# Patient Record
Sex: MALE | Race: WHITE | NOT HISPANIC OR LATINO | Employment: OTHER | URBAN - METROPOLITAN AREA
[De-identification: names, ages, dates, MRNs, and addresses within clinical notes are randomized per-mention and may not be internally consistent; named-entity substitution may affect disease eponyms.]

---

## 2017-01-20 ENCOUNTER — APPOINTMENT (OUTPATIENT)
Dept: LAB | Age: 57
End: 2017-01-20
Payer: COMMERCIAL

## 2017-01-20 ENCOUNTER — ALLSCRIPTS OFFICE VISIT (OUTPATIENT)
Dept: OTHER | Facility: OTHER | Age: 57
End: 2017-01-20

## 2017-01-20 ENCOUNTER — TRANSCRIBE ORDERS (OUTPATIENT)
Dept: ADMINISTRATIVE | Age: 57
End: 2017-01-20

## 2017-01-20 DIAGNOSIS — M79.10 MYALGIA: ICD-10-CM

## 2017-01-20 LAB
25(OH)D3 SERPL-MCNC: 7.9 NG/ML (ref 30–100)
CRP SERPL QL: 8.8 MG/L

## 2017-01-20 PROCEDURE — 86200 CCP ANTIBODY: CPT

## 2017-01-20 PROCEDURE — 86430 RHEUMATOID FACTOR TEST QUAL: CPT

## 2017-01-20 PROCEDURE — 82085 ASSAY OF ALDOLASE: CPT

## 2017-01-20 PROCEDURE — 36415 COLL VENOUS BLD VENIPUNCTURE: CPT

## 2017-01-20 PROCEDURE — 82306 VITAMIN D 25 HYDROXY: CPT

## 2017-01-20 PROCEDURE — 86038 ANTINUCLEAR ANTIBODIES: CPT

## 2017-01-20 PROCEDURE — 86140 C-REACTIVE PROTEIN: CPT

## 2017-01-23 ENCOUNTER — GENERIC CONVERSION - ENCOUNTER (OUTPATIENT)
Dept: OTHER | Facility: OTHER | Age: 57
End: 2017-01-23

## 2017-01-23 LAB
ALDOLASE SERPL-CCNC: 12.6 U/L (ref 3.3–10.3)
CCP IGA+IGG SERPL IA-ACNC: 3 UNITS (ref 0–19)
RHEUMATOID FACT SER QL LA: NEGATIVE
RYE IGE QN: NEGATIVE

## 2017-01-24 ENCOUNTER — GENERIC CONVERSION - ENCOUNTER (OUTPATIENT)
Dept: OTHER | Facility: OTHER | Age: 57
End: 2017-01-24

## 2017-01-31 ENCOUNTER — ALLSCRIPTS OFFICE VISIT (OUTPATIENT)
Dept: RADIOLOGY | Facility: CLINIC | Age: 57
End: 2017-01-31
Payer: COMMERCIAL

## 2017-02-08 ENCOUNTER — GENERIC CONVERSION - ENCOUNTER (OUTPATIENT)
Dept: OTHER | Facility: OTHER | Age: 57
End: 2017-02-08

## 2017-02-10 ENCOUNTER — ALLSCRIPTS OFFICE VISIT (OUTPATIENT)
Dept: OTHER | Facility: OTHER | Age: 57
End: 2017-02-10

## 2017-02-17 ENCOUNTER — ALLSCRIPTS OFFICE VISIT (OUTPATIENT)
Dept: OTHER | Facility: OTHER | Age: 57
End: 2017-02-17

## 2017-02-17 DIAGNOSIS — E55.9 VITAMIN D DEFICIENCY: ICD-10-CM

## 2017-05-12 ENCOUNTER — ALLSCRIPTS OFFICE VISIT (OUTPATIENT)
Dept: OTHER | Facility: OTHER | Age: 57
End: 2017-05-12

## 2017-05-16 ENCOUNTER — HOSPITAL ENCOUNTER (OUTPATIENT)
Dept: RADIOLOGY | Age: 57
Discharge: HOME | End: 2017-05-16
Payer: COMMERCIAL

## 2017-05-16 ENCOUNTER — TRANSCRIBE ORDERS (OUTPATIENT)
Dept: ADMINISTRATIVE | Age: 57
End: 2017-05-16

## 2017-05-16 DIAGNOSIS — N20.0 CALCULUS OF KIDNEY: ICD-10-CM

## 2017-05-16 PROCEDURE — 74000 HB X-RAY EXAM OF ABDOMEN (SINGLE ANTEROPOSTERIOR VIEW): CPT

## 2017-05-19 ENCOUNTER — ALLSCRIPTS OFFICE VISIT (OUTPATIENT)
Dept: OTHER | Facility: OTHER | Age: 57
End: 2017-05-19

## 2017-07-05 ENCOUNTER — APPOINTMENT (OUTPATIENT)
Dept: LAB | Facility: CLINIC | Age: 57
End: 2017-07-05
Payer: COMMERCIAL

## 2017-07-05 ENCOUNTER — TRANSCRIBE ORDERS (OUTPATIENT)
Dept: LAB | Facility: CLINIC | Age: 57
End: 2017-07-05

## 2017-07-05 DIAGNOSIS — R63.4 ABNORMAL WEIGHT LOSS: ICD-10-CM

## 2017-07-05 DIAGNOSIS — R53.83 OTHER FATIGUE: ICD-10-CM

## 2017-07-05 DIAGNOSIS — Z00.8 HEALTH EXAMINATION IN POPULATION SURVEY: ICD-10-CM

## 2017-07-05 DIAGNOSIS — Z00.8 HEALTH EXAMINATION IN POPULATION SURVEY: Primary | ICD-10-CM

## 2017-07-05 LAB
CHOLEST SERPL-MCNC: 248 MG/DL (ref 50–200)
EST. AVERAGE GLUCOSE BLD GHB EST-MCNC: 123 MG/DL
HBA1C MFR BLD: 5.9 % (ref 4.2–6.3)
HDLC SERPL-MCNC: 46 MG/DL (ref 40–60)
LDLC SERPL CALC-MCNC: 156 MG/DL (ref 0–100)
TRIGL SERPL-MCNC: 230 MG/DL

## 2017-07-05 PROCEDURE — 36415 COLL VENOUS BLD VENIPUNCTURE: CPT

## 2017-07-05 PROCEDURE — 80061 LIPID PANEL: CPT

## 2017-07-05 PROCEDURE — 83036 HEMOGLOBIN GLYCOSYLATED A1C: CPT

## 2017-07-20 ENCOUNTER — ALLSCRIPTS OFFICE VISIT (OUTPATIENT)
Dept: OTHER | Facility: OTHER | Age: 57
End: 2017-07-20

## 2017-07-20 DIAGNOSIS — E55.9 VITAMIN D DEFICIENCY: ICD-10-CM

## 2017-07-20 DIAGNOSIS — R53.83 OTHER FATIGUE: ICD-10-CM

## 2017-07-20 DIAGNOSIS — R63.4 ABNORMAL WEIGHT LOSS: ICD-10-CM

## 2017-07-20 DIAGNOSIS — M25.50 PAIN IN JOINT: ICD-10-CM

## 2017-07-21 ENCOUNTER — APPOINTMENT (OUTPATIENT)
Dept: LAB | Facility: CLINIC | Age: 57
End: 2017-07-21
Payer: COMMERCIAL

## 2017-07-21 DIAGNOSIS — I26.99 OTHER PULMONARY EMBOLISM WITHOUT ACUTE COR PULMONALE (HCC): ICD-10-CM

## 2017-07-21 DIAGNOSIS — M54.50 LOW BACK PAIN: ICD-10-CM

## 2017-07-21 DIAGNOSIS — M25.50 PAIN IN JOINT: ICD-10-CM

## 2017-07-21 DIAGNOSIS — R79.89 OTHER SPECIFIED ABNORMAL FINDINGS OF BLOOD CHEMISTRY: ICD-10-CM

## 2017-07-21 DIAGNOSIS — R63.4 ABNORMAL WEIGHT LOSS: ICD-10-CM

## 2017-07-21 DIAGNOSIS — E55.9 VITAMIN D DEFICIENCY: ICD-10-CM

## 2017-07-21 LAB
25(OH)D3 SERPL-MCNC: 23.3 NG/ML (ref 30–100)
BASOPHILS # BLD AUTO: 0.02 THOUSANDS/ΜL (ref 0–0.1)
BASOPHILS NFR BLD AUTO: 0 % (ref 0–1)
CK MB SERPL-MCNC: <1 % (ref 0–2.5)
CK MB SERPL-MCNC: <1 NG/ML (ref 0–5)
CK SERPL-CCNC: 217 U/L (ref 39–308)
CRP SERPL QL: <3 MG/L
EOSINOPHIL # BLD AUTO: 0.08 THOUSAND/ΜL (ref 0–0.61)
EOSINOPHIL NFR BLD AUTO: 1 % (ref 0–6)
ERYTHROCYTE [DISTWIDTH] IN BLOOD BY AUTOMATED COUNT: 13 % (ref 11.6–15.1)
HCT VFR BLD AUTO: 44.3 % (ref 36.5–49.3)
HGB BLD-MCNC: 15.1 G/DL (ref 12–17)
LYMPHOCYTES # BLD AUTO: 2.84 THOUSANDS/ΜL (ref 0.6–4.47)
LYMPHOCYTES NFR BLD AUTO: 43 % (ref 14–44)
MCH RBC QN AUTO: 30.8 PG (ref 26.8–34.3)
MCHC RBC AUTO-ENTMCNC: 34.1 G/DL (ref 31.4–37.4)
MCV RBC AUTO: 90 FL (ref 82–98)
MONOCYTES # BLD AUTO: 0.58 THOUSAND/ΜL (ref 0.17–1.22)
MONOCYTES NFR BLD AUTO: 9 % (ref 4–12)
NEUTROPHILS # BLD AUTO: 3.01 THOUSANDS/ΜL (ref 1.85–7.62)
NEUTS SEG NFR BLD AUTO: 47 % (ref 43–75)
NRBC BLD AUTO-RTO: 0 /100 WBCS
PLATELET # BLD AUTO: 214 THOUSANDS/UL (ref 149–390)
PMV BLD AUTO: 12.2 FL (ref 8.9–12.7)
RBC # BLD AUTO: 4.91 MILLION/UL (ref 3.88–5.62)
T4 FREE SERPL-MCNC: 0.94 NG/DL (ref 0.76–1.46)
TSH SERPL DL<=0.05 MIU/L-ACNC: 5.24 UIU/ML (ref 0.36–3.74)
WBC # BLD AUTO: 6.56 THOUSAND/UL (ref 4.31–10.16)

## 2017-07-21 PROCEDURE — 36415 COLL VENOUS BLD VENIPUNCTURE: CPT

## 2017-07-21 PROCEDURE — 84479 ASSAY OF THYROID (T3 OR T4): CPT

## 2017-07-21 PROCEDURE — 86618 LYME DISEASE ANTIBODY: CPT

## 2017-07-21 PROCEDURE — 84443 ASSAY THYROID STIM HORMONE: CPT

## 2017-07-21 PROCEDURE — 82550 ASSAY OF CK (CPK): CPT

## 2017-07-21 PROCEDURE — 86140 C-REACTIVE PROTEIN: CPT

## 2017-07-21 PROCEDURE — 82306 VITAMIN D 25 HYDROXY: CPT

## 2017-07-21 PROCEDURE — 84439 ASSAY OF FREE THYROXINE: CPT

## 2017-07-21 PROCEDURE — 85025 COMPLETE CBC W/AUTO DIFF WBC: CPT

## 2017-07-21 PROCEDURE — 82553 CREATINE MB FRACTION: CPT

## 2017-07-23 ENCOUNTER — GENERIC CONVERSION - ENCOUNTER (OUTPATIENT)
Dept: OTHER | Facility: OTHER | Age: 57
End: 2017-07-23

## 2017-07-23 LAB
B BURGDOR IGG SER IA-ACNC: 0.09
B BURGDOR IGM SER IA-ACNC: 0.75

## 2017-07-24 ENCOUNTER — GENERIC CONVERSION - ENCOUNTER (OUTPATIENT)
Dept: OTHER | Facility: OTHER | Age: 57
End: 2017-07-24

## 2017-07-25 ENCOUNTER — GENERIC CONVERSION - ENCOUNTER (OUTPATIENT)
Dept: OTHER | Facility: OTHER | Age: 57
End: 2017-07-25

## 2017-07-25 LAB — T3RU NFR SERPL: 29 % (ref 24–39)

## 2017-09-08 ENCOUNTER — GENERIC CONVERSION - ENCOUNTER (OUTPATIENT)
Dept: OTHER | Facility: OTHER | Age: 57
End: 2017-09-08

## 2017-10-24 ENCOUNTER — ALLSCRIPTS OFFICE VISIT (OUTPATIENT)
Dept: OTHER | Facility: OTHER | Age: 57
End: 2017-10-24

## 2017-10-24 DIAGNOSIS — L98.9 DISORDER OF SKIN OR SUBCUTANEOUS TISSUE: ICD-10-CM

## 2017-10-24 PROCEDURE — 88305 TISSUE EXAM BY PATHOLOGIST: CPT | Performed by: PHYSICIAN ASSISTANT

## 2017-10-25 ENCOUNTER — LAB REQUISITION (OUTPATIENT)
Dept: LAB | Facility: HOSPITAL | Age: 57
End: 2017-10-25
Payer: COMMERCIAL

## 2017-10-25 DIAGNOSIS — L98.9 DISORDER OF SKIN OR SUBCUTANEOUS TISSUE: ICD-10-CM

## 2017-10-27 ENCOUNTER — TRANSCRIBE ORDERS (OUTPATIENT)
Dept: LAB | Facility: CLINIC | Age: 57
End: 2017-10-27

## 2017-10-27 ENCOUNTER — APPOINTMENT (OUTPATIENT)
Dept: LAB | Facility: CLINIC | Age: 57
End: 2017-10-27
Payer: COMMERCIAL

## 2017-10-27 DIAGNOSIS — R79.89 OTHER SPECIFIED ABNORMAL FINDINGS OF BLOOD CHEMISTRY: ICD-10-CM

## 2017-10-27 DIAGNOSIS — M54.50 LOW BACK PAIN: ICD-10-CM

## 2017-10-27 DIAGNOSIS — Z12.5 ENCOUNTER FOR SCREENING FOR MALIGNANT NEOPLASM OF PROSTATE: ICD-10-CM

## 2017-10-27 DIAGNOSIS — I26.99 OTHER PULMONARY EMBOLISM WITHOUT ACUTE COR PULMONALE (HCC): ICD-10-CM

## 2017-10-27 LAB
ALBUMIN SERPL BCP-MCNC: 3.9 G/DL (ref 3.5–5)
ALP SERPL-CCNC: 55 U/L (ref 46–116)
ALT SERPL W P-5'-P-CCNC: 27 U/L (ref 12–78)
ANION GAP SERPL CALCULATED.3IONS-SCNC: 4 MMOL/L (ref 4–13)
AST SERPL W P-5'-P-CCNC: 17 U/L (ref 5–45)
BASOPHILS # BLD AUTO: 0.01 THOUSANDS/ΜL (ref 0–0.1)
BASOPHILS NFR BLD AUTO: 0 % (ref 0–1)
BILIRUB SERPL-MCNC: 0.59 MG/DL (ref 0.2–1)
BUN SERPL-MCNC: 18 MG/DL (ref 5–25)
CALCIUM SERPL-MCNC: 9 MG/DL (ref 8.3–10.1)
CHLORIDE SERPL-SCNC: 104 MMOL/L (ref 100–108)
CO2 SERPL-SCNC: 28 MMOL/L (ref 21–32)
CREAT SERPL-MCNC: 1.2 MG/DL (ref 0.6–1.3)
EOSINOPHIL # BLD AUTO: 0.09 THOUSAND/ΜL (ref 0–0.61)
EOSINOPHIL NFR BLD AUTO: 2 % (ref 0–6)
ERYTHROCYTE [DISTWIDTH] IN BLOOD BY AUTOMATED COUNT: 12.8 % (ref 11.6–15.1)
GFR SERPL CREATININE-BSD FRML MDRD: 68 ML/MIN/1.73SQ M
GLUCOSE SERPL-MCNC: 102 MG/DL (ref 65–140)
HCT VFR BLD AUTO: 45.5 % (ref 36.5–49.3)
HGB BLD-MCNC: 16 G/DL (ref 12–17)
LYMPHOCYTES # BLD AUTO: 2.86 THOUSANDS/ΜL (ref 0.6–4.47)
LYMPHOCYTES NFR BLD AUTO: 47 % (ref 14–44)
MCH RBC QN AUTO: 31.5 PG (ref 26.8–34.3)
MCHC RBC AUTO-ENTMCNC: 35.2 G/DL (ref 31.4–37.4)
MCV RBC AUTO: 90 FL (ref 82–98)
MONOCYTES # BLD AUTO: 0.5 THOUSAND/ΜL (ref 0.17–1.22)
MONOCYTES NFR BLD AUTO: 8 % (ref 4–12)
NEUTROPHILS # BLD AUTO: 2.64 THOUSANDS/ΜL (ref 1.85–7.62)
NEUTS SEG NFR BLD AUTO: 43 % (ref 43–75)
NRBC BLD AUTO-RTO: 0 /100 WBCS
PLATELET # BLD AUTO: 215 THOUSANDS/UL (ref 149–390)
PMV BLD AUTO: 11.8 FL (ref 8.9–12.7)
POTASSIUM SERPL-SCNC: 3.7 MMOL/L (ref 3.5–5.3)
PROT SERPL-MCNC: 7.5 G/DL (ref 6.4–8.2)
PSA SERPL-MCNC: 1.4 NG/ML (ref 0–4)
RBC # BLD AUTO: 5.08 MILLION/UL (ref 3.88–5.62)
SODIUM SERPL-SCNC: 136 MMOL/L (ref 136–145)
WBC # BLD AUTO: 6.14 THOUSAND/UL (ref 4.31–10.16)

## 2017-10-27 PROCEDURE — 80053 COMPREHEN METABOLIC PANEL: CPT

## 2017-10-27 PROCEDURE — G0103 PSA SCREENING: HCPCS

## 2017-10-27 PROCEDURE — 85025 COMPLETE CBC W/AUTO DIFF WBC: CPT

## 2017-10-27 PROCEDURE — 36415 COLL VENOUS BLD VENIPUNCTURE: CPT

## 2017-10-30 DIAGNOSIS — I26.99 OTHER PULMONARY EMBOLISM WITHOUT ACUTE COR PULMONALE (HCC): ICD-10-CM

## 2017-10-30 DIAGNOSIS — Z12.5 ENCOUNTER FOR SCREENING FOR MALIGNANT NEOPLASM OF PROSTATE: ICD-10-CM

## 2017-11-10 ENCOUNTER — GENERIC CONVERSION - ENCOUNTER (OUTPATIENT)
Dept: OTHER | Facility: OTHER | Age: 57
End: 2017-11-10

## 2017-11-29 ENCOUNTER — ALLSCRIPTS OFFICE VISIT (OUTPATIENT)
Dept: OTHER | Facility: OTHER | Age: 57
End: 2017-11-29

## 2017-12-05 NOTE — PROGRESS NOTES
Assessment    1  Walking pneumonia (486) (J18 9)    Plan  Walking pneumonia    · Amoxicillin-Pot Clavulanate 500-125 MG Oral Tablet; TAKE 1 TABLET EVERY 12  HOURS DAILY    Discussion/Summary    Walking pneumonia- antibiotics prescribed  Continue with cough medication  Possible side effects of new medications were reviewed with the patient/guardian today  The treatment plan was reviewed with the patient/guardian  The patient/guardian understands and agrees with the treatment plan      Chief Complaint  Patient c/o runny nose, cough, yellowish discolored mucus, sore throat, headaches x3 weeks  History of Present Illness    Clark Jimenes presents with complaints of sudden onset of constant episodes of moderate cold symptoms  Episodes started 3 weeks ago Symptoms are not improved by OTC cold medications (worse at night)   Associated symptoms include nasal congestion, post nasal drainage, hoarseness, productive cough, shortness of breath and fever, but no facial pressure, no facial pain, no headache, no plugged ear(s), no ear pain, no wheezing, no fatigue, no nausea, no vomiting and no chills  The patient presents with complaints of sore throat (first three days)       has been taking Sudafed, sinus headache medication, runny nose  Gets relief for 3 hours but cough does not help  His cough is productive but copious  Using halls at bedtime      Review of Systems    Constitutional: as noted in HPI  Eyes: as noted in HPI    ENT: as noted in HPI  Cardiovascular: no chest pain  Respiratory: as noted in HPI  Gastrointestinal: as noted in HPI  Musculoskeletal: myalgias  Neurological: headache, but as noted in HPI  ROS reviewed  Active Problems    1  Abnormal weight loss (783 21) (R63 4)   2  Anticoagulant long-term use (V58 61) (Z79 01)   3  Arthralgia (719 40) (M25 50)   4  Bilateral flank pain (789 09) (R10 9)   5  Degeneration of lumbar or lumbosacral intervertebral disc (722 52) (M51 37)   6  Depression (311) (F32 9)   7  Encounter for prostate cancer screening (V76 44) (Z12 5)   8  Encounter for screening colonoscopy (V76 51) (Z12 11)   9  Erectile dysfunction, unspecified erectile dysfunction type (607 84) (N52 9)   10  Fatigue (780 79) (R53 83)   11  Flu vaccine need (V04 81) (Z23)   12  Frontal headache (784 0) (R51)   13  Hip pain, right (719 45) (M25 551)   14  Hyperlipidemia (272 4) (E78 5)   15  Kidney stones (592 0) (N20 0)   16  Late onset dysthymia (300 4) (F34 1)   17  Left chest pressure (786 59) (R07 89)   18  Loose bowel movements (787 91) (R19 7)   19  Lower back pain (724 2) (M54 5)   20  Lumbar disc herniation with radiculopathy (722 10) (M51 16)   21  Lumbar radiculopathy (724 4) (M54 16)   22  Lumbosacral radiculopathy (724 4) (M54 17)   23  Myalgia (729 1) (M79 1)   24  Neck strain (847 0) (S16 1XXA)   25  Nephrolithiasis (592 0) (N20 0)   26  Paresthesia (782 0) (R20 2)   27  Pulmonary embolism (415 19) (I26 99)   28  Skin lesion (709 9) (L98 9)   29  Thoracic sprain (847 1) (S23 9XXA)   30  Vitamin D deficiency (268 9) (E55 9)    Past Medical History    The active problems and past medical history were reviewed and updated today  Surgical History    1  History of Hernia Repair   2  History of Oral Surgery Tooth Extraction [place] Tooth    The surgical history was reviewed and updated today  Family History  Mother    1  Family history of cerebrovascular accident (V17 1) (Z82 3)   2  Family history of malignant neoplasm of breast (V16 3) (Z80 3)  Father    3  Family history of diabetes mellitus (V18 0) (Z83 3)   4  Family history of malignant neoplasm (V16 9) (Z80 9)  Brother    5  Family history of melanoma (V16 8) (Z80 8)    The family history was reviewed and updated today         Social History    · Advance directive information unavailable   · Always uses seat belt   · Caffeine use (V49 89) (F15 90)   · Denied: History of Exercises occasionally   · Full-time employment   · Denied: History of domestic violence   ·    · Never smoker   · No alcohol use   · One child  The social history was reviewed and is unchanged  Current Meds   1  Eliquis 2 5 MG Oral Tablet; Therapy: (Recorded:29Nov2017) to Recorded    The medication list was reviewed and updated today  Allergies    1  No Known Drug Allergies    2  No Known Environmental Allergies   3  No Known Food Allergies    Vitals  Vital Signs    Recorded: 67ZVL4501 06:12PM   Temperature 97 5 F   Heart Rate 80   Respiration 16   Systolic 225   Diastolic 70   Height 6 ft 2 in   Weight 199 lb 6 4 oz   BMI Calculated 25 6   BSA Calculated 2 17     Physical Exam    Constitutional   General appearance: No acute distress, well appearing and well nourished  Ears, Nose, Mouth, and Throat   Otoscopic examination: Tympanic membrance translucent with normal light reflex  Canals patent without erythema  Oropharynx: Abnormal   There was erythema of both tonsils  hoarse voice  Pulmonary   Respiratory effort: No increased work of breathing or signs of respiratory distress  Auscultation of lungs: Abnormal   rhonchi over the right midlung field  Cardiovascular   Auscultation of heart: Normal rate and rhythm, normal S1 and S2, without murmurs  Lymphatic   Palpation of lymph nodes in neck: Abnormal   bilateral anterior cervical node enlargement  Psychiatric   Orientation to person, place and time: Normal     Mood and affect: Normal          Future Appointments    Date/Time Provider Specialty Site   12/08/2017 08:20 MARILIN Simon  Hematology Oncology [facility]   [facility]2018 01[facility]00 [facility]     Signatures   Electronically signed by : Anthony Jones; Nov 29 2017  6:33PM EST                       (Author)    Electronically signed by :  Pily Bower MD; Nov 30 2017  7:55AM EST                       (Author)

## 2017-12-08 ENCOUNTER — GENERIC CONVERSION - ENCOUNTER (OUTPATIENT)
Dept: OTHER | Facility: OTHER | Age: 57
End: 2017-12-08

## 2018-01-08 DIAGNOSIS — R53.83 OTHER FATIGUE: ICD-10-CM

## 2018-01-08 DIAGNOSIS — E55.9 VITAMIN D DEFICIENCY: ICD-10-CM

## 2018-01-08 DIAGNOSIS — E78.5 HYPERLIPIDEMIA: ICD-10-CM

## 2018-01-09 NOTE — MISCELLANEOUS
Message     Recorded as Task   Date: 11/01/2016 12:59 PM, Created By: Ron Thorne   Task Name: Call Back   Assigned To: Audra Barraza   Regarding Patient: Mirella Varma, Status: Active   CommentLovette Plum - 01 Nov 2016 12:59 PM     TASK CREATED  Caller: Self; Results Inquiry; [phone] (Home)  [phone] [facility]   [facility] 01 Nov [facility] 3:27 PM     TASK EDITED  Called and spoke with pt regarding his 10/28/16 CT stone study  The study showed a 3 mm left midpole kidney stone  Pt is only having severe pain on his right side/back, especially with getting up from a sitting position  Pt was encouraged to keep his f/u appointment with his PCP this week  Active Problems    1  Abdominal pain (789 00) (R10 9)   2  Abnormal weight loss (783 21) (R63 4)   3  Anticoagulant long-term use (V58 61) (Z79 01)   4  Bilateral flank pain (789 09) (R10 9)   5  Degeneration of lumbar or lumbosacral intervertebral disc (722 52) (M51 37)   6  Dizziness (780 4) (R42)   7  Encounter for prostate cancer screening (V76 44) (Z12 5)   8  Encounter for screening colonoscopy (V76 51) (Z12 11)   9  Erectile dysfunction, unspecified erectile dysfunction type (607 84) (N52 9)   10  Fatigue (780 79) (R53 83)   11  Flu vaccine need (V04 81) (Z23)   12  Frontal headache (784 0) (R51)   13  Hyperlipidemia (272 4) (E78 5)   14  Kidney stones (592 0) (N20 0)   15  Late onset dysthymia (300 4) (F34 1)   16  Left chest pressure (786 59) (R07 89)   17  Loose bowel movements (787 91) (R19 7)   18  Lower back pain (724 2) (M54 5)   19  Myalgia (729 1) (M79 1)   20  Neck strain (847 0) (S16 1XXA)   21  Nephrolithiasis (592 0) (N20 0)   22  Paresthesia (782 0) (R20 2)   23  Pulmonary embolism (415 19) (I26 99)   24  Shortness of breath (786 05) (R06 02)   25  Thoracic sprain (847 1) (S23 9XXA)   26  Wrist pain (069 43) (M25 539)    Current Meds   1   Cyclobenzaprine HCl - 10 MG Oral Tablet; TAKE 1 TABLET 3 TIMES DAILY AS NEEDED; Therapy: 71CJD8386 to (Evaluate:65Lte3171)  Requested for: 72YZC5065; Last   Rx:56Thd3367 Ordered   2  DULoxetine HCl - 60 MG Oral Capsule Delayed Release Particles; TAKE 1 CAPSULE BY   MOUTH EVERY DAY; Therapy: 50LSG9752 to (Roberto Carlos Salinas)  Requested for: 82WAC8082; Last   Rx:55Qgn5990 Ordered   3  Eliquis 2 5 MG Oral Tablet; Take 1 tablet twice daily; Therapy: 70Yja4399 to (Terra Barajas)  Requested for: 21NFV8155; Last   Rx:89Fxb1396 Ordered   4  Nabumetone 500 MG Oral Tablet; TAKE 1 TABLET EVERY 12 HOURS DAILY; Therapy: 96XDS4944 to (01 72 64 30 83)  Requested for: 78DBM7118; Last   Rx:10Oct2016 Ordered   5  Simvastatin 10 MG Oral Tablet; 1 po three times weekly; Therapy: 93JGV5040 to ()  Requested for: 07WIC5824; Last   Rx:10Oct2016 Ordered    Allergies    1  No Known Drug Allergies    2  No Known Environmental Allergies   3  No Known Food Allergies    Signatures   Electronically signed by :  Lior Almazan RN; Nov 1 2016  3:27PM EST                       (Author)

## 2018-01-10 NOTE — MISCELLANEOUS
Message   Recorded as Task   Date: 01/20/2017 02:07 PM, Created By: Bronson Qureshi   Task Name: Miscellaneous   Assigned To: [employer]ag TOÑO,TEAM   Regarding Patient: María Chamberlain, Status: In Progress   Comment:    Kim Eddy - 20 Jan 2017 2:07 PM     TASK CREATED  DR HEARN WANTS HIM TO BE SEEN AGAIN FOR ANOTHER INJECTION  I MADE HIM APPT FOR 2/10 AT 8:45/ ASK DR GARCIA IF HE NEEDS THIS APPT  THANKS   Jada Bojorquez - 20 Jan 2017 2:50 PM     TASK EDITED  S/W pt who reports he got relief for 2 wks after the inj and then he gets relief following his PT session and using his inversion table  He continues with Rt LB and Rt leg pain and now has Left LB pain too  S/P Rt L4-L5 TFESI 12/15/16  I told pt I would d/w Dr Daphne Rider Monday when he returns and maybe he would just rec repeating an inj rather than needing to come in for ov on 2/10/17  Pt remains on Eliquis so I told pt I would send the hold form to Dr Rocael Urena just to be proactive  Eliquis hold form faxed to Dr Rosa Burns - 20 Jan 2017 8:24 PM     TASK REPLIED TO: Previously Assigned To Yeyo Burns  ok to schedule repeat injection   Jada Bojorquez - 23 Jan 2017 8:03 AM     TASK EDITED  Do you want the inj to be bilateral instead of just Rt sided b/c he now is experiencing left LB pain too? Yeyo Burns - 23 Jan 2017 9:48 AM     TASK REPLIED TO: Previously Assigned To Yeyo Burns  no his herniation is more right sided so want to target that   will help with left side too   Jada Bojorquez - 23 Jan 2017 2:26 PM     TASK EDITED  Eliquis hold approval received from Dr Rocael Urena dated 1/20/17  Form to be scanned into chart by Chandrika Rider wants to repeat  Rt L4-L5 TFESI  Left vm on home/cell for pt to c/b  Jada Bojorquez - 23 Jan 2017 2:26 PM     TASK IN PROGRESS   Jada Bojorquez - 24 Jan 2017 8:17 AM     TASK EDITED  Pt informed Dr Daphne Rider rec repeating inj   Eliquis hold approval received      Pt sched for Rt L4-L5 TFESI #2 for 1/31/17 at 1:45 at [facility]  Instr reviewed: light lunch then NPO 1 Hr prior to opro,  needed, c/b needed if sick/abx started prior to opro, Eliquis to be held for 4 days prior to opro, Eliquis to be stopped on 1/27  Pt verbalized understanding of instr  Active Problems    1  Abnormal weight loss (783 21) (R63 4)   2  Anticoagulant long-term use (V58 61) (Z79 01)   3  Azotemia (790 6) (R79 89)   4  Bilateral flank pain (789 09) (R10 9)   5  Degeneration of lumbar or lumbosacral intervertebral disc (722 52) (M51 37)   6  Encounter for prostate cancer screening (V76 44) (Z12 5)   7  Encounter for screening colonoscopy (V76 51) (Z12 11)   8  Erectile dysfunction, unspecified erectile dysfunction type (607 84) (N52 9)   9  Fatigue (780 79) (R53 83)   10  Flu vaccine need (V04 81) (Z23)   11  Frontal headache (784 0) (R51)   12  Hip pain, right (719 45) (M25 551)   13  Hyperlipidemia (272 4) (E78 5)   14  Kidney stones (592 0) (N20 0)   15  Late onset dysthymia (300 4) (F34 1)   16  Left chest pressure (786 59) (R07 89)   17  Loose bowel movements (787 91) (R19 7)   18  Lower back pain (724 2) (M54 5)   19  Lumbar disc herniation with radiculopathy (722 10) (M51 16)   20  Lumbar radiculopathy (724 4) (M54 16)   21  Lumbosacral radiculopathy (724 4) (M54 17)   22  Myalgia (729 1) (M79 1)   23  Neck strain (847 0) (S16 1XXA)   24  Nephrolithiasis (592 0) (N20 0)   25  Paresthesia (782 0) (R20 2)   26  Pulmonary embolism (415 19) (I26 99)   27  Thoracic sprain (847 1) (S23 9XXA)   28  Vitamin D deficiency (268 9) (E55 9)    Current Meds   1  Acetaminophen-Codeine 300-30 MG Oral Tablet (Tylenol with Codeine #3); 1-2 po tid   prn; Therapy: 28ITI3976 to (Evaluate:21Dec2016); Last Rx:01Dec2016 Ordered   2  Baclofen 10 MG Oral Tablet; TAKE 1 TABLET 3 TIMES DAILY AS NEEDED FOR MUSCLE   SPASM; Therapy: 90TXR7706 to (Evaluate:84Hpi9459)  Requested for: 25YLT5895; Last   Rx:04Nov2016 Ordered   3  Cyclobenzaprine HCl - 10 MG Oral Tablet; TAKE 1 TABLET 3 TIMES DAILY AS NEEDED; Therapy: 60GRF2703 to (Evaluate:21Jkv7978)  Requested for: 56XEX0530; Last   Rx:45Nvj6915 Ordered   4  DULoxetine HCl - 60 MG Oral Capsule Delayed Release Particles; TAKE 1 CAPSULE BY   MOUTH EVERY DAY; Therapy: 00USU6761 to (Felisha Mackenzie)  Requested for: 22URH6142; Last   Rx:22Bqc6338 Ordered   5  Eliquis 2 5 MG Oral Tablet; Take 1 tablet twice daily; Therapy: 50Uuo7934 to (Sanam Beltran)  Requested for: 11PGZ4328; Last   Rx:11May2016 Ordered   6  Gabapentin 300 MG Oral Capsule; TAKE 1 CAPSULE Bedtime; Therapy: 47RWX0895 to (Evaluate:90Sus4047)  Requested for: 63JHP9179; Last   Rx:15Rkj6116 Ordered   7  Hydrocodone-Acetaminophen 5-325 MG Oral Tablet (Norco); TAKE 1 TABLET EVERY 4   TO 6 HOURS AS NEEDED FOR PAIN;   Therapy: 58QCT5047 to (Evaluate:24Nov2016); Last Rx:21Nov2016 Ordered   8  MethylPREDNISolone 4 MG Oral Tablet Therapy Pack; Take according to directions; Therapy: 93VIN2319 to (Last Rx:74Cdf5527)  Requested for: 84AHE2229 Ordered   9  Nabumetone 500 MG Oral Tablet; TAKE 1 TABLET EVERY 12 HOURS DAILY; Therapy: 23GVT8944 to ([id number])  Requested for: 00IRI9845; Last   Rx:10Oct2016 Ordered   10  PredniSONE 10 MG Oral Tablet; TAKE 4 TABLETS DAILY FOR 3 DAYS,3 TABLETS    DAILY FOR 3 DAYS, 2 TABLETS DAILY FOR 3 DAYS AND 1 TABLET DAILY FOR    3 DAYS, THEN STOP; Therapy: 02WXU2624 to (Evaluate:66Jor2208)  Requested for: 22Nov2016; Last    Rx:22Nov2016 Ordered   11  Simvastatin 10 MG Oral Tablet; 1 po three times weekly; Therapy: 25XGL8743 to (Mikhail Jennings)  Requested for: 73AGQ3998; Last    Rx:10Oct2016 Ordered   12  Vitamin D (Ergocalciferol) 63428 UNIT Oral Capsule; one weekly till finished; Therapy: 96HKD8799 to (Last Rx:23Jan2017)  Requested for: 57WCV0558 Ordered    Allergies    1  No Known Drug Allergies    2  No Known Environmental Allergies   3   No Known Food Allergies    Signatures   Electronically signed by : Claribel Newman, ; Jan 24 2017  8:19AM EST                       (Author)

## 2018-01-11 NOTE — PROGRESS NOTES
Assessment    1  Degeneration of lumbar or lumbosacral intervertebral disc (722 52) (M51 37)   2  Lumbosacral radiculopathy (724 4) (M54 17)    Plan  Degeneration of lumbar or lumbosacral intervertebral disc    · 1 - Keila Lutz MD, Landry LUNDBERG (Pain Management) Physician Referral  Consult Only: the  expectation is that the referring provider will communicate back to the patient on  treatment options  Evaluation and Treatment: the expectation is that the referred to  provider will communicate back to the patient on treatment options  Status: Active   Requested for: 10GGM3456  Care Summary provided  : Yes   · Follow-up visit in 10 days Evaluation and Treatment  Follow-up  Status: Hold For -  Scheduling  Requested for: 28EIU0922  Degeneration of lumbar or lumbosacral intervertebral disc, Lumbar radiculopathy    · Acetaminophen-Codeine 300-30 MG Oral Tablet (Tylenol with Codeine #3); 1-2 po  tid prn  Degeneration of lumbar or lumbosacral intervertebral disc, Lumbosacral radiculopathy    · MethylPREDNISolone 4 MG Oral Tablet Therapy Pack; Take according to directions    Discussion/Summary  Medication changes are as documented in orders  Treatment plan includes pain medicine consultation  Patient discussion: discussed with the patient, inversion table is ok, no heavy lifitng for now  Chief Complaint  Oncology referral       History of Present Illness  54 yo male with chronic anticoag for thrombosis   Has acute onset over 8 weeks ago, nontraumatic, of right LBP, radiates to hip and anterior thigh, "quivering", now some right heel pain  PCP tried muscle relaxer, had US retroperitoneum, and MRI  Had trial of low dose steroids   unsure of benefit  Some relief of APAP, no incontinence, cannot use stairs, some buckling right knee  Noted dramatic pain relief with inversion table  Active Problems    1  Abnormal weight loss (783 21) (R63 4)   2  Anticoagulant long-term use (V58 61) (Z79 01)   3   Azotemia (790 6) (R79 89) 4  Bilateral flank pain (789 09) (R10 9)   5  Degeneration of lumbar or lumbosacral intervertebral disc (722 52) (M51 37)   6  Encounter for prostate cancer screening (V76 44) (Z12 5)   7  Encounter for screening colonoscopy (V76 51) (Z12 11)   8  Erectile dysfunction, unspecified erectile dysfunction type (607 84) (N52 9)   9  Fatigue (780 79) (R53 83)   10  Flu vaccine need (V04 81) (Z23)   11  Frontal headache (784 0) (R51)   12  Hip pain, right (719 45) (M25 551)   13  Hyperlipidemia (272 4) (E78 5)   14  Kidney stones (592 0) (N20 0)   15  Late onset dysthymia (300 4) (F34 1)   16  Left chest pressure (786 59) (R07 89)   17  Loose bowel movements (787 91) (R19 7)   18  Lower back pain (724 2) (M54 5)   19  Lumbar radiculopathy (724 4) (M54 16)   20  Myalgia (729 1) (M79 1)   21  Neck strain (847 0) (S16 1XXA)   22  Nephrolithiasis (592 0) (N20 0)   23  Paresthesia (782 0) (R20 2)   24  Pulmonary embolism (415 19) (I26 99)   25  Thoracic sprain (847 1) (S23 9XXA)    Past Medical History    The active problems and past medical history were reviewed and updated today  Surgical History    1  History of Hernia Repair   2  History of Oral Surgery Tooth Extraction [place] Tooth    The surgical history was reviewed and updated today  Family History  Mother    1  Family history of cerebrovascular accident (V17 1) (Z82 3)   2  Family history of malignant neoplasm of breast (V16 3) (Z80 3)  Father    3  Family history of diabetes mellitus (V18 0) (Z83 3)   4  Family history of malignant neoplasm (V16 9) (Z80 9)  Brother    5  Family history of melanoma (V16 8) (Z80 8)    The family history was reviewed and updated today         Social History    · Advance directive information unavailable   · Always uses seat belt   · Caffeine use (V49 89) (F15 90)   · Denied: History of Exercises occasionally   · Full-time employment   · Denied: History of domestic violence   ·    · Never smoker   · No alcohol use   · One child    Current Meds   1  Baclofen 10 MG Oral Tablet; TAKE 1 TABLET 3 TIMES DAILY AS NEEDED FOR MUSCLE   SPASM; Therapy: 52OMV7990 to (Evaluate:39Jmj3297)  Requested for: 68FNU6814; Last   Rx:04Nov2016 Ordered   2  Cyclobenzaprine HCl - 10 MG Oral Tablet; TAKE 1 TABLET 3 TIMES DAILY AS NEEDED; Therapy: 49FLX8877 to (Evaluate:14Vom3499)  Requested for: 34KNO0104; Last   Rx:52Fbc0652 Ordered   3  DULoxetine HCl - 60 MG Oral Capsule Delayed Release Particles; TAKE 1 CAPSULE BY   MOUTH EVERY DAY; Therapy: 28KQV6334 to (Roz Jessica)  Requested for: 15OQE9309; Last   Rx:11May2016 Ordered   4  Eliquis 2 5 MG Oral Tablet; Take 1 tablet twice daily; Therapy: 45Kzu7177 to (Consuelo Mcrae)  Requested for: 88WTC6363; Last   Rx:11May2016 Ordered   5  Hydrocodone-Acetaminophen 5-325 MG Oral Tablet (Norco); TAKE 1 TABLET EVERY 4   TO 6 HOURS AS NEEDED FOR PAIN;   Therapy: 79XUT1058 to (Evaluate:24Nov2016); Last Rx:21Nov2016 Ordered   6  Nabumetone 500 MG Oral Tablet; TAKE 1 TABLET EVERY 12 HOURS DAILY; Therapy: 42CWI7339 to (01 72 64 30 83)  Requested for: 63HHZ9851; Last   Rx:10Oct2016 Ordered   7  PredniSONE 10 MG Oral Tablet; TAKE 4 TABLETS DAILY FOR 3 DAYS,3 TABLETS DAILY   FOR 3 DAYS, 2 TABLETS DAILY FOR 3 DAYS AND 1 TABLET DAILY FOR 3 DAYS,   THEN STOP; Therapy: 01QCB8135 to (Evaluate:45Mlm7217)  Requested for: 22Nov2016; Last   Rx:22Nov2016 Ordered   8  Simvastatin 10 MG Oral Tablet; 1 po three times weekly; Therapy: 61KTN6423 to [phone])  Requested for: 22JTS0902; Last   Rx:10Oct2016 Ordered    The medication list was reviewed and updated today  Allergies    1  No Known Drug Allergies    2  No Known Environmental Allergies   3   No Known Food Allergies    Vitals  Signs   Recorded: 89WAV7170 11:13AM   Heart Rate: 80  Systolic: 634  Diastolic: 82  Weight: 417 lb   BMI Calculated: 25 17  BSA Calculated: 2 15    Physical Exam    Lumbosacral Spine: Special Tests: +_ bowatring on the right, + femoral stretch , but negative Straight Leg Raise  Constitutional - General appearance: Abnormal  uncomfortable  Musculoskeletal - Gait and station: Abnormal  Gait evaluation demonstrated lean to the left on table  Neurologic - Cranial nerves: Normal  Reflexes: Abnormal  Deep tendon reflexes: 1+ right patella2+ right biceps, 2+ left biceps, 2+ right triceps, 2+ left triceps, 2+ right brachioradialis, 2+ left brachioradialis, 2+ left patella, 2+ right ankle jerk, 2+ left ankle jerk, no ankle clonus on the right and no ankle clonus on the left  Sensation: Abnormal + deficit right L-3  4       Results/Data    MRI Review L-4/5 protrusion / extrusion  Future Appointments    Date/Time Provider Specialty Site   05/12/2017 08:30 AM Rosalynn Apley, M D   Hematology Oncology [facility]   12/16/2016 08:15 AM Becca Neumann MD Pain Management [facility]   05/19/2017 08:45 AM Dixie Douglass, [facility] Urology [facility] UROLOGY [place]     Signatures   Electronically signed by : Haydee Guthrie DO; Dec  6 2016 11:43AM EST                       (Author)

## 2018-01-11 NOTE — RESULT NOTES
Verified Results  (1) C-REACTIVE PROTEIN 20Jan2017 02:15PM Reddy Kerr    Order Number: [id number]     Test Name Result Flag Reference   C-REACT PROTEIN 8 8 mg/L H <3 0     (1) VITAMIN D 25-HYDROXY 20Jan2017 02:15PM eRddy Kerr    Order Number: [id number]     Test Name Result Flag Reference   VIT D 25-HYDROX 7 9 ng/mL L 30 0-100 0   This assay is a certified procedure of the CDC Vitamin D Standardization Certification Program (VDSCP)     Deficiency <20ng/ml   Insufficiency 20-30ng/ml   Sufficient  ng/ml     *Patients undergoing fluorescein dye angiography may retain small amounts of fluorescein in the body for 48-72 hours post procedure  Samples containing fluorescein can produce falsely elevated Vitamin D values  If the patient had this procedure, a specimen should be resubmitted post fluorescein clearance

## 2018-01-11 NOTE — MISCELLANEOUS
Message   Recorded as Task   Date: 02/07/2017 09:08 AM, Created By: Mic Teran   Task Name: Follow Up   Assigned To: Rosa Darby procedure,Team   Regarding Patient: Joselin Hitchcock, Status: Active   CommentCorrene Sa - 07 Feb 2017 9:08 AM     TASK CREATED  pt is S/P RT L4-L5 TFESI #2  ELIQUIS 01/31/2017 by Dr Carey Sandy   no pain diary   f/u 02/10/2017   Sharri Lares - 07 Feb 2017 1:20 PM     TASK EDITED  1st attempt lm for pt on home phone to call office to f/u   Mic Teran - 07 Feb 2017 1:20 PM     TASK IN PROGRESS   Sharri Lares - 08 Feb 2017 10:28 AM     TASK EDITED  spoke to pt her got 100% relief from inj no pain as of now will contact office if pain comes back   Karla Marte - 08 Feb 2017 12:17 PM     TASK REPLIED TO: Previously Assigned To Karla Marte md aware   pt may f/u PRN        Active Problems    1  Abnormal weight loss (783 21) (R63 4)   2  Anticoagulant long-term use (V58 61) (Z79 01)   3  Azotemia (790 6) (R79 89)   4  Bilateral flank pain (789 09) (R10 9)   5  Degeneration of lumbar or lumbosacral intervertebral disc (722 52) (M51 37)   6  Encounter for prostate cancer screening (V76 44) (Z12 5)   7  Encounter for screening colonoscopy (V76 51) (Z12 11)   8  Erectile dysfunction, unspecified erectile dysfunction type (607 84) (N52 9)   9  Fatigue (780 79) (R53 83)   10  Flu vaccine need (V04 81) (Z23)   11  Frontal headache (784 0) (R51)   12  Hip pain, right (719 45) (M25 551)   13  Hyperlipidemia (272 4) (E78 5)   14  Kidney stones (592 0) (N20 0)   15  Late onset dysthymia (300 4) (F34 1)   16  Left chest pressure (786 59) (R07 89)   17  Loose bowel movements (787 91) (R19 7)   18  Lower back pain (724 2) (M54 5)   19  Lumbar disc herniation with radiculopathy (722 10) (M51 16)   20  Lumbar radiculopathy (724 4) (M54 16)   21  Lumbosacral radiculopathy (724 4) (M54 17)   22  Myalgia (729 1) (M79 1)   23  Neck strain (847 0) (S16 1XXA)   24  Nephrolithiasis (592 0) (N20 0)   25  Paresthesia (782 0) (R20 2)   26  Pulmonary embolism (415 19) (I26 99)   27  Thoracic sprain (847 1) (S23 9XXA)   28  Vitamin D deficiency (268 9) (E55 9)    Current Meds   1  Acetaminophen-Codeine 300-30 MG Oral Tablet (Tylenol with Codeine #3); 1-2 po tid   prn; Therapy: 31GSJ6821 to (Evaluate:40Ypo7770); Last Rx:48Hcq9380 Ordered   2  Baclofen 10 MG Oral Tablet; TAKE 1 TABLET 3 TIMES DAILY AS NEEDED FOR MUSCLE   SPASM; Therapy: 35WHH9425 to (Evaluate:28Ulv2799)  Requested for: 82SXX5451; Last   Rx:04Nov2016 Ordered   3  Cyclobenzaprine HCl - 10 MG Oral Tablet; TAKE 1 TABLET 3 TIMES DAILY AS NEEDED; Therapy: 71TXT7173 to (Evaluate:22Byu2117)  Requested for: 30BVJ6844; Last   Rx:11May2016 Ordered   4  DULoxetine HCl - 60 MG Oral Capsule Delayed Release Particles; TAKE 1 CAPSULE BY   MOUTH EVERY DAY; Therapy: 85DJV2127 to (Marilynn Fitch)  Requested for: 51WSK1266; Last   Rx:11May2016 Ordered   5  Eliquis 2 5 MG Oral Tablet; Take 1 tablet twice daily; Therapy: 94Flh5194 to (Hernán Lara)  Requested for: 17VVC9722; Last   Rx:35Ljj7678 Ordered   6  Gabapentin 300 MG Oral Capsule; TAKE 1 CAPSULE Bedtime; Therapy: 20UJS7988 to (Evaluate:70Tqh5359)  Requested for: 51YYZ6090; Last   Rx:70Amx3636 Ordered   7  Hydrocodone-Acetaminophen 5-325 MG Oral Tablet (Norco); TAKE 1 TABLET EVERY 4   TO 6 HOURS AS NEEDED FOR PAIN;   Therapy: 65BLV7954 to (Evaluate:24Nov2016); Last Rx:21Nov2016 Ordered   8  MethylPREDNISolone 4 MG Oral Tablet Therapy Pack; Take according to directions; Therapy: 01PCE9712 to (Last Rx:60Ppm9474)  Requested for: 81HEH0417 Ordered   9  Nabumetone 500 MG Oral Tablet; TAKE 1 TABLET EVERY 12 HOURS DAILY; Therapy: 49QLT6713 to ([phone])  Requested for: 47HBT5026; Last   Rx:10Oct2016 Ordered   10   PredniSONE 10 MG Oral Tablet; TAKE 4 TABLETS DAILY FOR 3 DAYS,3 TABLETS    DAILY FOR 3 DAYS, 2 TABLETS DAILY FOR 3 DAYS AND 1 TABLET DAILY FOR    3 DAYS, THEN STOP; Therapy: 22EYW6467 to (Evaluate:52Zil1659)  Requested for: 22Nov2016; Last    Rx:22Nov2016 Ordered   11  Simvastatin 10 MG Oral Tablet; 1 po three times weekly; Therapy: 25FFQ8850 to (Bonnie Barlow)  Requested for: 22YCF0894; Last    Rx:10Oct2016 Ordered   12  Vitamin D (Ergocalciferol) 79163 UNIT Oral Capsule; one weekly till finished; Therapy: 66JIZ5456 to (Last Rx:23Jan2017)  Requested for: 29RQP8332 Ordered    Allergies    1  No Known Drug Allergies    2  No Known Environmental Allergies   3   No Known Food Allergies    Signatures   Electronically signed by : Emilee Rordiguez, ; Feb 8 2017  1:27PM EST                       (Author)

## 2018-01-12 VITALS
HEIGHT: 74 IN | WEIGHT: 201 LBS | SYSTOLIC BLOOD PRESSURE: 112 MMHG | HEART RATE: 72 BPM | DIASTOLIC BLOOD PRESSURE: 80 MMHG | BODY MASS INDEX: 25.8 KG/M2

## 2018-01-12 VITALS
DIASTOLIC BLOOD PRESSURE: 82 MMHG | TEMPERATURE: 97.7 F | RESPIRATION RATE: 16 BRPM | WEIGHT: 197 LBS | HEART RATE: 88 BPM | BODY MASS INDEX: 25.28 KG/M2 | OXYGEN SATURATION: 97 % | SYSTOLIC BLOOD PRESSURE: 122 MMHG | HEIGHT: 74 IN

## 2018-01-12 NOTE — PROGRESS NOTES
Assessment    1  Lumbar radiculopathy (724 4) (M54 16)   2  Vitamin D deficiency (268 9) (E55 9)    Plan  Vitamin D deficiency    · (1) VITAMIN D 25-HYDROXY; Status:Active; Requested for:25Ljr3172;     Discussion/Summary  Impression: low back pain and disc herniation  Currently, the condition is improving  The diagnostic plan includes blood test after all eight pills of Vit D done  finish all eight Vit D pills Treatment plan includes exercise regimen  Patient discussion: discussed with the patient  Chief Complaint  12/6 Oncology referral   12/22 follow up after PT and LESI    1/20 follow up   2/17 Follow up      History of Present Illness  56 yo male with chronic anticoag for thrombosis   Has acute onset over 8 weeks ago, nontraumatic, of right LBP, radiates to hip and anterior thigh, "quivering", now some right heel pain  PCP tried muscle relaxer, had US retroperitoneum, and MRI  Had trial of low dose steroids   unsure of benefit  Some relief of APAP, no incontinence, cannot use stairs, some buckling right knee  Noted dramatic pain relief with inversion table  12/22 follow up   feels improved with one LESI, and PT  Will be getting an inversion table  Reports dramatic response to Medrol dose avery first nite  Poor tolerance of sitting in a car  Find passive extension in PT helpful  Sx "nothing like it was"  1/20 now with flair up on EP and off formal PT for about two weeks  now having new left sided Sx   no injury or new activity  Pt  has difficulty describing if meds effective  took last gabapentin last nite  2/17 finds sig  benefit from LESIs      Review of Systems    Gastrointestinal: No complaints of abdominal pain, no constipation, no nausea or vomiting, no diarrhea or bloody stools  Genitourinary: No complaints of dysuria or incontinence, no hesitancy, no nocturia  Musculoskeletal: as noted in HPI  Neurological: no numbness and no tingling  ROS reviewed  Active Problems    1   Abnormal weight loss (783 21) (R63 4)   2  Anticoagulant long-term use (V58 61) (Z79 01)   3  Azotemia (790 6) (R79 89)   4  Bilateral flank pain (789 09) (R10 9)   5  Degeneration of lumbar or lumbosacral intervertebral disc (722 52) (M51 37)   6  Encounter for prostate cancer screening (V76 44) (Z12 5)   7  Encounter for screening colonoscopy (V76 51) (Z12 11)   8  Erectile dysfunction, unspecified erectile dysfunction type (607 84) (N52 9)   9  Fatigue (780 79) (R53 83)   10  Flu vaccine need (V04 81) (Z23)   11  Frontal headache (784 0) (R51)   12  Hip pain, right (719 45) (M25 551)   13  Hyperlipidemia (272 4) (E78 5)   14  Kidney stones (592 0) (N20 0)   15  Late onset dysthymia (300 4) (F34 1)   16  Left chest pressure (786 59) (R07 89)   17  Loose bowel movements (787 91) (R19 7)   18  Lower back pain (724 2) (M54 5)   19  Lumbar disc herniation with radiculopathy (722 10) (M51 16)   20  Lumbar radiculopathy (724 4) (M54 16)   21  Lumbosacral radiculopathy (724 4) (M54 17)   22  Myalgia (729 1) (M79 1)   23  Neck strain (847 0) (S16 1XXA)   24  Nephrolithiasis (592 0) (N20 0)   25  Paresthesia (782 0) (R20 2)   26  Pulmonary embolism (415 19) (I26 99)   27  Thoracic sprain (847 1) (S23 9XXA)   28  Vitamin D deficiency (268 9) (E55 9)    Past Medical History    The active problems and past medical history were reviewed and updated today  Surgical History    1  History of Hernia Repair   2  History of Oral Surgery Tooth Extraction [place] Tooth    The surgical history was reviewed and updated today  Family History  Mother    1  Family history of cerebrovascular accident (V17 1) (Z82 3)   2  Family history of malignant neoplasm of breast (V16 3) (Z80 3)  Father    3  Family history of diabetes mellitus (V18 0) (Z83 3)   4  Family history of malignant neoplasm (V16 9) (Z80 9)  Brother    5  Family history of melanoma (V16 8) (Z80 8)    The family history was reviewed and updated today         Social History    · Advance directive information unavailable   · Always uses seat belt   · Caffeine use (V49 89) (F15 90)   · Denied: History of Exercises occasionally   · Full-time employment   · Denied: History of domestic violence   ·    · Never smoker   · No alcohol use   · One child  The social history was reviewed and is unchanged  Current Meds   1  DULoxetine HCl - 60 MG Oral Capsule Delayed Release Particles; TAKE 1 CAPSULE BY   MOUTH EVERY DAY; Therapy: 35FHZ9088 to (22 550855)  Requested for: 44VLD8001; Last   Rx:75Nza6901 Ordered   2  Eliquis 2 5 MG Oral Tablet; Take 1 tablet twice daily; Therapy: 23Bvr3994 to (Rosia Loges)  Requested for: 20UEH7373; Last   Rx:38Twy9481 Ordered   3  Simvastatin 10 MG Oral Tablet; 1 po three times weekly; Therapy: 59EBU3877 to (96 436100)  Requested for: 09NJW8020; Last   Rx:46Zas6768 Ordered   4  Vitamin D (Ergocalciferol) 26547 UNIT Oral Capsule; one weekly till finished; Therapy: 90NAZ4308 to (Last MQ:44FTL8291)  Requested for: 41RSL6568 Ordered    The medication list was reviewed and updated today  Allergies    1  No Known Drug Allergies    2  No Known Environmental Allergies   3  No Known Food Allergies    Vitals  Signs   Recorded: 53Tnf9866 01:27PM   Heart Rate: 72  Systolic: 496  Diastolic: 80  Height: 6 ft 2 in  Weight: 201 lb   BMI Calculated: 25 81  BSA Calculated: 2 18    Physical Exam        Lumbar/Sacral Spine examination demonstrates Lumbosacral Spine:   Evaluation of Muscle Stretch Reflexes on the right side demonstrates 2/4 Hamstring Reflex, 2/4 Knee Jerk Reflex, 2/4 Ankle Jerk Reflex and negative right ankle clonus  Evaluation of Muscle Stretch Reflexes on the left side demonstrates negative left ankle clonus, but 2/4 Hamstring Reflex, 2/4 Knee Jerk Reflex and 2/4 Ankle Jerk Reflex  Special Tests: negative Straight Leg Raise on right and negative Straight Leg Raise on left        Results/Data    Diagnostic Review Vit D 7 9       Future Appointments    Date/Time Provider Specialty Site   05/12/2017 08:30 AM MARILIN Cardona   Hematology Oncology [facility]   05/19/2017 08:45 AM Yasmine Solis [facility] Urology [facility] [address] [place] [address]     Signatures   Electronically signed by : Dinorah Morris DO; Feb 17 2017  1:54PM EST                       (Author)

## 2018-01-12 NOTE — RESULT NOTES
Verified Results  (1) T3 UPTAKE 16Jxk9371 11:20AM Anisha Cordoba Order Number: [id number]     Test Name Result Flag Reference   T3 UPTAKE 29 %  24 - 39   Performed at:  [address] [place] [address]  [id number]  : Ethan Li MD, Phone:  [phone]

## 2018-01-13 VITALS
RESPIRATION RATE: 18 BRPM | OXYGEN SATURATION: 96 % | BODY MASS INDEX: 24.96 KG/M2 | WEIGHT: 194.5 LBS | TEMPERATURE: 97.8 F | DIASTOLIC BLOOD PRESSURE: 86 MMHG | HEART RATE: 76 BPM | SYSTOLIC BLOOD PRESSURE: 118 MMHG | HEIGHT: 74 IN

## 2018-01-13 VITALS
HEART RATE: 80 BPM | DIASTOLIC BLOOD PRESSURE: 80 MMHG | BODY MASS INDEX: 25.28 KG/M2 | HEIGHT: 74 IN | SYSTOLIC BLOOD PRESSURE: 118 MMHG | WEIGHT: 197 LBS

## 2018-01-13 VITALS
TEMPERATURE: 97.8 F | WEIGHT: 201 LBS | RESPIRATION RATE: 16 BRPM | BODY MASS INDEX: 25.8 KG/M2 | HEART RATE: 86 BPM | SYSTOLIC BLOOD PRESSURE: 130 MMHG | DIASTOLIC BLOOD PRESSURE: 78 MMHG | HEIGHT: 74 IN

## 2018-01-14 VITALS
BODY MASS INDEX: 25.59 KG/M2 | DIASTOLIC BLOOD PRESSURE: 70 MMHG | TEMPERATURE: 97.5 F | SYSTOLIC BLOOD PRESSURE: 120 MMHG | HEIGHT: 74 IN | RESPIRATION RATE: 16 BRPM | WEIGHT: 199.4 LBS | HEART RATE: 80 BPM

## 2018-01-14 NOTE — MISCELLANEOUS
Message  Called and spoke with pt in regards to his 8/21/16 BMP results  Pt's Cr level was elevated at 1 40  Pt does report a history of kidney stones and his father had renal cell carcinoma  Pt was agreeable to a US of his kidney's and bladder- to be scheduled at [facility] radiology dept  Pt was also made aware that Dr Darrin Tyson would like the pt to have another BMP done- script was mailed to pt  Active Problems    1  Abdominal pain (789 00) (R10 9)   2  Abnormal weight loss (783 21) (R63 4)   3  Anticoagulant long-term use (V58 61) (Z79 01)   4  Bilateral flank pain (789 09) (R10 9)   5  Degeneration of lumbar or lumbosacral intervertebral disc (722 52) (M51 37)   6  Dizziness (780 4) (R42)   7  Encounter for prostate cancer screening (V76 44) (Z12 5)   8  Encounter for screening colonoscopy (V76 51) (Z12 11)   9  Erectile dysfunction, unspecified erectile dysfunction type (607 84) (N52 9)   10  Fatigue (780 79) (R53 83)   11  Flu vaccine need (V04 81) (Z23)   12  Frontal headache (784 0) (R51)   13  Hyperlipidemia (272 4) (E78 5)   14  Late onset dysthymia (300 4) (F34 1)   15  Left chest pressure (786 59) (R07 89)   16  Loose bowel movements (787 91) (R19 7)   17  Myalgia (729 1) (M79 1)   18  Neck strain (847 0) (S16 1XXA)   19  Paresthesia (782 0) (R20 2)   20  Pulmonary embolism (415 19) (I26 99)   21  Shortness of breath (786 05) (R06 02)   22  Thoracic sprain (847 1) (S23 9XXA)   23  Wrist pain (719 43) (M25 539)    Current Meds   1  Cialis 20 MG Oral Tablet; TAKE 1 TABLET BY MOUTH EVERY OTHER DAY AS NEEDED    Requested for: 02OMP3924; Last Rx:07Bxk8611 Ordered   2  Cyclobenzaprine HCl - 10 MG Oral Tablet; TAKE 1 TABLET 3 TIMES DAILY AS NEEDED; Therapy: 40TSS4053 to (Evaluate:67Tuy7835)  Requested for: 04UMC5243; Last   Rx:37Klr5690 Ordered   3  DULoxetine HCl - 60 MG Oral Capsule Delayed Release Particles; TAKE 1 CAPSULE BY   MOUTH EVERY DAY;    Therapy: 75MSC8905 to AlfredoNeumann Shield) Requested for: 11HAK9836; Last   Rx:98Xao9872 Ordered   4  Eliquis 2 5 MG Oral Tablet; Take 1 tablet twice daily; Therapy: 08Ahm7976 to (Ines Jensen)  Requested for: 80CHH0516; Last   Rx:45Xpg2534 Ordered   5  Nabumetone 500 MG Oral Tablet; TAKE 1 TABLET EVERY 12 HOURS DAILY; Therapy: 58MZR0722 to (21 )  Requested for: 06MRB1414; Last   Rx:10Oct2016 Ordered   6  Simvastatin 10 MG Oral Tablet; 1 po three times weekly; Therapy: 22MES7388 to (21 )  Requested for: 89HDB5827; Last   Rx:10Oct2016 Ordered    Allergies    1  No Known Drug Allergies    2  No Known Environmental Allergies   3  No Known Food Allergies    Signatures   Electronically signed by :  Myanor Horton RN; Oct 19 2016 11:07AM EST                       (Author)

## 2018-01-15 NOTE — MISCELLANEOUS
Message   Recorded as Task   Date: 11/21/2016 10:31 AM, Created By: Devendra Duran   Task Name: Call Back   Assigned To: Diamond Mack   Regarding Patient: Malika Tay, Status: Active   CommentCharity Matter - 21 Nov 2016 10:31 AM     TASK CREATED  Caller: Self; Results Inquiry; [phone] (Home)  PT REQMRI results in allscripts Nov 17th call #[phone]   Diamond Booanchi - 21 Nov 2016 11:59 AM     TASK EDITED  Printed out report of 11/17/16 Lumbar MRI results  Per the radiologist the pt only has some mild degenerative changes to his spine-Pt made aware  Pt wants Dr Radha Branch to review the report and get back to him  Active Problems    1  Abnormal weight loss (783 21) (R63 4)   2  Anticoagulant long-term use (V58 61) (Z79 01)   3  Azotemia (790 6) (R79 89)   4  Bilateral flank pain (789 09) (R10 9)   5  Degeneration of lumbar or lumbosacral intervertebral disc (722 52) (M51 37)   6  Encounter for prostate cancer screening (V76 44) (Z12 5)   7  Encounter for screening colonoscopy (V76 51) (Z12 11)   8  Erectile dysfunction, unspecified erectile dysfunction type (607 84) (N52 9)   9  Fatigue (780 79) (R53 83)   10  Flu vaccine need (V04 81) (Z23)   11  Frontal headache (784 0) (R51)   12  Hip pain, right (719 45) (M25 551)   13  Hyperlipidemia (272 4) (E78 5)   14  Kidney stones (592 0) (N20 0)   15  Late onset dysthymia (300 4) (F34 1)   16  Left chest pressure (786 59) (R07 89)   17  Loose bowel movements (787 91) (R19 7)   18  Lower back pain (724 2) (M54 5)   19  Lumbar radiculopathy (724 4) (M54 16)   20  Myalgia (729 1) (M79 1)   21  Neck strain (847 0) (S16 1XXA)   22  Nephrolithiasis (592 0) (N20 0)   23  Paresthesia (782 0) (R20 2)   24  Pulmonary embolism (415 19) (I26 99)   25  Thoracic sprain (847 1) (S23 9XXA)    Current Meds   1  Baclofen 10 MG Oral Tablet; TAKE 1 TABLET 3 TIMES DAILY AS NEEDED FOR MUSCLE   SPASM;    Therapy: 01XSE6256 to (Evaluate:31Eqq1662)  Requested for: 16OMH8584; Last TM:24REC1644 Ordered   2  Cyclobenzaprine HCl - 10 MG Oral Tablet; TAKE 1 TABLET 3 TIMES DAILY AS NEEDED; Therapy: 77PJF1010 to (Evaluate:37Jbh7365)  Requested for: 04QCG2308; Last   Rx:11May2016 Ordered   3  DULoxetine HCl - 60 MG Oral Capsule Delayed Release Particles; TAKE 1 CAPSULE BY   MOUTH EVERY DAY; Therapy: 82KEH6325 to (Rosie Gensarah)  Requested for: 57CPA2241; Last   Rx:11May2016 Ordered   4  Eliquis 2 5 MG Oral Tablet; Take 1 tablet twice daily; Therapy: 47Yqp1167 to (Arvis Fix)  Requested for: 94ZRM5598; Last   Rx:11May2016 Ordered   5  Hydrocodone-Acetaminophen 5-325 MG Oral Tablet (Norco); TAKE 1 TABLET EVERY 4   TO 6 HOURS AS NEEDED FOR PAIN;   Therapy: 19XJC2735 to (Evaluate:24Nov2016); Last Rx:21Nov2016 Ordered   6  Nabumetone 500 MG Oral Tablet; TAKE 1 TABLET EVERY 12 HOURS DAILY; Therapy: 71KYQ2475 to (77 873 135)  Requested for: 79VBV6302; Last   Rx:10Oct2016 Ordered   7  Simvastatin 10 MG Oral Tablet; 1 po three times weekly; Therapy: 67HXJ5427 to (Moses Roldan)  Requested for: 64HPN2231; Last   Rx:10Oct2016 Ordered    Allergies    1  No Known Drug Allergies    2  No Known Environmental Allergies   3  No Known Food Allergies    Signatures   Electronically signed by :  Viktoriya Holder RN; Nov 21 2016 12:00PM EST                       (Author)

## 2018-01-15 NOTE — CONSULTS
History of Present Illness  56-year-old male who states he has had this lesion in his left postauricular area for approximately 2 years  He says sometimes bleeds  He was told it is likely an ingrown hair  He presents today for evaluation  Discussion/Summary    56-year-old male with a left postauricular lesion that causes him occasional pain and sometimes bleeds  A 4 mm punch biopsy was performed without complication  He will follow up with us in 2 weeks for discussion of pathology results and excision plans        Signatures   Electronically signed by : Peggy Enriquez, [facility]; Oct 24 2017  9:29AM EST                       (Author)    Electronically signed by : MARILIN Silva ; Oct 27 2017 11:48AM EST                       (Author)

## 2018-01-15 NOTE — RESULT NOTES
Verified Results  (1) ALDOLASE 20Jan2017 02:15PM Amber Castro    Order Number: [id number]     Test Name Result Flag Reference   ALDOLASE 12 6 U/L H 3 3 - 10 3   Performed at:  [address]  [id number]  : Lefty Sebastian MD, Phone:  [phone]

## 2018-01-15 NOTE — MISCELLANEOUS
Message   Recorded as Task   Date: 12/09/2016 11:10 AM, Created By: Lisa Ward   Task Name: Follow Up   Assigned To: PRABHU Cooley See   Regarding Patient: Kaleb Chawla, Status: In Progress   Comment:    Jada Bojorquez - 09 Dec 2016 11:10 AM     TASK CREATED  Other  Eliquis hold form was faxed by Irena Edmond as Urgent to Dr Emanuel Colmenares office today at 8:55  S/W Shantell Moya at Dr Emanuel Colmenares office if Dr  had signed off on form yet as injection is scheduled for 12/15 and we require a 4 day hold of Eliquis which means pt would need to stop Eliquis on Sunday, so I need to contact pt today before our office closes at 12noon  Shantell Moya will s/w Dr Ileana Ramirez as soon as he finishes seeing a pt and she will call me back and I also gave her our back fax # here at [facility] to fax form to me  Await decision from Jada Martínez - 09 Dec 2016 11:10 AM     TASK IN PROGRESS   Jada Bojorquez - 09 Dec 2016 11:21 AM     TASK EDITED  Shantell Moya called back saying Dr Marilin Brandon said he would only allow Eliquis to be held 1-2 days not 4 days  I informed Dr Lucas Nowak who said our guidelines to perform HELEN is that eliquis be held then pt will be told injection can't be done  Dr Lucas Nowak said to tell Dr Ileana Ramirez that pt had said he had already stopped it for 3-4 days  Shantell Moya is going to s/w Dr Ileana Ramirez with this updated info and then c/b  Lisa Ward - 09 Dec 2016 12:15 PM     TASK EDITED  S/W Shantell Moya who she said Dr Lin is questioning what guidelines we are using to make this decision  I told her the [employer] which is stated on our form  Shantell Moya is requesting that Dr  Starling Gibson call and s/w Dr Ileana Ramirez directly on his cell    Cell # [phone]   Nivia Nehemias - 09 Dec 2016 12:24 PM     TASK REPLIED TO: Previously Assigned To [facility],Team  Spoke to Dr Ileana Ramirez who has agreed to 4 day hold and will fax back   Lisa Ward - 09 Dec 2016 12:33 PM TASK EDITED  S/W pt and advised that Dr Hever Castaneda s/w Dr Demar Chavez and he will give permission for pt to hold his Eliquis for 4 days prior to opro  Pt advised he needs to resume taking his eliquis today and then stop it starting Sun 12/11 for 4 days  Pt understood instructions  I confirmed pt was given the other pre-procedure instr from Jada Angela - 09 Dec 2016 12:33 PM     TASK IN PROGRESS   Jada Bojorquez - 12 Dec 2016 4:34 PM     TASK EDITED  Eliquis hold approval received today 12/12 from Dr Demar Chaevz and given to [place] to scan  Pt was already given instr about holding his Eliquis on friday 12/9 when I s/w him  Active Problems    1  Abnormal weight loss (783 21) (R63 4)   2  Anticoagulant long-term use (V58 61) (Z79 01)   3  Azotemia (790 6) (R79 89)   4  Bilateral flank pain (789 09) (R10 9)   5  Degeneration of lumbar or lumbosacral intervertebral disc (722 52) (M51 37)   6  Encounter for prostate cancer screening (V76 44) (Z12 5)   7  Encounter for screening colonoscopy (V76 51) (Z12 11)   8  Erectile dysfunction, unspecified erectile dysfunction type (607 84) (N52 9)   9  Fatigue (780 79) (R53 83)   10  Flu vaccine need (V04 81) (Z23)   11  Frontal headache (784 0) (R51)   12  Hip pain, right (719 45) (M25 551)   13  Hyperlipidemia (272 4) (E78 5)   14  Kidney stones (592 0) (N20 0)   15  Late onset dysthymia (300 4) (F34 1)   16  Left chest pressure (786 59) (R07 89)   17  Loose bowel movements (787 91) (R19 7)   18  Lower back pain (724 2) (M54 5)   19  Lumbar disc herniation with radiculopathy (722 10) (M51 16)   20  Lumbar radiculopathy (724 4) (M54 16)   21  Lumbosacral radiculopathy (724 4) (M54 17)   22  Myalgia (729 1) (M79 1)   23  Neck strain (847 0) (S16 1XXA)   24  Nephrolithiasis (592 0) (N20 0)   25  Paresthesia (782 0) (R20 2)   26  Pulmonary embolism (415 19) (I26 99)   27  Thoracic sprain (847 1) (S23 9XXA)    Current Meds   1   Acetaminophen-Codeine 300-30 MG Oral Tablet (Tylenol with Codeine #3); 1-2 po tid   prn; Therapy: 33KGG0092 to (Evaluate:47Kve1423); Last Rx:94Xei5313 Ordered   2  Baclofen 10 MG Oral Tablet; TAKE 1 TABLET 3 TIMES DAILY AS NEEDED FOR MUSCLE   SPASM; Therapy: 68EVT3819 to (Evaluate:33Noz8356)  Requested for: 81SDF3211; Last   Rx:04Nov2016 Ordered   3  Cyclobenzaprine HCl - 10 MG Oral Tablet; TAKE 1 TABLET 3 TIMES DAILY AS NEEDED; Therapy: 50IKF6813 to (Evaluate:49Ymf1396)  Requested for: 68WFD1562; Last   Rx:81Lcb0116 Ordered   4  DULoxetine HCl - 60 MG Oral Capsule Delayed Release Particles; TAKE 1 CAPSULE BY   MOUTH EVERY DAY; Therapy: 89UVZ2139 to (Chas Vazquez)  Requested for: 54XGU0896; Last   Rx:45Yeu3873 Ordered   5  Eliquis 2 5 MG Oral Tablet; Take 1 tablet twice daily; Therapy: 00Kur6954 to (Yu Polo)  Requested for: 73RWW3371; Last   Rx:11May2016 Ordered   6  Gabapentin 300 MG Oral Capsule; TAKE 1 CAPSULE Bedtime; Therapy: 11TWJ6330 to (Evaluate:33Udl5712)  Requested for: 80BFW4100; Last   Rx:04Bjv8704 Ordered   7  Hydrocodone-Acetaminophen 5-325 MG Oral Tablet (Norco); TAKE 1 TABLET EVERY 4   TO 6 HOURS AS NEEDED FOR PAIN;   Therapy: 13ACG0751 to (Evaluate:24Nov2016); Last Rx:21Nov2016 Ordered   8  MethylPREDNISolone 4 MG Oral Tablet Therapy Pack; Take according to directions; Therapy: 66EBG7515 to (Last Rx:26Qzj9251)  Requested for: 73NJL3725 Ordered   9  Nabumetone 500 MG Oral Tablet; TAKE 1 TABLET EVERY 12 HOURS DAILY; Therapy: 40IBA9953 to (72 470 15 18)  Requested for: 25CVO2919; Last   Rx:10Oct2016 Ordered   10  PredniSONE 10 MG Oral Tablet; TAKE 4 TABLETS DAILY FOR 3 DAYS,3 TABLETS    DAILY FOR 3 DAYS, 2 TABLETS DAILY FOR 3 DAYS AND 1 TABLET DAILY FOR    3 DAYS, THEN STOP; Therapy: 81CRD8098 to (Evaluate:52Sjg1049)  Requested for: 22Nov2016; Last    Rx:22Nov2016 Ordered   11  Simvastatin 10 MG Oral Tablet; 1 po three times weekly;     Therapy: 01MMO0135 to (03 17 74 30 53)  Requested for: 20LPT9384; Last    Rx:72Yxa1171 Ordered    Allergies    1  No Known Drug Allergies    2  No Known Environmental Allergies   3   No Known Food Allergies    Signatures   Electronically signed by : Gita Schrader, ; Dec 12 2016  4:35PM EST                       (Author)

## 2018-01-15 NOTE — MISCELLANEOUS
Message  called pt  with labs Vi D is 7 9 () will send Rx and needs repeating in 8 weeks   also may be getting 2nd LESI        Plan  Vitamin D deficiency    · Vitamin D (Ergocalciferol) 18752 UNIT Oral Capsule; one weekly till finished    Signatures   Electronically signed by : Chandni Godinez DO; Jan 23 2017  1:18PM EST                       (Author)

## 2018-01-16 NOTE — RESULT NOTES
Verified Results  (1) VITAMIN D 25-HYDROXY 96Ket1066 07:12AM Tal Phan    Order Number: [id number]     Test Name Result Flag Reference   VIT D 25-HYDROX 23 3 ng/mL L 30 0-100 0   Performing Comments: Have drawn after all eight Vit D pills taken  Georgette Ormond about third week of march  This assay is a certified procedure of the CDC Vitamin D Standardization Certification Program (VDSCP)     Deficiency <20ng/ml   Insufficiency 20-30ng/ml   Sufficient  ng/ml     *Patients undergoing fluorescein dye angiography may retain small amounts of fluorescein in the body for 48-72 hours post procedure  Samples containing fluorescein can produce falsely elevated Vitamin D values  If the patient had this procedure, a specimen should be resubmitted post fluorescein clearance

## 2018-01-16 NOTE — RESULT NOTES
Verified Results  (1) IRAIS SCREEN W/REFLEX TO TITER/PATTERN 70GTR9176 02:15PM Chirag Choudhury    Order Number: [id number]     Test Name Result Flag Reference   IRAIS SCREEN   Negative  Negative

## 2018-01-17 NOTE — RESULT NOTES
Verified Results  (1) CYCLIC CITRULLINATED PEPTIDE 20Jan2017 02:15PM Magdijohnathan Pena    Order Number: [id number]     Test Name Result Flag Reference   CYCLIC CITRULLINATED PEPTIDE 3 units  0 - 19   Negative               <20                            Weak positive      20 - 39                            Moderate positive  40 - 59                            Strong positive        >59    Performed at:  [address]  [id number]  : Xiomara Sewell MD, Phone:  [phone] (1) [address] [id number] 02:15PM Olivier Srivastava Order Number: [id number]     Test Name Result Flag Reference   RHEUMATOID FACTOR Negative  Negative

## 2018-01-17 NOTE — RESULT NOTES
Verified Results  (1) TSH 46PVZ6959 07:12AM Darin Reese     Test Name Result Flag Reference   TSH 5 240 uIU/mL H 0 358-3 740   Patients undergoing fluorescein dye angiography may retain small amounts of fluorescein in the body for 48-72 hours post procedure  Samples containing fluorescein can produce falsely depressed TSH values  If the patient had this procedure,a specimen should be resubmitted post fluorescein clearance  (1) T4, FREE 38Xpl5044 07:12AM Darin Reese     Test Name Result Flag Reference   T4,FREE 0 94 ng/dL  0 76-1 46     (1) LYME ANTIBODY PROFILE [facility] TO WESTERN BLOT 53CEV1664 07:12AM Danny Ma Order Number: [id number]     Test Name Result Flag Reference   LYME IGG 0 09  0 00-0 79   NEGATIVE(0 00-0 79)-Absence of detectable Borrelia IgG Antibodies  A negative result does not exclude the possibility of Borrelia infection  If early Lyme disease is suspected,a second sample should be collected & tested 4 weeks after initial testing  LYME IGM 0 75  0 00-0 79   NEGATIVE (0 00-0 79)-Absence of detectable Borrelia IgM antibodies  A negative result does not exclude the possibility of Borrelia infection  If early lyme disease is suspected, a second sample should be collected & tested 4 weeks after initial testing      (1) C-REACTIVE PROTEIN 95Mcj0560 07:12AM Darin Reese     Test Name Result Flag Reference   C-REACT PROTEIN <3 0 mg/L  <3 0     (1) CK (CPK) 98DWA1817 07:12AM Darin Reese     Test Name Result Flag Reference   CK (CPK) 217 U/L     CK MB FRACTION <1 0 ng/mL  0 0-5 0   CK-MB INDEX <1 0 %  0 0-2 5       Plan  Abnormal weight loss, Fatigue    · (1) T3 UPTAKE; Status:Active; Requested for:23Uak4935;    · (1) T4, FREE; Status:Active;  Requested for:42Htk3559;

## 2018-01-17 NOTE — MISCELLANEOUS
Message   Recorded as Task   Date: 10/24/2016 08:52 AM, Created By: Viri Najera   Task Name: Call Back   Assigned To: Diamond Mack   Regarding Patient: Malika Tay, Status: Active   CommentMichelle Castellon - 24 Oct 2016 8:52 AM     TASK CREATED  patient called for U/S results done on friday  Exam is in process  It is not yet finalized  Please call patient when result is complete  [address] 24 Oct 2016 1:17 PM     TASK EDITED  report not final yet   Diamond Mack - 24 Oct 2016 4:45 PM     TASK EDITED  Called and spoke with pt in regards to his 10/21/16 US of bladder and kidney's  Pt's kidney's and bladder were normal  Ureters were not visualized  Pt is still concerned that he has kidney stones since he is still having severe flank pain  Pt will f/u with his PCP about ordering a abd/pelvic CT scan to r/o kidney stones  Active Problems    1  Abdominal pain (789 00) (R10 9)   2  Abnormal weight loss (783 21) (R63 4)   3  Anticoagulant long-term use (V58 61) (Z79 01)   4  Bilateral flank pain (789 09) (R10 9)   5  Degeneration of lumbar or lumbosacral intervertebral disc (722 52) (M51 37)   6  Dizziness (780 4) (R42)   7  Encounter for prostate cancer screening (V76 44) (Z12 5)   8  Encounter for screening colonoscopy (V76 51) (Z12 11)   9  Erectile dysfunction, unspecified erectile dysfunction type (607 84) (N52 9)   10  Fatigue (780 79) (R53 83)   11  Flu vaccine need (V04 81) (Z23)   12  Frontal headache (784 0) (R51)   13  Hyperlipidemia (272 4) (E78 5)   14  Late onset dysthymia (300 4) (F34 1)   15  Left chest pressure (786 59) (R07 89)   16  Loose bowel movements (787 91) (R19 7)   17  Myalgia (729 1) (M79 1)   18  Neck strain (847 0) (S16 1XXA)   19  Paresthesia (782 0) (R20 2)   20  Pulmonary embolism (415 19) (I26 99)   21  Shortness of breath (786 05) (R06 02)   22  Thoracic sprain (847 1) (S23 9XXA)   23  Wrist pain (459 43) (H10 809)    Current Meds   1   Cialis 20 MG Oral Tablet; TAKE 1 TABLET BY MOUTH EVERY OTHER DAY AS NEEDED    Requested for: 93EAH2934; Last Rx:06Fgr6696 Ordered   2  Cyclobenzaprine HCl - 10 MG Oral Tablet; TAKE 1 TABLET 3 TIMES DAILY AS NEEDED; Therapy: 17ZLT7214 to (Evaluate:92Vuc1898)  Requested for: 57VNO3709; Last   Rx:78Wjv2963 Ordered   3  DULoxetine HCl - 60 MG Oral Capsule Delayed Release Particles; TAKE 1 CAPSULE BY   MOUTH EVERY DAY; Therapy: 75VBR7972 to (Antonio Beltran)  Requested for: 06GXQ5106; Last   Rx:11May2016 Ordered   4  Eliquis 2 5 MG Oral Tablet; Take 1 tablet twice daily; Therapy: 08Qjc9107 to (Swetha Fischer)  Requested for: 04XHF5507; Last   Rx:11May2016 Ordered   5  Nabumetone 500 MG Oral Tablet; TAKE 1 TABLET EVERY 12 HOURS DAILY; Therapy: 41PEC1244 to ()  Requested for: 54GGW2674; Last   Rx:10Oct2016 Ordered   6  Simvastatin 10 MG Oral Tablet; 1 po three times weekly; Therapy: 49CXT9895 to ([phone])  Requested for: 01SDP6109; Last   Rx:34Ygh5561 Ordered    Allergies    1  No Known Drug Allergies    2  No Known Environmental Allergies   3  No Known Food Allergies    Signatures   Electronically signed by :  Pérez Guerin RN; Oct 24 2016  4:46PM EST                       (Author)

## 2018-01-17 NOTE — PROGRESS NOTES
Assessment    1  Lower back pain (724 2) (M54 5)   2  Myalgia (729 1) (M79 1)    Plan  Lumbar disc herniation with radiculopathy, Lumbar radiculopathy    · Pain Management Follow Up Evaluation and Treatment  Follow-up Dr Audra Katz pt  interested in second L HELEN  Status: Hold For - Scheduling  Requested for: 72WCY2136  Lumbar disc herniation with radiculopathy, Lumbosacral radiculopathy, Myalgia    · Follow-up Visit in 4 Weeks Evaluation and Treatment  Follow-up  Status: Hold For -  Scheduling  Requested for: 20Jan2017  Myalgia    · (1) ALDOLASE; Status:Active; Requested XQY:58EBB0607;    · (1) IRAIS SCREEN W/REFLEX TO TITER/PATTERN; Status:Active; Requested  DKV:05MDY9400;    · (1) C-REACTIVE PROTEIN; Status:Active; Requested UQU:75GKW7091;    · (1) CYCLIC CITRULLINATED PEPTIDE; Status:Active; Requested YQA:82DSX1135;    · (1) RHEUMATOID FACTOR SCREEN; Status:Active; Requested EHW:57SPF1074;    · (1) VITAMIN D 25-HYDROXY; Status:Active; Requested IAE:64FDU2905;     Discussion/Summary  Impression: disc herniation  Currently, the condition is worsening  The diagnostic plan includes blood tests will call with results  Treatment plan includes pain medicine consultation  Patient discussion: discussed with the patient  Chief Complaint  12/6 Oncology referral   12/22 follow up after PT and LESI    1/20 follow up      History of Present Illness  56 yo male with chronic anticoag for thrombosis   Has acute onset over 8 weeks ago, nontraumatic, of right LBP, radiates to hip and anterior thigh, "quivering", now some right heel pain  PCP tried muscle relaxer, had US retroperitoneum, and MRI  Had trial of low dose steroids   unsure of benefit  Some relief of APAP, no incontinence, cannot use stairs, some buckling right knee  Noted dramatic pain relief with inversion table  12/22 follow up   feels improved with one LESI, and PT  Will be getting an inversion table  Reports dramatic response to Medrol dose avery first nite   Poor tolerance of sitting in a car  Find passive extension in PT helpful  Sx "nothing like it was"  1/20 now with flair up on EP and off formal PT for about two weeks  now having new left sided Sx   no injury or new activity  Pt  has difficulty describing if meds effective  took last gabapentin last nite  Review of Systems    Gastrointestinal: No complaints of abdominal pain, no constipation, no nausea or vomiting, no diarrhea or bloody stools  Genitourinary: No complaints of dysuria or incontinence, no hesitancy, no nocturia  Musculoskeletal: as noted in HPI  Active Problems    1  Abnormal weight loss (783 21) (R63 4)   2  Anticoagulant long-term use (V58 61) (Z79 01)   3  Azotemia (790 6) (R79 89)   4  Bilateral flank pain (789 09) (R10 9)   5  Degeneration of lumbar or lumbosacral intervertebral disc (722 52) (M51 37)   6  Encounter for prostate cancer screening (V76 44) (Z12 5)   7  Encounter for screening colonoscopy (V76 51) (Z12 11)   8  Erectile dysfunction, unspecified erectile dysfunction type (607 84) (N52 9)   9  Fatigue (780 79) (R53 83)   10  Flu vaccine need (V04 81) (Z23)   11  Frontal headache (784 0) (R51)   12  Hip pain, right (719 45) (M25 551)   13  Hyperlipidemia (272 4) (E78 5)   14  Kidney stones (592 0) (N20 0)   15  Late onset dysthymia (300 4) (F34 1)   16  Left chest pressure (786 59) (R07 89)   17  Loose bowel movements (787 91) (R19 7)   18  Lower back pain (724 2) (M54 5)   19  Lumbar disc herniation with radiculopathy (722 10) (M51 16)   20  Lumbar radiculopathy (724 4) (M54 16)   21  Lumbosacral radiculopathy (724 4) (M54 17)   22  Myalgia (729 1) (M79 1)   23  Neck strain (847 0) (S16 1XXA)   24  Nephrolithiasis (592 0) (N20 0)   25  Paresthesia (782 0) (R20 2)   26  Pulmonary embolism (415 19) (I26 99)   27  Thoracic sprain (847 1) (S23 9XXA)    Past Medical History    The active problems and past medical history were reviewed and updated today        Surgical History 1  History of Hernia Repair   2  History of Oral Surgery Tooth Extraction [place] Tooth    The surgical history was reviewed and updated today  Family History  Mother    1  Family history of cerebrovascular accident (V17 1) (Z82 3)   2  Family history of malignant neoplasm of breast (V16 3) (Z80 3)  Father    3  Family history of diabetes mellitus (V18 0) (Z83 3)   4  Family history of malignant neoplasm (V16 9) (Z80 9)  Brother    5  Family history of melanoma (V16 8) (Z80 8)    Social History    · Advance directive information unavailable   · Always uses seat belt   · Caffeine use (V49 89) (F15 90)   · Denied: History of Exercises occasionally   · Full-time employment   · Denied: History of domestic violence   ·    · Never smoker   · No alcohol use   · One child    Current Meds   1  Acetaminophen-Codeine 300-30 MG Oral Tablet; 1-2 po tid prn; Therapy: 07MZB1564 to (Evaluate:99Iqz7133); Last Rx:12Ppz4961 Ordered   2  Baclofen 10 MG Oral Tablet; TAKE 1 TABLET 3 TIMES DAILY AS NEEDED FOR MUSCLE   SPASM; Therapy: 78KYK8024 to (Evaluate:46Lao0221)  Requested for: 21QCU3932; Last   Rx:04Nov2016 Ordered   3  Cyclobenzaprine HCl - 10 MG Oral Tablet; TAKE 1 TABLET 3 TIMES DAILY AS NEEDED; Therapy: 31XSV1293 to (Evaluate:88Tii3570)  Requested for: 84INV9252; Last   Rx:97Xdc4303 Ordered   4  DULoxetine HCl - 60 MG Oral Capsule Delayed Release Particles; TAKE 1 CAPSULE BY   MOUTH EVERY DAY; Therapy: 45IFC8547 to (Wild Swift)  Requested for: 18HZH9936; Last   Rx:14Ptc7302 Ordered   5  Eliquis 2 5 MG Oral Tablet; Take 1 tablet twice daily; Therapy: 85Bjy3220 to (Fae Soulier)  Requested for: 74PPN6095; Last   Rx:39Nqv8484 Ordered   6  Gabapentin 300 MG Oral Capsule; TAKE 1 CAPSULE Bedtime; Therapy: 22RTX5671 to (Evaluate:42Qyw3862)  Requested for: 31YOC9213; Last   Rx:37Tga8990 Ordered   7   Hydrocodone-Acetaminophen 5-325 MG Oral Tablet; TAKE 1 TABLET EVERY 4 TO 6   HOURS AS NEEDED FOR PAIN;   Therapy: 43IHQ7382 to (Evaluate:24Nov2016); Last Rx:21Nov2016 Ordered   8  MethylPREDNISolone 4 MG Oral Tablet Therapy Pack; Take according to directions; Therapy: 15HVU4743 to (Last Rx:66Yal4862)  Requested for: 48HIO9286 Ordered   9  Nabumetone 500 MG Oral Tablet; TAKE 1 TABLET EVERY 12 HOURS DAILY; Therapy: 06LIX2194 to (01 72 64 30 83)  Requested for: 33MRT7582; Last   Rx:10Oct2016 Ordered   10  PredniSONE 10 MG Oral Tablet; TAKE 4 TABLETS DAILY FOR 3 DAYS,3 TABLETS DAILY    FOR 3 DAYS, 2 TABLETS DAILY FOR 3 DAYS AND 1 TABLET DAILY FOR 3 DAYS,    THEN STOP; Therapy: 68HFD3609 to (Evaluate:83Hhg3007)  Requested for: 22Nov2016; Last    Rx:22Nov2016 Ordered   11  Simvastatin 10 MG Oral Tablet; 1 po three times weekly; Therapy: 55EXT0883 to (Yesy Roche)  Requested for: 47ZDP1365; Last    Rx:10Oct2016 Ordered    Allergies    1  No Known Drug Allergies    2  No Known Environmental Allergies   3  No Known Food Allergies    Vitals  Signs   Recorded: 20Jan2017 01:05PM   Heart Rate: 92  Systolic: 313  Diastolic: 82  Height: 6 ft 2 in  Weight: 201 lb   BMI Calculated: 25 81  BSA Calculated: 2 18    Physical Exam    Constitutional   General appearance: Well developed, well nourished, alert, in no distress, non-toxic and no overt pain behavior  Lumbar/Sacral Spine examination demonstrates Lumbosacral Spine:   Evaluation of Muscle Stretch Reflexes on the right side demonstrates 1/4 Hamstring Reflex, 1/4 Knee Jerk Reflex and 1/4 Ankle Jerk Reflex, but negative Magallanes Test right upper extremity and negative right ankle clonus  Evaluation of Muscle Stretch Reflexes on the left side demonstrates 1/4 Hamstring Reflex, 1/4 Knee Jerk Reflex, 1/4 Ankle Jerk Reflex and negative left ankle clonus, but negative Magallanes Test left upper extremity  Special Tests: negative Straight Leg Raise on right and negative Straight Leg Raise on left        Results/Data    MRI Review right sided protrusion at L-5/ S-1  Inflammatory markers negative 11/15 no D done  Future Appointments    Date/Time Provider Specialty Site   05/12/2017 08:30 AM MARILIN Diaz   Hematology [facility]   05/19/2017 08:45 AM Tramaine Lord, [facility] [address]     Signatures   Electronically signed by : Linda James DO; Jan 20 2017  1:27PM EST                       (Author)

## 2018-01-17 NOTE — MISCELLANEOUS
Message   Recorded as Task   Date: 12/22/2016 12:59 PM, Created By: Nancy Schmidt   Task Name: Follow Up   Assigned To: Vandana Salazar procedure,Team   Regarding Patient: Kaleb Chawla, Status: Active   CommentWang Lazar - 22 Dec 2016 12:59 PM     TASK CREATED  pt is S/P RIGHT L4-L5 TFESI 12/15/2016 by Dr Lucas Nowak   no pain dairy   no f/u scheduled   Sharri Lares - 22 Dec 2016 3:03 PM     TASK EDITED  spoke to pt he got a 60% relief from inj his pain is a 4 no redness or swelling   Shawn Stearns - 22 Dec 2016 3:13 PM     TASK REPLIED TO: Previously Assigned To Nivia Del Cid md aware    please f/u with him next week   may need repeat injection   Sharri Lares - 23 Dec 2016 10:16 AM     TASK IN PROGRESS   Sharri Lares - 27 Dec 2016 9:12 AM     TASK EDITED  spoke to pt he got 80% relief from inj now he will call office if any changes   Shawn Stearns - 28 Dec 2016 9:06 AM     TASK REPLIED TO: Previously Assigned To Nivia Del Cid md aware   f/u PRN        Active Problems    1  Abnormal weight loss (783 21) (R63 4)   2  Anticoagulant long-term use (V58 61) (Z79 01)   3  Azotemia (790 6) (R79 89)   4  Bilateral flank pain (789 09) (R10 9)   5  Degeneration of lumbar or lumbosacral intervertebral disc (722 52) (M51 37)   6  Encounter for prostate cancer screening (V76 44) (Z12 5)   7  Encounter for screening colonoscopy (V76 51) (Z12 11)   8  Erectile dysfunction, unspecified erectile dysfunction type (607 84) (N52 9)   9  Fatigue (780 79) (R53 83)   10  Flu vaccine need (V04 81) (Z23)   11  Frontal headache (784 0) (R51)   12  Hip pain, right (719 45) (M25 551)   13  Hyperlipidemia (272 4) (E78 5)   14  Kidney stones (592 0) (N20 0)   15  Late onset dysthymia (300 4) (F34 1)   16  Left chest pressure (786 59) (R07 89)   17  Loose bowel movements (787 91) (R19 7)   18  Lower back pain (724 2) (M54 5)   19  Lumbar disc herniation with radiculopathy (722 10) (M51 16)   20   Lumbar radiculopathy (724 4) (M54 16)   21  Lumbosacral radiculopathy (724 4) (M54 17)   22  Myalgia (729 1) (M79 1)   23  Neck strain (847 0) (S16 1XXA)   24  Nephrolithiasis (592 0) (N20 0)   25  Paresthesia (782 0) (R20 2)   26  Pulmonary embolism (415 19) (I26 99)   27  Thoracic sprain (847 1) (S23 9XXA)    Current Meds   1  Acetaminophen-Codeine 300-30 MG Oral Tablet (Tylenol with Codeine #3); 1-2 po tid   prn; Therapy: 01KVH6758 to (Evaluate:55Nyw8883); Last Rx:01Dec2016 Ordered   2  Baclofen 10 MG Oral Tablet; TAKE 1 TABLET 3 TIMES DAILY AS NEEDED FOR MUSCLE   SPASM; Therapy: 78QOS3769 to (Evaluate:97Alu9926)  Requested for: 56EMQ7319; Last   Rx:04Nov2016 Ordered   3  Cyclobenzaprine HCl - 10 MG Oral Tablet; TAKE 1 TABLET 3 TIMES DAILY AS NEEDED; Therapy: 04QCF0798 to (Evaluate:37Htu5314)  Requested for: 08TQB9312; Last   Rx:11May2016 Ordered   4  DULoxetine HCl - 60 MG Oral Capsule Delayed Release Particles; TAKE 1 CAPSULE BY   MOUTH EVERY DAY; Therapy: 49MQB7748 to (Roxanna Ya)  Requested for: 28YHU0172; Last   Rx:11May2016 Ordered   5  Eliquis 2 5 MG Oral Tablet; Take 1 tablet twice daily; Therapy: 53Upj6035 to (Jeanne Hurtado)  Requested for: 25URD1905; Last   Rx:11May2016 Ordered   6  Gabapentin 300 MG Oral Capsule; TAKE 1 CAPSULE Bedtime; Therapy: 31VCG5215 to (Evaluate:90Uzm2536)  Requested for: 82FPX6201; Last   Rx:27Mce5586 Ordered   7  Hydrocodone-Acetaminophen 5-325 MG Oral Tablet (Norco); TAKE 1 TABLET EVERY 4   TO 6 HOURS AS NEEDED FOR PAIN;   Therapy: 27DSE9218 to (Evaluate:24Nov2016); Last Rx:21Nov2016 Ordered   8  MethylPREDNISolone 4 MG Oral Tablet Therapy Pack; Take according to directions; Therapy: 41TFD2284 to (Last Rx:78Oxv9008)  Requested for: 86IMQ8482 Ordered   9  Nabumetone 500 MG Oral Tablet; TAKE 1 TABLET EVERY 12 HOURS DAILY; Therapy: 76GLJ7140 to ([phone])  Requested for: 64YMN4984; Last   Rx:72Wsb6403 Ordered   10   PredniSONE 10 MG Oral Tablet; TAKE 4 TABLETS DAILY FOR 3 DAYS,3 TABLETS    DAILY FOR 3 DAYS, 2 TABLETS DAILY FOR 3 DAYS AND 1 TABLET DAILY FOR    3 DAYS, THEN STOP; Therapy: 62DCS5596 to (Evaluate:00Xdm4557)  Requested for: 22Nov2016; Last    Rx:22Nov2016 Ordered   11  Simvastatin 10 MG Oral Tablet; 1 po three times weekly; Therapy: 40MHM1942 to [phone])  Requested for: 56SKE3479; Last    Rx:10Oct2016 Ordered    Allergies    1  No Known Drug Allergies    2  No Known Environmental Allergies   3   No Known Food Allergies    Signatures   Electronically signed by : Camden Ragsdale, ; Dec 28 2016  9:46AM EST                       (Author)

## 2018-01-18 NOTE — RESULT NOTES
Verified Results  XR SPINE LUMBAR COMPLETE Claressa Elsie MINIMUM 6 VIEWS [id number] 02:01PM Willye Plane Order Number: [id number]     Test Name Result Flag Reference   XR SPINE LUMBAR COMPLETE W BENDING MINIMUM 6 VIEWS (Report)     LUMBAR SPINE     INDICATION: Back pain  COMPARISON: None     VIEWS: AP, bilateral oblique, coned down and lateral projections in neutral, flexion and extension; 7 images     FINDINGS:     Mild thoracolumbar levoscoliosis  Small marginal osteophytes are present at L3-L5  There is no subluxation and alignment is stable in flexion, extension, and neutral positioning  There is no radiographic evidence of acute fracture or destructive osseous lesion  No significant lumbar degenerative change noted  Visualized soft tissues appear unremarkable  IMPRESSION:     Mild degenerative changes and levoscoliosis  Workstation performed: [id number]     Signed by:   Romayne Cross Carnella Pipe, MD   11/4/16     XR HIPS BILATERAL 3-4 VS W PELVIS IF PERFORMED [id number] 02:01PM Willye Plane Order Number: [id number]     Test Name Result Flag Reference   XR HIPS BILATERAL WITH AP PELVIS 3-4 VW (Report)     BILATERAL HIPS AND PELVIS     INDICATION: Chronic pain both hips, right greater the left  COMPARISON: None     VIEWS: AP pelvis and coned down views of each hip; 5 images      FINDINGS:     No acute pelvic fracture or pathologic bone lesions  Visualized bony pelvis appears intact  Spina bifida occulta of S1 noted  Phleboliths are present in the true pelvis  LEFT HIP:   No significant degenerative changes  Bony alignment is maintained  Soft tissues are unremarkable  RIGHT HIP:   No significant degenerative changes  Bony alignment is maintained  Soft tissues are unremarkable  IMPRESSION:     Unremarkable hips and pelvis  Workstation performed: [id number]     Signed by:   Romayne Cross Carnella Pipe, MD   11/4/16

## 2018-01-18 NOTE — MISCELLANEOUS
Message   Recorded as Task   Date: 12/09/2016 11:10 AM, Created By: Smiley Varma   Task Name: Follow Up   Assigned To: PRABHU Sahni   Regarding Patient: Nevaeh Armenta, Status: In Progress   Comment:    Jada Bojorquez - 09 Dec 2016 11:10 AM     TASK CREATED  Other  Eliquis hold form was faxed by Antoine Ham as Urgent to Dr Vern Renteria office today at 8:55  S/W Harpreet Joshua at Dr Vern Renteria office if Dr  had signed off on form yet as injection is scheduled for 12/15 and we require a 4 day hold of Eliquis which means pt would need to stop Eliquis on Sunday, so I need to contact pt today before our office closes at 12noon  Harpreet Joshua will s/w Dr Sai Mosquera as soon as he finishes seeing a pt and she will call me back and I also gave her our back fax # here at [facility] to fax form to me  Await decision from Jada Meier - 09 Dec 2016 11:10 AM     TASK IN PROGRESS   Jada Bojorquez - 09 Dec 2016 11:21 AM     TASK EDITED  Harpreet Joshua called back saying Dr Yamile Doty said he would only allow Eliquis to be held 1-2 days not 4 days  I informed Dr Mae Novak who said our guidelines to perform HELEN is that eliquis be held then pt will be told injection can't be done  Dr Mae Novak said to tell Dr Sai Mosquera that pt had said he had already stopped it for 3-4 days  Harpreet Joshua is going to s/w Dr Sai Mosquera with this updated info and then c/b  Smiley Varma - 09 Dec 2016 12:15 PM     TASK EDITED  S/W Harpreet Joshua who she said Dr Lin is questioning what guidelines we are using to make this decision  I told her the [employer] which is stated on our form  Harpreet Joshua is requesting that Dr Mae Novak call and s/w Dr Sai Mosquera directly on his cell    Cell # [phone]   Nadine Alvarez - 09 Dec 2016 12:24 PM     TASK REPLIED TO: Previously Assigned To [facility] yosef clinical,Team  Spoke to Dr Sai Mosquera who has agreed to 4 day hold and will fax back   Smiley Zaragoza 09 Dec 2016 12:33 PM TASK EDITED  S/W pt and advised that Dr Paola Frey s/w Dr Xavier Ortega and he will give permission for pt to hold his Eliquis for 4 days prior to opro  Pt advised he needs to resume taking his eliquis today and then stop it starting Sun 12/11 for 4 days  Pt understood instructions  I confirmed pt was given the other pre-procedure instr from Jada Angela - 09 Dec 2016 12:33 PM     TASK IN PROGRESS   Jada Bojorquez - 12 Dec 2016 4:34 PM     TASK EDITED  Eliquis hold approval received today 12/12 from Dr Xavier Ortega and given to Giovanni to scan  Pt was already given instr about holding his Eliquis on friday 12/9 when I s/w him  Active Problems    1  Abnormal weight loss (783 21) (R63 4)   2  Anticoagulant long-term use (V58 61) (Z79 01)   3  Azotemia (790 6) (R79 89)   4  Bilateral flank pain (789 09) (R10 9)   5  Degeneration of lumbar or lumbosacral intervertebral disc (722 52) (M51 37)   6  Encounter for prostate cancer screening (V76 44) (Z12 5)   7  Encounter for screening colonoscopy (V76 51) (Z12 11)   8  Erectile dysfunction, unspecified erectile dysfunction type (607 84) (N52 9)   9  Fatigue (780 79) (R53 83)   10  Flu vaccine need (V04 81) (Z23)   11  Frontal headache (784 0) (R51)   12  Hip pain, right (719 45) (M25 551)   13  Hyperlipidemia (272 4) (E78 5)   14  Kidney stones (592 0) (N20 0)   15  Late onset dysthymia (300 4) (F34 1)   16  Left chest pressure (786 59) (R07 89)   17  Loose bowel movements (787 91) (R19 7)   18  Lower back pain (724 2) (M54 5)   19  Lumbar disc herniation with radiculopathy (722 10) (M51 16)   20  Lumbar radiculopathy (724 4) (M54 16)   21  Lumbosacral radiculopathy (724 4) (M54 17)   22  Myalgia (729 1) (M79 1)   23  Neck strain (847 0) (S16 1XXA)   24  Nephrolithiasis (592 0) (N20 0)   25  Paresthesia (782 0) (R20 2)   26  Pulmonary embolism (415 19) (I26 99)   27  Thoracic sprain (847 1) (S23 9XXA)    Current Meds   1   Acetaminophen-Codeine 300-30 MG Oral Tablet (Tylenol with Codeine #3); 1-2 po tid   prn; Therapy: 41RKX6973 to (Evaluate:05Run4605); Last Rx:95Muw0582 Ordered   2  Baclofen 10 MG Oral Tablet; TAKE 1 TABLET 3 TIMES DAILY AS NEEDED FOR MUSCLE   SPASM; Therapy: 31KQN8024 to (Evaluate:56Xvs4547)  Requested for: 00DBZ5566; Last   Rx:04Nov2016 Ordered   3  Cyclobenzaprine HCl - 10 MG Oral Tablet; TAKE 1 TABLET 3 TIMES DAILY AS NEEDED; Therapy: 35SCE7183 to (Evaluate:23Otq9613)  Requested for: 14MMX4391; Last   Rx:52Cau8376 Ordered   4  DULoxetine HCl - 60 MG Oral Capsule Delayed Release Particles; TAKE 1 CAPSULE BY   MOUTH EVERY DAY; Therapy: 72RUB2385 to (Mikhail Dominguez)  Requested for: 87OEQ6094; Last   Rx:27Tfg0294 Ordered   5  Eliquis 2 5 MG Oral Tablet; Take 1 tablet twice daily; Therapy: 48Acd2598 to (Kristyn Saha)  Requested for: 32WCE4656; Last   Rx:03Azy9706 Ordered   6  Gabapentin 300 MG Oral Capsule; TAKE 1 CAPSULE Bedtime; Therapy: 22DKC6685 to (Evaluate:66Rgq6430)  Requested for: 04PTV6762; Last   Rx:15Klz0952 Ordered   7  Hydrocodone-Acetaminophen 5-325 MG Oral Tablet (Norco); TAKE 1 TABLET EVERY 4   TO 6 HOURS AS NEEDED FOR PAIN;   Therapy: 60WIM7872 to (Evaluate:24Nov2016); Last Rx:21Nov2016 Ordered   8  MethylPREDNISolone 4 MG Oral Tablet Therapy Pack; Take according to directions; Therapy: 40EBP0133 to (Last Rx:04Jck1619)  Requested for: 79QFU2391 Ordered   9  Nabumetone 500 MG Oral Tablet; TAKE 1 TABLET EVERY 12 HOURS DAILY; Therapy: 95FEC6889 to ([phone])  Requested for: 18RMZ9593; Last   Rx:75Aql3435 Ordered   10  PredniSONE 10 MG Oral Tablet; TAKE 4 TABLETS DAILY FOR 3 DAYS,3 TABLETS    DAILY FOR 3 DAYS, 2 TABLETS DAILY FOR 3 DAYS AND 1 TABLET DAILY FOR    3 DAYS, THEN STOP; Therapy: 41ILC0994 to (Evaluate:08Xca4252)  Requested for: 22Nov2016; Last    Rx:22Nov2016 Ordered   11  Simvastatin 10 MG Oral Tablet; 1 po three times weekly;     Therapy: 34FDP4534 to (Nadine Portillo)  Requested for: 97PUA3853; Last    Rx:78Qpe8427 Ordered    Allergies    1  No Known Drug Allergies    2  No Known Environmental Allergies   3   No Known Food Allergies    Signatures   Electronically signed by : Farida Belle, ; Dec 12 2016  4:35PM EST                       (Author)

## 2018-01-18 NOTE — PROGRESS NOTES
Assessment    1  Lumbosacral radiculopathy (724 4) (M54 17)    Plan  Lumbosacral radiculopathy    · Follow-up Visit in 4 Weeks Evaluation and Treatment  Follow-up  Status: Hold For -  Scheduling  Requested for: 78Bcw0209   · *1 - [facility] Physical Therapy  Consult May D/C to [facility] if I with passive  extension program   Status: Hold For - Scheduling  Requested for: 76Clb2621  Care Summary provided  : Yes    Discussion/Summary  Impression: low back pain, disc herniation and radiculopathy  Currently, the condition is improving  There are no changes in medication management  Treatment plan includes physical therapy  Patient discussion: discussed with the patient, noel follow ups with Pain Medicine  Chief Complaint  12/6 Oncology referral   12/22 follow up after PT and LESI  History of Present Illness  56 yo male with chronic anticoag for thrombosis   Has acute onset over 8 weeks ago, nontraumatic, of right LBP, radiates to hip and anterior thigh, "quivering", now some right heel pain  PCP tried muscle relaxer, had US retroperitoneum, and MRI  Had trial of low dose steroids   unsure of benefit  Some relief of APAP, no incontinence, cannot use stairs, some buckling right knee  Noted dramatic pain relief with inversion table  12/22 follow up   feels improved with one LESI, and PT  Will be getting an inversion table  Reports dramatic response to Medrol dose avery first nite  Poor tolerance of sitting in a car  Find passive extension in PT helpful  Sx "nothing like it was"  Review of Systems    Gastrointestinal: No complaints of abdominal pain, no constipation, no nausea or vomiting, no diarrhea or bloody stools  Genitourinary: No complaints of dysuria or incontinence, no hesitancy, no nocturia  Musculoskeletal: as noted in HPI  Neurological: no dizziness  ROS reviewed  Active Problems    1  Abnormal weight loss (783 21) (R63 4)   2  Anticoagulant long-term use (V58 61) (Z79 01)   3  Azotemia (790 6) (R79 89)   4  Bilateral flank pain (789 09) (R10 9)   5  Degeneration of lumbar or lumbosacral intervertebral disc (722 52) (M51 37)   6  Encounter for prostate cancer screening (V76 44) (Z12 5)   7  Encounter for screening colonoscopy (V76 51) (Z12 11)   8  Erectile dysfunction, unspecified erectile dysfunction type (607 84) (N52 9)   9  Fatigue (780 79) (R53 83)   10  Flu vaccine need (V04 81) (Z23)   11  Frontal headache (784 0) (R51)   12  Hip pain, right (719 45) (M25 551)   13  Hyperlipidemia (272 4) (E78 5)   14  Kidney stones (592 0) (N20 0)   15  Late onset dysthymia (300 4) (F34 1)   16  Left chest pressure (786 59) (R07 89)   17  Loose bowel movements (787 91) (R19 7)   18  Lower back pain (724 2) (M54 5)   19  Lumbar disc herniation with radiculopathy (722 10) (M51 16)   20  Lumbar radiculopathy (724 4) (M54 16)   21  Lumbosacral radiculopathy (724 4) (M54 17)   22  Myalgia (729 1) (M79 1)   23  Neck strain (847 0) (S16 1XXA)   24  Nephrolithiasis (592 0) (N20 0)   25  Paresthesia (782 0) (R20 2)   26  Pulmonary embolism (415 19) (I26 99)   27  Thoracic sprain (847 1) (S23 9XXA)    Past Medical History    The active problems and past medical history were reviewed and updated today  Surgical History    1  History of Hernia Repair   2  History of Oral Surgery Tooth Extraction [facility]    Family History  Mother    1  Family history of cerebrovascular accident (V17 1) (Z82 3)   2  Family history of malignant neoplasm of breast (V16 3) (Z80 3)  Father    3  Family history of diabetes mellitus (V18 0) (Z83 3)   4  Family history of malignant neoplasm (V16 9) (Z80 9)  Brother    5   Family history of melanoma (V16 8) (Z80 8)    Social History    · Advance directive information unavailable   · Always uses seat belt   · Caffeine use (V49 89) (F15 90)   · Denied: History of Exercises occasionally   · Full-time employment   · Denied: History of domestic violence   ·    · Never smoker   · No alcohol use   · One child    Current Meds   1  Acetaminophen-Codeine 300-30 MG Oral Tablet (Tylenol with Codeine #3); 1-2 po tid   prn; Therapy: 26QRV2355 to (Evaluate:73Rng0311); Last Rx:13Kto2888 Ordered   2  Baclofen 10 MG Oral Tablet; TAKE 1 TABLET 3 TIMES DAILY AS NEEDED FOR MUSCLE   SPASM; Therapy: 45KDR4698 to (Evaluate:96Jmy8509)  Requested for: 36BJB7953; Last   Rx:04Nov2016 Ordered   3  Cyclobenzaprine HCl - 10 MG Oral Tablet; TAKE 1 TABLET 3 TIMES DAILY AS NEEDED; Therapy: 15GVL4955 to (Evaluate:33Dud8614)  Requested for: 82FGL7448; Last   Rx:56Oud4816 Ordered   4  DULoxetine HCl - 60 MG Oral Capsule Delayed Release Particles; TAKE 1 CAPSULE BY   MOUTH EVERY DAY; Therapy: 65BVJ5249 to (Eddy Darden)  Requested for: 56TYS8487; Last   Rx:05Ofu2791 Ordered   5  Eliquis 2 5 MG Oral Tablet; Take 1 tablet twice daily; Therapy: 21Ncd0625 to (Soraya Upton)  Requested for: 45OCO6553; Last   Rx:49Ncc7023 Ordered   6  Gabapentin 300 MG Oral Capsule; TAKE 1 CAPSULE Bedtime; Therapy: 24QLH9775 to (Evaluate:97Cvs4021)  Requested for: 01SQH4593; Last   Rx:69Otr8512 Ordered   7  Hydrocodone-Acetaminophen 5-325 MG Oral Tablet (Norco); TAKE 1 TABLET EVERY 4   TO 6 HOURS AS NEEDED FOR PAIN;   Therapy: 62UBJ8895 to (Evaluate:24Nov2016); Last Rx:21Nov2016 Ordered   8  MethylPREDNISolone 4 MG Oral Tablet Therapy Pack; Take according to directions; Therapy: 28XDO3697 to (Last Rx:46Plu0304)  Requested for: 47GXS1190 Ordered   9  Nabumetone 500 MG Oral Tablet; TAKE 1 TABLET EVERY 12 HOURS DAILY; Therapy: 17GYL7052 to (03 [phone])  Requested for: 11VVJ7729; Last   Rx:10Oct2016 Ordered   10  PredniSONE 10 MG Oral Tablet; TAKE 4 TABLETS DAILY FOR 3 DAYS,3 TABLETS DAILY    FOR 3 DAYS, 2 TABLETS DAILY FOR 3 DAYS AND 1 TABLET DAILY FOR 3 DAYS,    THEN STOP; Therapy: 12LKG7931 to (Evaluate:14Uat7774)  Requested for: 22Nov2016; Last    Rx:22Nov2016 Ordered   11   Simvastatin 10 MG Oral Tablet; 1 po three times weekly; Therapy: 63HXB4719 to [phone])  Requested for: 35TRP2264; Last    Rx:78Xop5465 Ordered    The medication list was reviewed and updated today  Allergies    1  No Known Drug Allergies    2  No Known Environmental Allergies   3  No Known Food Allergies    Vitals  Signs   Recorded: 24Gro2351 01:06PM   Heart Rate: 72  Systolic: 680  Diastolic: 88  Height: 6 ft 2 in  Weight: 199 lb   BMI Calculated: 25 55  BSA Calculated: 2 17    Physical Exam    Constitutional   General appearance: Well developed, well nourished, alert, in no distress, non-toxic and no overt pain behavior  Lumbar/Sacral Spine examination demonstrates Lumbosacral Spine:   Evaluation of Muscle Stretch Reflexes on the right side demonstrates 1/4 Knee Jerk Reflex, but 2/4 Hamstring Reflex, 2/4 Ankle Jerk Reflex and negative Magallanes Test right upper extremity  Evaluation of Muscle Stretch Reflexes on the left side demonstrates 2/4 Hamstring Reflex, 2/4 Knee Jerk Reflex, 2/4 Ankle Jerk Reflex and negative Magallanes Test left upper extremity  Special Tests: negative Straight Leg Raise on right and negative Straight Leg Raise on left  Results/Data  I personally reviewed the films/images/results in the office today  My interpretation follows  MRI Review had extruded disc contacting right sided roots at L-4/5  Future Appointments    Date/Time Provider Specialty Site   05/12/2017 08:30 AM MARILIN Carolina   Hematology [facility]cology [facility]   05/19/2017 08:45 AM Balbina Ryder, [facility] Urology [facility] Ellen Casarez     Signatures   Electronically signed by : Carlos Manuel Flaherty DO; Dec 22 2016  1:30PM EST                       (Author)

## 2018-01-22 VITALS
HEART RATE: 80 BPM | TEMPERATURE: 97.8 F | WEIGHT: 195.38 LBS | RESPIRATION RATE: 16 BRPM | SYSTOLIC BLOOD PRESSURE: 112 MMHG | HEIGHT: 74 IN | BODY MASS INDEX: 25.07 KG/M2 | DIASTOLIC BLOOD PRESSURE: 82 MMHG

## 2018-01-22 VITALS — BODY MASS INDEX: 25.41 KG/M2 | HEIGHT: 74 IN | WEIGHT: 198 LBS

## 2018-01-22 VITALS
SYSTOLIC BLOOD PRESSURE: 122 MMHG | WEIGHT: 201 LBS | BODY MASS INDEX: 25.8 KG/M2 | HEIGHT: 74 IN | DIASTOLIC BLOOD PRESSURE: 82 MMHG | HEART RATE: 92 BPM

## 2018-01-22 VITALS — BODY MASS INDEX: 25.41 KG/M2 | WEIGHT: 198 LBS | HEIGHT: 74 IN

## 2018-01-24 VITALS
OXYGEN SATURATION: 97 % | BODY MASS INDEX: 25.07 KG/M2 | DIASTOLIC BLOOD PRESSURE: 80 MMHG | SYSTOLIC BLOOD PRESSURE: 122 MMHG | HEART RATE: 83 BPM | TEMPERATURE: 97.3 F | HEIGHT: 74 IN | WEIGHT: 195.38 LBS | RESPIRATION RATE: 16 BRPM

## 2018-02-06 ENCOUNTER — GENERIC CONVERSION - ENCOUNTER (OUTPATIENT)
Dept: OTHER | Facility: OTHER | Age: 58
End: 2018-02-06

## 2018-02-12 NOTE — RESULT NOTES
Verified Results  (1) TISSUE EXAM [id number] 05:01PM Elisabet Louis     Test Name Result Flag Reference   LAB AP CASE REPORT (Report)     Surgical Pathology Report             Case: [id number]                   Authorizing Provider: Dennise Lemus PA-C    Collected:      10/24/2017           Pathologist:      Deonte Wolff MD        Received:      10/25/2017 9661        Specimen:  Skin, Other, left posterior ear   LAB AP FINAL DIAGNOSIS (Report)     A  Skin, left posterior ear, punch biopsy:  - Superficial dermal inflammation; see note  Note: Initial and deeper sections are examined, and show moderately   intense lymphoplasmacytic inflammation  Case discussed with clinician  Interpretation performed at [facility], [address] [place] [address], [address]          Electronically signed by Deonte Wolff MD on 11/7/2017 at 5:01 PM   LAB AP SURGICAL ADDITIONAL INFORMATION (Report)     All controls performed with the immunohistochemical stains reported above   reacted appropriately  These tests were developed and their performance   characteristics determined by [facility] or   [facility]  They may not be cleared or approved by the [place]  Food and Drug Administration  The FDA has determined that such clearance   or approval is not necessary  These tests are used for clinical purposes  They should not be regarded as investigational or for research  This   laboratory has been approved by [facility], designated as a high-complexity   laboratory and is qualified to perform these tests  LAB AP GROSS DESCRIPTION (Report)     A  The specimen is received in formalin, labeled with the patient's name   and hospital number, and is designated Skin punch biopsy left posterior   ear  The specimen consists of a tan portion of skin measuring 0 4 cm in   diameter, excised to a depth of 0 4 cm  The skin surface appears   keratotic  The margin of resection is inked green   The skin surface is   inked red  The specimen is bisected  Entirely submitted  One cassette  Note: The estimated total formalin fixation time based upon information   provided by the submitting clinician and the standard processing schedule   is over 72 hours      MAC   LAB AP CLINICAL INFORMATION Suspicious lesion

## 2018-05-08 DIAGNOSIS — N20.0 CALCULUS OF KIDNEY: ICD-10-CM

## 2018-05-08 DIAGNOSIS — I26.99 OTHER PULMONARY EMBOLISM WITHOUT ACUTE COR PULMONALE (HCC): ICD-10-CM

## 2018-05-08 DIAGNOSIS — Z12.5 ENCOUNTER FOR SCREENING FOR MALIGNANT NEOPLASM OF PROSTATE: ICD-10-CM

## 2018-06-08 ENCOUNTER — OFFICE VISIT (OUTPATIENT)
Dept: HEMATOLOGY ONCOLOGY | Facility: CLINIC | Age: 58
End: 2018-06-08
Payer: COMMERCIAL

## 2018-06-08 VITALS
RESPIRATION RATE: 16 BRPM | HEART RATE: 65 BPM | TEMPERATURE: 97.4 F | HEIGHT: 73 IN | BODY MASS INDEX: 26.21 KG/M2 | DIASTOLIC BLOOD PRESSURE: 70 MMHG | SYSTOLIC BLOOD PRESSURE: 120 MMHG | OXYGEN SATURATION: 95 % | WEIGHT: 197.8 LBS

## 2018-06-08 DIAGNOSIS — Z12.5 ENCOUNTER FOR PROSTATE CANCER SCREENING: ICD-10-CM

## 2018-06-08 DIAGNOSIS — I27.82 OTHER CHRONIC PULMONARY EMBOLISM WITHOUT ACUTE COR PULMONALE (HCC): Primary | ICD-10-CM

## 2018-06-08 PROBLEM — I26.99 OTHER PULMONARY EMBOLISM WITHOUT ACUTE COR PULMONALE (HCC): Status: ACTIVE | Noted: 2018-06-08

## 2018-06-08 PROBLEM — C61 PROSTATE CANCER (HCC): Status: ACTIVE | Noted: 2018-06-08

## 2018-06-08 PROCEDURE — 99214 OFFICE O/P EST MOD 30 MIN: CPT | Performed by: INTERNAL MEDICINE

## 2018-06-08 NOTE — PATIENT INSTRUCTIONS
The patient is aware seek medical attention for persistent or progressive low back pain, radiation to the right lower extremity, difficulty with ambulation, shortness of breath, chest pain, dizziness, lightheadedness, nosebleeds, easy bruising, excessive fatigue, or if other new problems arise
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
## 2018-07-31 ENCOUNTER — APPOINTMENT (OUTPATIENT)
Dept: LAB | Age: 58
End: 2018-07-31

## 2018-07-31 ENCOUNTER — TRANSCRIBE ORDERS (OUTPATIENT)
Dept: ADMINISTRATIVE | Age: 58
End: 2018-07-31

## 2018-07-31 DIAGNOSIS — Z00.8 ENCOUNTER FOR OTHER GENERAL EXAMINATION: ICD-10-CM

## 2018-07-31 DIAGNOSIS — Z00.8 ENCOUNTER FOR OTHER GENERAL EXAMINATION: Primary | ICD-10-CM

## 2018-07-31 LAB
CHOLEST SERPL-MCNC: 234 MG/DL (ref 50–200)
EST. AVERAGE GLUCOSE BLD GHB EST-MCNC: 123 MG/DL
HBA1C MFR BLD: 5.9 % (ref 4.2–6.3)
HDLC SERPL-MCNC: 44 MG/DL (ref 40–60)
LDLC SERPL CALC-MCNC: 117 MG/DL (ref 0–100)
NONHDLC SERPL-MCNC: 190 MG/DL
TRIGL SERPL-MCNC: 364 MG/DL

## 2018-07-31 PROCEDURE — 36415 COLL VENOUS BLD VENIPUNCTURE: CPT

## 2018-07-31 PROCEDURE — 83036 HEMOGLOBIN GLYCOSYLATED A1C: CPT

## 2018-07-31 PROCEDURE — 80061 LIPID PANEL: CPT

## 2018-08-01 ENCOUNTER — TELEPHONE (OUTPATIENT)
Dept: FAMILY MEDICINE CLINIC | Facility: CLINIC | Age: 58
End: 2018-08-01

## 2018-08-01 NOTE — TELEPHONE ENCOUNTER
----- Message from Henri Briggs MD sent at 8/1/2018  9:44 AM EDT -----  Triglycerides too high-rec ov to review

## 2018-08-06 ENCOUNTER — OFFICE VISIT (OUTPATIENT)
Dept: FAMILY MEDICINE CLINIC | Facility: CLINIC | Age: 58
End: 2018-08-06
Payer: COMMERCIAL

## 2018-08-06 VITALS
RESPIRATION RATE: 14 BRPM | WEIGHT: 195.25 LBS | HEART RATE: 76 BPM | HEIGHT: 73 IN | TEMPERATURE: 97.1 F | BODY MASS INDEX: 25.88 KG/M2 | SYSTOLIC BLOOD PRESSURE: 106 MMHG | DIASTOLIC BLOOD PRESSURE: 78 MMHG

## 2018-08-06 DIAGNOSIS — M25.50 ARTHRALGIA, UNSPECIFIED JOINT: ICD-10-CM

## 2018-08-06 DIAGNOSIS — E55.9 VITAMIN D DEFICIENCY: ICD-10-CM

## 2018-08-06 DIAGNOSIS — E78.5 HYPERLIPIDEMIA, UNSPECIFIED HYPERLIPIDEMIA TYPE: Primary | ICD-10-CM

## 2018-08-06 PROBLEM — Z12.5 ENCOUNTER FOR PROSTATE CANCER SCREENING: Status: RESOLVED | Noted: 2018-06-08 | Resolved: 2018-08-06

## 2018-08-06 PROCEDURE — 3008F BODY MASS INDEX DOCD: CPT | Performed by: FAMILY MEDICINE

## 2018-08-06 PROCEDURE — 99213 OFFICE O/P EST LOW 20 MIN: CPT | Performed by: FAMILY MEDICINE

## 2018-08-06 RX ORDER — ATORVASTATIN CALCIUM 10 MG/1
10 TABLET, FILM COATED ORAL DAILY
Qty: 90 TABLET | Refills: 1 | Status: SHIPPED | OUTPATIENT
Start: 2018-08-06 | End: 2019-06-10

## 2018-08-06 NOTE — PROGRESS NOTES
Assessment/Plan:    Vitamin D deficiency  Await lab    Hyperlipidemia  Await lab       Diagnoses and all orders for this visit:    Hyperlipidemia, unspecified hyperlipidemia type  -     Lipid Panel with Direct LDL reflex; Future    Arthralgia, unspecified joint    Vitamin D deficiency  -     Vitamin D 25 hydroxy; Future          Subjective:      Patient ID: Gael Elizondo is a 65 y o  male  Hyperlipidemia   This is a chronic problem  The current episode started more than 1 year ago  The problem is uncontrolled  Recent lipid tests were reviewed and are high (elevated triglycerides)  Exacerbated by: not taking fish oil  Pertinent negatives include no chest pain, myalgias or shortness of breath  He is currently on no antihyperlipidemic treatment  The current treatment provides no improvement of lipids  Risk factors for coronary artery disease include male sex  The following portions of the patient's history were reviewed and updated as appropriate: allergies, current medications, past family history, past medical history, past social history, past surgical history and problem list       Review of Systems   Constitutional: Negative for activity change, appetite change, fatigue and unexpected weight change  Respiratory: Negative for shortness of breath  Cardiovascular: Negative for chest pain  Gastrointestinal: Negative for abdominal pain  Musculoskeletal: Positive for arthralgias  Negative for myalgias  Psychiatric/Behavioral: The patient is not nervous/anxious  Objective:      /78 (BP Location: Left arm, Patient Position: Sitting, Cuff Size: Adult)   Pulse 76   Temp (!) 97 1 °F (36 2 °C) (Temporal)   Resp 14   Ht 6' 1 04" (1 855 m)   Wt 88 6 kg (195 lb 4 oz)   BMI 25 73 kg/m²          Physical Exam   Constitutional: He appears well-developed and well-nourished  Neck: No thyromegaly present  Cardiovascular: Normal rate, regular rhythm and normal heart sounds  Pulmonary/Chest: Breath sounds normal    Musculoskeletal: He exhibits no edema  Lymphadenopathy:     He has no cervical adenopathy  Vitals reviewed

## 2018-10-25 ENCOUNTER — OFFICE VISIT (OUTPATIENT)
Dept: FAMILY MEDICINE CLINIC | Facility: CLINIC | Age: 58
End: 2018-10-25
Payer: COMMERCIAL

## 2018-10-25 VITALS
WEIGHT: 195 LBS | TEMPERATURE: 97.3 F | RESPIRATION RATE: 18 BRPM | BODY MASS INDEX: 25.84 KG/M2 | HEART RATE: 72 BPM | SYSTOLIC BLOOD PRESSURE: 116 MMHG | DIASTOLIC BLOOD PRESSURE: 80 MMHG | HEIGHT: 73 IN

## 2018-10-25 DIAGNOSIS — M25.551 PAIN OF BOTH HIP JOINTS: ICD-10-CM

## 2018-10-25 DIAGNOSIS — G89.29 CHRONIC PAIN OF LEFT KNEE: ICD-10-CM

## 2018-10-25 DIAGNOSIS — Z23 FLU VACCINE NEED: Primary | ICD-10-CM

## 2018-10-25 DIAGNOSIS — M25.552 PAIN OF BOTH HIP JOINTS: ICD-10-CM

## 2018-10-25 DIAGNOSIS — M25.562 CHRONIC PAIN OF LEFT KNEE: ICD-10-CM

## 2018-10-25 PROCEDURE — 99213 OFFICE O/P EST LOW 20 MIN: CPT | Performed by: FAMILY MEDICINE

## 2018-10-25 PROCEDURE — 90471 IMMUNIZATION ADMIN: CPT

## 2018-10-25 PROCEDURE — 90682 RIV4 VACC RECOMBINANT DNA IM: CPT

## 2018-10-25 NOTE — PROGRESS NOTES
Assessment/Plan:    No problem-specific Assessment & Plan notes found for this encounter  Diagnoses and all orders for this visit:    Flu vaccine need  -     influenza vaccine, [id number], quadrivalent, recombinant, PF, 0 5 mL, for patients 18 yr+ (FLUBLOK)    Chronic pain of left knee  -     XR knee 3 vw left non injury; Future    Pain of both hip joints  -     XR hip/pelv 2-3 vws right if performed; Future  -     XR hip/pelv 2-3 vws left if performed; Future          Subjective:      Patient ID: Mely Ramirez is a 65 y o  male  Knee Pain    The incident occurred more than 1 week ago  There was no injury mechanism  The pain is present in the left knee, right hip and left hip  The quality of the pain is described as aching and burning  The pain is at a severity of 5/10 (worse at night)  The pain is moderate  The pain has been fluctuating since onset  Pertinent negatives include no inability to bear weight or loss of motion  He reports no foreign bodies present  The symptoms are aggravated by weight bearing  He has tried acetaminophen for the symptoms  The treatment provided mild relief  The following portions of the patient's history were reviewed and updated as appropriate: allergies, current medications, past family history, past medical history, past social history, past surgical history and problem list       Review of Systems   Constitutional: Negative for activity change, appetite change and unexpected weight change  Respiratory: Negative for shortness of breath  Cardiovascular: Negative for chest pain  Musculoskeletal: Positive for arthralgias           L Knee and both hips         Objective:      /80 (BP Location: Left arm, Patient Position: Sitting, Cuff Size: Standard)   Pulse 72   Temp (!) 97 3 °F (36 3 °C) (Temporal)   Resp 18   Ht 6' 1 04" (1 855 m)   Wt 88 5 kg (195 lb)   BMI 25 70 kg/m²          Physical Exam   Constitutional: He appears well-developed and well-nourished  Neck: No thyromegaly present  Cardiovascular: Normal rate and normal heart sounds  Pulmonary/Chest: Breath sounds normal    Musculoskeletal: He exhibits tenderness  Right hip: Normal         Left hip: He exhibits decreased range of motion and tenderness  Left knee: He exhibits no swelling, no effusion, no LCL laxity, normal patellar mobility and no MCL laxity  Tenderness found  Lymphadenopathy:     He has no cervical adenopathy

## 2018-10-27 ENCOUNTER — APPOINTMENT (OUTPATIENT)
Dept: RADIOLOGY | Age: 58
End: 2018-10-27
Payer: COMMERCIAL

## 2018-10-27 DIAGNOSIS — M25.552 PAIN OF BOTH HIP JOINTS: ICD-10-CM

## 2018-10-27 DIAGNOSIS — M25.551 PAIN OF BOTH HIP JOINTS: ICD-10-CM

## 2018-10-27 DIAGNOSIS — G89.29 CHRONIC PAIN OF LEFT KNEE: ICD-10-CM

## 2018-10-27 DIAGNOSIS — M25.562 CHRONIC PAIN OF LEFT KNEE: ICD-10-CM

## 2018-10-27 PROCEDURE — 73562 X-RAY EXAM OF KNEE 3: CPT

## 2018-10-27 PROCEDURE — 73502 X-RAY EXAM HIP UNI 2-3 VIEWS: CPT

## 2018-10-29 ENCOUNTER — TELEPHONE (OUTPATIENT)
Dept: FAMILY MEDICINE CLINIC | Facility: CLINIC | Age: 58
End: 2018-10-29

## 2018-11-19 ENCOUNTER — OFFICE VISIT (OUTPATIENT)
Dept: FAMILY MEDICINE CLINIC | Facility: CLINIC | Age: 58
End: 2018-11-19
Payer: COMMERCIAL

## 2018-11-19 VITALS
WEIGHT: 196.4 LBS | RESPIRATION RATE: 18 BRPM | HEIGHT: 73 IN | BODY MASS INDEX: 26.03 KG/M2 | DIASTOLIC BLOOD PRESSURE: 72 MMHG | SYSTOLIC BLOOD PRESSURE: 110 MMHG | TEMPERATURE: 97.8 F | HEART RATE: 84 BPM

## 2018-11-19 DIAGNOSIS — M25.562 CHRONIC PAIN OF LEFT KNEE: Primary | ICD-10-CM

## 2018-11-19 DIAGNOSIS — G89.29 CHRONIC PAIN OF LEFT KNEE: Primary | ICD-10-CM

## 2018-11-19 PROCEDURE — 3008F BODY MASS INDEX DOCD: CPT | Performed by: FAMILY MEDICINE

## 2018-11-19 PROCEDURE — 99213 OFFICE O/P EST LOW 20 MIN: CPT | Performed by: FAMILY MEDICINE

## 2018-11-19 PROCEDURE — 1036F TOBACCO NON-USER: CPT | Performed by: FAMILY MEDICINE

## 2018-11-19 NOTE — PROGRESS NOTES
Assessment/Plan:    No problem-specific Assessment & Plan notes found for this encounter  There are no diagnoses linked to this encounter  Subjective:      Patient ID: Markos Winters is a 63 y o  male  Knee Pain    The incident occurred more than 1 week ago  The injury mechanism is unknown  The pain is present in the left knee  The quality of the pain is described as cramping, shooting and stabbing  The pain is at a severity of 4/10  The pain is mild  Associated symptoms include a loss of motion  Pertinent negatives include no numbness  The symptoms are aggravated by movement  He has tried acetaminophen, rest, elevation and ice for the symptoms  The treatment provided no relief  The following portions of the patient's history were reviewed and updated as appropriate: allergies, current medications, past family history, past medical history, past social history, past surgical history and problem list       Review of Systems   Constitutional: Negative for activity change and appetite change  Respiratory: Negative for shortness of breath  Cardiovascular: Positive for chest pain  Occ chest pain   Musculoskeletal: Positive for arthralgias  Knee pain and instability   Neurological: Negative for numbness and headaches  Objective:      /72 (BP Location: Left arm, Patient Position: Sitting, Cuff Size: Adult)   Pulse 84   Temp 97 8 °F (36 6 °C) (Temporal)   Resp 18   Ht 6' 1 04" (1 855 m)   Wt 89 1 kg (196 lb 6 4 oz)   BMI 25 88 kg/m²          Physical Exam   Constitutional: He appears well-developed and well-nourished  Cardiovascular: Normal rate, regular rhythm and normal heart sounds  Pulmonary/Chest: Effort normal    Musculoskeletal: He exhibits tenderness  Left knee: He exhibits decreased range of motion and abnormal patellar mobility  Tenderness found  instability of posterior cruciate ligamant   Psychiatric: He has a normal mood and affect

## 2018-11-28 ENCOUNTER — HOSPITAL ENCOUNTER (OUTPATIENT)
Dept: RADIOLOGY | Age: 58
Discharge: HOME | End: 2018-11-28
Payer: COMMERCIAL

## 2018-11-28 ENCOUNTER — TELEPHONE (OUTPATIENT)
Dept: FAMILY MEDICINE CLINIC | Facility: CLINIC | Age: 58
End: 2018-11-28

## 2018-11-28 DIAGNOSIS — G89.29 CHRONIC PAIN OF LEFT KNEE: ICD-10-CM

## 2018-11-28 DIAGNOSIS — M25.562 CHRONIC PAIN OF LEFT KNEE: ICD-10-CM

## 2018-11-28 PROCEDURE — 73721 MRI JNT OF LWR EXTRE W/O DYE: CPT

## 2018-11-28 NOTE — TELEPHONE ENCOUNTER
----- Message from Hortensia Shukla MD sent at 11/28/2018 10:33 AM EST -----  Tear medical meniscus -rec ortho eval

## 2018-12-04 ENCOUNTER — OFFICE VISIT (OUTPATIENT)
Dept: OBGYN CLINIC | Facility: HOSPITAL | Age: 58
End: 2018-12-04
Payer: COMMERCIAL

## 2018-12-04 VITALS
DIASTOLIC BLOOD PRESSURE: 83 MMHG | BODY MASS INDEX: 25.54 KG/M2 | HEIGHT: 74 IN | SYSTOLIC BLOOD PRESSURE: 122 MMHG | HEART RATE: 76 BPM | WEIGHT: 199 LBS

## 2018-12-04 DIAGNOSIS — M25.562 CHRONIC PAIN OF LEFT KNEE: ICD-10-CM

## 2018-12-04 DIAGNOSIS — G89.29 CHRONIC PAIN OF LEFT KNEE: ICD-10-CM

## 2018-12-04 DIAGNOSIS — M22.2X2 PATELLOFEMORAL DISORDER OF LEFT KNEE: Primary | ICD-10-CM

## 2018-12-04 PROCEDURE — 99203 OFFICE O/P NEW LOW 30 MIN: CPT | Performed by: ORTHOPAEDIC SURGERY

## 2018-12-04 PROCEDURE — 20610 DRAIN/INJ JOINT/BURSA W/O US: CPT | Performed by: ORTHOPAEDIC SURGERY

## 2018-12-04 RX ORDER — METHYLPREDNISOLONE ACETATE 40 MG/ML
2 INJECTION, SUSPENSION INTRA-ARTICULAR; INTRALESIONAL; INTRAMUSCULAR; SOFT TISSUE
Status: COMPLETED | OUTPATIENT
Start: 2018-12-04 | End: 2018-12-04

## 2018-12-04 RX ORDER — BUPIVACAINE HYDROCHLORIDE 2.5 MG/ML
2 INJECTION, SOLUTION INFILTRATION; PERINEURAL
Status: COMPLETED | OUTPATIENT
Start: 2018-12-04 | End: 2018-12-04

## 2018-12-04 RX ADMIN — METHYLPREDNISOLONE ACETATE 2 ML: 40 INJECTION, SUSPENSION INTRA-ARTICULAR; INTRALESIONAL; INTRAMUSCULAR; SOFT TISSUE at 15:49

## 2018-12-04 RX ADMIN — BUPIVACAINE HYDROCHLORIDE 2 ML: 2.5 INJECTION, SOLUTION INFILTRATION; PERINEURAL at 15:49

## 2018-12-04 NOTE — PROGRESS NOTES
Assessment:  1  Chronic pain of left knee     2  Patellofemoral disorder of left knee      Lateral calf tenderness    Plan:     Patellofemoral disorder   Weight Bear as Tolerated   Exercises discussed and demonstrated today  o VMO strengthening   Left knee steroid injection today   Follow up as needed   Doctor to call in two months    To do next visit:  No Follow-up on file  The above stated was discussed in layman's terms and the patient expressed understanding  All questions were answered to the patient's satisfaction  Subjective:   Amber Sidhu is a 63 y o  male who presents for left knee pain  He has had symptoms for  3-4 months with no injury  Today he complains of posterior left knee pain that extends inferiorly into the posterior proximal calf  He rates his pain 4-5/10 and 10/10 at its worse  He describes his pain as an ache  He denies clicking or locking yet his knee can feel unstable  He denies swelling  He denies numbness and tingling  He mentions the left lateral hip can ache as well  Certain movements, getting out of car, leaning on one leg, transitional positions aggravates  He will use tylenol on occasion  He denies injections, physical therapy or surgery  He mentions a history 4-5 years ago of pulmonary embolism treated with eliquis           Review of systems negative unless otherwise specified in HPI    Past Medical History:   Diagnosis Date    Hyperlipidemia     Pulmonary embolism (HCC)        Past Surgical History:   Procedure Laterality Date    HERNIA REPAIR      WISDOM TOOTH EXTRACTION         Family History   Problem Relation Age of Onset   Mary Morris Stroke Mother         CVA   Mary Morris Breast cancer Mother     Diabetes Father         DM    Cancer Father     Melanoma Brother        Social History     Occupational History          full-time employment     Social History Main Topics    Smoking status: Former Smoker    Smokeless tobacco: Never Used      Comment: per allscripts 'never smoker'    Alcohol use No    Drug use: No    Sexual activity: Not on file         Current Outpatient Prescriptions:     apixaban (ELIQUIS) 2 5 mg, Take by mouth, Disp: , Rfl:     atorvastatin (LIPITOR) 10 mg tablet, Take 1 tablet (10 mg total) by mouth daily, Disp: 90 tablet, Rfl: 1    Allergies   Allergen Reactions    Codeine GI Intolerance     vomitting            Vitals:    12/04/18 1503   BP: 122/83   Pulse: 76       Objective:            Physical Exam  · General: Awake, Alert, Oriented  · Eyes: Pupils equal, round and reactive to light  · Heart: regular rate and rhythm  · Lungs: No audible wheezing  · Abdomen: soft                    Ortho Exam   Left knee:  Pain over patella tendon with passive extension  Pain with varus stress over anterior compartment of leg  No TTP over anterior compartment  No medial or lateral JLT  TTP lateral calf region  Calf pain with foot eversion/external rotation   Neurovascularly Intact Distally     Diagnostics, reviewed and taken today if performed as documented:     The attending physician has personally reviewed the pertinent films in PACS and interpretation is as follows:  None performed today  X-ray 10/27/18  MRI 11/28/18  No signs of arthritis noted radiographic imaging  Medial meniscal tear noted    Procedures, if performed today:    Large joint arthrocentesis  Date/Time: 12/4/2018 3:49 PM  Consent given by: patient  Site marked: site marked  Timeout: Immediately prior to procedure a time out was called to verify the correct patient, procedure, equipment, support staff and site/side marked as required   Supporting Documentation  Indications: pain   Procedure Details  Location: knee - L knee  Preparation: Patient was prepped and draped in the usual sterile fashion  Needle size: 22 G  Ultrasound guidance: no  Approach: anterolateral  Medications administered: 2 mL bupivacaine 0 25 %; 2 mL methylPREDNISolone acetate 40 mg/mL    Patient tolerance: patient tolerated the procedure well with no immediate complications  Dressing:  Sterile dressing applied        None performed      Scribe Attestation    I,:   Willie Craven am acting as a scribe while in the presence of the attending physician :        I,:   Elvira Yen MD personally performed the services described in this documentation    as scribed in my presence :              Portions of the record may have been created with voice recognition software  Occasional wrong word or "sound a like" substitutions may have occurred due to the inherent limitations of voice recognition software  Read the chart carefully and recognize, using context, where substitutions have occurred

## 2018-12-21 ENCOUNTER — OFFICE VISIT (OUTPATIENT)
Dept: HEMATOLOGY ONCOLOGY | Facility: CLINIC | Age: 58
End: 2018-12-21
Payer: COMMERCIAL

## 2018-12-21 VITALS
BODY MASS INDEX: 25.41 KG/M2 | HEIGHT: 74 IN | SYSTOLIC BLOOD PRESSURE: 126 MMHG | WEIGHT: 198 LBS | HEART RATE: 72 BPM | RESPIRATION RATE: 18 BRPM | OXYGEN SATURATION: 97 % | DIASTOLIC BLOOD PRESSURE: 86 MMHG | TEMPERATURE: 97.7 F

## 2018-12-21 DIAGNOSIS — I27.82 OTHER CHRONIC PULMONARY EMBOLISM WITHOUT ACUTE COR PULMONALE (HCC): Primary | ICD-10-CM

## 2018-12-21 PROCEDURE — 99214 OFFICE O/P EST MOD 30 MIN: CPT | Performed by: INTERNAL MEDICINE
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
## 2019-01-02 ENCOUNTER — TELEPHONE (OUTPATIENT)
Dept: OBGYN CLINIC | Facility: HOSPITAL | Age: 59
End: 2019-01-02

## 2019-01-03 ENCOUNTER — OFFICE VISIT (OUTPATIENT)
Dept: OBGYN CLINIC | Facility: HOSPITAL | Age: 59
End: 2019-01-03
Payer: COMMERCIAL

## 2019-01-03 ENCOUNTER — TELEPHONE (OUTPATIENT)
Dept: OBGYN CLINIC | Facility: HOSPITAL | Age: 59
End: 2019-01-03

## 2019-01-03 ENCOUNTER — HOSPITAL ENCOUNTER (OUTPATIENT)
Dept: NON INVASIVE DIAGNOSTICS | Facility: HOSPITAL | Age: 59
Discharge: HOME | End: 2019-01-03
Attending: ORTHOPAEDIC SURGERY
Payer: COMMERCIAL

## 2019-01-03 VITALS
HEIGHT: 73 IN | BODY MASS INDEX: 26.24 KG/M2 | SYSTOLIC BLOOD PRESSURE: 125 MMHG | WEIGHT: 198 LBS | HEART RATE: 90 BPM | DIASTOLIC BLOOD PRESSURE: 78 MMHG

## 2019-01-03 DIAGNOSIS — M25.562 CHRONIC PAIN OF LEFT KNEE: Primary | ICD-10-CM

## 2019-01-03 DIAGNOSIS — R60.9 SWELLING: Primary | ICD-10-CM

## 2019-01-03 DIAGNOSIS — R60.9 SWELLING: ICD-10-CM

## 2019-01-03 DIAGNOSIS — G89.29 CHRONIC PAIN OF LEFT KNEE: Primary | ICD-10-CM

## 2019-01-03 DIAGNOSIS — M22.2X2 PATELLOFEMORAL DISORDER OF LEFT KNEE: ICD-10-CM

## 2019-01-03 PROCEDURE — 93971 EXTREMITY STUDY: CPT | Performed by: SURGERY

## 2019-01-03 PROCEDURE — 93971 EXTREMITY STUDY: CPT

## 2019-01-03 PROCEDURE — 99213 OFFICE O/P EST LOW 20 MIN: CPT | Performed by: ORTHOPAEDIC SURGERY

## 2019-01-03 NOTE — TELEPHONE ENCOUNTER
Please be aware Aileen Montes from vascular lab called with results of doppler studies, please see results in EPIC  Pt  Made aware       Thank you

## 2019-01-03 NOTE — PROGRESS NOTES
Assessment:  1  Chronic pain of left knee  VAS lower limb venous duplex study, unilateral/limited   2  Patellofemoral disorder of left knee      History of PEs in the past and currently on Eliquis    Plan:     Weight Bearing  as Tolerated   Will be ordering venous duplex of the left lower extremity to rule out DVT versus Baker cyst rupture  Patient has a history of PE as on Eliquis   Will contact patient with results from the venous duplex   Discussed treatment plan with patient and he is in agreement treatment plan  Thank you        The above stated was discussed in layman's terms and the patient expressed understanding  All questions were answered to the patient's satisfaction  Subjective:   Sonya Sexton is a 63 y o  male who presents left knee pain  Patient has a left knee steroid injection on 12/4/18 and notes he has a day and half of relief  He notes he is having increase pain in the posterior left knee and radiating pain down to the calf, notes stiffness in the calf and also having shin splints for the last couple of days  His pain is worse when walking  This has been occurring over the past 3-4 days      Review of systems negative unless otherwise specified in HPI    Past Medical History:   Diagnosis Date    Hyperlipidemia     Pulmonary embolism (HCC)        Past Surgical History:   Procedure Laterality Date    HERNIA REPAIR      WISDOM TOOTH EXTRACTION         Family History   Problem Relation Age of Onset   [de-identified] Stroke Mother         CVA   [de-identified] Breast cancer Mother     Diabetes Father         DM    Cancer Father     Melanoma Brother        Social History     Occupational History          full-time employment     Social History Main Topics    Smoking status: Former Smoker    Smokeless tobacco: Never Used      Comment: per allscripts 'never smoker'    Alcohol use No    Drug use: No    Sexual activity: Not on file         Current Outpatient Prescriptions:     apixaban (ELIQUIS) 2 5 mg, Take 1 tablet (2 5 mg total) by mouth 2 (two) times a day, Disp: 60 tablet, Rfl: 6    atorvastatin (LIPITOR) 10 mg tablet, Take 1 tablet (10 mg total) by mouth daily, Disp: 90 tablet, Rfl: 1    Allergies   Allergen Reactions    Codeine GI Intolerance     vomitting            Vitals:    01/03/19 1616   BP: 125/78   Pulse: 90       Objective:            Physical Exam  · General: Awake, Alert, Oriented  · Eyes: Pupils equal, round and reactive to light  · Heart: regular rate and rhythm  · Lungs: No audible wheezing  · Abdomen: soft                    Ortho Exam  Left knee  No lacerations nor abrasions  No medial or lateral joint line tenderness  Tenderness palpation over the calf region  Neurovascularly Intact Distally     Diagnostics, reviewed and taken today if performed as documented:    None performed      The attending physician has personally reviewed the pertinent films in PACS and interpretation is as follows:      Procedures, if performed today:    Procedures    None performed      Scribe Attestation    I,:   Joshua Chiu am acting as a scribe while in the presence of the attending physician :        I,:   Marva Angeles MD personally performed the services described in this documentation    as scribed in my presence :              Portions of the record may have been created with voice recognition software  Occasional wrong word or "sound a like" substitutions may have occurred due to the inherent limitations of voice recognition software  Read the chart carefully and recognize, using context, where substitutions have occurred

## 2019-03-05 ENCOUNTER — OFFICE VISIT (OUTPATIENT)
Dept: OBGYN CLINIC | Facility: HOSPITAL | Age: 59
End: 2019-03-05
Payer: COMMERCIAL

## 2019-03-05 VITALS
DIASTOLIC BLOOD PRESSURE: 84 MMHG | SYSTOLIC BLOOD PRESSURE: 147 MMHG | WEIGHT: 201.6 LBS | HEART RATE: 103 BPM | BODY MASS INDEX: 26.6 KG/M2

## 2019-03-05 DIAGNOSIS — S83.242D OTHER TEAR OF MEDIAL MENISCUS OF LEFT KNEE AS CURRENT INJURY, SUBSEQUENT ENCOUNTER: ICD-10-CM

## 2019-03-05 DIAGNOSIS — M25.562 CHRONIC PAIN OF LEFT KNEE: ICD-10-CM

## 2019-03-05 DIAGNOSIS — G89.29 CHRONIC PAIN OF LEFT KNEE: ICD-10-CM

## 2019-03-05 DIAGNOSIS — M25.462 EFFUSION OF LEFT KNEE: Primary | ICD-10-CM

## 2019-03-05 PROCEDURE — 99213 OFFICE O/P EST LOW 20 MIN: CPT | Performed by: ORTHOPAEDIC SURGERY

## 2019-03-05 PROCEDURE — 20610 DRAIN/INJ JOINT/BURSA W/O US: CPT | Performed by: ORTHOPAEDIC SURGERY

## 2019-03-05 RX ORDER — TRIAMCINOLONE ACETONIDE 40 MG/ML
40 INJECTION, SUSPENSION INTRA-ARTICULAR; INTRAMUSCULAR
Status: COMPLETED | OUTPATIENT
Start: 2019-03-05 | End: 2019-03-05

## 2019-03-05 RX ORDER — LIDOCAINE HYDROCHLORIDE 10 MG/ML
2 INJECTION, SOLUTION INFILTRATION; PERINEURAL
Status: COMPLETED | OUTPATIENT
Start: 2019-03-05 | End: 2019-03-05

## 2019-03-05 RX ADMIN — LIDOCAINE HYDROCHLORIDE 2 ML: 10 INJECTION, SOLUTION INFILTRATION; PERINEURAL at 17:04

## 2019-03-05 RX ADMIN — TRIAMCINOLONE ACETONIDE 40 MG: 40 INJECTION, SUSPENSION INTRA-ARTICULAR; INTRAMUSCULAR at 17:04

## 2019-03-05 NOTE — PROGRESS NOTES
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    Ice knee over the next few days, activities to tolerance    PRN unless symptoms return       To do next visit:  Return if symptoms worsen or fail to improve  The above stated was discussed in layman's terms and the patient expressed understanding  All questions were answered to the patient's satisfaction  Scribe Attestation    I,:   Lela Russell am acting as a scribe while in the presence of the attending physician :        I,:   Deo Jenkins MD personally performed the services described in this documentation    as scribed in my presence :              Subjective:   Mely Ramirez is a 63 y o  male who presents for f/u regarding his left knee  He did get US no DVT but did show bakers cyst  He did have cortisone injection 12/4/18, day and a half relief  Since last appt he knee did give out causing him to fall  His pain today is anterior knee over the patella  No longer pain posterior knee or calf  He does have some medial joint line tenderness and swelling in his knee  Pivoting or twisting can recreate pain  Knee feels tight, stiff with flexion  No locking, catching, clicking noted  Only using tylenol due to being on eliquis  MRI did show MMT         Review of systems negative unless otherwise specified in HPI    Past Medical History:   Diagnosis Date    Hyperlipidemia     Pulmonary embolism (HCC)        Past Surgical History:   Procedure Laterality Date    HERNIA REPAIR      WISDOM TOOTH EXTRACTION         Family History   Problem Relation Age of Onset    Stroke Mother         CVA    Breast cancer Mother     Diabetes Father         DM    Cancer Father     Melanoma Brother        Social History     Occupational History     Comment: full-time employment   Tobacco Use    Smoking status: Former Smoker    Smokeless tobacco: Never Used    Tobacco comment: per [employer] 'never smoker'   Substance and Sexual Activity    Alcohol use: No    Drug use: No    Sexual activity: Not on file         Current Outpatient Medications:     apixaban (ELIQUIS) 2 5 mg, Take 1 tablet (2 5 mg total) by mouth 2 (two) times a day, Disp: 60 tablet, Rfl: 6    atorvastatin (LIPITOR) 10 mg tablet, Take 1 tablet (10 mg total) by mouth daily, Disp: 90 tablet, Rfl: 1    Allergies   Allergen Reactions    Codeine GI Intolerance     vomitting            Vitals:    03/05/19 1639   BP: 147/84   Pulse: 103       Objective:                    Left Knee Exam     Tenderness   The patient is experiencing tenderness in the patella and medial joint line  Range of Motion   Extension: 0   Flexion: 120     Tests   Kervin:  Lateral - negative  Varus: negative Valgus: negative  Lachman:  Anterior - negative      Drawer:  Anterior - negative       Pivot shift: negative  Patellar apprehension: negative    Other   Erythema: absent  Scars: absent  Sensation: normal  Pulse: present  Swelling: moderate  Effusion: effusion present    Comments:  Equivocal medial Kervin             Diagnostics, reviewed and taken today if performed as documented: The attending physician has personally reviewed the pertinent films in PACS and interpretation is as follows: US reviewed from 1/3/19 no DVT, evidence of bakers cyst popliteal fossa       Procedures, if performed today:    Large joint arthrocentesis: L knee  Date/Time: 3/5/2019 5:04 PM  Consent given by: patient  Site marked: site marked  Timeout: Immediately prior to procedure a time out was called to verify the correct patient, procedure, equipment, support staff and site/side marked as required Supporting Documentation  Indications: joint swelling   Procedure Details  Location: knee - L knee  Preparation: Patient was prepped and draped in the usual sterile fashion  Needle size: 18 G  Ultrasound guidance: no  Approach: anterolateral  Medications administered: 2 mL lidocaine 1 %; 40 mg triamcinolone acetonide 40 mg/mL    Aspirate amount: 46 mL  Aspirate: yellow and serous    Patient tolerance: patient tolerated the procedure well with no immediate complications  Dressing:  Sterile dressing applied          None performed      Portions of the record may have been created with voice recognition software   Occasional wrong word or "sound a like" substitutions may have occurred due to the inherent limitations of voice recognition software   Read the chart carefully and recognize, using context, where substitutions have occurred

## 2019-05-21 ENCOUNTER — OFFICE VISIT (OUTPATIENT)
Dept: OBGYN CLINIC | Facility: HOSPITAL | Age: 59
End: 2019-05-21
Payer: COMMERCIAL

## 2019-05-21 VITALS
DIASTOLIC BLOOD PRESSURE: 85 MMHG | BODY MASS INDEX: 25.91 KG/M2 | SYSTOLIC BLOOD PRESSURE: 123 MMHG | WEIGHT: 196.4 LBS | HEART RATE: 80 BPM

## 2019-05-21 DIAGNOSIS — G89.29 CHRONIC PAIN OF LEFT KNEE: Primary | ICD-10-CM

## 2019-05-21 DIAGNOSIS — S83.242D OTHER TEAR OF MEDIAL MENISCUS OF LEFT KNEE AS CURRENT INJURY, SUBSEQUENT ENCOUNTER: ICD-10-CM

## 2019-05-21 DIAGNOSIS — M25.562 CHRONIC PAIN OF LEFT KNEE: Primary | ICD-10-CM

## 2019-05-21 DIAGNOSIS — M25.462 EFFUSION OF LEFT KNEE: ICD-10-CM

## 2019-05-21 PROCEDURE — 99213 OFFICE O/P EST LOW 20 MIN: CPT | Performed by: ORTHOPAEDIC SURGERY

## 2019-05-21 PROCEDURE — 20610 DRAIN/INJ JOINT/BURSA W/O US: CPT | Performed by: ORTHOPAEDIC SURGERY

## 2019-05-21 RX ORDER — BUPIVACAINE HYDROCHLORIDE 2.5 MG/ML
2 INJECTION, SOLUTION INFILTRATION; PERINEURAL
Status: COMPLETED | OUTPATIENT
Start: 2019-05-21 | End: 2019-05-21

## 2019-05-21 RX ADMIN — BUPIVACAINE HYDROCHLORIDE 2 ML: 2.5 INJECTION, SOLUTION INFILTRATION; PERINEURAL at 16:50

## 2019-05-29 ENCOUNTER — TELEPHONE (OUTPATIENT)
Dept: OBGYN CLINIC | Facility: HOSPITAL | Age: 59
End: 2019-05-29

## 2019-05-31 ENCOUNTER — PREP FOR PROCEDURE (OUTPATIENT)
Dept: OBGYN CLINIC | Facility: HOSPITAL | Age: 59
End: 2019-05-31

## 2019-05-31 DIAGNOSIS — S83.242A ACUTE MEDIAL MENISCAL TEAR, LEFT, INITIAL ENCOUNTER: Primary | ICD-10-CM

## 2019-05-31 RX ORDER — CEFAZOLIN SODIUM 2 G/50ML
2000 SOLUTION INTRAVENOUS ONCE
Status: CANCELLED | OUTPATIENT
Start: 2019-05-31 | End: 2019-05-31

## 2019-06-10 ENCOUNTER — TELEPHONE (OUTPATIENT)
Dept: FAMILY MEDICINE CLINIC | Facility: CLINIC | Age: 59
End: 2019-06-10

## 2019-06-11 ENCOUNTER — OFFICE VISIT (OUTPATIENT)
Dept: FAMILY MEDICINE CLINIC | Facility: CLINIC | Age: 59
End: 2019-06-11
Payer: COMMERCIAL

## 2019-06-11 ENCOUNTER — APPOINTMENT (OUTPATIENT)
Dept: LAB | Facility: CLINIC | Age: 59
End: 2019-06-11
Payer: COMMERCIAL

## 2019-06-11 VITALS
HEART RATE: 84 BPM | BODY MASS INDEX: 26.11 KG/M2 | TEMPERATURE: 97.9 F | SYSTOLIC BLOOD PRESSURE: 106 MMHG | HEIGHT: 73 IN | WEIGHT: 197 LBS | DIASTOLIC BLOOD PRESSURE: 74 MMHG

## 2019-06-11 DIAGNOSIS — Z01.818 PREOPERATIVE EXAMINATION: Primary | ICD-10-CM

## 2019-06-11 DIAGNOSIS — E55.9 VITAMIN D DEFICIENCY: ICD-10-CM

## 2019-06-11 DIAGNOSIS — S83.242A ACUTE MEDIAL MENISCAL TEAR, LEFT, INITIAL ENCOUNTER: ICD-10-CM

## 2019-06-11 DIAGNOSIS — E78.5 HYPERLIPIDEMIA, UNSPECIFIED HYPERLIPIDEMIA TYPE: ICD-10-CM

## 2019-06-11 DIAGNOSIS — E78.2 MIXED HYPERLIPIDEMIA: ICD-10-CM

## 2019-06-11 LAB
25(OH)D3 SERPL-MCNC: 10.3 NG/ML (ref 30–100)
ANION GAP SERPL CALCULATED.3IONS-SCNC: 7 MMOL/L (ref 4–13)
APTT PPP: 30 SECONDS (ref 26–38)
BASOPHILS # BLD AUTO: 0.05 THOUSANDS/ΜL (ref 0–0.1)
BASOPHILS NFR BLD AUTO: 1 % (ref 0–1)
BUN SERPL-MCNC: 20 MG/DL (ref 5–25)
CALCIUM SERPL-MCNC: 9.1 MG/DL (ref 8.3–10.1)
CHLORIDE SERPL-SCNC: 105 MMOL/L (ref 100–108)
CHOLEST SERPL-MCNC: 242 MG/DL (ref 50–200)
CO2 SERPL-SCNC: 26 MMOL/L (ref 21–32)
CREAT SERPL-MCNC: 1.3 MG/DL (ref 0.6–1.3)
EOSINOPHIL # BLD AUTO: 0.07 THOUSAND/ΜL (ref 0–0.61)
EOSINOPHIL NFR BLD AUTO: 1 % (ref 0–6)
ERYTHROCYTE [DISTWIDTH] IN BLOOD BY AUTOMATED COUNT: 12.2 % (ref 11.6–15.1)
GFR SERPL CREATININE-BSD FRML MDRD: 61 ML/MIN/1.73SQ M
GLUCOSE P FAST SERPL-MCNC: 103 MG/DL (ref 65–99)
HCT VFR BLD AUTO: 46.4 % (ref 36.5–49.3)
HDLC SERPL-MCNC: 43 MG/DL (ref 40–60)
HGB BLD-MCNC: 15.3 G/DL (ref 12–17)
IMM GRANULOCYTES # BLD AUTO: 0.03 THOUSAND/UL (ref 0–0.2)
IMM GRANULOCYTES NFR BLD AUTO: 0 % (ref 0–2)
INR PPP: 0.92 (ref 0.86–1.17)
LDLC SERPL CALC-MCNC: 144 MG/DL (ref 0–100)
LYMPHOCYTES # BLD AUTO: 2.69 THOUSANDS/ΜL (ref 0.6–4.47)
LYMPHOCYTES NFR BLD AUTO: 38 % (ref 14–44)
MCH RBC QN AUTO: 30.8 PG (ref 26.8–34.3)
MCHC RBC AUTO-ENTMCNC: 33 G/DL (ref 31.4–37.4)
MCV RBC AUTO: 93 FL (ref 82–98)
MONOCYTES # BLD AUTO: 0.61 THOUSAND/ΜL (ref 0.17–1.22)
MONOCYTES NFR BLD AUTO: 9 % (ref 4–12)
NEUTROPHILS # BLD AUTO: 3.57 THOUSANDS/ΜL (ref 1.85–7.62)
NEUTS SEG NFR BLD AUTO: 51 % (ref 43–75)
NRBC BLD AUTO-RTO: 0 /100 WBCS
PLATELET # BLD AUTO: 212 THOUSANDS/UL (ref 149–390)
PMV BLD AUTO: 12.4 FL (ref 8.9–12.7)
POTASSIUM SERPL-SCNC: 3.7 MMOL/L (ref 3.5–5.3)
PROTHROMBIN TIME: 12.5 SECONDS (ref 11.8–14.2)
RBC # BLD AUTO: 4.97 MILLION/UL (ref 3.88–5.62)
SODIUM SERPL-SCNC: 138 MMOL/L (ref 136–145)
TRIGL SERPL-MCNC: 274 MG/DL
WBC # BLD AUTO: 7.02 THOUSAND/UL (ref 4.31–10.16)

## 2019-06-11 PROCEDURE — 85610 PROTHROMBIN TIME: CPT

## 2019-06-11 PROCEDURE — 85730 THROMBOPLASTIN TIME PARTIAL: CPT

## 2019-06-11 PROCEDURE — 3008F BODY MASS INDEX DOCD: CPT | Performed by: FAMILY MEDICINE

## 2019-06-11 PROCEDURE — 80048 BASIC METABOLIC PNL TOTAL CA: CPT

## 2019-06-11 PROCEDURE — 36415 COLL VENOUS BLD VENIPUNCTURE: CPT

## 2019-06-11 PROCEDURE — 82306 VITAMIN D 25 HYDROXY: CPT

## 2019-06-11 PROCEDURE — 85025 COMPLETE CBC W/AUTO DIFF WBC: CPT

## 2019-06-11 PROCEDURE — 99214 OFFICE O/P EST MOD 30 MIN: CPT | Performed by: FAMILY MEDICINE

## 2019-06-11 PROCEDURE — 80061 LIPID PANEL: CPT

## 2019-06-11 RX ORDER — ERGOCALCIFEROL 1.25 MG/1
50000 CAPSULE ORAL WEEKLY
Qty: 12 CAPSULE | Refills: 1 | Status: SHIPPED | OUTPATIENT
Start: 2019-06-11 | End: 2019-06-14 | Stop reason: SDUPTHER

## 2019-06-13 ENCOUNTER — PREP FOR PROCEDURE (OUTPATIENT)
Dept: OBGYN CLINIC | Facility: HOSPITAL | Age: 59
End: 2019-06-13

## 2019-06-14 ENCOUNTER — HOSPITAL ENCOUNTER (OUTPATIENT)
Facility: HOSPITAL | Age: 59
Setting detail: OUTPATIENT SURGERY
Discharge: HOME | End: 2019-06-14
Attending: ORTHOPAEDIC SURGERY | Admitting: ORTHOPAEDIC SURGERY
Payer: COMMERCIAL

## 2019-06-14 ENCOUNTER — ANESTHESIA (OUTPATIENT)
Dept: PERIOP | Facility: HOSPITAL | Age: 59
End: 2019-06-14
Payer: COMMERCIAL

## 2019-06-14 ENCOUNTER — ANESTHESIA EVENT (OUTPATIENT)
Dept: PERIOP | Facility: HOSPITAL | Age: 59
End: 2019-06-14
Payer: COMMERCIAL

## 2019-06-14 ENCOUNTER — TELEPHONE (OUTPATIENT)
Dept: FAMILY MEDICINE CLINIC | Facility: CLINIC | Age: 59
End: 2019-06-14

## 2019-06-14 VITALS
HEIGHT: 74 IN | DIASTOLIC BLOOD PRESSURE: 79 MMHG | OXYGEN SATURATION: 97 % | TEMPERATURE: 99 F | RESPIRATION RATE: 16 BRPM | WEIGHT: 197 LBS | HEART RATE: 66 BPM | SYSTOLIC BLOOD PRESSURE: 136 MMHG | BODY MASS INDEX: 25.28 KG/M2

## 2019-06-14 DIAGNOSIS — Z98.890 STATUS POST SURGERY: Primary | ICD-10-CM

## 2019-06-14 DIAGNOSIS — E55.9 VITAMIN D DEFICIENCY: ICD-10-CM

## 2019-06-14 PROCEDURE — 29881 ARTHRS KNE SRG MNISECTMY M/L: CPT | Performed by: PHYSICIAN ASSISTANT

## 2019-06-14 PROCEDURE — 29881 ARTHRS KNE SRG MNISECTMY M/L: CPT | Performed by: ORTHOPAEDIC SURGERY

## 2019-06-14 RX ORDER — EPHEDRINE SULFATE 50 MG/ML
INJECTION INTRAVENOUS AS NEEDED
Status: DISCONTINUED | OUTPATIENT
Start: 2019-06-14 | End: 2019-06-14 | Stop reason: SURG

## 2019-06-14 RX ORDER — BUPIVACAINE HYDROCHLORIDE 5 MG/ML
INJECTION, SOLUTION EPIDURAL; INTRACAUDAL AS NEEDED
Status: DISCONTINUED | OUTPATIENT
Start: 2019-06-14 | End: 2019-06-14 | Stop reason: HOSPADM

## 2019-06-14 RX ORDER — KETOROLAC TROMETHAMINE 30 MG/ML
30 INJECTION, SOLUTION INTRAMUSCULAR; INTRAVENOUS ONCE
Status: COMPLETED | OUTPATIENT
Start: 2019-06-14 | End: 2019-06-14

## 2019-06-14 RX ORDER — FENTANYL CITRATE 50 UG/ML
INJECTION, SOLUTION INTRAMUSCULAR; INTRAVENOUS AS NEEDED
Status: DISCONTINUED | OUTPATIENT
Start: 2019-06-14 | End: 2019-06-14 | Stop reason: SURG

## 2019-06-14 RX ORDER — ONDANSETRON 2 MG/ML
4 INJECTION INTRAMUSCULAR; INTRAVENOUS ONCE AS NEEDED
Status: DISCONTINUED | OUTPATIENT
Start: 2019-06-14 | End: 2019-06-14 | Stop reason: HOSPADM

## 2019-06-14 RX ORDER — SODIUM CHLORIDE, SODIUM LACTATE, POTASSIUM CHLORIDE, CALCIUM CHLORIDE 600; 310; 30; 20 MG/100ML; MG/100ML; MG/100ML; MG/100ML
INJECTION, SOLUTION INTRAVENOUS CONTINUOUS PRN
Status: DISCONTINUED | OUTPATIENT
Start: 2019-06-14 | End: 2019-06-14

## 2019-06-14 RX ORDER — LIDOCAINE HYDROCHLORIDE 10 MG/ML
INJECTION, SOLUTION INFILTRATION; PERINEURAL AS NEEDED
Status: DISCONTINUED | OUTPATIENT
Start: 2019-06-14 | End: 2019-06-14 | Stop reason: SURG

## 2019-06-14 RX ORDER — HYDROCODONE BITARTRATE AND ACETAMINOPHEN 5; 325 MG/1; MG/1
1 TABLET ORAL EVERY 6 HOURS PRN
Status: DISCONTINUED | OUTPATIENT
Start: 2019-06-14 | End: 2019-06-14 | Stop reason: HOSPADM

## 2019-06-14 RX ORDER — CEFAZOLIN SODIUM 2 G/50ML
2000 SOLUTION INTRAVENOUS ONCE
Status: COMPLETED | OUTPATIENT
Start: 2019-06-14 | End: 2019-06-14

## 2019-06-14 RX ORDER — HYDROCODONE BITARTRATE AND ACETAMINOPHEN 5; 325 MG/1; MG/1
1 TABLET ORAL EVERY 6 HOURS PRN
Qty: 30 TABLET | Refills: 0 | Status: SHIPPED | OUTPATIENT
Start: 2019-06-14 | End: 2019-06-24

## 2019-06-14 RX ORDER — LIDOCAINE HYDROCHLORIDE AND EPINEPHRINE 10; 10 MG/ML; UG/ML
INJECTION, SOLUTION INFILTRATION; PERINEURAL AS NEEDED
Status: DISCONTINUED | OUTPATIENT
Start: 2019-06-14 | End: 2019-06-14 | Stop reason: HOSPADM

## 2019-06-14 RX ORDER — HYDROCODONE BITARTRATE AND ACETAMINOPHEN 5; 325 MG/1; MG/1
1 TABLET ORAL EVERY 6 HOURS PRN
Status: DISCONTINUED | OUTPATIENT
Start: 2019-06-14 | End: 2019-06-14

## 2019-06-14 RX ORDER — DEXAMETHASONE SODIUM PHOSPHATE 10 MG/ML
INJECTION, SOLUTION INTRAMUSCULAR; INTRAVENOUS AS NEEDED
Status: DISCONTINUED | OUTPATIENT
Start: 2019-06-14 | End: 2019-06-14 | Stop reason: SURG

## 2019-06-14 RX ORDER — SODIUM CHLORIDE, SODIUM LACTATE, POTASSIUM CHLORIDE, CALCIUM CHLORIDE 600; 310; 30; 20 MG/100ML; MG/100ML; MG/100ML; MG/100ML
100 INJECTION, SOLUTION INTRAVENOUS CONTINUOUS
Status: DISCONTINUED | OUTPATIENT
Start: 2019-06-14 | End: 2019-06-14 | Stop reason: HOSPADM

## 2019-06-14 RX ORDER — ONDANSETRON 2 MG/ML
4 INJECTION INTRAMUSCULAR; INTRAVENOUS EVERY 6 HOURS PRN
Status: DISCONTINUED | OUTPATIENT
Start: 2019-06-14 | End: 2019-06-14 | Stop reason: HOSPADM

## 2019-06-14 RX ORDER — METHYLPREDNISOLONE ACETATE 40 MG/ML
INJECTION, SUSPENSION INTRA-ARTICULAR; INTRALESIONAL; INTRAMUSCULAR; SOFT TISSUE AS NEEDED
Status: DISCONTINUED | OUTPATIENT
Start: 2019-06-14 | End: 2019-06-14 | Stop reason: HOSPADM

## 2019-06-14 RX ORDER — GLYCOPYRROLATE 0.2 MG/ML
INJECTION INTRAMUSCULAR; INTRAVENOUS AS NEEDED
Status: DISCONTINUED | OUTPATIENT
Start: 2019-06-14 | End: 2019-06-14 | Stop reason: SURG

## 2019-06-14 RX ORDER — ONDANSETRON 2 MG/ML
INJECTION INTRAMUSCULAR; INTRAVENOUS AS NEEDED
Status: DISCONTINUED | OUTPATIENT
Start: 2019-06-14 | End: 2019-06-14 | Stop reason: SURG

## 2019-06-14 RX ORDER — FENTANYL CITRATE/PF 50 MCG/ML
25 SYRINGE (ML) INJECTION
Status: COMPLETED | OUTPATIENT
Start: 2019-06-14 | End: 2019-06-14

## 2019-06-14 RX ORDER — ERGOCALCIFEROL 1.25 MG/1
50000 CAPSULE ORAL 2 TIMES WEEKLY
Qty: 24 CAPSULE | Refills: 1 | Status: SHIPPED | OUTPATIENT
Start: 2019-06-17 | End: 2019-08-28 | Stop reason: SDUPTHER

## 2019-06-14 RX ORDER — MIDAZOLAM HYDROCHLORIDE 1 MG/ML
INJECTION INTRAMUSCULAR; INTRAVENOUS AS NEEDED
Status: DISCONTINUED | OUTPATIENT
Start: 2019-06-14 | End: 2019-06-14 | Stop reason: SURG

## 2019-06-14 RX ORDER — PROPOFOL 10 MG/ML
INJECTION, EMULSION INTRAVENOUS AS NEEDED
Status: DISCONTINUED | OUTPATIENT
Start: 2019-06-14 | End: 2019-06-14 | Stop reason: SURG

## 2019-06-14 RX ADMIN — CEFAZOLIN SODIUM 2000 MG: 2 SOLUTION INTRAVENOUS at 07:25

## 2019-06-14 RX ADMIN — HYDROCODONE BITARTRATE AND ACETAMINOPHEN 1 TABLET: 5; 325 TABLET ORAL at 09:33

## 2019-06-14 RX ADMIN — ONDANSETRON 4 MG: 2 INJECTION INTRAMUSCULAR; INTRAVENOUS at 07:42

## 2019-06-14 RX ADMIN — GLYCOPYRROLATE 0.2 MG: 0.2 INJECTION, SOLUTION INTRAMUSCULAR; INTRAVENOUS at 07:42

## 2019-06-14 RX ADMIN — FENTANYL CITRATE 50 MCG: 50 INJECTION, SOLUTION INTRAMUSCULAR; INTRAVENOUS at 07:33

## 2019-06-14 RX ADMIN — FENTANYL CITRATE 25 MCG: 50 INJECTION INTRAMUSCULAR; INTRAVENOUS at 08:59

## 2019-06-14 RX ADMIN — KETOROLAC TROMETHAMINE 30 MG: 30 INJECTION, SOLUTION INTRAMUSCULAR at 10:25

## 2019-06-14 RX ADMIN — PROPOFOL 200 MG: 10 INJECTION, EMULSION INTRAVENOUS at 07:33

## 2019-06-14 RX ADMIN — SODIUM CHLORIDE, SODIUM LACTATE, POTASSIUM CHLORIDE, AND CALCIUM CHLORIDE 100 ML/HR: .6; .31; .03; .02 INJECTION, SOLUTION INTRAVENOUS at 08:45

## 2019-06-14 RX ADMIN — FENTANYL CITRATE 25 MCG: 50 INJECTION INTRAMUSCULAR; INTRAVENOUS at 08:54

## 2019-06-14 RX ADMIN — FENTANYL CITRATE 25 MCG: 50 INJECTION INTRAMUSCULAR; INTRAVENOUS at 09:04

## 2019-06-14 RX ADMIN — PHENYLEPHRINE HYDROCHLORIDE 100 MCG: 10 INJECTION INTRAVENOUS at 07:47

## 2019-06-14 RX ADMIN — MIDAZOLAM 2 MG: 1 INJECTION INTRAMUSCULAR; INTRAVENOUS at 07:27

## 2019-06-14 RX ADMIN — EPHEDRINE SULFATE 10 MG: 50 INJECTION, SOLUTION INTRAVENOUS at 07:57

## 2019-06-14 RX ADMIN — FENTANYL CITRATE 25 MCG: 50 INJECTION INTRAMUSCULAR; INTRAVENOUS at 08:44

## 2019-06-14 RX ADMIN — DEXAMETHASONE SODIUM PHOSPHATE 10 MG: 10 INJECTION, SOLUTION INTRAMUSCULAR; INTRAVENOUS at 07:43

## 2019-06-14 RX ADMIN — LIDOCAINE HYDROCHLORIDE 50 MG: 10 INJECTION, SOLUTION INFILTRATION; PERINEURAL at 07:33

## 2019-06-14 RX ADMIN — SODIUM CHLORIDE, SODIUM LACTATE, POTASSIUM CHLORIDE, AND CALCIUM CHLORIDE: .6; .31; .03; .02 INJECTION, SOLUTION INTRAVENOUS at 07:20

## 2019-06-14 RX ADMIN — PHENYLEPHRINE HYDROCHLORIDE 150 MCG: 10 INJECTION INTRAVENOUS at 07:54

## 2019-06-14 RX ADMIN — EPHEDRINE SULFATE 10 MG: 50 INJECTION, SOLUTION INTRAVENOUS at 08:12

## 2019-06-19 ENCOUNTER — EVALUATION (OUTPATIENT)
Dept: PHYSICAL THERAPY | Facility: REHABILITATION | Age: 59
End: 2019-06-19
Payer: COMMERCIAL

## 2019-06-19 DIAGNOSIS — Z98.890 STATUS POST SURGERY: ICD-10-CM

## 2019-06-19 DIAGNOSIS — Z98.890 S/P LEFT KNEE ARTHROSCOPY: Primary | ICD-10-CM

## 2019-06-19 PROCEDURE — 97110 THERAPEUTIC EXERCISES: CPT | Performed by: PHYSICAL THERAPIST

## 2019-06-19 PROCEDURE — 97161 PT EVAL LOW COMPLEX 20 MIN: CPT | Performed by: PHYSICAL THERAPIST

## 2019-06-26 ENCOUNTER — APPOINTMENT (OUTPATIENT)
Dept: PHYSICAL THERAPY | Facility: REHABILITATION | Age: 59
End: 2019-06-26
Payer: COMMERCIAL

## 2019-06-27 ENCOUNTER — APPOINTMENT (OUTPATIENT)
Dept: PHYSICAL THERAPY | Facility: REHABILITATION | Age: 59
End: 2019-06-27
Payer: COMMERCIAL

## 2019-06-27 ENCOUNTER — OFFICE VISIT (OUTPATIENT)
Dept: OBGYN CLINIC | Facility: HOSPITAL | Age: 59
End: 2019-06-27

## 2019-06-27 VITALS
SYSTOLIC BLOOD PRESSURE: 126 MMHG | BODY MASS INDEX: 25.24 KG/M2 | WEIGHT: 196.6 LBS | HEART RATE: 81 BPM | DIASTOLIC BLOOD PRESSURE: 75 MMHG

## 2019-06-27 DIAGNOSIS — Z98.890 STATUS POST SURGERY: Primary | ICD-10-CM

## 2019-06-27 PROCEDURE — 99024 POSTOP FOLLOW-UP VISIT: CPT | Performed by: ORTHOPAEDIC SURGERY

## 2019-07-15 ENCOUNTER — OFFICE VISIT (OUTPATIENT)
Dept: PHYSICAL THERAPY | Facility: REHABILITATION | Age: 59
End: 2019-07-15
Payer: COMMERCIAL

## 2019-07-15 DIAGNOSIS — Z98.890 STATUS POST SURGERY: Primary | ICD-10-CM

## 2019-07-15 DIAGNOSIS — Z98.890 S/P LEFT KNEE ARTHROSCOPY: ICD-10-CM

## 2019-07-15 PROCEDURE — 97110 THERAPEUTIC EXERCISES: CPT

## 2019-07-15 NOTE — PROGRESS NOTES
Daily Note     Today's date: 7/15/2019  Patient name: Florencio Mcknight  : 1960  MRN: [id number]  Referring provider: Shalom Pedroza PA-C  Dx:   Encounter Diagnosis     ICD-10-CM    1  Status post surgery Z98 890    2  S/P left knee arthroscopy Z98 890                   Subjective: Patient reports his knee is feeling great  Objective: See treatment diary below  Precautions: None      Manual              Patellar mobs             L knee flexion stretch                                                        Exercise Diary  7/15            Recumbent bike 10'            SLR x3 2# 3x10 ea            LAQ             Front/lat step ups 2x10 ea            Leg press 115# 3x10            squats 3x10            Ecc lower off step 2x10                                                                                                                                                                                         Modalities                                                              Assessment: Tolerated treatment well  Patient exhibited good technique with therapeutic exercises      Plan: Continue per plan of care

## 2019-07-18 ENCOUNTER — APPOINTMENT (OUTPATIENT)
Dept: PHYSICAL THERAPY | Facility: REHABILITATION | Age: 59
End: 2019-07-18
Payer: COMMERCIAL

## 2019-07-25 ENCOUNTER — OFFICE VISIT (OUTPATIENT)
Dept: PHYSICAL THERAPY | Facility: REHABILITATION | Age: 59
End: 2019-07-25
Payer: COMMERCIAL

## 2019-07-25 DIAGNOSIS — Z98.890 STATUS POST SURGERY: Primary | ICD-10-CM

## 2019-07-25 PROCEDURE — 97164 PT RE-EVAL EST PLAN CARE: CPT | Performed by: PHYSICAL THERAPIST

## 2019-07-25 NOTE — PROGRESS NOTES
Daily Note     Today's date: 2019  Patient name: Octavio Wolf  : 1960  MRN: [id number]  Referring provider: Kymberly Glover PA-C  Dx:   Encounter Diagnosis     ICD-10-CM    1  Status post surgery Z98 890                   Subjective:No pain complaints  PT Discharge    Today's date: 2019  Patient name: Octavio Wolf  : 1960  MRN: [id number]  Referring provider: Kymberly Glover PA-C  Dx:   Encounter Diagnosis     ICD-10-CM    1  Status post surgery Z98 890                   Assessment  Assessment details: All formal PT goals have been achieved  Functional limitations: None  Goals  Short Term goals - 4 weeks  1  Patient will be independent and compliant with a HEP  MET  2  Patient will report a 50% decrease in pain complaints  MET    Long Term goals - 8 weeks  1  Patient will report elimination of pain complaints  MET  2  Patient will return to all work related activities without restriction  MET  3  Patient will return to all recreational activities without restriction    MET          Subjective Evaluation    Pain  Current pain ratin  At best pain ratin  At worst pain ratin    Treatments  Current treatment: physical therapy  Patient Goals  Patient goals for therapy: decreased pain, increased motion, increased strength, independence with ADLs/IADLs, return to sport/leisure activities and return to work          Objective     Active Range of Motion   Left Knee   Normal active range of motion    Right Knee   Normal active range of motion    Passive Range of Motion   Left Knee   Normal passive range of motion    Right Knee   Normal passive range of motion    Strength/Myotome Testing     Left Knee   Normal strength    Right Knee   Normal strength      Flowsheet Rows      Most Recent Value   PT/OT G-Codes   Current Score  84   Projected Score  87                Objective: See treatment diary below  Precautions: None      Manual              Patellar mobs  5' L knee flexion stretch  5'                                                      Exercise Diary  7/15 7/25           Recumbent bike 10' 10'           SLR x3 2# 3x10 ea            LAQ             Front/lat step ups 2x10 ea            Leg press 115# 3x10            squats 3x10            Ecc lower off step 2x10                                                                                                                                                                                         Modalities                                                              Assessment: Tolerated treatment well  Patient exhibited good technique with therapeutic exercises      Plan: Continue per plan of care

## 2019-08-28 DIAGNOSIS — E55.9 VITAMIN D DEFICIENCY: Primary | ICD-10-CM

## 2019-08-28 DIAGNOSIS — E55.9 VITAMIN D DEFICIENCY: ICD-10-CM

## 2019-08-28 RX ORDER — ERGOCALCIFEROL 1.25 MG/1
50000 CAPSULE ORAL 2 TIMES WEEKLY
Qty: 24 CAPSULE | Refills: 0 | Status: SHIPPED | OUTPATIENT
Start: 2019-08-29 | End: 2019-12-20 | Stop reason: ALTCHOICE

## 2019-10-15 ENCOUNTER — OFFICE VISIT (OUTPATIENT)
Dept: OBGYN CLINIC | Facility: HOSPITAL | Age: 59
End: 2019-10-15
Payer: COMMERCIAL

## 2019-10-15 VITALS
SYSTOLIC BLOOD PRESSURE: 137 MMHG | DIASTOLIC BLOOD PRESSURE: 88 MMHG | WEIGHT: 190 LBS | HEIGHT: 74 IN | HEART RATE: 73 BPM | BODY MASS INDEX: 24.38 KG/M2

## 2019-10-15 DIAGNOSIS — M25.562 LEFT KNEE PAIN, UNSPECIFIED CHRONICITY: Primary | ICD-10-CM

## 2019-10-15 PROCEDURE — 99213 OFFICE O/P EST LOW 20 MIN: CPT | Performed by: PHYSICIAN ASSISTANT

## 2019-10-15 PROCEDURE — 20610 DRAIN/INJ JOINT/BURSA W/O US: CPT | Performed by: PHYSICIAN ASSISTANT

## 2019-10-15 RX ORDER — BUPIVACAINE HYDROCHLORIDE 2.5 MG/ML
2 INJECTION, SOLUTION INFILTRATION; PERINEURAL
Status: COMPLETED | OUTPATIENT
Start: 2019-10-15 | End: 2019-10-15

## 2019-10-15 RX ORDER — METHYLPREDNISOLONE ACETATE 40 MG/ML
2 INJECTION, SUSPENSION INTRA-ARTICULAR; INTRALESIONAL; INTRAMUSCULAR; SOFT TISSUE
Status: COMPLETED | OUTPATIENT
Start: 2019-10-15 | End: 2019-10-15

## 2019-10-15 RX ADMIN — METHYLPREDNISOLONE ACETATE 2 ML: 40 INJECTION, SUSPENSION INTRA-ARTICULAR; INTRALESIONAL; INTRAMUSCULAR; SOFT TISSUE at 18:47

## 2019-10-15 RX ADMIN — BUPIVACAINE HYDROCHLORIDE 2 ML: 2.5 INJECTION, SOLUTION INFILTRATION; PERINEURAL at 18:47

## 2019-10-15 NOTE — PROGRESS NOTES
Orthopedics          Phil Iqbal 63 y o  male MRN: [id number]      Chief Complaint:   left knee pain    HPI:   63 y  o male complaining of left knee pain  Patient presents office today regarding left knee pain and swelling  Patient did have arthroscopic intervention of his left knee with partial medial meniscectomy [place] 5 months ago  Patient states he is doing very well until 2 weeks ago he noticed significant swelling in his left knee as well as pain lateral aspect  Denies any instability clicking popping catching his left knee  He has been utilizing his hinged knee brace  States the pain is worse with weight-bearing mildly relieved with rest   He denies any numbness tingling                  Review Of Systems:   · Skin: Normal  · Neuro: See HPI  · Musculoskeletal: See HPI  · All other systems reviewed and are negative    Past Medical History:   Past Medical History:   Diagnosis Date    Hyperlipidemia     Pulmonary embolism (HCC)        Past Surgical History:   Past Surgical History:   Procedure Laterality Date    HERNIA REPAIR      [place] KNEE SCOPE,DIAGNOSTIC Left 6/14/2019    Procedure: ARTHROSCOPY KNEE;  Surgeon: Susan Ham MD;  Location: [facility];  Service: Orthopedics    WISDOM TOOTH EXTRACTION         Family History:  Family history reviewed and non-contributory  Family History   Problem Relation Age of Onset   Melisa Hola Stroke Mother         CVA   [facility] Breast cancer Mother     Diabetes Father         DM    Cancer Father     Melanoma Brother          Social History:  Social History     Socioeconomic History    Marital status: /Civil Union     Spouse name: None    Number of children: 1    Years of education: None    Highest education level: None   Occupational History     Comment: full-time employment   Social Needs    Financial resource strain: None    Food insecurity:     Worry: None     Inability: None    Transportation needs:     Medical: None     Non-medical: None   Tobacco Use    Smoking status: Never Smoker    Smokeless tobacco: Never Used   Substance and Sexual Activity    Alcohol use: No    Drug use: No    Sexual activity: None   Lifestyle    Physical activity:     Days per week: None     Minutes per session: None    Stress: None   Relationships    Social connections:     Talks on phone: None     Gets together: None     Attends [employer] service: None     Active member of club or organization: None     Attends meetings of clubs or organizations: None     Relationship status: None    Intimate partner violence:     Fear of current or ex partner: None     Emotionally abused: None     Physically abused: None     Forced sexual activity: None   Other Topics Concern    None   Social History Narrative    Advance directive information unavailable    Always uses seat belt    Caffeine use    Denied:  Hx of exercises occasionally    Denied:  Hx of domestic violence       Allergies:    Allergies   Allergen Reactions    Codeine GI Intolerance     vomitting       Labs:  0   Lab Value Date/Time    HCT 46 4 06/11/2019 0712    HCT 45 5 10/27/2017 0759    HCT 44 3 07/21/2017 0712    HGB 15 3 06/11/2019 0712    HGB 16 0 10/27/2017 0759    HGB 15 1 07/21/2017 0712    INR 0 92 06/11/2019 0712    WBC 7 02 06/11/2019 0712    WBC 6 14 10/27/2017 0759    WBC 6 56 07/21/2017 0712    CRP <3 0 07/21/2017 5849       Meds:    Current Outpatient Medications:     Acetaminophen (TYLENOL PO), Take by mouth, Disp: , Rfl:     ergocalciferol (VITAMIN D2) 50,000 units, Take 1 capsule (50,000 Units total) by mouth 2 (two) times a week, Disp: 24 capsule, Rfl: 0      Physical Exam:     General Appearance:    Alert, cooperative, no distress, appears stated age   Head:    Normocephalic, without obvious abnormality, atraumatic   Eyes:    conjunctiva/corneas clear, both eyes        Nose:   Nares normal, septum midline, no drainage    Throat:   Lips normal; teeth and gums normal   Neck:    symmetrical, trachea midline, ;     thyroid:  no enlargement/   Back:     Symmetric, no curvature, ROM normal   Lungs:   No audible wheezing or labored breathing   Chest Wall:    No tenderness or deformity    Heart:    Regular rate and rhythm               Pulses:   2+ and symmetric all extremities   Skin:   Skin color, texture, turgor normal, no rashes or lesions   Neurologic:   normal strength, sensation and reflexes     throughout       Musculoskeletal: left lower extremity  · On examination of the left knee there is a moderate effusion no erythema no laceration no abrasion no ecchymosis  Range of motion full active extension flexion greater than 120°  There is no pain on palpation medial joint line, pain palpation lateral joint pes anserine bursa region, distal iliotibial band  No pain or laxity with varus or valgus stress  Quadriceps, hamstring strength 5/5  Sensation intact, distal pulses present  Large joint arthrocentesis: L knee  Date/Time: 10/15/2019 6:47 PM  Consent given by: patient  Supporting Documentation  Indications: pain   Procedure Details  Location: knee - L knee  Needle size: 18 G  Ultrasound guidance: no  Approach: superior  Medications administered: 2 mL bupivacaine 0 25 %; 2 mL methylPREDNISolone acetate 40 mg/mL    Aspirate amount: 25 mL  Aspirate: clear and yellow    Patient tolerance: patient tolerated the procedure well with no immediate complications  Dressing:  Sterile dressing applied            _*_*_*_*_*_*_*_*_*_*_*_*_*_*_*_*_*_*_*_*_*_*_*_*_*_*_*_*_*_*_*_*_*_*_*_*_*_*_*_*_*    Assessment:  63 y  o male with left knee pain in the effusion    Plan:   · Weight bearing as tolerated  left lower extremity  · Left knee aspiration injection given as noted above  · Knee Home range of motion stretching strengthening exercises  · Hinged knee brace as needed left knee  · Should the patient's symptoms persist or return patient may need further evaluation  · Follow up in 2 months or sooner if needed      Sarah Escamilla Ruth Person PA-C

## 2019-12-09 ENCOUNTER — APPOINTMENT (OUTPATIENT)
Dept: LAB | Facility: CLINIC | Age: 59
End: 2019-12-09
Payer: COMMERCIAL

## 2019-12-09 DIAGNOSIS — I27.82 OTHER CHRONIC PULMONARY EMBOLISM WITHOUT ACUTE COR PULMONALE (HCC): ICD-10-CM

## 2019-12-09 DIAGNOSIS — E55.9 VITAMIN D DEFICIENCY: ICD-10-CM

## 2019-12-09 LAB
25(OH)D3 SERPL-MCNC: 82 NG/ML (ref 30–100)
ALBUMIN SERPL BCP-MCNC: 4.3 G/DL (ref 3.5–5)
ALP SERPL-CCNC: 57 U/L (ref 46–116)
ALT SERPL W P-5'-P-CCNC: 28 U/L (ref 12–78)
ANION GAP SERPL CALCULATED.3IONS-SCNC: 1 MMOL/L (ref 4–13)
AST SERPL W P-5'-P-CCNC: 14 U/L (ref 5–45)
BASOPHILS # BLD AUTO: 0.07 THOUSANDS/ΜL (ref 0–0.1)
BASOPHILS NFR BLD AUTO: 1 % (ref 0–1)
BILIRUB SERPL-MCNC: 0.83 MG/DL (ref 0.2–1)
BUN SERPL-MCNC: 21 MG/DL (ref 5–25)
CALCIUM SERPL-MCNC: 9.6 MG/DL (ref 8.3–10.1)
CHLORIDE SERPL-SCNC: 107 MMOL/L (ref 100–108)
CO2 SERPL-SCNC: 29 MMOL/L (ref 21–32)
CREAT SERPL-MCNC: 1.35 MG/DL (ref 0.6–1.3)
EOSINOPHIL # BLD AUTO: 0.08 THOUSAND/ΜL (ref 0–0.61)
EOSINOPHIL NFR BLD AUTO: 1 % (ref 0–6)
ERYTHROCYTE [DISTWIDTH] IN BLOOD BY AUTOMATED COUNT: 12.6 % (ref 11.6–15.1)
GFR SERPL CREATININE-BSD FRML MDRD: 57 ML/MIN/1.73SQ M
GLUCOSE P FAST SERPL-MCNC: 119 MG/DL (ref 65–99)
HCT VFR BLD AUTO: 48.3 % (ref 36.5–49.3)
HGB BLD-MCNC: 15.5 G/DL (ref 12–17)
IMM GRANULOCYTES # BLD AUTO: 0.1 THOUSAND/UL (ref 0–0.2)
IMM GRANULOCYTES NFR BLD AUTO: 1 % (ref 0–2)
LYMPHOCYTES # BLD AUTO: 3.32 THOUSANDS/ΜL (ref 0.6–4.47)
LYMPHOCYTES NFR BLD AUTO: 39 % (ref 14–44)
MCH RBC QN AUTO: 30.2 PG (ref 26.8–34.3)
MCHC RBC AUTO-ENTMCNC: 32.1 G/DL (ref 31.4–37.4)
MCV RBC AUTO: 94 FL (ref 82–98)
MONOCYTES # BLD AUTO: 0.55 THOUSAND/ΜL (ref 0.17–1.22)
MONOCYTES NFR BLD AUTO: 7 % (ref 4–12)
NEUTROPHILS # BLD AUTO: 4.38 THOUSANDS/ΜL (ref 1.85–7.62)
NEUTS SEG NFR BLD AUTO: 51 % (ref 43–75)
NRBC BLD AUTO-RTO: 0 /100 WBCS
PLATELET # BLD AUTO: 231 THOUSANDS/UL (ref 149–390)
PMV BLD AUTO: 12 FL (ref 8.9–12.7)
POTASSIUM SERPL-SCNC: 3.7 MMOL/L (ref 3.5–5.3)
PROT SERPL-MCNC: 7.4 G/DL (ref 6.4–8.2)
RBC # BLD AUTO: 5.14 MILLION/UL (ref 3.88–5.62)
SODIUM SERPL-SCNC: 137 MMOL/L (ref 136–145)
WBC # BLD AUTO: 8.5 THOUSAND/UL (ref 4.31–10.16)

## 2019-12-09 PROCEDURE — 36415 COLL VENOUS BLD VENIPUNCTURE: CPT

## 2019-12-09 PROCEDURE — 82306 VITAMIN D 25 HYDROXY: CPT

## 2019-12-09 PROCEDURE — 85025 COMPLETE CBC W/AUTO DIFF WBC: CPT

## 2019-12-09 PROCEDURE — 80053 COMPREHEN METABOLIC PANEL: CPT

## 2019-12-20 ENCOUNTER — APPOINTMENT (OUTPATIENT)
Dept: LAB | Age: 59
End: 2019-12-20
Payer: COMMERCIAL

## 2019-12-20 ENCOUNTER — OFFICE VISIT (OUTPATIENT)
Dept: FAMILY MEDICINE CLINIC | Facility: CLINIC | Age: 59
End: 2019-12-20
Payer: COMMERCIAL

## 2019-12-20 ENCOUNTER — APPOINTMENT (OUTPATIENT)
Dept: RADIOLOGY | Age: 59
End: 2019-12-20
Payer: COMMERCIAL

## 2019-12-20 VITALS
HEIGHT: 74 IN | WEIGHT: 187 LBS | BODY MASS INDEX: 24 KG/M2 | HEART RATE: 80 BPM | SYSTOLIC BLOOD PRESSURE: 110 MMHG | DIASTOLIC BLOOD PRESSURE: 68 MMHG

## 2019-12-20 DIAGNOSIS — R73.9 HYPERGLYCEMIA: ICD-10-CM

## 2019-12-20 DIAGNOSIS — N52.9 ERECTILE DYSFUNCTION, UNSPECIFIED ERECTILE DYSFUNCTION TYPE: ICD-10-CM

## 2019-12-20 DIAGNOSIS — R07.9 CHEST PAIN, UNSPECIFIED TYPE: ICD-10-CM

## 2019-12-20 DIAGNOSIS — E78.5 HYPERLIPIDEMIA, UNSPECIFIED HYPERLIPIDEMIA TYPE: ICD-10-CM

## 2019-12-20 DIAGNOSIS — S83.242A ACUTE MEDIAL MENISCAL TEAR, LEFT, INITIAL ENCOUNTER: ICD-10-CM

## 2019-12-20 DIAGNOSIS — E55.9 VITAMIN D DEFICIENCY: ICD-10-CM

## 2019-12-20 DIAGNOSIS — M25.50 ARTHRALGIA, UNSPECIFIED JOINT: ICD-10-CM

## 2019-12-20 DIAGNOSIS — Z00.00 HEALTH CARE MAINTENANCE: ICD-10-CM

## 2019-12-20 DIAGNOSIS — Z12.11 SCREENING FOR COLON CANCER: ICD-10-CM

## 2019-12-20 DIAGNOSIS — M79.10 MYALGIA: ICD-10-CM

## 2019-12-20 DIAGNOSIS — Z23 NEED FOR INFLUENZA VACCINATION: ICD-10-CM

## 2019-12-20 DIAGNOSIS — R07.9 CHEST PAIN, UNSPECIFIED TYPE: Primary | ICD-10-CM

## 2019-12-20 PROBLEM — R63.4 WEIGHT LOSS: Status: ACTIVE | Noted: 2019-12-20

## 2019-12-20 PROBLEM — I26.99 PULMONARY EMBOLISM (HCC): Status: RESOLVED | Noted: 2018-06-08 | Resolved: 2019-12-20

## 2019-12-20 LAB
BILIRUB UR QL STRIP: NEGATIVE
CK MB SERPL-MCNC: 1.2 NG/ML (ref 0–5)
CK MB SERPL-MCNC: <1 % (ref 0–2.5)
CK SERPL-CCNC: 365 U/L (ref 39–308)
CLARITY UR: CLEAR
COLOR UR: YELLOW
CRP SERPL QL: <3 MG/L
ERYTHROCYTE [DISTWIDTH] IN BLOOD BY AUTOMATED COUNT: 12.4 % (ref 11.6–15.1)
EST. AVERAGE GLUCOSE BLD GHB EST-MCNC: 123 MG/DL
GLUCOSE UR STRIP-MCNC: NEGATIVE MG/DL
HBA1C MFR BLD: 5.9 % (ref 4.2–6.3)
HCT VFR BLD AUTO: 45.8 % (ref 36.5–49.3)
HGB BLD-MCNC: 15.1 G/DL (ref 12–17)
HGB UR QL STRIP.AUTO: NEGATIVE
KETONES UR STRIP-MCNC: NEGATIVE MG/DL
LEUKOCYTE ESTERASE UR QL STRIP: NEGATIVE
MCH RBC QN AUTO: 30.5 PG (ref 26.8–34.3)
MCHC RBC AUTO-ENTMCNC: 33 G/DL (ref 31.4–37.4)
MCV RBC AUTO: 93 FL (ref 82–98)
NITRITE UR QL STRIP: NEGATIVE
PH UR STRIP.AUTO: 5.5 [PH]
PLATELET # BLD AUTO: 175 THOUSANDS/UL (ref 149–390)
PMV BLD AUTO: 12.4 FL (ref 8.9–12.7)
PROT UR STRIP-MCNC: NEGATIVE MG/DL
RBC # BLD AUTO: 4.95 MILLION/UL (ref 3.88–5.62)
SP GR UR STRIP.AUTO: 1.02 (ref 1–1.03)
T4 FREE SERPL-MCNC: 0.84 NG/DL (ref 0.76–1.46)
TSH SERPL DL<=0.05 MIU/L-ACNC: 5.32 UIU/ML (ref 0.36–3.74)
URATE SERPL-MCNC: 7.1 MG/DL (ref 4.2–8)
UROBILINOGEN UR QL STRIP.AUTO: 1 E.U./DL
WBC # BLD AUTO: 6.72 THOUSAND/UL (ref 4.31–10.16)

## 2019-12-20 PROCEDURE — 84443 ASSAY THYROID STIM HORMONE: CPT

## 2019-12-20 PROCEDURE — 82550 ASSAY OF CK (CPK): CPT

## 2019-12-20 PROCEDURE — 82553 CREATINE MB FRACTION: CPT

## 2019-12-20 PROCEDURE — 85027 COMPLETE CBC AUTOMATED: CPT

## 2019-12-20 PROCEDURE — 3008F BODY MASS INDEX DOCD: CPT | Performed by: FAMILY MEDICINE

## 2019-12-20 PROCEDURE — 93000 ELECTROCARDIOGRAM COMPLETE: CPT | Performed by: FAMILY MEDICINE

## 2019-12-20 PROCEDURE — 99214 OFFICE O/P EST MOD 30 MIN: CPT | Performed by: FAMILY MEDICINE

## 2019-12-20 PROCEDURE — 86618 LYME DISEASE ANTIBODY: CPT

## 2019-12-20 PROCEDURE — 81003 URINALYSIS AUTO W/O SCOPE: CPT

## 2019-12-20 PROCEDURE — 1036F TOBACCO NON-USER: CPT | Performed by: FAMILY MEDICINE

## 2019-12-20 PROCEDURE — 83036 HEMOGLOBIN GLYCOSYLATED A1C: CPT

## 2019-12-20 PROCEDURE — 84550 ASSAY OF BLOOD/URIC ACID: CPT

## 2019-12-20 PROCEDURE — 86430 RHEUMATOID FACTOR TEST QUAL: CPT

## 2019-12-20 PROCEDURE — 90682 RIV4 VACC RECOMBINANT DNA IM: CPT | Performed by: FAMILY MEDICINE

## 2019-12-20 PROCEDURE — 71046 X-RAY EXAM CHEST 2 VIEWS: CPT

## 2019-12-20 PROCEDURE — 36415 COLL VENOUS BLD VENIPUNCTURE: CPT

## 2019-12-20 PROCEDURE — 86140 C-REACTIVE PROTEIN: CPT

## 2019-12-20 PROCEDURE — 86038 ANTINUCLEAR ANTIBODIES: CPT

## 2019-12-20 PROCEDURE — 84439 ASSAY OF FREE THYROXINE: CPT

## 2019-12-20 PROCEDURE — 90471 IMMUNIZATION ADMIN: CPT | Performed by: FAMILY MEDICINE

## 2019-12-20 RX ORDER — MULTIVIT-MIN/IRON/FOLIC ACID/K 18-600-40
CAPSULE ORAL
COMMUNITY

## 2019-12-20 NOTE — PROGRESS NOTES
Assessment and Plan:  1  Chest pain status post Cialis  EKG completed in office  Resting EKG was normal   Will proceed with stress echocardiogram Chest x-rays ordered  2  Hyperlipidemia blood work is ordered  3  Hyperglycemia blood work is ordered  4  Vitamin-D deficiency, level was normal earlier this month  5  Erectile dysfunction, will hold off medication to cardiac workup is complete secondary to chest pain after Cialis therapy  6  Meniscal tear left, patient follows with Orthopedics  7  Screening for colon cancer up-to-date with colonoscopy will check fit testing  8  Weight loss patient states 20 lb loss in the last 2 years on intentional   9  Myalgias /arthralgias, blood work is ordered  10  Health care maintenance, influenza vaccine given  11   Patient to return in  2 weeks, sooner if needed    Problem List Items Addressed This Visit        Musculoskeletal and Integument    Acute medial meniscal tear, left, initial encounter      Sees Orthopedics         Relevant Orders    CBC    Comprehensive metabolic panel    Hemoglobin A1C    Lipid Panel with Direct LDL reflex    TSH, 3rd generation with Free T4 reflex    UA (URINE) with reflex to Scope    Occult Blood, Fecal Immunochemical    IRAIS Screen w/ Reflex to Titer/Pattern    CK    C-reactive protein    Lyme Antibody Profile with reflex to WB    RF Screen w/ Reflex to Titer    Uric acid       Other    Erectile dysfunction    Relevant Orders    CBC    Comprehensive metabolic panel    Hemoglobin A1C    Lipid Panel with Direct LDL reflex    TSH, 3rd generation with Free T4 reflex    UA (URINE) with reflex to Scope    Occult Blood, Fecal Immunochemical    IRAIS Screen w/ Reflex to Titer/Pattern    CK    C-reactive protein    Lyme Antibody Profile with reflex to WB    RF Screen w/ Reflex to Titer    Uric acid    Hyperlipidemia    Relevant Orders    CBC    Comprehensive metabolic panel    Hemoglobin A1C    Lipid Panel with Direct LDL reflex    TSH, 3rd generation with Free T4 reflex    UA (URINE) with reflex to Scope    Occult Blood, Fecal Immunochemical    IRAIS Screen w/ Reflex to Titer/Pattern    CK    C-reactive protein    Lyme Antibody Profile with reflex to WB    RF Screen w/ Reflex to Titer    Uric acid    Vitamin D deficiency    Relevant Orders    CBC    Comprehensive metabolic panel    Hemoglobin A1C    Lipid Panel with Direct LDL reflex    TSH, 3rd generation with Free T4 reflex    UA (URINE) with reflex to Scope    Occult Blood, Fecal Immunochemical    IRAIS Screen w/ Reflex to Titer/Pattern    CK    C-reactive protein    Lyme Antibody Profile with reflex to WB    RF Screen w/ Reflex to Titer    Uric acid    Health care maintenance      Influenza vaccine given         Relevant Orders    CBC    Comprehensive metabolic panel    Hemoglobin A1C    Lipid Panel with Direct LDL reflex    TSH, 3rd generation with Free T4 reflex    UA (URINE) with reflex to Scope    Occult Blood, Fecal Immunochemical    IRAIS Screen w/ Reflex to Titer/Pattern    CK    C-reactive protein    Lyme Antibody Profile with reflex to WB    RF Screen w/ Reflex to Titer    Uric acid    Hyperglycemia    Screening for colon cancer      Up-to-date with colonoscopy will check fit test         Relevant Orders    CBC    Comprehensive metabolic panel    Hemoglobin A1C    Lipid Panel with Direct LDL reflex    TSH, 3rd generation with Free T4 reflex    UA (URINE) with reflex to Scope    Occult Blood, Fecal Immunochemical    IRAIS Screen w/ Reflex to Titer/Pattern    CK    C-reactive protein    Lyme Antibody Profile with reflex to WB    RF Screen w/ Reflex to Titer    Uric acid      Other Visit Diagnoses     Chest pain, unspecified type    -  Primary    Relevant Orders    POCT ECG (Completed)    CBC    Comprehensive metabolic panel    Hemoglobin A1C    Lipid Panel with Direct LDL reflex    TSH, 3rd generation with Free T4 reflex    UA (URINE) with reflex to Scope    Occult Blood, Fecal Immunochemical    IRAIS Screen w/ Reflex to Titer/Pattern    CK    C-reactive protein    Lyme Antibody Profile with reflex to WB    RF Screen w/ Reflex to Titer    Uric acid    XR chest pa & lateral    Echo stress test w contrast if indicated    Need for influenza vaccination        Relevant Orders    influenza vaccine, 6590-9994, quadrivalent, recombinant, PF, 0 5 mL, for patients 18 yr+ ([facility]) (Completed)    Myalgia        Relevant Orders    CBC    Comprehensive metabolic panel    Hemoglobin A1C    Lipid Panel with Direct LDL reflex    TSH, 3rd generation with Free T4 reflex    UA (URINE) with reflex to Scope    Occult Blood, Fecal Immunochemical    IRAIS Screen w/ Reflex to Titer/Pattern    CK    C-reactive protein    Lyme Antibody Profile with reflex to WB    RF Screen w/ Reflex to Titer    Uric acid    Arthralgia, unspecified joint        Relevant Orders    CBC    Comprehensive metabolic panel    Hemoglobin A1C    Lipid Panel with Direct LDL reflex    TSH, 3rd generation with Free T4 reflex    UA (URINE) with reflex to Scope    Occult Blood, Fecal Immunochemical    IRAIS Screen w/ Reflex to Titer/Pattern    CK    C-reactive protein    Lyme Antibody Profile with reflex to WB    RF Screen w/ Reflex to Titer    Uric acid                 Diagnoses and all orders for this visit:    Chest pain, unspecified type  -     POCT ECG  -     CBC; Future  -     Comprehensive metabolic panel; Future  -     Hemoglobin A1C; Future  -     Lipid Panel with Direct LDL reflex; Future  -     TSH, 3rd generation with Free T4 reflex; Future  -     UA (URINE) with reflex to Scope; Future  -     Occult Blood, Fecal Immunochemical; Future  -     IRAIS Screen w/ Reflex to Titer/Pattern; Future  -     CK; Future  -     C-reactive protein; Future  -     Lyme Antibody Profile with reflex to WB; Future  -     RF Screen w/ Reflex to Titer; Future  -     Uric acid; Future  -     XR chest pa & lateral; Future  -     Echo stress test w contrast if indicated;  Future    Need for influenza vaccination  -     influenza vaccine, [id number], quadrivalent, recombinant, PF, 0 5 mL, for patients 18 yr+ (FLUBLOK)    Hyperlipidemia, unspecified hyperlipidemia type  -     CBC; Future  -     Comprehensive metabolic panel; Future  -     Hemoglobin A1C; Future  -     Lipid Panel with Direct LDL reflex; Future  -     TSH, 3rd generation with Free T4 reflex; Future  -     UA (URINE) with reflex to Scope; Future  -     Occult Blood, Fecal Immunochemical; Future  -     IRAIS Screen w/ Reflex to Titer/Pattern; Future  -     CK; Future  -     C-reactive protein; Future  -     Lyme Antibody Profile with reflex to WB; Future  -     RF Screen w/ Reflex to Titer; Future  -     Uric acid; Future    Vitamin D deficiency  -     CBC; Future  -     Comprehensive metabolic panel; Future  -     Hemoglobin A1C; Future  -     Lipid Panel with Direct LDL reflex; Future  -     TSH, 3rd generation with Free T4 reflex; Future  -     UA (URINE) with reflex to Scope; Future  -     Occult Blood, Fecal Immunochemical; Future  -     IRAIS Screen w/ Reflex to Titer/Pattern; Future  -     CK; Future  -     C-reactive protein; Future  -     Lyme Antibody Profile with reflex to WB; Future  -     RF Screen w/ Reflex to Titer; Future  -     Uric acid; Future    Erectile dysfunction, unspecified erectile dysfunction type  -     CBC; Future  -     Comprehensive metabolic panel; Future  -     Hemoglobin A1C; Future  -     Lipid Panel with Direct LDL reflex; Future  -     TSH, 3rd generation with Free T4 reflex; Future  -     UA (URINE) with reflex to Scope; Future  -     Occult Blood, Fecal Immunochemical; Future  -     IRAIS Screen w/ Reflex to Titer/Pattern; Future  -     CK; Future  -     C-reactive protein; Future  -     Lyme Antibody Profile with reflex to WB; Future  -     RF Screen w/ Reflex to Titer; Future  -     Uric acid; Future    Acute medial meniscal tear, left, initial encounter  -     CBC;  Future  -     Comprehensive metabolic panel; Future  -     Hemoglobin A1C; Future  -     Lipid Panel with Direct LDL reflex; Future  -     TSH, 3rd generation with Free T4 reflex; Future  -     UA (URINE) with reflex to Scope; Future  -     Occult Blood, Fecal Immunochemical; Future  -     IRAIS Screen w/ Reflex to Titer/Pattern; Future  -     CK; Future  -     C-reactive protein; Future  -     Lyme Antibody Profile with reflex to WB; Future  -     RF Screen w/ Reflex to Titer; Future  -     Uric acid; Future    Screening for colon cancer  -     CBC; Future  -     Comprehensive metabolic panel; Future  -     Hemoglobin A1C; Future  -     Lipid Panel with Direct LDL reflex; Future  -     TSH, 3rd generation with Free T4 reflex; Future  -     UA (URINE) with reflex to Scope; Future  -     Occult Blood, Fecal Immunochemical; Future  -     IRAIS Screen w/ Reflex to Titer/Pattern; Future  -     CK; Future  -     C-reactive protein; Future  -     Lyme Antibody Profile with reflex to WB; Future  -     RF Screen w/ Reflex to Titer; Future  -     Uric acid; Future    Health care maintenance  -     CBC; Future  -     Comprehensive metabolic panel; Future  -     Hemoglobin A1C; Future  -     Lipid Panel with Direct LDL reflex; Future  -     TSH, 3rd generation with Free T4 reflex; Future  -     UA (URINE) with reflex to Scope; Future  -     Occult Blood, Fecal Immunochemical; Future  -     IRAIS Screen w/ Reflex to Titer/Pattern; Future  -     CK; Future  -     C-reactive protein; Future  -     Lyme Antibody Profile with reflex to WB; Future  -     RF Screen w/ Reflex to Titer; Future  -     Uric acid; Future    Myalgia  -     CBC; Future  -     Comprehensive metabolic panel; Future  -     Hemoglobin A1C; Future  -     Lipid Panel with Direct LDL reflex; Future  -     TSH, 3rd generation with Free T4 reflex; Future  -     UA (URINE) with reflex to Scope; Future  -     Occult Blood, Fecal Immunochemical; Future  -     IRAIS Screen w/ Reflex to Titer/Pattern;  Future  - CK; Future  -     C-reactive protein; Future  -     Lyme Antibody Profile with reflex to WB; Future  -     RF Screen w/ Reflex to Titer; Future  -     Uric acid; Future    Arthralgia, unspecified joint  -     CBC; Future  -     Comprehensive metabolic panel; Future  -     Hemoglobin A1C; Future  -     Lipid Panel with Direct LDL reflex; Future  -     TSH, 3rd generation with Free T4 reflex; Future  -     UA (URINE) with reflex to Scope; Future  -     Occult Blood, Fecal Immunochemical; Future  -     IRAIS Screen w/ Reflex to Titer/Pattern; Future  -     CK; Future  -     C-reactive protein; Future  -     Lyme Antibody Profile with reflex to WB; Future  -     RF Screen w/ Reflex to Titer; Future  -     Uric acid; Future    Hyperglycemia    Other orders  -     Cholecalciferol (VITAMIN D) 50 MCG (2000 UT) CAPS; Take by mouth              Subjective:      Patient ID: Kristal Sykes is a 63 y o  male  CC:    Chief Complaint   Patient presents with    Other     Pt states he is in constant pain and "just doesn't feel right"      Erectile Dysfunction     He would like medication for ED  - lsh       HPI:     Patient is concerned he has lost 20 lb in the last 2-3 years  Is up-to-date with cancer screening with GI as needed colonoscopy in 2012  In addition patient does have erectile dysfunction was on Cialis but noticed the 2nd day he did get chest pain after taking this  Will hold off on any medication till cleared cardiac wise  Patient has a history hyperlipidemia hyper glycemia and vitamin D deficiency    Recent vitamin-D was normal will check full blood work patient also has migratory myalgias/ arthralgias will check blood work      The following portions of the patient's history were reviewed and updated as appropriate: allergies, current medications, past family history, past medical history, past social history, past surgical history and problem list       Review of Systems   Constitutional: Positive for unexpected weight change  HENT: Negative  Eyes: Negative  Respiratory: Negative  Cardiovascular:         HPI   Gastrointestinal:         HPI   Endocrine: Negative  Genitourinary: Negative  Musculoskeletal:         H   Skin: Negative  Allergic/Immunologic: Negative  Neurological: Negative  Hematological: Negative  Psychiatric/Behavioral: Negative  Data to review:       Objective:    Vitals:    12/20/19 0847   BP: 110/68   BP Location: Left arm   Patient Position: Sitting   Cuff Size: Large   Pulse: 80   Weight: 84 8 kg (187 lb)   Height: 6' 2" (1 88 m)        Physical Exam   Constitutional: He is oriented to person, place, and time  He appears well-developed and well-nourished  HENT:   Head: Normocephalic and atraumatic  Right Ear: External ear normal    Left Ear: External ear normal    Nose: Nose normal    Mouth/Throat: Oropharynx is clear and moist  No oropharyngeal exudate  Eyes: Pupils are equal, round, and reactive to light  Conjunctivae and EOM are normal  No scleral icterus  Neck: Neck supple  No JVD present  No tracheal deviation present  No thyromegaly present  Cardiovascular: Normal rate, regular rhythm and normal heart sounds  Pulmonary/Chest: Effort normal and breath sounds normal    Abdominal: Soft  Bowel sounds are normal  He exhibits no distension and no mass  There is no tenderness  There is no rebound and no guarding  Musculoskeletal: He exhibits no edema, tenderness or deformity  Lymphadenopathy:     He has no cervical adenopathy  Neurological: He is alert and oriented to person, place, and time  He displays normal reflexes  No cranial nerve deficit or sensory deficit  He exhibits normal muscle tone  Coordination normal    Skin: Skin is warm and dry  Psychiatric: He has a normal mood and affect

## 2019-12-21 LAB — B BURGDOR IGG+IGM SER-ACNC: <0.91 ISR (ref 0–0.9)

## 2019-12-22 LAB — RHEUMATOID FACT SER QL LA: NEGATIVE

## 2019-12-23 ENCOUNTER — APPOINTMENT (OUTPATIENT)
Dept: LAB | Facility: CLINIC | Age: 59
End: 2019-12-23
Payer: COMMERCIAL

## 2019-12-23 LAB
ALBUMIN SERPL BCP-MCNC: 3.9 G/DL (ref 3.5–5)
ALP SERPL-CCNC: 62 U/L (ref 46–116)
ALT SERPL W P-5'-P-CCNC: 27 U/L (ref 12–78)
ANION GAP SERPL CALCULATED.3IONS-SCNC: 5 MMOL/L (ref 4–13)
AST SERPL W P-5'-P-CCNC: 15 U/L (ref 5–45)
BILIRUB SERPL-MCNC: 0.75 MG/DL (ref 0.2–1)
BUN SERPL-MCNC: 18 MG/DL (ref 5–25)
CALCIUM SERPL-MCNC: 9.5 MG/DL (ref 8.3–10.1)
CHLORIDE SERPL-SCNC: 107 MMOL/L (ref 100–108)
CHOLEST SERPL-MCNC: 229 MG/DL (ref 50–200)
CO2 SERPL-SCNC: 26 MMOL/L (ref 21–32)
CREAT SERPL-MCNC: 1.36 MG/DL (ref 0.6–1.3)
GFR SERPL CREATININE-BSD FRML MDRD: 57 ML/MIN/1.73SQ M
GLUCOSE P FAST SERPL-MCNC: 106 MG/DL (ref 65–99)
HDLC SERPL-MCNC: 50 MG/DL
LDLC SERPL CALC-MCNC: 127 MG/DL (ref 0–100)
POTASSIUM SERPL-SCNC: 3.8 MMOL/L (ref 3.5–5.3)
PROT SERPL-MCNC: 7.6 G/DL (ref 6.4–8.2)
RYE IGE QN: NEGATIVE
SODIUM SERPL-SCNC: 138 MMOL/L (ref 136–145)
TRIGL SERPL-MCNC: 259 MG/DL

## 2019-12-23 PROCEDURE — 80061 LIPID PANEL: CPT

## 2019-12-23 PROCEDURE — 36415 COLL VENOUS BLD VENIPUNCTURE: CPT

## 2019-12-23 PROCEDURE — 80053 COMPREHEN METABOLIC PANEL: CPT

## 2019-12-31 ENCOUNTER — APPOINTMENT (OUTPATIENT)
Dept: LAB | Facility: HOSPITAL | Age: 59
End: 2019-12-31
Payer: COMMERCIAL

## 2019-12-31 DIAGNOSIS — E78.5 HYPERLIPIDEMIA, UNSPECIFIED HYPERLIPIDEMIA TYPE: ICD-10-CM

## 2019-12-31 DIAGNOSIS — R07.9 CHEST PAIN, UNSPECIFIED TYPE: ICD-10-CM

## 2019-12-31 DIAGNOSIS — M25.50 ARTHRALGIA, UNSPECIFIED JOINT: ICD-10-CM

## 2019-12-31 DIAGNOSIS — M79.10 MYALGIA: ICD-10-CM

## 2019-12-31 DIAGNOSIS — S83.242A ACUTE MEDIAL MENISCAL TEAR, LEFT, INITIAL ENCOUNTER: ICD-10-CM

## 2019-12-31 DIAGNOSIS — Z12.11 SCREENING FOR COLON CANCER: ICD-10-CM

## 2019-12-31 DIAGNOSIS — Z00.00 HEALTH CARE MAINTENANCE: ICD-10-CM

## 2019-12-31 DIAGNOSIS — E55.9 VITAMIN D DEFICIENCY: ICD-10-CM

## 2019-12-31 DIAGNOSIS — N52.9 ERECTILE DYSFUNCTION, UNSPECIFIED ERECTILE DYSFUNCTION TYPE: ICD-10-CM

## 2019-12-31 LAB — HEMOCCULT STL QL IA: POSITIVE

## 2019-12-31 PROCEDURE — G0328 FECAL BLOOD SCRN IMMUNOASSAY: HCPCS

## 2020-01-02 DIAGNOSIS — R19.5 HEMATEST POSITIVE STOOLS: Primary | ICD-10-CM

## 2020-01-03 ENCOUNTER — OFFICE VISIT (OUTPATIENT)
Dept: FAMILY MEDICINE CLINIC | Facility: CLINIC | Age: 60
End: 2020-01-03
Payer: COMMERCIAL

## 2020-01-03 VITALS
HEIGHT: 74 IN | BODY MASS INDEX: 24.15 KG/M2 | SYSTOLIC BLOOD PRESSURE: 110 MMHG | RESPIRATION RATE: 18 BRPM | OXYGEN SATURATION: 98 % | WEIGHT: 188.2 LBS | HEART RATE: 74 BPM | DIASTOLIC BLOOD PRESSURE: 68 MMHG | TEMPERATURE: 97.3 F

## 2020-01-03 DIAGNOSIS — R19.5 HEMATEST POSITIVE STOOLS: ICD-10-CM

## 2020-01-03 DIAGNOSIS — E55.9 VITAMIN D DEFICIENCY: ICD-10-CM

## 2020-01-03 DIAGNOSIS — R94.6 THYROID FUNCTION STUDY ABNORMALITY: ICD-10-CM

## 2020-01-03 DIAGNOSIS — N28.9 LOW KIDNEY FUNCTION: ICD-10-CM

## 2020-01-03 DIAGNOSIS — R73.9 HYPERGLYCEMIA: ICD-10-CM

## 2020-01-03 DIAGNOSIS — R07.9 CHEST PAIN, UNSPECIFIED TYPE: Primary | ICD-10-CM

## 2020-01-03 DIAGNOSIS — E78.5 HYPERLIPIDEMIA, UNSPECIFIED HYPERLIPIDEMIA TYPE: ICD-10-CM

## 2020-01-03 PROCEDURE — 99214 OFFICE O/P EST MOD 30 MIN: CPT | Performed by: FAMILY MEDICINE

## 2020-01-03 RX ORDER — NITROGLYCERIN 0.4 MG/1
TABLET SUBLINGUAL
Qty: 25 TABLET | Refills: 0 | Status: SHIPPED | OUTPATIENT
Start: 2020-01-03 | End: 2020-02-07

## 2020-01-03 NOTE — PATIENT INSTRUCTIONS
Follow-up with Cardiology for chest pain complete stress echocardiogram as ordered  Patient follow Gastroenterology for blood in stool    Patient to return in 2 months for office visit and blood work sooner if needed

## 2020-01-03 NOTE — PROGRESS NOTES
Assessment and Plan:  1  Chest pain, recurrent, CK-MB was normal chest x-ray was normal   Patient does have stress echocardiogram schedule and a month  I will changes to next week  In addition I referred to Cardiology, patient wishes see Dr Srikanth Pabon  Patient's take 81 mg of aspirin daily  Nitrostat was ordered instructions were given  If needs 3rd dose patient call 911 and be transported to [facility] ER for evaluation  2  Hemato test positive stool  Patient follow Gastroenterology as ordered  3  Hyperlipidemia, patient may use fish oil  4  Hyperglycemia diet controlled  5  Vitamin-D deficiency, stable  6  Mildly elevated TSH will continue to monitor  7  Mildly elevated creatinine increased p o  Fluids will recheck in 2 months  8  Patient to return in 2 months for office visit blood work sooner if need       Problem List Items Addressed This Visit        Genitourinary    Low kidney function     Increased p o  Fluids, monitor very minimally elevated creatinine normal BUN         Relevant Orders    Basic metabolic panel    T4    TSH, 3rd generation with Free T4 reflex       Other    Hyperlipidemia     Patient start fish oil 3000 mg daily  If patient does have CAD would recommend changing to statin         Vitamin D deficiency     Stable         Hyperglycemia     Diet controlled         Hematest positive stools     Follow with Gastroenterology as ordered         Chest pain - Primary     Refer to Cardiology, patient would like to see Dr Srikanth Pabon, will push echo stress test up to next week  Nitroglycerin is ordered    Patient take aspirin 81 mg daily until seen by Cardiology         Relevant Medications    aspirin 81 MG tablet    nitroglycerin (NITROSTAT) 0 4 mg SL tablet    Other Relevant Orders    Ambulatory referral to Cardiology    Echo stress test w contrast if indicated    Thyroid function study abnormality     TSH is mildly elevated normal T4 continue to monitor         Relevant Orders    Basic metabolic panel    T4    TSH, 3rd generation with Free T4 reflex                 Diagnoses and all orders for this visit:    Chest pain, unspecified type  -     Ambulatory referral to Cardiology; Future  -     Echo stress test w contrast if indicated; Future  -     aspirin 81 MG tablet; Take 1 tablet (81 mg total) by mouth daily  -     nitroglycerin (NITROSTAT) 0 4 mg SL tablet; Place 1 tablet under tongue for chest pain may repeat in 5 minutes  May repeat 3rd dose and 5 minutes however then call 911 be transported to the ER    Hyperlipidemia, unspecified hyperlipidemia type    Hyperglycemia    Vitamin D deficiency    Low kidney function  -     Basic metabolic panel; Future  -     T4; Future  -     TSH, 3rd generation with Free T4 reflex; Future    Hematest positive stools    Thyroid function study abnormality  -     Basic metabolic panel; Future  -     T4; Future  -     TSH, 3rd generation with Free T4 reflex; Future              Subjective:      Patient ID: Angelica Valenzuela is a 63 y o  male  CC:    Chief Complaint   Patient presents with    Follow-up     pt is here for a 2 week follow up       HPI:    Patient's symptom-free until last evening he did have an episode of substernal chest pain took 2 aspirin and it went away  Patient's stress echocardiogram is not scheduled to the end of this month  Blood work was discussed  Patient has mildly elevated TSH with normal T4, mild elevated fasting sugar patient's cholesterol is mildly elevated  He did have hemato test positive stool      The following portions of the patient's history were reviewed and updated as appropriate: allergies, current medications, past family history, past medical history, past social history, past surgical history and problem list       Review of Systems   Constitutional: Negative  HENT: Negative  Eyes: Negative  Respiratory: Negative      Cardiovascular:        HPI   Gastrointestinal:        HPI   Endocrine:        HPI   Genitourinary: Negative  Musculoskeletal: Negative  Skin: Negative  Allergic/Immunologic: Negative  Neurological: Negative  Hematological: Negative  Psychiatric/Behavioral: Negative  Data to review:       Objective:    Vitals:    01/03/20 0841   BP: 110/68   BP Location: Left arm   Patient Position: Sitting   Cuff Size: Adult   Pulse: 74   Resp: 18   Temp: (!) 97 3 °F (36 3 °C)   TempSrc: Temporal   SpO2: 98%   Weight: 85 4 kg (188 lb 3 2 oz)   Height: 6' 2" (1 88 m)        Physical Exam   Constitutional: He is oriented to person, place, and time  He appears well-developed and well-nourished  HENT:   Head: Normocephalic and atraumatic  Mouth/Throat: Oropharynx is clear and moist    Eyes: No scleral icterus  Neck: Neck supple  No JVD present  Cardiovascular: Normal rate, regular rhythm and normal heart sounds  Pulmonary/Chest: Effort normal and breath sounds normal    Abdominal: Soft  Bowel sounds are normal  There is no tenderness  Musculoskeletal: He exhibits no edema  Neurological: He is alert and oriented to person, place, and time  No cranial nerve deficit  Skin: Skin is warm and dry  Psychiatric: He has a normal mood and affect

## 2020-01-03 NOTE — ASSESSMENT & PLAN NOTE
Refer to Cardiology, patient would like to see Dr Denney Leyden, will push echo stress test up to next week  Nitroglycerin is ordered    Patient take aspirin 81 mg daily until seen by Cardiology

## 2020-01-07 ENCOUNTER — HOSPITAL ENCOUNTER (OUTPATIENT)
Dept: NON INVASIVE DIAGNOSTICS | Facility: HOSPITAL | Age: 60
Discharge: HOME | End: 2020-01-07
Payer: COMMERCIAL

## 2020-01-07 DIAGNOSIS — R07.9 CHEST PAIN, UNSPECIFIED TYPE: ICD-10-CM

## 2020-01-07 PROCEDURE — 93350 STRESS TTE ONLY: CPT

## 2020-01-07 PROCEDURE — 93351 STRESS TTE COMPLETE: CPT | Performed by: INTERNAL MEDICINE

## 2020-01-08 LAB
ARRHY DURING EX: NORMAL
CHEST PAIN STATEMENT: NORMAL
MAX DIASTOLIC BP: 70 MMHG
MAX HEART RATE: 146 BPM
MAX PREDICTED HEART RATE: 162 BPM
MAX. SYSTOLIC BP: 152 MMHG
PROTOCOL NAME: NORMAL
REASON FOR TERMINATION: NORMAL
TARGET HR FORMULA: NORMAL
TEST INDICATION: NORMAL
TIME IN EXERCISE PHASE: NORMAL

## 2020-01-10 ENCOUNTER — CONSULT (OUTPATIENT)
Dept: CARDIOLOGY CLINIC | Facility: CLINIC | Age: 60
End: 2020-01-10
Payer: COMMERCIAL

## 2020-01-10 VITALS
WEIGHT: 187 LBS | HEART RATE: 84 BPM | SYSTOLIC BLOOD PRESSURE: 116 MMHG | BODY MASS INDEX: 24 KG/M2 | HEIGHT: 74 IN | DIASTOLIC BLOOD PRESSURE: 66 MMHG | OXYGEN SATURATION: 96 %

## 2020-01-10 DIAGNOSIS — E78.5 HYPERLIPIDEMIA, UNSPECIFIED HYPERLIPIDEMIA TYPE: ICD-10-CM

## 2020-01-10 DIAGNOSIS — R07.9 CHEST PAIN, UNSPECIFIED TYPE: Primary | ICD-10-CM

## 2020-01-10 DIAGNOSIS — R73.9 HYPERGLYCEMIA: ICD-10-CM

## 2020-01-10 PROCEDURE — 99244 OFF/OP CNSLTJ NEW/EST MOD 40: CPT | Performed by: INTERNAL MEDICINE

## 2020-01-10 NOTE — PROGRESS NOTES
Cardiology Consultation     Rica Woodward  [id number]  1960  HEART & VASCULAR [facility] [place] [address] [address] [place] [address]      1  Chest pain, unspecified type     2  Hyperlipidemia, unspecified hyperlipidemia type     3  Hyperglycemia         Discussion/Summary:    Chest pain  Not typically exertional, did have reproduction of symptoms on recent stress echo, but normal EKG (PVCs are expected), no ischemic changes, and LV function and regional wall motion was normal  Normal stress echo, so likelihood of ischemia as cause of symptoms is low  Other etiologies could potentially be GI/GERD - he is seeing gastroenterology soon for colonoscopy  Should address with them  He does have a history of PE, was supposed to be on apixaban  He has mild CKD, so I would not like to repeat a CT chest unnecessarily, particularly since his current symptoms are quite different than prior symptoms of his PE  His RV size/function were normal on recent echo, and he had good exercise tolerance  Regardless, he should be on anticoagulation so I recommended he resume the Eliquis, which he is agreeable to do so  Other blood work was reassuring  Weight loss evaluation with age appropriate cancer screening as planned, otherwise could perform additional imaging to exclude malignancy if indicated, defer to PCP  History of Present Illness:    52-year-old man  He comes to the office today for evaluation of chest pain  He denies any cardiac history, but for about a year, he is reporting some symptoms of chest discomfort that radiated up into his neck  These do not necessarily come with exertion, or more likely to come at rest, actually  He had ignored them for a while, but recently mention them to his family physician  He had a stress echo ordered  This was done just a few days ago  He exercised for 10 minutes on the Chavez protocol  He did experience 2/10 chest pain during exercise which lasted for long after recovery  Resolved spontaneously ultimately  I reviewed the images and the EKG strips personally  No ischemic changes were noted on the EKG  He did have occasional PVCs with exercise, but his symptoms did not seem related to any sort of arrhythmia or palpitation  His baseline echo images were normal   There was trace valve disease but nothing significant  With exercise, there was normal LV function augmentation and no development of any regional wall motion abnormalities  His other history includes pulmonary embolism in the past which was unprovoked  He was seeing Hematology and was on 2 5 mg of Eliquis twice a day which he stopped on his own last summer and has not started back up  He has chronic kidney disease with creatinine of about 1 35  Recent blood work was done which showed no other significant abnormalities  He has also had some unintentional weight loss  He has a colonoscopy planned for the near future with gastroenterology  He did have fecal occult blood positive      Patient Active Problem List   Diagnosis    Chronic pain of left knee    DDD (degenerative disc disease), lumbosacral    Erectile dysfunction    Hyperlipidemia    Nephrolithiasis    Vitamin D deficiency    Acute medial meniscal tear, left, initial encounter    Health care maintenance    Hyperglycemia    Weight loss    Screening for colon cancer    Hematest positive stools    Chest pain    Thyroid function study abnormality    Low kidney function     Past Medical History:   Diagnosis Date    Hyperlipidemia     Pulmonary embolism (HCC)      Social History     Tobacco Use    Smoking status: Never Smoker    Smokeless tobacco: Never Used   Substance Use Topics    Alcohol use: No    Drug use: No      Family History   Problem Relation Age of Onset    Stroke Mother         CVA   Robert Casey Breast cancer Mother     Diabetes Father DM    Cancer Father     Melanoma Brother      Past Surgical History:   Procedure Laterality Date    HERNIA REPAIR      CT KNEE SCOPE,DIAGNOSTIC Left 6/14/2019    Procedure: ARTHROSCOPY KNEE;  Surgeon: Sugey Pyle MD;  [facility];  Service: Orthopedics    WISDOM TOOTH EXTRACTION         Current Outpatient Medications:     Acetaminophen (TYLENOL PO), Take by mouth, Disp: , Rfl:     aspirin 81 MG tablet, Take 1 tablet (81 mg total) by mouth daily, Disp: , Rfl:     Cholecalciferol (VITAMIN D) 50 MCG (2000 UT) CAPS, Take by mouth, Disp: , Rfl:     nitroglycerin (NITROSTAT) 0 4 mg SL tablet, Place 1 tablet under tongue for chest pain may repeat in 5 minutes  May repeat 3rd dose and 5 minutes however then call 911 be transported to the ER, Disp: 25 tablet, Rfl: 0  Allergies   Allergen Reactions    Codeine GI Intolerance     vomitting       Vitals:    01/10/20 1405   BP: 116/66   BP Location: Right arm   Patient Position: Sitting   Cuff Size: Standard   Pulse: 84   SpO2: 96%   Weight: 84 8 kg (187 lb)   Height: 6' 2" (1 88 m)     Vitals:    01/10/20 1405   Weight: 84 8 kg (187 lb)      Height: 6' 2" (188 cm)   Body mass index is 24 01 kg/m²      Physical Exam:  GENERAL: Alert, well appearing, and in no distress  HEENT:  PERRL, EOMI, no scleral icterus, no conjunctival pallor  NECK:  Supple, No elevated JVP, no thyromegaly, no carotid bruits  HEART:  Regular rate and rhythm, normal S1/S2, no S3/S4, no murmur or rub  LUNGS:  Clear to auscultation bilaterally  ABDOMEN:  Soft, non-tender, positive bowel sounds, no rebound or guarding  EXTREMITIES:  No edema  VASCULAR:  Normal pedal pulses   NEURO: No focal deficits,  SKIN: Normal without suspicious lesions on exposed skin      ROS:  Positive for weight loss, chest pain, lack of energy, bloody stools, diarrhea, abdominal pain, rectal bleeding, arthritis, back pain, snoring, blood clots, kidney stone, frequent urination at night, prostate problems, erectile dysfunction, daytime sleepiness  Except as noted in HPI, is otherwise reviewed in detail and a 12 point review of systems is negative  Labs:  Lab Results   Component Value Date    K 3 8 12/23/2019     12/23/2019    CREATININE 1 36 (H) 12/23/2019    BUN 18 12/23/2019    CO2 26 12/23/2019    ALT 27 12/23/2019    AST 15 12/23/2019    INR 0 92 06/11/2019    GLUF 106 (H) 12/23/2019    HGBA1C 5 9 12/20/2019    WBC 6 72 12/20/2019    HGB 15 1 12/20/2019    HCT 45 8 12/20/2019     12/20/2019       No results found for: CHOL  Lab Results   Component Value Date    HDL 50 12/23/2019    HDL 43 06/11/2019    HDL 44 07/31/2018     Lab Results   Component Value Date    LDLCALC 127 (H) 12/23/2019    LDLCALC 144 (H) 06/11/2019    LDLCALC 117 (H) 07/31/2018     Lab Results   Component Value Date    TRIG 259 (H) 12/23/2019    TRIG 274 (H) 06/11/2019    TRIG 364 (H) 07/31/2018       Testing:  Stress Echo 1/7/20:  STRESS RESULTS: Duration of exercise was 10 min and 2 sec  Maximal work rate was 13 4 METs  Maximal heart rate during stress was 146 bpm  Maximal systolic blood pressure during stress was 152 mmHg  The rate-pressure product for the peak  heart rate and blood pressure was 53565  The patient experienced chest pain during stress; pain resolved spontaneously  O2 sat was 97% pre-stress, 96% during stress and 97% post exercise  The stress test was terminated due to achievement  of maximal (symptom limited) exercise      ECG CONCLUSIONS: The stress ECG was negative for ischemia  Arrhythmia during stress: isolated premature ventricular beats      STRESS 2D ECHO RESULTS:     BASELINE: Left ventricular size was normal  Overall left ventricular systolic function was normal  Estimated left ventricular ejection fraction was 55 %       PEAK STRESS: There were no regional wall motion abnormalities  There was an appropriate reduction in left ventricular size   There was an appropriate augmentation in LV function      OTHER ECHO FINDINGS: Mild diastolic dysfunction  Mild mitral regurgitation  Mild tricuspid regurgitation  Trace pulmonic insufficiency

## 2020-01-27 ENCOUNTER — HOSPITAL ENCOUNTER (OUTPATIENT)
Dept: GASTROENTEROLOGY | Facility: HOSPITAL | Age: 60
Setting detail: OUTPATIENT SURGERY
Discharge: HOME | End: 2020-01-27
Attending: COLON & RECTAL SURGERY | Admitting: COLON & RECTAL SURGERY
Payer: COMMERCIAL

## 2020-01-27 ENCOUNTER — ANESTHESIA (OUTPATIENT)
Dept: GASTROENTEROLOGY | Facility: HOSPITAL | Age: 60
End: 2020-01-27

## 2020-01-27 ENCOUNTER — ANESTHESIA EVENT (OUTPATIENT)
Dept: GASTROENTEROLOGY | Facility: HOSPITAL | Age: 60
End: 2020-01-27

## 2020-01-27 VITALS
OXYGEN SATURATION: 95 % | HEIGHT: 74 IN | SYSTOLIC BLOOD PRESSURE: 111 MMHG | RESPIRATION RATE: 20 BRPM | WEIGHT: 186 LBS | HEART RATE: 70 BPM | BODY MASS INDEX: 23.87 KG/M2 | DIASTOLIC BLOOD PRESSURE: 68 MMHG | TEMPERATURE: 97.8 F

## 2020-01-27 DIAGNOSIS — Z12.11 ENCOUNTER FOR SCREENING FOR MALIGNANT NEOPLASM OF COLON: ICD-10-CM

## 2020-01-27 DIAGNOSIS — Z83.71 FAMILY HISTORY OF COLONIC POLYPS: ICD-10-CM

## 2020-01-27 PROCEDURE — 88305 TISSUE EXAM BY PATHOLOGIST: CPT | Performed by: PATHOLOGY

## 2020-01-27 RX ORDER — SODIUM CHLORIDE 9 MG/ML
125 INJECTION, SOLUTION INTRAVENOUS CONTINUOUS
Status: DISCONTINUED | OUTPATIENT
Start: 2020-01-27 | End: 2020-01-31 | Stop reason: HOSPADM

## 2020-01-27 RX ORDER — PROPOFOL 10 MG/ML
INJECTION, EMULSION INTRAVENOUS AS NEEDED
Status: DISCONTINUED | OUTPATIENT
Start: 2020-01-27 | End: 2020-01-27 | Stop reason: SURG

## 2020-01-27 RX ADMIN — PROPOFOL 30 MG: 10 INJECTION, EMULSION INTRAVENOUS at 11:21

## 2020-01-27 RX ADMIN — PROPOFOL 150 MG: 10 INJECTION, EMULSION INTRAVENOUS at 11:06

## 2020-01-27 RX ADMIN — SODIUM CHLORIDE 125 ML/HR: 0.9 INJECTION, SOLUTION INTRAVENOUS at 11:02

## 2020-01-27 RX ADMIN — PROPOFOL 40 MG: 10 INJECTION, EMULSION INTRAVENOUS at 11:15

## 2020-01-27 RX ADMIN — PROPOFOL 30 MG: 10 INJECTION, EMULSION INTRAVENOUS at 11:17

## 2020-01-27 RX ADMIN — PROPOFOL 50 MG: 10 INJECTION, EMULSION INTRAVENOUS at 11:09

## 2020-01-27 NOTE — DISCHARGE INSTRUCTIONS
[facility] [place] [address], [address] [place] [address] [place]  Phone: [phone]    DISCHARGE INSTRUCTIONS:    1   ___ Complete Exam - Normal    2   ___ Exam normal, but entire colon not seen  We will discuss this with you  3   ___ Polyp(s) removed by "burning" - NO pathology report will follow    4   _2__ Polyp(s) removed by excision  Pathology report will be available in 4-5 days   Someone from our office will call you with results  5   ___ Exam prompted biopsies  Pathology report will be available in 4-5 days   Someone from our office will call you with results  6   ___ Exam demonstrated findings that need treatment  Prescriptions will be   Given to you  Return to our office in ____ weeks  Please call for appt  7   ___ Original office visit or colonoscopy findings necessitate an office visit  Please call to set up a new appointment    8   ___ Medication  __________________________________________        [address]:    - Go straight home and rest today    - No driving or drinking alcohol for 24 hours    - Resume regular diet and medications unless otherwise instructed  Coumadin and Plavix are blood thinners  You can resume these medications on __________  IF YOU ARE HAVING ANY FEVER, BLEEDING OR PERSISTENT PAIN IN THE ABDOMEN, PLEASE CALL OUR OFFICE ANY DAY OR TIME  [phone]        Colorectal Polyps   WHAT YOU NEED TO KNOW:   Colorectal polyps are small growths of tissue in the lining of the colon and rectum  Most polyps are hyperplastic polyps and are usually benign (noncancerous)  Certain types of polyps, called adenomatous polyps, may turn into cancer  DISCHARGE INSTRUCTIONS:   Follow up with your healthcare provider or gastroenterologist as directed: You may need to return for more tests, such as another colonoscopy  Write down your questions so you remember to ask them during your visits    Reduce your risk for colorectal polyps:   · Eat a variety of healthy foods:  Healthy foods include fruit, vegetables, whole-grain breads, low-fat dairy products, beans, lean meat, and fish  Ask if you need to be on a special diet  · Maintain a healthy weight:  Ask your healthcare provider if you need to lose weight and how much you need to lose  Ask for help with a weight loss program     · Exercise:  Begin to exercise slowly and do more as you get stronger  Talk with your healthcare provider before you start an exercise program      · Limit alcohol:  Your risk for polyps increases the more you drink  · Do not smoke: If you smoke, it is never too late to quit  Ask for information about how to stop  For support and more information:   · Dominick Doe ([facility])  [address], [place] [address]  Phone: [phone]  Web Address: www digestive  [facility] go[facility]  Contact your healthcare provider or gastroenterologist if:   · You have a fever  · You have chills, a cough, or feel weak and achy  · You have abdominal pain that does not go away or gets worse after you take medicine  · Your abdomen is swollen  · You are losing weight without trying  · You have questions or concerns about your condition or care  Seek care immediately or call 911 if:   · You have sudden shortness of breath  · You have a fast heart rate, fast breathing, or are too dizzy to stand up  · You have severe abdominal pain  · You see blood in your bowel movement  © 2017 [address] [employer] Information is for End User's use only and may not be sold, redistributed or otherwise used for commercial purposes  All illustrations and images included in CareNotes® are the copyrighted property of [employer]  or [employer]  The above information is an  only  It is not intended as medical advice for individual conditions or treatments   Talk to your doctor, nurse or pharmacist before following any medical regimen to see if it is safe and effective for you  Colonoscopy   WHAT YOU NEED TO KNOW:   A colonoscopy is a procedure to examine the inside of your colon (intestine) with a scope  Polyps or tissue growths may have been removed during your colonoscopy  It is normal to feel bloated and to have some abdominal discomfort  You should be passing gas  If you have hemorrhoids or you had polyps removed, you may have a small amount of bleeding  DISCHARGE INSTRUCTIONS:   Seek care immediately if:   · You have a large amount of bright red blood in your bowel movements  · Your abdomen is hard and firm and you have severe pain  · You have sudden trouble breathing  Contact your healthcare provider if:   · You develop a rash or hives  · You have a fever within 24 hours of your procedure  · You have not had a bowel movement for 3 days after your procedure  · You have questions or concerns about your condition or care  Activity:   · Do not lift, strain, or run  for 3 days after your procedure  · Rest after your procedure  You have been given medicine to relax you  Do not  drive or make important decisions until the day after your procedure  Return to your normal activity as directed  · Relieve gas and discomfort from bloating  by lying on your right side with a heating pad on your abdomen  You may need to take short walks to help the gas move out  Eat small meals until bloating is relieved  If you had polyps removed: For 7 days after your procedure:  · Do not  take aspirin  · Do not  go on long car rides  Help prevent constipation:   · Eat a variety of healthy foods  Healthy foods include fruit, vegetables, whole-grain breads, low-fat dairy products, beans, lean meat, and fish  Ask if you need to be on a special diet  Your healthcare provider may recommend that you eat high-fiber foods such as cooked beans   Fiber helps you have regular bowel movements  · Drink liquids as directed  Adults should drink between 9 and 13 eight-ounce cups of liquid every day  Ask what amount is best for you  For most people, good liquids to drink are water, juice, and milk  · Exercise as directed  Talk to your healthcare provider about the best exercise plan for you  Exercise can help prevent constipation, decrease your blood pressure and improve your health  Follow up with your healthcare provider as directed:  Write down your questions so you remember to ask them during your visits  © 2017 [address] [facility] Information is for End User's use only and may not be sold, redistributed or otherwise used for commercial purposes  All illustrations and images included in CareNotes® are the copyrighted property of [employer]  or [employer]  The above information is an  only  It is not intended as medical advice for individual conditions or treatments  Talk to your doctor, nurse or pharmacist before following any medical regimen to see if it is safe and effective for you

## 2020-01-27 NOTE — ANESTHESIA PREPROCEDURE EVALUATION
Review of Systems/Medical History  Patient summary reviewed  Chart reviewed  No history of anesthetic complications     Cardiovascular  Exercise tolerance (METS): >4,  Hyperlipidemia,   PE (H/o PE not on A/C per patient preference),  Pulmonary       GI/Hepatic  Negative GI/hepatic ROS   Bowel prep       Kidney stones,        Endo/Other  Negative endo/other ROS      GYN       Hematology  Negative hematology ROS      Musculoskeletal    Arthritis     Neurology  Negative neurology ROS      Psychology           Physical Exam    Airway    Mallampati score: II  TM Distance: >3 FB  Neck ROM: full     Dental   No notable dental hx     Cardiovascular  Rhythm: regular, Rate: normal, Cardiovascular exam normal    Pulmonary  Pulmonary exam normal Breath sounds clear to auscultation,     Other Findings        Anesthesia Plan  ASA Score- 2     Anesthesia Type- IV sedation with anesthesia with ASA Monitors  Additional Monitors:   Airway Plan:         Plan Factors-Patient not instructed to abstain from smoking on day of procedure  Patient did not smoke on day of surgery  Induction- intravenous  Postoperative Plan-     Informed Consent- Anesthetic plan and risks discussed with patient  I personally reviewed this patient with the CRNA  Discussed and agreed on the Anesthesia Plan with the CRNA  Amanuel Mosquera

## 2020-01-27 NOTE — H&P
COLON AND RECT[facility]  HISTORY AND PHYSICAL EXAM  Rica Woodward 63 y o  male MRN: [id number]  Unit/Bed#:  Encounter: [id number]    Assessment/Plan     Indication for colonoscopy: Screening colonoscopy    Plan:  Flexible Colonoscopy    Review of Systems    Historical Information   Past Medical History:   Diagnosis Date    Arthritis     Blood in stool     Disease of thyroid gland     Hyperlipidemia     Kidney stone     Pulmonary embolism (HCC)      Past Surgical History:   Procedure Laterality Date    COLONOSCOPY      HERNIA REPAIR      KNEE ARTHROSCOPY      ND KNEE SCOPE,DIAGNOSTIC Left 6/14/2019    Procedure: ARTHROSCOPY KNEE;  Surgeon: Jamarcus Balbuena MD;  [facility];  Service: Orthopedics    WISDOM TOOTH EXTRACTION       Social History   Social History     Substance and Sexual Activity   Alcohol Use No     Social History     Substance and Sexual Activity   Drug Use No     Social History     Tobacco Use   Smoking Status Never Smoker   Smokeless Tobacco Never Used     Family History: non-contributory    Meds/Allergies   PTA meds:   Cannot display prior to admission medications because the patient has not been admitted in this contact  Allergies   Allergen Reactions    Codeine GI Intolerance     vomitting       Objective   First Vitals:        Current Vitals:        No intake or output data in the 24 hours ending 01/27/20 1054    Invasive Devices     None                 Physical Exam    HEENT: Normocephalic, atraumatic  Lungs: Clear  Heart: Regular rate and rhythm  Abdomen: Soft  Nondistended  Nontender  Extremities: No edema    Lab Results: CBC: No results found for: WBC, HGB, HCT, MCV, PLT, ADJUSTEDWBC, MCH, MCHC, RDW, MPV, NRBC    EKG, Pathology, and Other Studies: I have personally reviewed pertinent reports

## 2020-02-07 ENCOUNTER — TELEPHONE (OUTPATIENT)
Dept: HEMATOLOGY ONCOLOGY | Facility: CLINIC | Age: 60
End: 2020-02-07

## 2020-02-07 ENCOUNTER — OFFICE VISIT (OUTPATIENT)
Dept: FAMILY MEDICINE CLINIC | Facility: CLINIC | Age: 60
End: 2020-02-07
Payer: COMMERCIAL

## 2020-02-07 VITALS
RESPIRATION RATE: 18 BRPM | WEIGHT: 187 LBS | BODY MASS INDEX: 24 KG/M2 | TEMPERATURE: 98.5 F | DIASTOLIC BLOOD PRESSURE: 78 MMHG | SYSTOLIC BLOOD PRESSURE: 128 MMHG | HEART RATE: 72 BPM | HEIGHT: 74 IN

## 2020-02-07 DIAGNOSIS — K63.5 POLYP OF COLON, UNSPECIFIED PART OF COLON, UNSPECIFIED TYPE: Primary | ICD-10-CM

## 2020-02-07 DIAGNOSIS — N28.9 LOW KIDNEY FUNCTION: ICD-10-CM

## 2020-02-07 DIAGNOSIS — R73.9 HYPERGLYCEMIA: ICD-10-CM

## 2020-02-07 DIAGNOSIS — R94.6 THYROID FUNCTION STUDY ABNORMALITY: ICD-10-CM

## 2020-02-07 DIAGNOSIS — E55.9 VITAMIN D DEFICIENCY: ICD-10-CM

## 2020-02-07 DIAGNOSIS — R07.9 CHEST PAIN, UNSPECIFIED TYPE: ICD-10-CM

## 2020-02-07 DIAGNOSIS — N52.9 ERECTILE DYSFUNCTION, UNSPECIFIED ERECTILE DYSFUNCTION TYPE: ICD-10-CM

## 2020-02-07 DIAGNOSIS — E78.5 HYPERLIPIDEMIA, UNSPECIFIED HYPERLIPIDEMIA TYPE: ICD-10-CM

## 2020-02-07 DIAGNOSIS — D68.59 HYPERCOAGULABLE STATE (HCC): ICD-10-CM

## 2020-02-07 PROBLEM — R19.5 HEMATEST POSITIVE STOOLS: Status: RESOLVED | Noted: 2020-01-02 | Resolved: 2020-02-07

## 2020-02-07 PROBLEM — Z12.11 SCREENING FOR COLON CANCER: Status: RESOLVED | Noted: 2019-12-20 | Resolved: 2020-02-07

## 2020-02-07 PROBLEM — R63.4 WEIGHT LOSS: Status: RESOLVED | Noted: 2019-12-20 | Resolved: 2020-02-07

## 2020-02-07 PROCEDURE — 1036F TOBACCO NON-USER: CPT | Performed by: FAMILY MEDICINE

## 2020-02-07 PROCEDURE — 3008F BODY MASS INDEX DOCD: CPT | Performed by: FAMILY MEDICINE

## 2020-02-07 PROCEDURE — 99214 OFFICE O/P EST MOD 30 MIN: CPT | Performed by: FAMILY MEDICINE

## 2020-02-07 RX ORDER — ASPIRIN 81 MG/1
81 TABLET ORAL DAILY
COMMUNITY

## 2020-02-07 NOTE — PATIENT INSTRUCTIONS
Patient to return in 3 months for office visit blood work  Follow-up with Hematology for evaluation for blood clots  Follow-up with urology for evaluation erectile dysfunction    Low sugar low carb right low-fat diet recommended increase oral fluids

## 2020-02-07 NOTE — ASSESSMENT & PLAN NOTE
Patient has tried medications and does not like these    Will refer to urology for further evaluation and treatment

## 2020-02-07 NOTE — PROGRESS NOTES
Assessment and Plan:  1  Colon polyp status post colonoscopy 01/27/2020   2  Mildly elevated creatinine repeat blood work is ordered  3  Chest pain, resolved status post cardiac evaluation and stress echocardiogram  4  Erectile dysfunction, will check testosterone refer to Urology patient has tried medication and does not like this  5  Mild elevated TSH, repeat TSH and T4 discussed with patient he has a propensity for an underactive thyroid  6  Vitamin-D deficiency, blood work ordered  7  Hyperglycemia, low sugar low carb right diet recommended  8  Hyperlipidemia low-fat diet recommended  9  Hypercoagulable state, patient did have a pulmonary embolism now which seemed unprovoked 9/14 was on Eliquis in the past off by himself  At this point I recommend patient take aspirin 81 mg enteric-coated daily  Will refer back to Hematology for evaluation if needs full anticoagulation  10  Patient return in 3 months for office visit blood work sooner if needed        Problem List Items Addressed This Visit        Digestive    Colon polyp - Primary     Status post colonoscopy 01/27/2020         Relevant Orders    CBC    Comprehensive metabolic panel    Hemoglobin A1C    Lipid Panel with Direct LDL reflex    T4    TSH, 3rd generation with Free T4 reflex    UA (URINE) with reflex to Scope    Testosterone       Genitourinary    Low kidney function     Blood work ordered         Relevant Orders    CBC    Comprehensive metabolic panel    Hemoglobin A1C    Lipid Panel with Direct LDL reflex    T4    TSH, 3rd generation with Free T4 reflex    UA (URINE) with reflex to Scope    Testosterone       Other    Erectile dysfunction     Patient has tried medications and does not like these    Will refer to urology for further evaluation and treatment         Relevant Orders    Ambulatory referral to Urology    CBC    Comprehensive metabolic panel    Hemoglobin A1C    Lipid Panel with Direct LDL reflex    T4    TSH, 3rd generation with Free T4 reflex    UA (URINE) with reflex to Scope    Testosterone    Hyperlipidemia     Low-fat diet recommended         Relevant Orders    CBC    Comprehensive metabolic panel    Hemoglobin A1C    Lipid Panel with Direct LDL reflex    T4    TSH, 3rd generation with Free T4 reflex    UA (URINE) with reflex to Scope    Testosterone    Vitamin D deficiency     Blood work ordered         Relevant Orders    CBC    Comprehensive metabolic panel    Hemoglobin A1C    Lipid Panel with Direct LDL reflex    T4    TSH, 3rd generation with Free T4 reflex    UA (URINE) with reflex to Scope    Testosterone    Hyperglycemia     Follow low sugar low-carbohydrate diet         Relevant Orders    CBC    Comprehensive metabolic panel    Hemoglobin A1C    Lipid Panel with Direct LDL reflex    T4    TSH, 3rd generation with Free T4 reflex    UA (URINE) with reflex to Scope    Testosterone    Thyroid function study abnormality     Elevated TSH normal T4 continue to monitor         Relevant Orders    CBC    Comprehensive metabolic panel    Hemoglobin A1C    Lipid Panel with Direct LDL reflex    T4    TSH, 3rd generation with Free T4 reflex    UA (URINE) with reflex to Scope    Testosterone    Hypercoagulable state ([address] )     Per overview  Start aspirin 81 mg daily until seen by Hematology         Relevant Orders    Ambulatory referral to Hematology / Oncology    CBC    Comprehensive metabolic panel    Hemoglobin A1C    Lipid Panel with Direct LDL reflex    T4    TSH, 3rd generation with Free T4 reflex    UA (URINE) with reflex to Scope    Testosterone    RESOLVED: Chest pain    Relevant Orders    CBC    Comprehensive metabolic panel    Hemoglobin A1C    Lipid Panel with Direct LDL reflex    T4    TSH, 3rd generation with Free T4 reflex    UA (URINE) with reflex to Scope    Testosterone                 Diagnoses and all orders for this visit:    Polyp of colon, unspecified part of colon, unspecified type  -     CBC;  Future  -     Comprehensive metabolic panel; Future  -     Hemoglobin A1C; Future  -     Lipid Panel with Direct LDL reflex; Future  -     T4; Future  -     TSH, 3rd generation with Free T4 reflex; Future  -     UA (URINE) with reflex to Scope; Future  -     Testosterone; Future    Low kidney function  -     CBC; Future  -     Comprehensive metabolic panel; Future  -     Hemoglobin A1C; Future  -     Lipid Panel with Direct LDL reflex; Future  -     T4; Future  -     TSH, 3rd generation with Free T4 reflex; Future  -     UA (URINE) with reflex to Scope; Future  -     Testosterone; Future    Chest pain, unspecified type  -     CBC; Future  -     Comprehensive metabolic panel; Future  -     Hemoglobin A1C; Future  -     Lipid Panel with Direct LDL reflex; Future  -     T4; Future  -     TSH, 3rd generation with Free T4 reflex; Future  -     UA (URINE) with reflex to Scope; Future  -     Testosterone; Future    Erectile dysfunction, unspecified erectile dysfunction type  -     Ambulatory referral to Urology; Future  -     CBC; Future  -     Comprehensive metabolic panel; Future  -     Hemoglobin A1C; Future  -     Lipid Panel with Direct LDL reflex; Future  -     T4; Future  -     TSH, 3rd generation with Free T4 reflex; Future  -     UA (URINE) with reflex to Scope; Future  -     Testosterone; Future    Hyperglycemia  -     CBC; Future  -     Comprehensive metabolic panel; Future  -     Hemoglobin A1C; Future  -     Lipid Panel with Direct LDL reflex; Future  -     T4; Future  -     TSH, 3rd generation with Free T4 reflex; Future  -     UA (URINE) with reflex to Scope; Future  -     Testosterone; Future    Hyperlipidemia, unspecified hyperlipidemia type  -     CBC; Future  -     Comprehensive metabolic panel; Future  -     Hemoglobin A1C; Future  -     Lipid Panel with Direct LDL reflex; Future  -     T4; Future  -     TSH, 3rd generation with Free T4 reflex; Future  -     UA (URINE) with reflex to Scope;  Future  -     Testosterone; Future    Thyroid function study abnormality  -     CBC; Future  -     Comprehensive metabolic panel; Future  -     Hemoglobin A1C; Future  -     Lipid Panel with Direct LDL reflex; Future  -     T4; Future  -     TSH, 3rd generation with Free T4 reflex; Future  -     UA (URINE) with reflex to Scope; Future  -     Testosterone; Future    Vitamin D deficiency  -     CBC; Future  -     Comprehensive metabolic panel; Future  -     Hemoglobin A1C; Future  -     Lipid Panel with Direct LDL reflex; Future  -     T4; Future  -     TSH, 3rd generation with Free T4 reflex; Future  -     UA (URINE) with reflex to Scope; Future  -     Testosterone; Future    Hypercoagulable state [facility])  -     Ambulatory referral to Hematology / Oncology; Future  -     CBC; Future  -     Comprehensive metabolic panel; Future  -     Hemoglobin A1C; Future  -     Lipid Panel with Direct LDL reflex; Future  -     T4; Future  -     TSH, 3rd generation with Free T4 reflex; Future  -     UA (URINE) with reflex to Scope; Future  -     Testosterone; Future    Other orders  -     aspirin (ECOTRIN LOW STRENGTH) 81 mg EC tablet; Take 81 mg by mouth daily              Subjective:      Patient ID: Regino Beck is a 63 y o  male  CC:    Chief Complaint   Patient presents with    Follow-up     Patient present today for his routine follow up to review lab results  Pt will like to discuss with Dr Cayetano Recinos  about his weight loss  HPI:    Patient seen by cardiology and stress echocardiogram was normal no further chest pain  Patient did have colonoscopy benign colon polyps were found  Blood work was discussed from prior  Patient tells me in 2014 he had pulmonary embolisms  Has seen Hematology, Dr Len King was on Eliquis 2 5 mg twice daily but stopped it on his own  Would like to go back to Hematology, order was given  In addition patient has ED  Has tried medications does not like them    Will refer to urology for further evaluation of his ED       The following portions of the patient's history were reviewed and updated as appropriate: allergies, current medications, past family history, past medical history, past social history, past surgical history and problem list       Review of Systems   Constitutional: Negative  HENT: Negative  Eyes: Negative  Respiratory: Negative  Cardiovascular:        HPI   Gastrointestinal:        HPI   Endocrine: Negative  Genitourinary:        HPI   Musculoskeletal: Negative  Skin: Negative  Allergic/Immunologic: Negative  Neurological: Negative  Hematological:        HPI   Psychiatric/Behavioral: Negative  Data to review:       Objective:    Vitals:    02/07/20 1133   BP: 128/78   BP Location: Left arm   Patient Position: Sitting   Cuff Size: Large   Pulse: 72   Resp: 18   Temp: 98 5 °F (36 9 °C)   TempSrc: Temporal   Weight: 84 8 kg (187 lb)   Height: 6' 2" (1 88 m)        Physical Exam   Constitutional: He is oriented to person, place, and time  He appears well-developed and well-nourished  HENT:   Head: Normocephalic and atraumatic  Mouth/Throat: Oropharynx is clear and moist    Eyes: No scleral icterus  Neck: Neck supple  No JVD present  Cardiovascular: Normal rate, regular rhythm and normal heart sounds  Pulmonary/Chest: Effort normal and breath sounds normal    Musculoskeletal: He exhibits no edema  Neurological: He is alert and oriented to person, place, and time  No cranial nerve deficit  Skin: Skin is warm and dry  Psychiatric: He has a normal mood and affect

## 2020-02-17 ENCOUNTER — TELEPHONE (OUTPATIENT)
Dept: HEMATOLOGY ONCOLOGY | Facility: CLINIC | Age: 60
End: 2020-02-17

## 2020-02-17 NOTE — TELEPHONE ENCOUNTER
Spoke to Jn and stated he would go for blood work today or first thing christopher morning  Sara Marin

## 2020-02-18 ENCOUNTER — OFFICE VISIT (OUTPATIENT)
Dept: HEMATOLOGY ONCOLOGY | Facility: CLINIC | Age: 60
End: 2020-02-18
Payer: COMMERCIAL

## 2020-02-18 ENCOUNTER — TRANSCRIBE ORDERS (OUTPATIENT)
Dept: LAB | Facility: CLINIC | Age: 60
End: 2020-02-18

## 2020-02-18 VITALS
TEMPERATURE: 97 F | SYSTOLIC BLOOD PRESSURE: 112 MMHG | DIASTOLIC BLOOD PRESSURE: 70 MMHG | HEART RATE: 85 BPM | HEIGHT: 74 IN | WEIGHT: 187 LBS | RESPIRATION RATE: 16 BRPM | BODY MASS INDEX: 24 KG/M2

## 2020-02-18 DIAGNOSIS — R19.5 HEME POSITIVE STOOL: ICD-10-CM

## 2020-02-18 DIAGNOSIS — Z12.5 ENCOUNTER FOR PROSTATE CANCER SCREENING: ICD-10-CM

## 2020-02-18 DIAGNOSIS — I27.82 OTHER CHRONIC PULMONARY EMBOLISM WITHOUT ACUTE COR PULMONALE (HCC): ICD-10-CM

## 2020-02-18 DIAGNOSIS — R63.4 UNINTENTIONAL WEIGHT LOSS: Primary | ICD-10-CM

## 2020-02-18 DIAGNOSIS — K21.00 GASTROESOPHAGEAL REFLUX DISEASE WITH ESOPHAGITIS: ICD-10-CM

## 2020-02-18 PROCEDURE — 99213 OFFICE O/P EST LOW 20 MIN: CPT | Performed by: INTERNAL MEDICINE

## 2020-02-18 PROCEDURE — 1036F TOBACCO NON-USER: CPT | Performed by: INTERNAL MEDICINE

## 2020-02-18 RX ORDER — OMEPRAZOLE 20 MG/1
20 TABLET, DELAYED RELEASE ORAL DAILY
Qty: 30 TABLET | Refills: 1 | Status: SHIPPED | OUTPATIENT
Start: 2020-02-18 | End: 2020-09-30
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
## 2020-02-18 NOTE — PATIENT INSTRUCTIONS
The patient is aware seek medical attention for persistent or progressive lower chest pain, decreased appetite, loose bowel movement, further weight loss, assess the fatigue, or if other new problems arise

## 2020-02-21 ENCOUNTER — OFFICE VISIT (OUTPATIENT)
Dept: FAMILY MEDICINE CLINIC | Facility: CLINIC | Age: 60
End: 2020-02-21
Payer: COMMERCIAL

## 2020-02-21 VITALS
HEART RATE: 92 BPM | HEIGHT: 74 IN | SYSTOLIC BLOOD PRESSURE: 132 MMHG | TEMPERATURE: 98.9 F | RESPIRATION RATE: 20 BRPM | BODY MASS INDEX: 23.61 KG/M2 | DIASTOLIC BLOOD PRESSURE: 80 MMHG | WEIGHT: 184 LBS

## 2020-02-21 DIAGNOSIS — B34.9 VIRAL ILLNESS: ICD-10-CM

## 2020-02-21 DIAGNOSIS — H10.33 ACUTE CONJUNCTIVITIS OF BOTH EYES, UNSPECIFIED ACUTE CONJUNCTIVITIS TYPE: Primary | ICD-10-CM

## 2020-02-21 PROCEDURE — 3008F BODY MASS INDEX DOCD: CPT | Performed by: PHYSICIAN ASSISTANT

## 2020-02-21 PROCEDURE — 99214 OFFICE O/P EST MOD 30 MIN: CPT | Performed by: PHYSICIAN ASSISTANT

## 2020-02-21 PROCEDURE — 1036F TOBACCO NON-USER: CPT | Performed by: PHYSICIAN ASSISTANT

## 2020-02-21 RX ORDER — SULFACETAMIDE SODIUM 100 MG/ML
2 SOLUTION/ DROPS OPHTHALMIC 3 TIMES DAILY
Qty: 10 ML | Refills: 0 | Status: SHIPPED | OUTPATIENT
Start: 2020-02-21 | End: 2020-02-28

## 2020-02-21 NOTE — PROGRESS NOTES
Assessment and Plan:    Problem List Items Addressed This Visit        Other    Acute conjunctivitis of both eyes - Primary     Sulfacetamide eyedrops as directed  Patient here in the morning but will not be able to get to the pharmacy until later today so I did administer 2 drops of sulfacetamide ophthalmic solution each eye for the patient before he left  Good hygiene and washing hands whenever going your eyes recommended  Relevant Medications    sulfacetamide (BLEPH-10) 10 % ophthalmic solution    Viral illness     Patient exhibiting cold symptoms and GI symptoms  At this point time he does not need an antibiotic and this might even make his symptoms worse  Brat diet, increasing fluids, time and continue Coricidin HBP over-the-counter  Diagnoses and all orders for this visit:    Acute conjunctivitis of both eyes, unspecified acute conjunctivitis type  -     sulfacetamide (BLEPH-10) 10 % ophthalmic solution; Administer 2 drops to both eyes 3 (three) times a day for 7 days    Viral illness              Subjective:      Patient ID: Regino Beck is a 63 y o  male  CC:    Chief Complaint   Patient presents with    Vomiting     2 days ago    Diarrhea    Blurred Vision     right eye first now both   Cough    Fever       HPI:    Patient states that 2 days ago he started with nausea vomiting diarrhea and this subsided although he is not having a normal appetite he is no longer nauseous  He is drinking lots of fluids and avoiding caffeine he also has a lot head congestion postnasal drip cough and is taking Coricidin HBP  He yesterday started to have irritation of his eyes and this morning woke up with both them crusted shut with trouble seeing and a letter redness in his eye right is worse  He thought he got something in his eye  He does were glasses and never contacts  He states he had a fever which has resolved  No one else is sick that he is around        The following portions of the patient's history were reviewed and updated as appropriate: allergies, current medications, past family history, past medical history, past social history, past surgical history and problem list       Review of Systems   Constitutional: Positive for fever  HENT: Negative  Eyes: Positive for pain, discharge, redness and visual disturbance  Respiratory: Positive for cough  Cardiovascular: Negative  Gastrointestinal: Positive for diarrhea, nausea and vomiting  Endocrine: Negative  Genitourinary: Negative  Musculoskeletal: Negative  Skin: Negative  Allergic/Immunologic: Negative  Neurological: Negative  Hematological: Negative  Psychiatric/Behavioral: Negative  Data to review:       Objective:    Vitals:    02/21/20 0810   BP: 132/80   BP Location: Left arm   Patient Position: Sitting   Cuff Size: Adult   Pulse: 92   Resp: 20   Temp: 98 9 °F (37 2 °C)   TempSrc: Tympanic   Weight: 83 5 kg (184 lb)   Height: 6' 2" (1 88 m)        Physical Exam   Constitutional: He is oriented to person, place, and time  He appears well-developed and well-nourished  No distress  HENT:   Head: Normocephalic and atraumatic  Right Ear: Hearing, tympanic membrane, external ear and ear canal normal    Left Ear: Hearing, tympanic membrane, external ear and ear canal normal    Mouth/Throat: Uvula is midline  Posterior oropharyngeal edema and posterior oropharyngeal erythema present  Left TM dull hazy with decrease in light reflex without erythema  Eyes: Pupils are equal, round, and reactive to light  EOM are normal  Right eye exhibits discharge  Left eye exhibits discharge  Right conjunctiva is injected  Left conjunctiva is injected  Bilateral upper lids with mild edema  Mild erythema to the outer right lower lid upper cheek  Neck: Carotid bruit is not present  Cardiovascular: Normal rate, regular rhythm and normal heart sounds   Exam reveals no gallop and no friction rub    No murmur heard  Pulmonary/Chest: Effort normal and breath sounds normal  No respiratory distress  He has no wheezes  He has no rales  Lymphadenopathy:     He has cervical adenopathy  Right cervical: No superficial cervical adenopathy present  Left cervical: Superficial cervical adenopathy present  Neurological: He is alert and oriented to person, place, and time  Skin: Skin is warm and dry  He is not diaphoretic  Psychiatric: He has a normal mood and affect  Judgment normal    Nursing note and vitals reviewed

## 2020-02-21 NOTE — PATIENT INSTRUCTIONS
Problem List Items Addressed This Visit        Other    Acute conjunctivitis of both eyes - Primary     Sulfacetamide eyedrops as directed  Patient here in the morning but will not be able to get to the pharmacy until later today so I did administer 2 drops of sulfacetamide ophthalmic solution each eye for the patient before he left  Good hygiene and washing hands whenever going your eyes recommended  Relevant Medications    sulfacetamide (BLEPH-10) 10 % ophthalmic solution    Viral illness     Patient exhibiting cold symptoms and GI symptoms  At this point time he does not need an antibiotic and this might even make his symptoms worse  Brat diet, increasing fluids, time and continue Coricidin HBP over-the-counter

## 2020-02-21 NOTE — ASSESSMENT & PLAN NOTE
Sulfacetamide eyedrops as directed  Patient here in the morning but will not be able to get to the pharmacy until later today so I did administer 2 drops of sulfacetamide ophthalmic solution each eye for the patient before he left  Good hygiene and washing hands whenever going your eyes recommended

## 2020-02-21 NOTE — ASSESSMENT & PLAN NOTE
Patient exhibiting cold symptoms and GI symptoms  At this point time he does not need an antibiotic and this might even make his symptoms worse  Brat diet, increasing fluids, time and continue Coricidin HBP over-the-counter

## 2020-03-31 ENCOUNTER — OFFICE VISIT (OUTPATIENT)
Dept: GASTROENTEROLOGY | Facility: CLINIC | Age: 60
End: 2020-03-31
Payer: COMMERCIAL

## 2020-03-31 VITALS
DIASTOLIC BLOOD PRESSURE: 74 MMHG | HEIGHT: 74 IN | SYSTOLIC BLOOD PRESSURE: 116 MMHG | WEIGHT: 180 LBS | HEART RATE: 78 BPM | TEMPERATURE: 97.7 F | BODY MASS INDEX: 23.1 KG/M2

## 2020-03-31 DIAGNOSIS — K63.5 POLYP OF COLON, UNSPECIFIED PART OF COLON, UNSPECIFIED TYPE: ICD-10-CM

## 2020-03-31 DIAGNOSIS — R19.5 HEMATEST POSITIVE STOOLS: ICD-10-CM

## 2020-03-31 DIAGNOSIS — R63.4 WEIGHT LOSS: Primary | ICD-10-CM

## 2020-03-31 PROCEDURE — 99244 OFF/OP CNSLTJ NEW/EST MOD 40: CPT | Performed by: INTERNAL MEDICINE

## 2020-04-07 ENCOUNTER — ANESTHESIA EVENT (OUTPATIENT)
Dept: GASTROENTEROLOGY | Facility: HOSPITAL | Age: 60
End: 2020-04-07

## 2020-04-08 ENCOUNTER — ANESTHESIA (OUTPATIENT)
Dept: GASTROENTEROLOGY | Facility: HOSPITAL | Age: 60
End: 2020-04-08

## 2020-04-08 ENCOUNTER — HOSPITAL ENCOUNTER (OUTPATIENT)
Dept: GASTROENTEROLOGY | Facility: HOSPITAL | Age: 60
Setting detail: OUTPATIENT SURGERY
Discharge: HOME | End: 2020-04-08
Attending: INTERNAL MEDICINE | Admitting: INTERNAL MEDICINE
Payer: COMMERCIAL

## 2020-04-08 VITALS
HEIGHT: 74 IN | OXYGEN SATURATION: 95 % | DIASTOLIC BLOOD PRESSURE: 54 MMHG | RESPIRATION RATE: 16 BRPM | HEART RATE: 75 BPM | TEMPERATURE: 98 F | SYSTOLIC BLOOD PRESSURE: 99 MMHG | BODY MASS INDEX: 23.1 KG/M2 | WEIGHT: 180 LBS

## 2020-04-08 DIAGNOSIS — R19.5 HEMATEST POSITIVE STOOLS: ICD-10-CM

## 2020-04-08 DIAGNOSIS — R63.4 WEIGHT LOSS: ICD-10-CM

## 2020-04-08 PROCEDURE — 43239 EGD BIOPSY SINGLE/MULTIPLE: CPT | Performed by: INTERNAL MEDICINE

## 2020-04-08 PROCEDURE — 88305 TISSUE EXAM BY PATHOLOGIST: CPT | Performed by: PATHOLOGY

## 2020-04-08 RX ORDER — SODIUM CHLORIDE, SODIUM LACTATE, POTASSIUM CHLORIDE, CALCIUM CHLORIDE 600; 310; 30; 20 MG/100ML; MG/100ML; MG/100ML; MG/100ML
125 INJECTION, SOLUTION INTRAVENOUS CONTINUOUS
Status: DISCONTINUED | OUTPATIENT
Start: 2020-04-08 | End: 2020-04-12 | Stop reason: HOSPADM

## 2020-04-08 RX ORDER — PROPOFOL 10 MG/ML
INJECTION, EMULSION INTRAVENOUS AS NEEDED
Status: DISCONTINUED | OUTPATIENT
Start: 2020-04-08 | End: 2020-04-08 | Stop reason: SURG

## 2020-04-08 RX ORDER — LIDOCAINE HYDROCHLORIDE 10 MG/ML
INJECTION, SOLUTION EPIDURAL; INFILTRATION; INTRACAUDAL; PERINEURAL AS NEEDED
Status: DISCONTINUED | OUTPATIENT
Start: 2020-04-08 | End: 2020-04-08 | Stop reason: SURG

## 2020-04-08 RX ORDER — PROPOFOL 10 MG/ML
INJECTION, EMULSION INTRAVENOUS CONTINUOUS PRN
Status: DISCONTINUED | OUTPATIENT
Start: 2020-04-08 | End: 2020-04-08 | Stop reason: SURG

## 2020-04-08 RX ORDER — SODIUM CHLORIDE 9 MG/ML
INJECTION, SOLUTION INTRAVENOUS CONTINUOUS PRN
Status: DISCONTINUED | OUTPATIENT
Start: 2020-04-08 | End: 2020-04-08 | Stop reason: SURG

## 2020-04-08 RX ADMIN — LIDOCAINE HYDROCHLORIDE 50 MG: 10 INJECTION, SOLUTION EPIDURAL; INFILTRATION; INTRACAUDAL; PERINEURAL at 08:50

## 2020-04-08 RX ADMIN — PROPOFOL 100 MG: 10 INJECTION, EMULSION INTRAVENOUS at 08:55

## 2020-04-08 RX ADMIN — PROPOFOL 100 MCG/KG/MIN: 10 INJECTION, EMULSION INTRAVENOUS at 08:58

## 2020-04-08 RX ADMIN — SODIUM CHLORIDE: 9 INJECTION, SOLUTION INTRAVENOUS at 08:50

## 2020-04-08 RX ADMIN — PROPOFOL 100 MG: 10 INJECTION, EMULSION INTRAVENOUS at 08:58

## 2020-04-14 ENCOUNTER — TELEPHONE (OUTPATIENT)
Dept: GASTROENTEROLOGY | Facility: CLINIC | Age: 60
End: 2020-04-14

## 2020-08-14 ENCOUNTER — OFFICE VISIT (OUTPATIENT)
Dept: FAMILY MEDICINE CLINIC | Facility: CLINIC | Age: 60
End: 2020-08-14
Payer: COMMERCIAL

## 2020-08-14 VITALS
WEIGHT: 187 LBS | RESPIRATION RATE: 18 BRPM | HEART RATE: 80 BPM | TEMPERATURE: 98 F | HEIGHT: 74 IN | BODY MASS INDEX: 24 KG/M2 | SYSTOLIC BLOOD PRESSURE: 124 MMHG | DIASTOLIC BLOOD PRESSURE: 68 MMHG

## 2020-08-14 DIAGNOSIS — K40.91 UNILATERAL RECURRENT INGUINAL HERNIA WITHOUT OBSTRUCTION OR GANGRENE: ICD-10-CM

## 2020-08-14 DIAGNOSIS — M51.37 DDD (DEGENERATIVE DISC DISEASE), LUMBOSACRAL: Primary | ICD-10-CM

## 2020-08-14 PROBLEM — K40.90 INGUINAL HERNIA: Status: ACTIVE | Noted: 2020-08-14

## 2020-08-14 PROCEDURE — 1036F TOBACCO NON-USER: CPT | Performed by: FAMILY MEDICINE

## 2020-08-14 PROCEDURE — 99213 OFFICE O/P EST LOW 20 MIN: CPT | Performed by: FAMILY MEDICINE

## 2020-08-14 PROCEDURE — 3008F BODY MASS INDEX DOCD: CPT | Performed by: FAMILY MEDICINE

## 2020-08-14 NOTE — ASSESSMENT & PLAN NOTE
Patient has had an inguinal hernia before  This area was replaced before with mesh  He saw surgery approximately 2009 for that  At this time, he is continuing to have increasing pain  Would recommend that he have another evaluation by general surgery for consideration of repair/revision

## 2020-08-14 NOTE — PROGRESS NOTES
Assessment and Plan:    Problem List Items Addressed This Visit     DDD (degenerative disc disease), lumbosacral - Primary     Patient does have history of degenerative disc disease with lumbar 0 sacral area being significantly affected  Given that he has current injury and irritation, it is also reasonable for him to consider injection with pain management  In the meantime, he should probably rest for approximately 72-96 hours  After that, slowly return to work  Again, would recommend follow-up with pain management  They may need to have updated MRI prior to injection  Relevant Orders    Ambulatory referral to Pain Management    Inguinal hernia     Patient has had an inguinal hernia before  This area was replaced before with mesh  He saw surgery approximately 2009 for that  At this time, he is continuing to have increasing pain  Would recommend that he have another evaluation by general surgery for consideration of repair/revision  Relevant Orders    Ambulatory referral to General Surgery                 Diagnoses and all orders for this visit:    DDD (degenerative disc disease), lumbosacral  -     Ambulatory referral to Pain Management; Future    Unilateral recurrent inguinal hernia without obstruction or gangrene  -     Ambulatory referral to General Surgery; Future              Subjective:      Patient ID: Cadence Woodall is a 63 y o  male  CC:    Chief Complaint   Patient presents with    Back Pain     lower back; shoots down the right leg       HPI:    Patient is here for back pain  He has had this several times before  Patient has been to pain management previously, and had injections in the past   His last injection helped out quite a bit  Patient had been doing relatively well, but with his 2 jobs, it is quite physical   He works construction full-time, 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 [employer]    With this, the patient does lift frequently, and has noticed over the last several days that he has increasing pain  Patient has not been to physical therapy recently  He has not had an MRI recently  Reviewed the chart, and found that his last visit with pain management was January of 2017, Dr Sampson Minor Hill  He reports he did have Hernia before, was inguinal  Last done 10 years ago  Patient reports that the pain that he has now on that side is quite significant  It is a sharp shooting pain on the right side  Lifting can make it worse, as can certain movements  On review the chart, he did have an admission previously with Dr Madison Pantoja from [address] [place] [address]  The following portions of the patient's history were reviewed and updated as appropriate: allergies, current medications, past family history, past medical history, past social history, past surgical history and problem list       Review of Systems   Constitutional: Negative  HENT: Negative  Musculoskeletal:        Per HPI   Neurological:        Per HPI         Data to review:       Objective:    Vitals:    08/14/20 1503   BP: 124/68   Pulse: 80   Resp: 18   Temp: 98 °F (36 7 °C)   TempSrc: Temporal   Weight: 84 8 kg (187 lb)   Height: 6' 2" (1 88 m)        Physical Exam  Vitals signs and nursing note reviewed  Constitutional:       Appearance: Normal appearance  Cardiovascular:      Rate and Rhythm: Normal rate and regular rhythm  Pulses: Normal pulses  Carotid pulses are 2+ on the right side and 2+ on the left side  Heart sounds: Normal heart sounds  No murmur  No gallop  Pulmonary:      Effort: Pulmonary effort is normal  No respiratory distress  Breath sounds: No stridor  No wheezing, rhonchi or rales  Chest:      Chest wall: No tenderness  Abdominal:       Musculoskeletal:      Lumbar back: He exhibits decreased range of motion (Decreased flexion, extension, leaning to the right, side bending to the right) and tenderness  Back:    Neurological:      Mental Status: He is alert

## 2020-08-14 NOTE — PATIENT INSTRUCTIONS
Problem List Items Addressed This Visit     DDD (degenerative disc disease), lumbosacral - Primary     Patient does have history of degenerative disc disease with lumbar 0 sacral area being significantly affected  Given that he has current injury and irritation, it is also reasonable for him to consider injection with pain management  In the meantime, he should probably rest for approximately 72-96 hours  After that, slowly return to work  Again, would recommend follow-up with pain management  They may need to have updated MRI prior to injection  Relevant Orders    Ambulatory referral to Pain Management    Inguinal hernia     Patient has had an inguinal hernia before  This area was replaced before with mesh  He saw surgery approximately 2009 for that  At this time, he is continuing to have increasing pain  Would recommend that he have another evaluation by general surgery for consideration of repair/revision  Relevant Orders    Ambulatory referral to General Surgery          COVID 19 Instructions    [place] was advised to limit contact with others to essential tasks such as getting food, medications, and medical care  Proper handwashing reviewed, and Hand sanitzer when washing is not available  If the patient develops symptoms of COVID 19, the patient should call the office as soon as possible  Please try to download [employer] Duo  Once you do download this on your phone, you will be prompted to add your phone number to the account  After that, he should receive a text from [facility], and use that code to verify your phone number  After that, you should be able to use Google Duo to receive and make video calls  Please download Microsoft Teams to your phone or computer  We will be transitioning to this platform for Video Visits  Instructions for downloading this are available from the office

## 2020-08-14 NOTE — ASSESSMENT & PLAN NOTE
Patient does have history of degenerative disc disease with lumbar 0 sacral area being significantly affected  Given that he has current injury and irritation, it is also reasonable for him to consider injection with pain management  In the meantime, he should probably rest for approximately 72-96 hours  After that, slowly return to work  Again, would recommend follow-up with pain management  They may need to have updated MRI prior to injection

## 2020-09-11 ENCOUNTER — OFFICE VISIT (OUTPATIENT)
Dept: URGENT CARE | Age: 60
End: 2020-09-11
Payer: COMMERCIAL

## 2020-09-11 ENCOUNTER — APPOINTMENT (EMERGENCY)
Dept: RADIOLOGY | Facility: HOSPITAL | Age: 60
End: 2020-09-11
Payer: COMMERCIAL

## 2020-09-11 ENCOUNTER — HOSPITAL ENCOUNTER (EMERGENCY)
Facility: HOSPITAL | Age: 60
Discharge: HOME | End: 2020-09-12
Attending: EMERGENCY MEDICINE | Admitting: EMERGENCY MEDICINE
Payer: COMMERCIAL

## 2020-09-11 VITALS
HEART RATE: 78 BPM | TEMPERATURE: 98.4 F | SYSTOLIC BLOOD PRESSURE: 141 MMHG | OXYGEN SATURATION: 94 % | RESPIRATION RATE: 18 BRPM | DIASTOLIC BLOOD PRESSURE: 68 MMHG

## 2020-09-11 VITALS
HEART RATE: 64 BPM | RESPIRATION RATE: 16 BRPM | OXYGEN SATURATION: 96 % | TEMPERATURE: 98 F | DIASTOLIC BLOOD PRESSURE: 69 MMHG | SYSTOLIC BLOOD PRESSURE: 142 MMHG

## 2020-09-11 DIAGNOSIS — R07.9 LEFT-SIDED CHEST PAIN: Primary | ICD-10-CM

## 2020-09-11 DIAGNOSIS — R07.9 CHEST PAIN, UNSPECIFIED TYPE: Primary | ICD-10-CM

## 2020-09-11 LAB
ANION GAP SERPL CALCULATED.3IONS-SCNC: 4 MMOL/L (ref 4–13)
ANISOCYTOSIS BLD QL SMEAR: PRESENT
BASOPHILS # BLD MANUAL: 0 THOUSAND/UL (ref 0–0.1)
BASOPHILS NFR MAR MANUAL: 0 % (ref 0–1)
BUN SERPL-MCNC: 27 MG/DL (ref 5–25)
CALCIUM SERPL-MCNC: 9.4 MG/DL (ref 8.3–10.1)
CHLORIDE SERPL-SCNC: 107 MMOL/L (ref 100–108)
CO2 SERPL-SCNC: 28 MMOL/L (ref 21–32)
CREAT SERPL-MCNC: 1.31 MG/DL (ref 0.6–1.3)
D DIMER PPP FEU-MCNC: 0.36 UG/ML FEU
EOSINOPHIL # BLD MANUAL: 0.08 THOUSAND/UL (ref 0–0.4)
EOSINOPHIL NFR BLD MANUAL: 1 % (ref 0–6)
ERYTHROCYTE [DISTWIDTH] IN BLOOD BY AUTOMATED COUNT: 12.7 % (ref 11.6–15.1)
GFR SERPL CREATININE-BSD FRML MDRD: 60 ML/MIN/1.73SQ M
GLUCOSE SERPL-MCNC: 91 MG/DL (ref 65–140)
HCT VFR BLD AUTO: 42.4 % (ref 36.5–49.3)
HGB BLD-MCNC: 14.4 G/DL (ref 12–17)
LYMPHOCYTES # BLD AUTO: 3.4 THOUSAND/UL (ref 0.6–4.47)
LYMPHOCYTES # BLD AUTO: 41 % (ref 14–44)
MCH RBC QN AUTO: 31.6 PG (ref 26.8–34.3)
MCHC RBC AUTO-ENTMCNC: 34 G/DL (ref 31.4–37.4)
MCV RBC AUTO: 93 FL (ref 82–98)
MONOCYTES # BLD AUTO: 0.08 THOUSAND/UL (ref 0–1.22)
MONOCYTES NFR BLD: 1 % (ref 4–12)
NEUTROPHILS # BLD MANUAL: 4.57 THOUSAND/UL (ref 1.85–7.62)
NEUTS SEG NFR BLD AUTO: 55 % (ref 43–75)
NRBC BLD AUTO-RTO: 0 /100 WBCS
PLATELET # BLD AUTO: 202 THOUSANDS/UL (ref 149–390)
PLATELET BLD QL SMEAR: ADEQUATE
PMV BLD AUTO: 11.9 FL (ref 8.9–12.7)
POTASSIUM SERPL-SCNC: 4.2 MMOL/L (ref 3.5–5.3)
RBC # BLD AUTO: 4.55 MILLION/UL (ref 3.88–5.62)
SODIUM SERPL-SCNC: 139 MMOL/L (ref 136–145)
TOTAL CELLS COUNTED SPEC: 100
TROPONIN I SERPL-MCNC: <0.02 NG/ML
VARIANT LYMPHS # BLD AUTO: 2 %
WBC # BLD AUTO: 8.3 THOUSAND/UL (ref 4.31–10.16)

## 2020-09-11 PROCEDURE — 71046 X-RAY EXAM CHEST 2 VIEWS: CPT

## 2020-09-11 PROCEDURE — 99285 EMERGENCY DEPT VISIT HI MDM: CPT | Performed by: EMERGENCY MEDICINE

## 2020-09-11 PROCEDURE — 85027 COMPLETE CBC AUTOMATED: CPT | Performed by: EMERGENCY MEDICINE

## 2020-09-11 PROCEDURE — 80048 BASIC METABOLIC PNL TOTAL CA: CPT | Performed by: EMERGENCY MEDICINE

## 2020-09-11 PROCEDURE — 93005 ELECTROCARDIOGRAM TRACING: CPT | Performed by: PHYSICIAN ASSISTANT

## 2020-09-11 PROCEDURE — 85379 FIBRIN DEGRADATION QUANT: CPT | Performed by: EMERGENCY MEDICINE

## 2020-09-11 PROCEDURE — 93005 ELECTROCARDIOGRAM TRACING: CPT

## 2020-09-11 PROCEDURE — 99285 EMERGENCY DEPT VISIT HI MDM: CPT

## 2020-09-11 PROCEDURE — 36415 COLL VENOUS BLD VENIPUNCTURE: CPT | Performed by: EMERGENCY MEDICINE

## 2020-09-11 PROCEDURE — 85007 BL SMEAR W/DIFF WBC COUNT: CPT | Performed by: EMERGENCY MEDICINE

## 2020-09-11 PROCEDURE — 84484 ASSAY OF TROPONIN QUANT: CPT | Performed by: EMERGENCY MEDICINE

## 2020-09-11 PROCEDURE — G0382 LEV 3 HOSP TYPE B ED VISIT: HCPCS | Performed by: PHYSICIAN ASSISTANT

## 2020-09-11 RX ORDER — NITROGLYCERIN 0.3 MG/1
0.3 TABLET SUBLINGUAL
COMMUNITY
End: 2022-07-15 | Stop reason: ALTCHOICE

## 2020-09-12 LAB
ATRIAL RATE: 75 BPM
ATRIAL RATE: 76 BPM
P AXIS: 65 DEGREES
PR INTERVAL: 156 MS
PR INTERVAL: 170 MS
QRS AXIS: 144 DEGREES
QRS AXIS: 64 DEGREES
QRSD INTERVAL: 80 MS
QRSD INTERVAL: 82 MS
QT INTERVAL: 362 MS
QT INTERVAL: 370 MS
QTC INTERVAL: 404 MS
QTC INTERVAL: 416 MS
T WAVE AXIS: 117 DEGREES
T WAVE AXIS: 69 DEGREES
VENTRICULAR RATE: 75 BPM
VENTRICULAR RATE: 76 BPM

## 2020-09-12 PROCEDURE — 93010 ELECTROCARDIOGRAM REPORT: CPT | Performed by: INTERNAL MEDICINE

## 2020-09-12 NOTE — ED ATTENDING ATTESTATION
9/11/2020  I, Mihai Georges MD, saw and evaluated the patient  I have discussed the patient with the resident/non-physician practitioner and agree with the resident's/non-physician practitioner's findings, Plan of Care, and MDM as documented in the resident's/non-physician practitioner's note, except where noted  All available labs and Radiology studies were reviewed  I was present for key portions of any procedure(s) performed by the resident/non-physician practitioner and I was immediately available to provide assistance  At this point I agree with the current assessment done in the Emergency Department  I have conducted an independent evaluation of this patient a history and physical is as follows: Pt presents with intermittent chest pain since Tuesday after being stung by yellow jacket on his hand Pt states since then has had a few episodes each day that lasts seconds on L anterior chest nonradiating no diaphoresis  PT does states it hurts to take deep breath when has the pain  Pain occurs at rest and is nonradiating   Pain occurred 12 hours ago PE: alert heart reg lugns clear abd soft nontender chest wall nontender ext nad MDM: will do cardiac mitchell and dimer  ED Course         Critical Care Time  Procedures

## 2020-09-12 NOTE — PROGRESS NOTES
[email or web address]        NAME: Tha Paz is a 63 y o  male  : 1960    MRN: [id number]  DATE: 2020  TIME: 10:33 PM    Assessment and Plan   Chest pain, unspecified type [R07 9]  1  Chest pain, unspecified type  Transfer to other facility         Patient Instructions       Follow up with PCP in 3-5 days  Proceed to  ER if symptoms worsen  Chief Complaint     Chief Complaint   Patient presents with    Chest Pain     Patient reports he was stung by the wasp on Tuesday and since then he has had intermittent, stabbing chest pain that he reports is relieved with nitro and aspirin  He reports SOB when the chest pain is present  He also reports feeling fatigued since Tuesday  History of Present Illness       Patient for evaluation of chest pain  Patient got stung by wasp on Tuesday since and has been having some sharp intermittent pains in his chest   He states that the pain was relieved with taking aspirin and nitro  He does not have the pain right now  Review of Systems   Review of Systems   Constitutional: Negative  Respiratory: Negative  Cardiovascular: Positive for chest pain  Negative for palpitations and leg swelling  Gastrointestinal: Negative            Current Medications       Current Outpatient Medications:     nitroglycerin (NITROSTAT) 0 3 mg SL tablet, Place 0 3 mg under the tongue every 5 (five) minutes as needed for chest pain, Disp: , Rfl:     Acetaminophen (TYLENOL PO), Take by mouth, Disp: , Rfl:     aspirin (ECOTRIN LOW STRENGTH) 81 mg EC tablet, Take 81 mg by mouth daily, Disp: , Rfl:     Cholecalciferol (VITAMIN D) 50 MCG ( UT) CAPS, Take by mouth, Disp: , Rfl:     omeprazole (PriLOSEC OTC) 20 MG tablet, Take 1 tablet (20 mg total) by mouth daily (Patient not taking: Reported on 2020), Disp: 30 tablet, Rfl: 1    Current Allergies     Allergies as of 2020 - Reviewed 2020   Allergen Reaction Noted    Amoxicillin Vomiting 02/21/2020    Codeine GI Intolerance 06/08/2018            The following portions of the patient's history were reviewed and updated as appropriate: allergies, current medications, past family history, past medical history, past social history, past surgical history and problem list      Past Medical History:   Diagnosis Date    Arthritis     Blood in stool     Disease of thyroid gland     Hyperlipidemia     Kidney stone     Pulmonary embolism (HCC)        Past Surgical History:   Procedure Laterality Date    COLONOSCOPY      HERNIA REPAIR      KNEE ARTHROSCOPY      [place] KNEE SCOPE,DIAGNOSTIC Left 6/14/2019    Procedure: ARTHROSCOPY KNEE;  Surgeon: Moe Fernandez MD;  [facility];  Service: Orthopedics    WISDOM TOOTH EXTRACTION         Family History   Problem Relation Age of Onset    Stroke Mother         CVA    Breast cancer Mother     Diabetes Father         DM    Cancer Father     Melanoma Brother          Medications have been verified  Objective   /68   Pulse 78   Temp 98 4 °F (36 9 °C) (Temporal)   Resp 18   SpO2 94%        Physical Exam     Physical Exam  Vitals signs and nursing note reviewed  Constitutional:       General: He is not in acute distress  Appearance: Normal appearance  He is well-developed  He is not ill-appearing, toxic-appearing or diaphoretic  HENT:      Head: Normocephalic and atraumatic  Eyes:      Extraocular Movements: Extraocular movements intact  Conjunctiva/sclera: Conjunctivae normal       Pupils: Pupils are equal, round, and reactive to light  Cardiovascular:      Rate and Rhythm: Normal rate and regular rhythm  Heart sounds: Normal heart sounds  No murmur  Pulmonary:      Effort: Pulmonary effort is normal       Breath sounds: Normal breath sounds  Chest:      Chest wall: No tenderness  Skin:     General: Skin is warm and dry  Neurological:      General: No focal deficit present        Mental Status: He is alert and oriented to person, place, and time  Psychiatric:         Mood and Affect: Mood normal          Behavior: Behavior normal          Thought Content:  Thought content normal          Judgment: Judgment normal

## 2020-09-12 NOTE — DISCHARGE INSTRUCTIONS
Your blood work including your heart marker was normal   Your EKG and chest x-ray were also normal   Please follow-up with your primary care provider  Please return for this severe chest pain, severe shortness of breath, or any other concerning signs and symptoms  Please refer to the following documents for additional instructions and return precautions

## 2020-09-12 NOTE — ED PROVIDER NOTES
History  Chief Complaint   Patient presents with    Chest Pain     pt presents ambulatory with c/o left anterior chest pain since being stung by a bee on tuesday  states pain is relieved with nitro and aspirin  45-year-old male history of remote unprovoked PE approximately 12 years ago and hyperlipidemia intermittent aspirin use presenting with intermittent left-sided chest pain  Patient reports since Tuesday having approximately 3-4 episodes of left-sided chest pain per day  Patient says the episodes last for few seconds at a time and self-resolved  Patient reports episodes have come on at rest and with exertion and resolves with rest   Patient reports he did use nitro during 1 episode with resolution  Patient describes the pain as sharp  He reports shortness of breath during the event and inability take a full breath due to the pain  He denies any radiation of pain  No associated diaphoresis  Last episode occurred around 10:00 a m  This morning  Of note, patient only intermittently uses aspirin and stopped taking his blood thinner approximately 1 year ago  No recent surgery or prolonged activity  He denies any other complaints  Currently asymptomatic  Denies any headache, vision changes, abdominal pain, nausea vomiting, fever/chills, or any bladder or bowel changes  Prior to Admission Medications   Prescriptions Last Dose Informant Patient Reported? Taking?    Acetaminophen (TYLENOL PO)  Self Yes No   Sig: Take by mouth   Cholecalciferol (VITAMIN D) 50 MCG (2000 UT) CAPS  Self Yes No   Sig: Take by mouth   aspirin (ECOTRIN LOW STRENGTH) 81 mg EC tablet  Self Yes Yes   Sig: Take 81 mg by mouth daily   nitroglycerin (NITROSTAT) 0 3 mg SL tablet 9/11/2020 at Unknown time  Yes Yes   Sig: Place 0 3 mg under the tongue every 5 (five) minutes as needed for chest pain   omeprazole (PriLOSEC OTC) 20 MG tablet  Self No No   Sig: Take 1 tablet (20 mg total) by mouth daily   Patient not taking: Reported on 8/14/2020      Facility-Administered Medications: None       Past Medical History:   Diagnosis Date    Arthritis     Blood in stool     Disease of thyroid gland     Hyperlipidemia     Kidney stone     Pulmonary embolism (HCC)        Past Surgical History:   Procedure Laterality Date    COLONOSCOPY      HERNIA REPAIR      KNEE ARTHROSCOPY      [place] KNEE SCOPE,DIAGNOSTIC Left 6/14/2019    Procedure: ARTHROSCOPY KNEE;  Surgeon: Azam Beckford MD;  [facility];  Service: Orthopedics    WISDOM TOOTH EXTRACTION         Family History   Problem Relation Age of Onset    Stroke Mother         CVA    Breast cancer Mother     Diabetes Father         DM    Cancer Father     Melanoma Brother      I have reviewed and agree with the history as documented  E-Cigarette/Vaping     E-Cigarette/Vaping Substances     Social History     Tobacco Use    Smoking status: Never Smoker    Smokeless tobacco: Never Used   Substance Use Topics    Alcohol use: No    Drug use: No        Review of Systems   Constitutional: Negative for appetite change, chills, diaphoresis, fever and unexpected weight change  HENT: Negative for congestion and rhinorrhea  Eyes: Negative for photophobia and visual disturbance  Respiratory: Negative for cough, chest tightness and shortness of breath  Cardiovascular: Positive for chest pain  Negative for palpitations and leg swelling  Gastrointestinal: Negative for abdominal distention, abdominal pain, blood in stool, constipation, diarrhea, nausea and vomiting  Genitourinary: Negative for dysuria and hematuria  Musculoskeletal: Negative for back pain, joint swelling, neck pain and neck stiffness  Skin: Negative for color change, pallor, rash and wound  Neurological: Negative for dizziness, syncope, weakness, light-headedness and headaches  Psychiatric/Behavioral: Negative for agitation  All other systems reviewed and are negative        Physical Exam  ED Triage Vitals   Temperature Pulse Respirations Blood Pressure SpO2   09/11/20 2139 09/11/20 2139 09/11/20 2139 09/11/20 2139 09/11/20 2139   98 °F (36 7 °C) 76 20 136/79 99 %      Temp src Heart Rate Source Patient Position - Orthostatic VS BP Location FiO2 (%)   -- 09/11/20 2256 -- -- --    Monitor         Pain Score       09/11/20 2139       No Pain             Orthostatic Vital Signs  Vitals:    09/11/20 2139 09/11/20 2256   BP: 136/79 142/69   Pulse: 76 64       Physical Exam  Vitals signs and nursing note reviewed  Constitutional:       Appearance: He is well-developed  He is not diaphoretic  HENT:      Head: Normocephalic and atraumatic  Nose: Nose normal    Eyes:      General:         Right eye: No discharge  Left eye: No discharge  Pupils: Pupils are equal, round, and reactive to light  Neck:      Musculoskeletal: Normal range of motion and neck supple  Vascular: No JVD  Trachea: No tracheal deviation  Cardiovascular:      Rate and Rhythm: Normal rate and regular rhythm  Heart sounds: Normal heart sounds  No murmur  No friction rub  No gallop  Pulmonary:      Effort: Pulmonary effort is normal  No respiratory distress  Breath sounds: Normal breath sounds  No stridor  No wheezing or rales  Chest:      Chest wall: No tenderness  Abdominal:      General: Bowel sounds are normal  There is no distension  Palpations: Abdomen is soft  Tenderness: There is no abdominal tenderness  There is no guarding or rebound  Musculoskeletal: Normal range of motion  General: No tenderness or deformity  Right lower leg: He exhibits no tenderness  No edema  Left lower leg: He exhibits no tenderness  No edema  Comments: No unilateral leg swelling  No lower extremity tenderness  No calf swelling or tenderness  Lymphadenopathy:      Cervical: No cervical adenopathy  Skin:     General: Skin is warm and dry        Coloration: Skin is not pale  Findings: No erythema or rash  Neurological:      General: No focal deficit present  Mental Status: He is alert and oriented to person, place, and time  Mental status is at baseline  Cranial Nerves: No cranial nerve deficit  Sensory: No sensory deficit  Motor: No weakness or abnormal muscle tone  Coordination: Coordination normal    Psychiatric:         Behavior: Behavior normal          Thought Content: Thought content normal          ED Medications  Medications - No data to display    Diagnostic Studies  Results Reviewed     Procedure Component Value Units Date/Time    CBC and differential [[id number]] Collected:  09/11/20 2158    Lab Status:  Final result Specimen:  Blood from Arm, Right Updated:  09/11/20 2244     WBC 8 30 Thousand/uL      RBC 4 55 Million/uL      Hemoglobin 14 4 g/dL      Hematocrit 42 4 %      MCV 93 fL      MCH 31 6 pg      MCHC 34 0 g/dL      RDW 12 7 %      MPV 11 9 fL      Platelets 080 Thousands/uL      nRBC 0 /100 WBCs     Narrative: This is an appended report  These results have been appended to a previously verified report      D-Dimer [[id number]]  (Normal) Collected:  09/11/20 2158    Lab Status:  Final result Specimen:  Blood from Arm, Right Updated:  09/11/20 2236     D-Dimer, Quant 0 36 ug/ml FEU     Troponin I [[id number]]  (Normal) Collected:  09/11/20 2158    Lab Status:  Final result Specimen:  Blood from Arm, Right Updated:  09/11/20 2232     Troponin I <0 02 ng/mL     Basic metabolic panel [[id number]]  (Abnormal) Collected:  09/11/20 2158    Lab Status:  Final result Specimen:  Blood from Arm, Right Updated:  09/11/20 2228     Sodium 139 mmol/L      Potassium 4 2 mmol/L      Chloride 107 mmol/L      CO2 28 mmol/L      ANION GAP 4 mmol/L      BUN 27 mg/dL      Creatinine 1 31 mg/dL      Glucose 91 mg/dL      Calcium 9 4 mg/dL      eGFR 60 ml/min/1 73sq m     Narrative:       [place] guidelines for Chronic Kidney Disease (CKD):     Stage 1 with normal or high GFR (GFR > 90 mL/min/1 73 square meters)    Stage 2 Mild CKD (GFR = 60-89 mL/min/1 73 square meters)    Stage 3A Moderate CKD (GFR = 45-59 mL/min/1 73 square meters)    Stage 3B Moderate CKD (GFR = 30-44 mL/min/1 73 square meters)    Stage 4 Severe CKD (GFR = 15-29 mL/min/1 73 square meters)    Stage 5 End Stage CKD (GFR <15 mL/min/1 73 square meters)  Note: GFR calculation is accurate only with a steady state creatinine                 XR chest 2 views    (Results Pending)         Procedures  ECG 12 Lead Documentation Only    Date/Time: 9/11/2020 9:39 PM  Performed by: Pennie Bullock MD  Authorized by: Pennie Bullock MD     Indications / Diagnosis:  CP  ECG reviewed by me, the ED Provider: yes    Patient location:  ED  Previous ECG:     Previous ECG:  Compared to current    Similarity:  No change    Comparison to cardiac monitor: Yes    Interpretation:     Interpretation: normal    Rate:     ECG rate:  75    ECG rate assessment: normal    Rhythm:     Rhythm: sinus rhythm    Ectopy:     Ectopy: none    QRS:     QRS axis:  Normal    QRS intervals:  Normal  Conduction:     Conduction: normal    ST segments:     ST segments:  Normal  T waves:     T waves: normal            ED Course           HEART Risk Score      Most Recent Value   Heart Score Risk Calculator   History  1 Filed at: 09/11/2020 2215   ECG  0 Filed at: 09/11/2020 2215   Age  1 Filed at: 09/11/2020 2215   Risk Factors  1 Filed at: 09/11/2020 2215   Troponin  0 Filed at: 09/11/2020 2215   HEART Score  3 Filed at: 09/11/2020 2215              PERC Rule for PE      Most Recent Value   PERC Rule for PE   Age >=50  1 Filed at: 09/11/2020 2204   HR >=100  0 Filed at: 09/11/2020 2204   O2 Sat on room air < 95%  0 Filed at: 09/11/2020 2204   History of PE or DVT  1 Filed at: 09/11/2020 2204   Recent trauma or surgery  0 Filed at: 09/11/2020 2204   Hemoptysis  0 Filed at: 09/11/2020 2204   Exogenous estrogen  0 Filed at: 09/11/2020 2204   Unilateral leg swelling  0 Filed at: 09/11/2020 2204   PERC Rule for PE Results  2 Filed at: 09/11/2020 2204              SBIRT 22yo+      Most Recent Value   SBIRT (23 yo +)   In order to provide better care to our patients, we are screening all of our patients for alcohol and drug use  Would it be okay to ask you these screening questions? Yes Filed at: 09/11/2020 2354   Initial Alcohol Screen: US AUDIT-C    1  How often do you have a drink containing alcohol?  0 Filed at: 09/11/2020 2354   2  How many drinks containing alcohol do you have on a typical day you are drinking? 0 Filed at: 09/11/2020 2354   3a  Male UNDER 65: How often do you have five or more drinks on one occasion? 0 Filed at: 09/11/2020 2354   3b  FEMALE Any Age, or MALE 65+: How often do you have 4 or more drinks on one occassion? 0 Filed at: 09/11/2020 2354   Audit-C Score  0 Filed at: 09/11/2020 2354   [place]: How many times in the past year have you    Used an illegal drug or used a prescription medication for non-medical reasons?   Never Filed at: 09/11/2020 2354          Wells' Criteria for PE      Most Recent Value   Wells' Criteria for PE   Clinical signs and symptoms of DVT  0 Filed at: 09/11/2020 2204   PE is primary diagnosis or equally likely  0 Filed at: 09/11/2020 2204   HR >100  0 Filed at: 09/11/2020 2204   Immobilization at least 3 days or Surgery in the previous 4 weeks  0 Filed at: 09/11/2020 2204   Previous, objectively diagnosed PE or DVT  1 5 Filed at: 09/11/2020 2204   Hemoptysis  0 Filed at: 09/11/2020 2204   Malignancy with treatment within 6 months or palliative  0 Filed at: 09/11/2020 2204   Ct Anthony' Criteria Total  1 5 Filed at: 09/11/2020 2204              MDM  Number of Diagnoses or Management Options  Left-sided chest pain:   Diagnosis management comments: 52-year-old male history of remote unprovoked PE approximately 12 years ago and hyperlipidemia intermittent aspirin use presenting with intermittent left-sided chest pain  Given patient history, suspicion for PE or cardiac related chest pain  However given short duration of episodes brought on with rest, unlikely  Cardiac workup x1 given last episode of chest pain was 12 hours ago  Reassess  EKG no acute process  Troponin negative  Remainder of labs within normal limits  D-dimer negative  Chest x-ray no acute process  Patient remained asymptomatic  Advised follow-up primary care provider  Given instructions and return precautions  All questions answered  Patient acknowledged understanding of all written and verbal instructions and return precautions  Discharge  Amount and/or Complexity of Data Reviewed  Clinical lab tests: ordered and reviewed  Tests in the radiology section of CPT®: ordered and reviewed  Tests in the medicine section of CPT®: ordered and reviewed  Review and summarize past medical records: yes  Independent visualization of images, tracings, or specimens: yes    Risk of Complications, Morbidity, and/or Mortality  Presenting problems: moderate  Diagnostic procedures: low  Management options: low    Patient Progress  Patient progress: stable        Disposition  Final diagnoses:   Left-sided chest pain     Time reflects when diagnosis was documented in both MDM as applicable and the Disposition within this note     Time User Action Codes Description Comment    9/11/2020 10:07 PM Nataliia Bishop Add [R07 9] Left-sided chest pain       ED Disposition     ED Disposition Condition Date/Time Comment    Discharge Stable Fri Sep 11, 2020 11:56 PM Srinivas Martin discharge to home/self care              Follow-up Information     Follow up With Specialties Details Why Contact Info Additional Information    Unknown MD [facility] Schedule an appointment as soon as possible for a visit in 1 week  [address] [place] [address] Emergency Department Emergency Medicine Go to  If symptoms worsen [address]  [phone]  ED, [address], [place], [place], [address] [phone]          Discharge Medication List as of 9/11/2020 11:56 PM      CONTINUE these medications which have NOT CHANGED    Details   aspirin (ECOTRIN LOW STRENGTH) 81 mg EC tablet Take 81 mg by mouth daily, Historical Med      nitroglycerin (NITROSTAT) 0 3 mg SL tablet Place 0 3 mg under the tongue every 5 (five) minutes as needed for chest pain, Historical Med      Acetaminophen (TYLENOL PO) Take by mouth, Historical Med      Cholecalciferol (VITAMIN D) 50 MCG (2000 UT) CAPS Take by mouth, Historical Med      omeprazole (PriLOSEC OTC) 20 MG tablet Take 1 tablet (20 mg total) by mouth daily, Starting Tue 2/18/2020, Print           No discharge procedures on file  PDMP Review     None           ED Provider  Attending physically available and evaluated Jacinta Ivonne PATEL managed the patient along with the ED Attending      Electronically Signed by         Cory Roa MD  09/12/20 0261

## 2020-09-29 ENCOUNTER — CONSULT (OUTPATIENT)
Dept: SURGERY | Facility: CLINIC | Age: 60
End: 2020-09-29
Payer: COMMERCIAL

## 2020-09-29 VITALS
DIASTOLIC BLOOD PRESSURE: 78 MMHG | TEMPERATURE: 96.8 F | SYSTOLIC BLOOD PRESSURE: 122 MMHG | RESPIRATION RATE: 16 BRPM | WEIGHT: 186.6 LBS | HEART RATE: 76 BPM | HEIGHT: 74 IN | BODY MASS INDEX: 23.95 KG/M2

## 2020-09-29 DIAGNOSIS — Z98.890 STATUS POST RIGHT INGUINAL HERNIA REPAIR: ICD-10-CM

## 2020-09-29 DIAGNOSIS — R10.31 CHRONIC PAIN OF RIGHT GROIN: Primary | ICD-10-CM

## 2020-09-29 DIAGNOSIS — Z87.19 STATUS POST RIGHT INGUINAL HERNIA REPAIR: ICD-10-CM

## 2020-09-29 DIAGNOSIS — G89.29 CHRONIC PAIN OF RIGHT GROIN: Primary | ICD-10-CM

## 2020-09-29 PROCEDURE — 99243 OFF/OP CNSLTJ NEW/EST LOW 30: CPT | Performed by: SURGERY

## 2020-09-29 NOTE — ASSESSMENT & PLAN NOTE
At this point he has chronic pain in his right groin from his previous inguinal hernia repair  I do not feel a recurrent hernia  I explained to him that chronic pain in the groin can be difficult to manage  The 1st step explained if he has a reproducible point tenderness that I would start with injections of local anesthetic followed by possibly a steroid  This can sometimes resolved the pain completely  Otherwise he gets some relief but it returns then I often refer him to pain management  Explained to him the very last step and 1 I do not less a recommend would be re-exploration removal the mesh and a triple neurectomy  At this point his pain is not that severe  In addition he artery has an appointment with [facility] tomorrow  I believe this is a good step and hopefully they will be able to help    He can follow up as needed or if the pain is active he can return for evaluation

## 2020-09-29 NOTE — PROGRESS NOTES
Assessment/Plan:    Chronic pain of right groin   At this point he has chronic pain in his right groin from his previous inguinal hernia repair  I do not feel a recurrent hernia  I explained to him that chronic pain in the groin can be difficult to manage  The 1st step explained if he has a reproducible point tenderness that I would start with injections of local anesthetic followed by possibly a steroid  This can sometimes resolved the pain completely  Otherwise he gets some relief but it returns then I often refer him to pain management  Explained to him the very last step and 1 I do not less a recommend would be re-exploration removal the mesh and a triple neurectomy  At this point his pain is not that severe  In addition he artery has an appointment with [facility] tomorrow  I believe this is a good step and hopefully they will be able to help  He can follow up as needed or if the pain is active he can return for evaluation       Diagnoses and all orders for this visit:    Chronic pain of right groin    Status post right inguinal hernia repair  -     Ambulatory referral to General Surgery          Subjective:      Patient ID: Concha Ledesma is a 63 y o  male  Mr Frances Padgett   Is a 61-year-old gentleman that is here for evaluation of right groin pain  He has a history of a right inguinal hernia repair in 2009 at [facility]    He states that his recovery was difficult and had a lot of pain after surgery  He states about a year after surgery he began having right-sided pain  He describes it as a sharp stabbing pain near the incision  It is almost always the same spot in same amount of severity  It can happen several times a week or can stop for few weeks  His more associated with movement than not    He denies any  Radiation of the pain down his leg or from his lower back      The following portions of the patient's history were reviewed and updated as appropriate: allergies, current medications, past family history, past medical history, past social history, past surgical history and problem list     Review of Systems   Constitutional: Negative for appetite change, chills and fever  HENT: Negative for congestion and ear pain  Eyes: Negative for discharge and itching  Respiratory: Negative for chest tightness and shortness of breath  Cardiovascular: Negative for chest pain and palpitations  Gastrointestinal: Positive for abdominal pain  Negative for abdominal distention  Genitourinary: Negative for difficulty urinating and frequency  Musculoskeletal: Negative for arthralgias and gait problem  Skin: Negative for color change and rash  Neurological: Negative for dizziness and numbness  Psychiatric/Behavioral: Negative for agitation and confusion  Objective:      /78   Pulse 76   Temp (!) 96 8 °F (36 °C)   Resp 16   Ht 6' 2" (1 88 m)   Wt 84 6 kg (186 lb 9 6 oz)   BMI 23 96 kg/m²          Physical Exam  Vitals signs and nursing note reviewed  Constitutional:       General: He is not in acute distress  Appearance: He is well-developed  He is not diaphoretic  HENT:      Head: Normocephalic and atraumatic  Eyes:      Pupils: Pupils are equal, round, and reactive to light  Neck:      Musculoskeletal: Normal range of motion and neck supple  Cardiovascular:      Rate and Rhythm: Normal rate and regular rhythm  Heart sounds: Normal heart sounds  No murmur  Pulmonary:      Effort: Pulmonary effort is normal  No respiratory distress  Breath sounds: Normal breath sounds  No wheezing  Abdominal:      General: Bowel sounds are normal  There is no distension  Palpations: Abdomen is soft  Tenderness: There is no abdominal tenderness  Comments:  Right groin incision is well-healed  There is no evidence of recurrent inguinal hernia    There is some reproducible point tenderness near the pubic tubercle   Musculoskeletal: Normal range of motion  Skin:     General: Skin is warm and dry  Neurological:      Mental Status: He is alert and oriented to person, place, and time     Psychiatric:         Behavior: Behavior normal

## 2020-09-30 ENCOUNTER — TRANSCRIBE ORDERS (OUTPATIENT)
Dept: PAIN MEDICINE | Facility: CLINIC | Age: 60
End: 2020-09-30

## 2020-09-30 ENCOUNTER — CONSULT (OUTPATIENT)
Dept: PAIN MEDICINE | Facility: CLINIC | Age: 60
End: 2020-09-30
Payer: COMMERCIAL

## 2020-09-30 VITALS
TEMPERATURE: 98.1 F | HEART RATE: 74 BPM | SYSTOLIC BLOOD PRESSURE: 116 MMHG | DIASTOLIC BLOOD PRESSURE: 65 MMHG | WEIGHT: 189 LBS | BODY MASS INDEX: 24.27 KG/M2

## 2020-09-30 DIAGNOSIS — M51.16 INTERVERTEBRAL DISC DISORDER WITH RADICULOPATHY OF LUMBAR REGION: Primary | ICD-10-CM

## 2020-09-30 DIAGNOSIS — M51.37 DDD (DEGENERATIVE DISC DISEASE), LUMBOSACRAL: ICD-10-CM

## 2020-09-30 PROCEDURE — 99244 OFF/OP CNSLTJ NEW/EST MOD 40: CPT | Performed by: ANESTHESIOLOGY

## 2020-09-30 RX ORDER — GABAPENTIN 300 MG/1
300 CAPSULE ORAL
Qty: 30 CAPSULE | Refills: 1 | Status: SHIPPED | OUTPATIENT
Start: 2020-09-30 | End: 2022-07-15 | Stop reason: ALTCHOICE

## 2020-09-30 NOTE — PROGRESS NOTES
Assessment  1  Intervertebral disc disorder with radiculopathy of lumbar region    2  DDD (degenerative disc disease), lumbosacral        Plan  The patient's symptoms, history/physical are consistent with pain that is multifactorial in origin but predominantly the result his L4-5 disc bulging  He is having new onset weakness in his right leg with recent episodes of following so at this time I will order an updated MRI of the lumbar spine to evaluate  I advised him I will call with the results and discuss treatment moving forward  For now, I will start him on gabapentin 300 mg at bedtime which provided relief for him previously  He was apprised of the most common side effects including sleepiness and dizziness  He was instructed that he may take Tylenol 1000 mg up to 3 times a day as needed for pain relief as well  I will hold off on starting any NSAIDs because of his chronic kidney disease  My impressions and treatment recommendations were discussed in detail with the patient who verbalized understanding and had no further questions  Discharge instructions were provided  I personally saw and examined the patient and I agree with the above discussed plan of care  Orders Placed This Encounter   Procedures    MRI lumbar spine without contrast     Standing Status:   Future     Standing Expiration Date:   9/30/2024     Scheduling Instructions: There is no preparation for this test  Please leave your jewelry and valuables at home, wedding rings are the exception  Magnetic nail polish must be removed prior to arrival for your test  Please bring your insurance cards, a form of photo ID and a list of your medications with you  Arrive 15 minutes prior to your appointment time in order to register  Please bring any prior CT or MRI studies of this area that were not performed at a [facility] facility  To schedule this appointment, please contact Central Scheduling at [phone]  Prior to your appointment, please make sure you complete the MRI Screening Form when you e-Check in for your appointment  This will be available starting 7 days before your appointment in [address]  You may receive an e-mail with an activation code if you do not have a [employer] account  If you do not have access to a device, we will complete your screening at your appointment  Order Specific Question:   What is the patient's sedation requirement? Answer:   No Sedation     New Medications Ordered This Visit   Medications    gabapentin (NEURONTIN) 300 mg capsule     Sig: Take 1 capsule (300 mg total) by mouth daily at bedtime     Dispense:  30 capsule     Refill:  1       History of Present Illness    Candelario Fernandez is a 63 y o  male who presents for consultation in regards to chronic lower back pain  Symptoms have been present for over 20 years  Pain is moderate rated 5-10/10 on numeric rating scale felt constantly  Symptoms described to be dull, aching, sharp and shooting with pain located lower back and radiates into the legs some weakness occasionally  He reports that he has fallen a couple times in his garage due to his right leg giving out  Symptoms are aggravated with physical activity including bending, walking, exercise, coughing, sneezing and decreased with kneeling  There is no change with bowel movements  Treatment history has included prior epidural steroid injections 3 years ago which provided moderate relief  Physical therapy and heat/ice have provided moderate relief  He is currently using Tylenol as needed which provides minimal relief  He has been referred by his family physician Dr Lázaro Henriquez for further treatment  I have personally reviewed and/or updated the patient's past medical history, past surgical history, family history, social history, current medications, allergies, and vital signs today  Review of Systems   Constitutional: Positive for unexpected weight change  Negative for fever  HENT: Positive for hearing loss  Negative for trouble swallowing  Eyes: Negative for visual disturbance  Respiratory: Negative for shortness of breath and wheezing  Cardiovascular: Positive for chest pain  Negative for palpitations  Gastrointestinal: Negative for constipation, diarrhea, nausea and vomiting  Endocrine: Negative for cold intolerance, heat intolerance and polydipsia  Genitourinary: Negative for difficulty urinating and frequency  Musculoskeletal: Positive for joint swelling  Negative for arthralgias, gait problem and myalgias  Skin: Negative for rash  Neurological: Negative for dizziness, seizures, syncope, weakness and headaches  Hematological: Does not bruise/bleed easily  Psychiatric/Behavioral: Negative for dysphoric mood  All other systems reviewed and are negative        Patient Active Problem List   Diagnosis    Chronic pain of left knee    DDD (degenerative disc disease), lumbosacral    Erectile dysfunction    Hyperlipidemia    Nephrolithiasis    Vitamin D deficiency    Acute medial meniscal tear, left, initial encounter    Health care maintenance    Hyperglycemia    Thyroid function study abnormality    Low kidney function    Colon polyp    Hypercoagulable state ([address] )    Acute conjunctivitis of both eyes    Viral illness    Inguinal hernia    Chest pain    Chronic pain of right groin       Past Medical History:   Diagnosis Date    Arthritis     Blood in stool     Disease of thyroid gland     Hyperlipidemia     Kidney stone     Pulmonary embolism (HCC)        Past Surgical History:   Procedure Laterality Date    COLONOSCOPY      HERNIA REPAIR Right 2009    With mesh    KNEE ARTHROSCOPY      [place] KNEE SCOPE,DIAGNOSTIC Left 6/14/2019    Procedure: ARTHROSCOPY KNEE;  Surgeon: Elvira Yen MD;  Location: [facility];  Service: Orthopedics    WISDOM TOOTH EXTRACTION         Family History   Problem Relation Age of Onset  Stroke Mother         CVA    Breast cancer Mother     Diabetes Father         DM    Cancer Father     Melanoma Brother        Social History     Occupational History     Comment: full-time employment   Tobacco Use    Smoking status: Never Smoker    Smokeless tobacco: Never Used   Substance and Sexual Activity    Alcohol use: No    Drug use: No    Sexual activity: Not on file       Current Outpatient Medications on File Prior to Visit   Medication Sig    Acetaminophen (TYLENOL PO) Take by mouth    aspirin (ECOTRIN LOW STRENGTH) 81 mg EC tablet Take 81 mg by mouth daily    Cholecalciferol (VITAMIN D) 50 MCG (2000 UT) CAPS Take by mouth    nitroglycerin (NITROSTAT) 0 3 mg SL tablet Place 0 3 mg under the tongue every 5 (five) minutes as needed for chest pain    [DISCONTINUED] omeprazole (PriLOSEC OTC) 20 MG tablet Take 1 tablet (20 mg total) by mouth daily (Patient not taking: Reported on 8/14/2020)     No current facility-administered medications on file prior to visit  Allergies   Allergen Reactions    Amoxicillin Vomiting    Codeine GI Intolerance     vomitting       Physical Exam    /65   Pulse 74   Temp 98 1 °F (36 7 °C) (Oral)   Wt 85 7 kg (189 lb)   BMI 24 27 kg/m²     Constitutional: normal, well developed, well nourished, alert, in no distress and non-toxic and no overt pain behavior  Eyes: anicteric  HEENT: grossly intact  Neck: supple, symmetric, trachea midline and no masses   Pulmonary:even and unlabored  Cardiovascular:No edema or pitting edema present  Skin:Normal without rashes or lesions and well hydrated  Psychiatric:Mood and affect appropriate  Neurologic:Cranial Nerves II-XII grossly intact  Musculoskeletal:normal     Lumbar Spine Exam  Appearance:  Normal lordosis  Palpation/Tenderness:  right lumbar paraspinal tenderness  right sacroiliac joint tenderness  right piriformis tenderness  Range of Motion:  Flexion:   Moderately limited  with pain  Extension: Moderately limited  with pain  Lateral Flexion - Left:  No limitation  without pain  Lateral Flexion - Right:  Moderately limited  with pain  Rotation - Left:  No limitation  without pain  Rotation - Right:  Moderately limited  with pain  Motor Strength:  Left hip flexion:  5/5  Left hip extension:  5/5  Right hip flexion:  4/5  Right hip extension:  5/5  Left knee flexion:  5/5  Left knee extension:  5/5  Right knee flexion:  5/5  Right knee extension:  4/5  Left foot dorsiflexion:  5/5  Left foot plantar flexion:  5/5  Right foot dorsiflexion:  5/5  Right foot plantar flexion:  5/5  Reflexes:  Left Patellar:  2+   Right Patellar:  2+   Left Achilles:  2+   Right Achilles:  2+   Special Tests:  Left Straight Leg Test:  negative  Right Straight Leg Test:  positive  Left Handy's Maneuver:  negative  Right Handy's Maneuver:  positive  Left Gaenslen's Test:  negative  Right Gaenslen's Test:  positive  Left Pelvic Distraction Test:  negative  Right Pelvic Distraction Test:  positive    Imaging    MRI LUMBAR SPINE WITHOUT CONTRAST (11/17/2016)     INDICATION:  Abnormal blood work  Chronic low back pain radiating into the hips and groin  Pain worse on the right      COMPARISON:  11/4/2016     TECHNIQUE:  Sagittal T1, sagittal T2, sagittal inversion recovery, axial T1 and axial T2, coronal T2        IMAGE QUALITY:  Diagnostic     FINDINGS:     ALIGNMENT:  Minimal levoscoliosis mid lumbar spine  Normal lordosis      MARROW SIGNAL:  Scattered degenerative endplate changes  No focally suspicious marrow lesions  No bone marrow edema or compression abnormality       DISTAL CORD AND CONUS:  Normal size and signal within the distal cord and conus  The conus ends at the L1-L2 level      PARASPINAL SOFT TISSUES:  Paraspinal soft tissues are unremarkable      SACRUM:  Normal signal within the sacrum   No evidence of insufficiency or stress fracture      LOWER THORACIC DISC SPACES:  Normal disc height and signal   No disc herniation, canal stenosis or foraminal narrowing      LUMBAR DISC SPACES:          L1-L2:  Normal      L2-L3:  Normal      L3-L4:  Small left neural foraminal disc protrusion  Mild left neural foraminal narrowing  Central canal and right neural foramen patent      L4-L5:  Central/right paracentral disc herniation, protrusion type  Mild central canal and right neural foraminal narrowing  Left neural foramen patent      L5-S1:  There is a diffuse disk bulge  No significant central canal or neural foraminal narrowing

## 2020-09-30 NOTE — PATIENT INSTRUCTIONS

## 2020-10-09 ENCOUNTER — HOSPITAL ENCOUNTER (OUTPATIENT)
Dept: RADIOLOGY | Age: 60
Discharge: HOME | End: 2020-10-09
Payer: COMMERCIAL

## 2020-10-09 DIAGNOSIS — M51.16 INTERVERTEBRAL DISC DISORDER WITH RADICULOPATHY OF LUMBAR REGION: ICD-10-CM

## 2020-10-09 PROCEDURE — 72148 MRI LUMBAR SPINE W/O DYE: CPT

## 2020-10-09 PROCEDURE — G1004 CDSM NDSC: HCPCS

## 2020-10-19 ENCOUNTER — TELEPHONE (OUTPATIENT)
Dept: PAIN MEDICINE | Facility: CLINIC | Age: 60
End: 2020-10-19

## 2020-10-20 ENCOUNTER — TELEPHONE (OUTPATIENT)
Dept: PAIN MEDICINE | Facility: CLINIC | Age: 60
End: 2020-10-20

## 2020-10-20 ENCOUNTER — TELEPHONE (OUTPATIENT)
Dept: RADIOLOGY | Facility: CLINIC | Age: 60
End: 2020-10-20

## 2020-10-20 DIAGNOSIS — M51.16 INTERVERTEBRAL DISC DISORDER WITH RADICULOPATHY OF LUMBAR REGION: Primary | ICD-10-CM

## 2020-11-17 ENCOUNTER — TELEMEDICINE (OUTPATIENT)
Dept: FAMILY MEDICINE CLINIC | Facility: CLINIC | Age: 60
End: 2020-11-17
Payer: COMMERCIAL

## 2020-11-17 VITALS — OXYGEN SATURATION: 97 % | HEART RATE: 84 BPM | TEMPERATURE: 97.8 F

## 2020-11-17 DIAGNOSIS — A08.4 VIRAL GASTROENTERITIS: Primary | ICD-10-CM

## 2020-11-17 PROBLEM — H10.33 ACUTE CONJUNCTIVITIS OF BOTH EYES: Status: RESOLVED | Noted: 2020-02-21 | Resolved: 2020-11-17

## 2020-11-17 PROCEDURE — 99213 OFFICE O/P EST LOW 20 MIN: CPT | Performed by: FAMILY MEDICINE

## 2020-12-04 ENCOUNTER — HOSPITAL ENCOUNTER (OUTPATIENT)
Dept: RADIOLOGY | Facility: CLINIC | Age: 60
Discharge: HOME | End: 2020-12-04
Attending: ANESTHESIOLOGY | Admitting: ANESTHESIOLOGY
Payer: COMMERCIAL

## 2020-12-04 VITALS
OXYGEN SATURATION: 95 % | SYSTOLIC BLOOD PRESSURE: 133 MMHG | HEART RATE: 66 BPM | DIASTOLIC BLOOD PRESSURE: 81 MMHG | TEMPERATURE: 97.6 F | RESPIRATION RATE: 18 BRPM

## 2020-12-04 DIAGNOSIS — M51.16 INTERVERTEBRAL DISC DISORDER WITH RADICULOPATHY OF LUMBAR REGION: ICD-10-CM

## 2020-12-04 PROCEDURE — 64483 NJX AA&/STRD TFRM EPI L/S 1: CPT | Performed by: ANESTHESIOLOGY

## 2020-12-04 PROCEDURE — 64484 NJX AA&/STRD TFRM EPI L/S EA: CPT | Performed by: ANESTHESIOLOGY

## 2020-12-04 RX ORDER — BUPIVACAINE HCL/PF 2.5 MG/ML
10 VIAL (ML) INJECTION ONCE
Status: COMPLETED | OUTPATIENT
Start: 2020-12-04 | End: 2020-12-04

## 2020-12-04 RX ORDER — 0.9 % SODIUM CHLORIDE 0.9 %
10 VIAL (ML) INJECTION ONCE
Status: COMPLETED | OUTPATIENT
Start: 2020-12-04 | End: 2020-12-04

## 2020-12-04 RX ORDER — METHYLPREDNISOLONE ACETATE 80 MG/ML
80 INJECTION, SUSPENSION INTRA-ARTICULAR; INTRALESIONAL; INTRAMUSCULAR; PARENTERAL; SOFT TISSUE ONCE
Status: COMPLETED | OUTPATIENT
Start: 2020-12-04 | End: 2020-12-04

## 2020-12-04 RX ADMIN — Medication 2 ML: at 09:19

## 2020-12-04 RX ADMIN — BUPIVACAINE HYDROCHLORIDE 1 ML: 2.5 INJECTION, SOLUTION EPIDURAL; INFILTRATION; INTRACAUDAL at 09:19

## 2020-12-04 RX ADMIN — IOHEXOL 1 ML: 300 INJECTION, SOLUTION INTRAVENOUS at 09:19

## 2020-12-04 RX ADMIN — SODIUM CHLORIDE 1 ML: 9 INJECTION, SOLUTION INTRAMUSCULAR; INTRAVENOUS; SUBCUTANEOUS at 09:20

## 2020-12-04 RX ADMIN — METHYLPREDNISOLONE ACETATE 80 MG: 80 INJECTION, SUSPENSION INTRA-ARTICULAR; INTRALESIONAL; INTRAMUSCULAR; PARENTERAL; SOFT TISSUE at 09:20

## 2020-12-11 ENCOUNTER — TELEPHONE (OUTPATIENT)
Dept: PAIN MEDICINE | Facility: CLINIC | Age: 60
End: 2020-12-11

## 2020-12-11 NOTE — TELEPHONE ENCOUNTER
Pt reports 50% improvement post inj   Pain level 3/10   Pt aware I will call next week for an update

## 2021-04-08 ENCOUNTER — OFFICE VISIT (OUTPATIENT)
Dept: FAMILY MEDICINE CLINIC | Facility: CLINIC | Age: 61
End: 2021-04-08
Payer: COMMERCIAL

## 2021-04-08 VITALS
BODY MASS INDEX: 25.41 KG/M2 | HEART RATE: 72 BPM | DIASTOLIC BLOOD PRESSURE: 78 MMHG | WEIGHT: 198 LBS | RESPIRATION RATE: 16 BRPM | SYSTOLIC BLOOD PRESSURE: 134 MMHG | HEIGHT: 74 IN

## 2021-04-08 DIAGNOSIS — Z13.29 SCREENING FOR THYROID DISORDER: ICD-10-CM

## 2021-04-08 DIAGNOSIS — E78.5 HYPERLIPIDEMIA, UNSPECIFIED HYPERLIPIDEMIA TYPE: ICD-10-CM

## 2021-04-08 DIAGNOSIS — Z13.0 SCREENING FOR DEFICIENCY ANEMIA: ICD-10-CM

## 2021-04-08 DIAGNOSIS — E55.9 VITAMIN D DEFICIENCY: ICD-10-CM

## 2021-04-08 DIAGNOSIS — N28.9 LOW KIDNEY FUNCTION: ICD-10-CM

## 2021-04-08 DIAGNOSIS — N52.9 ERECTILE DYSFUNCTION, UNSPECIFIED ERECTILE DYSFUNCTION TYPE: ICD-10-CM

## 2021-04-08 DIAGNOSIS — R73.9 HYPERGLYCEMIA: ICD-10-CM

## 2021-04-08 DIAGNOSIS — H10.32 ACUTE BACTERIAL CONJUNCTIVITIS OF LEFT EYE: Primary | ICD-10-CM

## 2021-04-08 DIAGNOSIS — D68.59 HYPERCOAGULABLE STATE (HCC): ICD-10-CM

## 2021-04-08 PROCEDURE — 99214 OFFICE O/P EST MOD 30 MIN: CPT | Performed by: NURSE PRACTITIONER

## 2021-04-08 RX ORDER — CIPROFLOXACIN HYDROCHLORIDE 3.5 MG/ML
1 SOLUTION/ DROPS TOPICAL EVERY 4 HOURS
Qty: 5 ML | Refills: 0 | Status: SHIPPED | OUTPATIENT
Start: 2021-04-08 | End: 2022-01-21

## 2021-04-08 NOTE — LETTER
April 8, 2021     Patient: Kapil Lindsay   YOB: 1960   Date of Visit: 4/8/2021       To Whom it May Concern:    Bharat Casillas is under my professional care  He was seen in my office on 4/8/2021  He is excused from work from 4/8/21-4/9/21 as he is being treated for a medical condition  Patient may return to work on 4/10/21 with no restrictions  If you have any questions or concerns, please don't hesitate to call           Sincerely,          NALLELY Palomares        CC: No Recipients

## 2021-04-08 NOTE — PATIENT INSTRUCTIONS
Conjunctivitis   WHAT YOU SHOULD KNOW:   Conjunctivitis, or pink eye, is inflammation of your conjunctiva  The conjunctiva is a thin tissue that covers the front of your eye and the back of your eyelids  The conjunctiva helps protect your eye and keep it moist         INSTRUCTIONS:   Medicines:   · Allergy medicine: This medicine helps decrease itchy, red, swollen eyes caused by allergies  It may be given as a pill, eye drops, or nasal spray  · Antibiotics:  You will need antibiotics if your conjunctivitis is caused by bacteria  This medicine may be given as eye drops or eye ointment  · Steroid medicine: This medicine helps decrease inflammation  It may be given as a pill, eye drops, or nasal spray  · Take your medicine as directed  Call your healthcare provider if you think your medicine is not helping or if you have side effects  Tell him if you are allergic to any medicine  Keep a list of the medicines, vitamins, and herbs you take  Include the amounts, and when and why you take them  Bring the list or the pill bottles to follow-up visits  Carry your medicine list with you in case of an emergency  Follow up with your primary healthcare provider as directed: You may need to return for more tests on your eyes  These will help your primary healthcare provider check for eye damage  Write down your questions so you remember to ask them during your visits  Avoid the spread of conjunctivitis:   · Wash your hands often:  Wash your hands before you touch your eyes  Also wash your hands before you prepare or eat food and after you use the bathroom or change a diaper  · Avoid allergens:  Try to avoid the things that cause your allergies, such as pets, dust, or grass  · Avoid contact:  Do not share towels or washcloths  Try to stay away from others as much as possible  Ask when you can return to work or school  · Throw away eye makeup:  Throw away mascara and other eye makeup    Manage your symptoms:  · Apply a cool compress:  Wet a washcloth with cold water and place it on your eye  This will help decrease swelling  · Use eye drops:  Eye drops, or artificial tears, can be bought without a doctor's order  They help keep your eye moist     · Do not wear contact lenses: They can irritate your eye  Throw away the pair you are using and ask when you can wear them again  Use a new pair of lenses when your primary healthcare provider says it is okay  · Flush your eye:  You may need to flush your eye with saline to help decrease your symptoms  Ask for more information on how to flush your eye  Contact your primary healthcare provider if:   · Your eyesight becomes blurry  · You have tiny bumps or spots of blood on your eye  · You have questions or concerns about your condition or care  Return to the emergency department if:   · The swelling in your eye gets worse, even after treatment  · Your vision suddenly becomes worse or you cannot see at all  · Your eye begins to bleed  © 2014 [address] is for End User's use only and may not be sold, redistributed or otherwise used for commercial purposes  All illustrations and images included in CareNotes® are the copyrighted property of A D A M [employer]  or Mo Vargas  The above information is an  only  It is not intended as medical advice for individual conditions or treatments  Talk to your doctor, nurse or pharmacist before following any medical regimen to see if it is safe and effective for you  [address]    Your healthcare provider and/or public health staff have evaluated you and have determined that you do not need to remain in the hospital at this time  At this time you can be isolated at home where you will be monitored by staff from your local or state health department   You should carefully follow the prevention and isolation steps below until a healthcare provider or local or [facility] health department says that you can return to your normal activities  Stay home except to get medical care    People who are mildly ill with COVID-19 are able to isolate at home during their illness  You should restrict activities outside your home, except for getting medical care  Do not go to work, school, or public areas  Avoid using public transportation, ride-sharing, or taxis  Separate yourself from other people and animals in your home    People: As much as possible, you should stay in a specific room and away from other people in your home  Also, you should use a separate bathroom, if available  Animals: You should restrict contact with pets and other animals while you are sick with COVID-19, just like you would around other people  Although there have not been reports of pets or other animals becoming sick with COVID-19, it is still recommended that people sick with COVID-19 limit contact with animals until more information is known about the virus  When possible, have another member of your household care for your animals while you are sick  If you are sick with COVID-19, avoid contact with your pet, including petting, snuggling, being kissed or licked, and sharing food  If you must care for your pet or be around animals while you are sick, wash your hands before and after you interact with pets and wear a facemask  See COVID-19 and Animals for more information  Call ahead before visiting your doctor    If you have a medical appointment, call the healthcare provider and tell them that you have or may have COVID-19  This will help the healthcare providers office take steps to keep other people from getting infected or exposed  Wear a facemask    You should wear a facemask when you are around other people (e g , sharing a room or vehicle) or pets and before you enter a healthcare providers office   If you are not able to wear a facemask (for example, because it causes trouble breathing), then people who live with you should not stay in the same room with you, or they should wear a facemask if they enter your room  Cover your coughs and sneezes    Cover your mouth and nose with a tissue when you cough or sneeze  Throw used tissues in a lined trash can  Immediately wash your hands with soap and water for at least 20 seconds or, if soap and water are not available, clean your hands with an alcohol-based hand  that contains at least 60% alcohol  Clean your hands often    Wash your hands often with soap and water for at least 20 seconds, especially after blowing your nose, coughing, or sneezing; going to the bathroom; and before eating or preparing food  If soap and water are not readily available, use an alcohol-based hand  with at least 60% alcohol, covering all surfaces of your hands and rubbing them together until they feel dry  Soap and water are the best option if hands are visibly dirty  Avoid touching your eyes, nose, and mouth with unwashed hands  Avoid sharing personal household items    You should not share dishes, drinking glasses, cups, eating utensils, towels, or bedding with other people or pets in your home  After using these items, they should be washed thoroughly with soap and water  Clean all high-touch surfaces everyday    High touch surfaces include counters, tabletops, doorknobs, bathroom fixtures, toilets, phones, keyboards, tablets, and bedside tables  Also, clean any surfaces that may have blood, stool, or body fluids on them  Use a household cleaning spray or wipe, according to the label instructions  Labels contain instructions for safe and effective use of the cleaning product including precautions you should take when applying the product, such as wearing gloves and making sure you have good ventilation during use of the product      Monitor your symptoms    Seek prompt medical attention if your illness is worsening (e g , difficulty breathing)  Before seeking care, call your healthcare provider and tell them that you have, or are being evaluated for, COVID-19  Put on a facemask before you enter the facility  These steps will help the healthcare providers office to keep other people in the office or waiting room from getting infected or exposed  Ask your healthcare provider to call the local or Critical access hospital health department  Persons who are placed under active monitoring or facilitated self-monitoring should follow instructions provided by their local health department or occupational health professionals, as appropriate  If you have a medical emergency and need to call 911, notify the dispatch personnel that you have, or are being evaluated for COVID-19  If possible, put on a facemask before emergency medical services arrive  Discontinuing home isolation    Patients with confirmed COVID-19 should remain under home isolation precautions until the following conditions are met:   - They have had no fever for at least 24 hours (that is one full day of no fever without the use medicine that reduces fevers)  AND  - other symptoms have improved (for example, when their cough or shortness of breath have improved)  AND  - If had mild or moderate illness, at least 10 days have passed since their symptoms first appeared or if severe illness (needed oxygen) or immunosuppressed, at least 20 days have passed since symptoms first appeared  Patients with confirmed COVID-19 should also notify close contacts (including their workplace) and ask that they self-quarantine  Currently, close contact is defined as being within 6 feet for 15 minutes or more from the period 24 hours starting 48 hours before symptom onset to the time at which the patient went into isolation    Close contacts of patients diagnosed with COVID-19 should be instructed by the patient to self-quarantine for 14 days from the last time of their last contact with the patient  Source: [employer]    Problem List Items Addressed This Visit        Genitourinary    Low kidney function     CMP ordered to assess this  No acute complaints related to this  Other    Erectile dysfunction     No acute complaints related to this  Hyperlipidemia     Lipid panel ordered  Relevant Orders    Lipid Panel with Direct LDL reflex    Vitamin D deficiency     Well controlled on current regimen  Vitamin-D level ordered  Relevant Orders    Vitamin D 25 hydroxy    Hyperglycemia     CMP ordered to reassess this  Relevant Orders    Comprehensive metabolic panel    UA w Reflex to Microscopic w Reflex to Culture -Lab Collect    Hypercoagulable state ([facility] [address] )     No current complaints related to this  Patient takes aspirin 81 mg daily  Relevant Orders    APTT    Protime-INR    Acute bacterial conjunctivitis of left eye - Primary     Ciprofloxacin eyedrops ordered  Patient advised he can take Benadryl as needed for pruritus  Patient also advised to begin daily antihistamine such as Zyrtec to help with pruritus           Relevant Medications    ciprofloxacin (CILOXAN) 0 3 % ophthalmic solution      Other Visit Diagnoses     Screening for thyroid disorder        Relevant Orders    TSH, 3rd generation    Screening for deficiency anemia        Relevant Orders    CBC and Platelet

## 2021-04-08 NOTE — PROGRESS NOTES
Assessment and Plan:    Problem List Items Addressed This Visit        Genitourinary    Low kidney function     CMP ordered to assess this  No acute complaints related to this  Other    Erectile dysfunction     No acute complaints related to this  Hyperlipidemia     Lipid panel ordered  Relevant Orders    Lipid Panel with Direct LDL reflex    Vitamin D deficiency     Well controlled on current regimen  Vitamin-D level ordered  Relevant Orders    Vitamin D 25 hydroxy    Hyperglycemia     CMP ordered to reassess this  Relevant Orders    Comprehensive metabolic panel    UA w Reflex to Microscopic w Reflex to Culture -Lab Collect    Hypercoagulable state ([address] )     No current complaints related to this  Patient takes aspirin 81 mg daily  Relevant Orders    APTT    Protime-INR    Acute bacterial conjunctivitis of left eye - Primary     Ciprofloxacin eyedrops ordered  Patient advised he can take Benadryl as needed for pruritus  Patient also advised to begin daily antihistamine such as Zyrtec to help with pruritus  Relevant Medications    ciprofloxacin (CILOXAN) 0 3 % ophthalmic solution      Other Visit Diagnoses     Screening for thyroid disorder        Relevant Orders    TSH, 3rd generation    Screening for deficiency anemia        Relevant Orders    CBC and Platelet                 Diagnoses and all orders for this visit:    Acute bacterial conjunctivitis of left eye  -     ciprofloxacin (CILOXAN) 0 3 % ophthalmic solution; Administer 1 drop into the left eye every 4 (four) hours    Hyperglycemia  -     Comprehensive metabolic panel; Future  -     UA w Reflex to Microscopic w Reflex to Culture -Lab Collect    Hyperlipidemia, unspecified hyperlipidemia type  -     Lipid Panel with Direct LDL reflex; Future    Vitamin D deficiency  -     Vitamin D 25 hydroxy; Future    Screening for thyroid disorder  -     TSH, 3rd generation;  Future    Screening for deficiency anemia  -     CBC and Platelet; Future    Hypercoagulable state ([address] )  -     APTT; Future  -     Protime-INR; Future    Low kidney function    Erectile dysfunction, unspecified erectile dysfunction type              Subjective:      Patient ID: Jatin Cruz is a 62 y o  male  CC:    Chief Complaint   Patient presents with    Conjunctivitis     pt here with left pink eye since today and painful  R Jai       HPI:    Conjunctivitis: Patient reports when he woke this morning his eye felt past pasted shut  Patient reports the discharge in his eye looked like pus  Patient reports associated pruritus and pain in the left eye  Low kidney function: Most recent BMP in September 2020 showed creatinine of 1 31, BUN of 27, and eGFR of 60  Patient has seen a nephrologist for this and it was felt there was no acute deficiency with his kidneys  ED: Not currently on medication for this  Denies any current issues  Hyperlipidemia: Noted previously  Has not had a lipid panel completed since December 2019  Vitamin D deficiency: Noted to be normal in December 2019  Will order Vitamin D level to reassess this  Currently on daily Vitamin D supplement  Hyperglycemia: Glucose noted to be normal on most recent BMP in November 2020  Hypercoagulable state: Most recent aPTT and PT/INR in June 2019 were noted to be normal  Patient reports he had a PE in his left lung in the past and he was on blood thinners in the past but these have since been discontinued  No current complaints regarding this  Patient currently takes one baby Aspirin daily  The following portions of the patient's history were reviewed and updated as appropriate: allergies, current medications, past family history, past medical history, past social history, past surgical history and problem list       Review of Systems   Constitutional: Negative for chills and fever  HENT: Negative for ear pain and sore throat      Eyes: Positive for pain, discharge, redness and itching  Negative for photophobia and visual disturbance  All symptoms present in left eye    Respiratory: Negative for cough, chest tightness, shortness of breath and wheezing  Cardiovascular: Negative for chest pain, palpitations and leg swelling  Gastrointestinal: Negative for abdominal pain, constipation, diarrhea, nausea and vomiting  Endocrine: Negative for cold intolerance, heat intolerance, polydipsia, polyphagia and polyuria  Genitourinary: Negative for decreased urine volume, dysuria and hematuria  Musculoskeletal: Negative for arthralgias, back pain and myalgias  Skin: Negative for color change and rash  Allergic/Immunologic: Negative for environmental allergies  Neurological: Negative for dizziness, seizures, syncope, weakness, light-headedness, numbness and headaches  Hematological: Negative for adenopathy  Psychiatric/Behavioral: Negative for confusion  The patient is not nervous/anxious  All other systems reviewed and are negative  Data to review:       Objective:    Vitals:    04/08/21 1404   BP: 134/78   BP Location: Left arm   Patient Position: Sitting   Cuff Size: Large   Pulse: 72   Resp: 16   Weight: 89 8 kg (198 lb)   Height: 6' 2" (1 88 m)        Physical Exam  Vitals signs and nursing note reviewed  Constitutional:       General: He is not in acute distress  Appearance: Normal appearance  He is well-developed  He is not ill-appearing  HENT:      Head: Normocephalic and atraumatic  Eyes:      General:         Left eye: Discharge (yellow, thick discharge) present  Extraocular Movements: Extraocular movements intact  Conjunctiva/sclera:      Left eye: Left conjunctiva is injected  Exudate present  No hemorrhage  Pupils: Pupils are equal, round, and reactive to light  Comments: Left conjunctiva red and inflamed  Some yellow, thick exudates noted      Neck:      Musculoskeletal: Normal range of motion and neck supple  Cardiovascular:      Rate and Rhythm: Normal rate and regular rhythm  Pulses: Normal pulses  Carotid pulses are 2+ on the right side and 2+ on the left side  Posterior tibial pulses are 2+ on the right side and 2+ on the left side  Heart sounds: Normal heart sounds  No murmur  Pulmonary:      Effort: Pulmonary effort is normal  No respiratory distress  Breath sounds: Normal breath sounds  No wheezing or rhonchi  Abdominal:      General: Abdomen is flat  Bowel sounds are normal  There is no distension  Palpations: Abdomen is soft  Tenderness: There is no abdominal tenderness  There is no guarding  Musculoskeletal: Normal range of motion  Right lower leg: No edema  Left lower leg: No edema  Skin:     General: Skin is warm and dry  Capillary Refill: Capillary refill takes less than 2 seconds  Neurological:      General: No focal deficit present  Mental Status: He is alert and oriented to person, place, and time  Psychiatric:         Mood and Affect: Mood normal          Behavior: Behavior normal          Thought Content:  Thought content normal          Judgment: Judgment normal

## 2021-04-08 NOTE — ASSESSMENT & PLAN NOTE
Ciprofloxacin eyedrops ordered  Patient advised he can take Benadryl as needed for pruritus  Patient also advised to begin daily antihistamine such as Zyrtec to help with pruritus

## 2021-06-05 ENCOUNTER — APPOINTMENT (EMERGENCY)
Dept: RADIOLOGY | Facility: HOSPITAL | Age: 61
End: 2021-06-05
Payer: COMMERCIAL

## 2021-06-05 ENCOUNTER — HOSPITAL ENCOUNTER (EMERGENCY)
Facility: HOSPITAL | Age: 61
Discharge: HOME | End: 2021-06-06
Attending: SURGERY
Payer: COMMERCIAL
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
## 2021-06-05 DIAGNOSIS — M25.511 ACUTE PAIN OF RIGHT SHOULDER: ICD-10-CM

## 2021-06-05 DIAGNOSIS — S01.511A LIP LACERATION, INITIAL ENCOUNTER: ICD-10-CM

## 2021-06-05 DIAGNOSIS — S01.81XA FACIAL LACERATION, INITIAL ENCOUNTER: ICD-10-CM
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
## 2021-06-05 LAB
ANION GAP SERPL CALCULATED.3IONS-SCNC: 5 MMOL/L (ref 4–13)
BASE EXCESS BLDA CALC-SCNC: 5 MMOL/L (ref -2–3)
BASOPHILS # BLD AUTO: 0.04 THOUSANDS/ΜL (ref 0–0.1)
BASOPHILS NFR BLD AUTO: 1 % (ref 0–1)
BUN SERPL-MCNC: 20 MG/DL (ref 5–25)
CALCIUM SERPL-MCNC: 9.7 MG/DL (ref 8.3–10.1)
CHLORIDE SERPL-SCNC: 109 MMOL/L (ref 100–108)
CO2 SERPL-SCNC: 27 MMOL/L (ref 21–32)
CREAT SERPL-MCNC: 1.4 MG/DL (ref 0.6–1.3)
EOSINOPHIL # BLD AUTO: 0.03 THOUSAND/ΜL (ref 0–0.61)
EOSINOPHIL NFR BLD AUTO: 0 % (ref 0–6)
ERYTHROCYTE [DISTWIDTH] IN BLOOD BY AUTOMATED COUNT: 12.5 % (ref 11.6–15.1)
GFR SERPL CREATININE-BSD FRML MDRD: 55 ML/MIN/1.73SQ M
GLUCOSE SERPL-MCNC: 101 MG/DL (ref 65–140)
GLUCOSE SERPL-MCNC: 104 MG/DL (ref 65–140)
HCO3 BLDA-SCNC: 29.6 MMOL/L (ref 24–30)
HCT VFR BLD AUTO: 45.4 % (ref 36.5–49.3)
HCT VFR BLD CALC: 45 % (ref 36.5–49.3)
HGB BLD-MCNC: 15.5 G/DL (ref 12–17)
HGB BLDA-MCNC: 15.3 G/DL (ref 12–17)
HOLD SPECIMEN: NORMAL
IMM GRANULOCYTES # BLD AUTO: 0.04 THOUSAND/UL (ref 0–0.2)
IMM GRANULOCYTES NFR BLD AUTO: 1 % (ref 0–2)
LYMPHOCYTES # BLD AUTO: 2.96 THOUSANDS/ΜL (ref 0.6–4.47)
LYMPHOCYTES NFR BLD AUTO: 35 % (ref 14–44)
MCH RBC QN AUTO: 31.4 PG (ref 26.8–34.3)
MCHC RBC AUTO-ENTMCNC: 34.1 G/DL (ref 31.4–37.4)
MCV RBC AUTO: 92 FL (ref 82–98)
MONOCYTES # BLD AUTO: 0.7 THOUSAND/ΜL (ref 0.17–1.22)
MONOCYTES NFR BLD AUTO: 8 % (ref 4–12)
NEUTROPHILS # BLD AUTO: 4.74 THOUSANDS/ΜL (ref 1.85–7.62)
NEUTS SEG NFR BLD AUTO: 55 % (ref 43–75)
NRBC BLD AUTO-RTO: 0 /100 WBCS
PCO2 BLD: 31 MMOL/L (ref 21–32)
PCO2 BLD: 41.7 MM HG (ref 42–50)
PH BLD: 7.46 [PH] (ref 7.3–7.4)
PLATELET # BLD AUTO: 216 THOUSANDS/UL (ref 149–390)
PMV BLD AUTO: 12.1 FL (ref 8.9–12.7)
PO2 BLD: 39 MM HG (ref 35–45)
POTASSIUM BLD-SCNC: 5.2 MMOL/L (ref 3.5–5.3)
POTASSIUM SERPL-SCNC: 4.3 MMOL/L (ref 3.5–5.3)
RBC # BLD AUTO: 4.94 MILLION/UL (ref 3.88–5.62)
SAO2 % BLD FROM PO2: 76 % (ref 60–85)
SODIUM BLD-SCNC: 144 MMOL/L (ref 136–145)
SODIUM SERPL-SCNC: 141 MMOL/L (ref 136–145)
SPECIMEN SOURCE: ABNORMAL
WBC # BLD AUTO: 8.51 THOUSAND/UL (ref 4.31–10.16)

## 2021-06-05 PROCEDURE — 73030 X-RAY EXAM OF SHOULDER: CPT

## 2021-06-05 PROCEDURE — 84295 ASSAY OF SERUM SODIUM: CPT

## 2021-06-05 PROCEDURE — 82803 BLOOD GASES ANY COMBINATION: CPT

## 2021-06-05 PROCEDURE — 70450 CT HEAD/BRAIN W/O DYE: CPT

## 2021-06-05 PROCEDURE — 99284 EMERGENCY DEPT VISIT MOD MDM: CPT | Performed by: SURGERY

## 2021-06-05 PROCEDURE — 90471 IMMUNIZATION ADMIN: CPT

## 2021-06-05 PROCEDURE — NC001 PR NO CHARGE: Performed by: EMERGENCY MEDICINE

## 2021-06-05 PROCEDURE — 85025 COMPLETE CBC W/AUTO DIFF WBC: CPT | Performed by: SURGERY

## 2021-06-05 PROCEDURE — 93308 TTE F-UP OR LMTD: CPT | Performed by: SURGERY

## 2021-06-05 PROCEDURE — 73080 X-RAY EXAM OF ELBOW: CPT

## 2021-06-05 PROCEDURE — 12013 RPR F/E/E/N/L/M 2.6-5.0 CM: CPT | Performed by: SURGERY

## 2021-06-05 PROCEDURE — 73130 X-RAY EXAM OF HAND: CPT

## 2021-06-05 PROCEDURE — 84132 ASSAY OF SERUM POTASSIUM: CPT

## 2021-06-05 PROCEDURE — 85014 HEMATOCRIT: CPT

## 2021-06-05 PROCEDURE — 90715 TDAP VACCINE 7 YRS/> IM: CPT | Performed by: EMERGENCY MEDICINE

## 2021-06-05 PROCEDURE — 72125 CT NECK SPINE W/O DYE: CPT

## 2021-06-05 PROCEDURE — 76705 ECHO EXAM OF ABDOMEN: CPT | Performed by: SURGERY

## 2021-06-05 PROCEDURE — 73564 X-RAY EXAM KNEE 4 OR MORE: CPT

## 2021-06-05 PROCEDURE — 74177 CT ABD & PELVIS W/CONTRAST: CPT

## 2021-06-05 PROCEDURE — 80048 BASIC METABOLIC PNL TOTAL CA: CPT | Performed by: SURGERY

## 2021-06-05 PROCEDURE — NC001 PR NO CHARGE: Performed by: SURGERY

## 2021-06-05 PROCEDURE — 71260 CT THORAX DX C+: CPT

## 2021-06-05 PROCEDURE — 82947 ASSAY GLUCOSE BLOOD QUANT: CPT

## 2021-06-05 PROCEDURE — 73200 CT UPPER EXTREMITY W/O DYE: CPT

## 2021-06-05 PROCEDURE — 99285 EMERGENCY DEPT VISIT HI MDM: CPT

## 2021-06-05 PROCEDURE — 36415 COLL VENOUS BLD VENIPUNCTURE: CPT | Performed by: SURGERY

## 2021-06-05 RX ORDER — OXYCODONE HYDROCHLORIDE 5 MG/1
5 TABLET ORAL ONCE
Status: COMPLETED | OUTPATIENT
Start: 2021-06-05 | End: 2021-06-05

## 2021-06-05 RX ORDER — LIDOCAINE HYDROCHLORIDE 10 MG/ML
10 INJECTION, SOLUTION EPIDURAL; INFILTRATION; INTRACAUDAL; PERINEURAL ONCE
Status: COMPLETED | OUTPATIENT
Start: 2021-06-05 | End: 2021-06-05

## 2021-06-05 RX ORDER — ACETAMINOPHEN 325 MG/1
975 TABLET ORAL ONCE
Status: COMPLETED | OUTPATIENT
Start: 2021-06-05 | End: 2021-06-05

## 2021-06-05 RX ADMIN — TETANUS TOXOID, REDUCED DIPHTHERIA TOXOID AND ACELLULAR PERTUSSIS VACCINE, ADSORBED 0.5 ML: 5; 2.5; 8; 8; 2.5 SUSPENSION INTRAMUSCULAR at 17:26

## 2021-06-05 RX ADMIN — ACETAMINOPHEN 975 MG: 325 TABLET, FILM COATED ORAL at 21:17

## 2021-06-05 RX ADMIN — IOHEXOL 100 ML: 350 INJECTION, SOLUTION INTRAVENOUS at 13:58

## 2021-06-05 RX ADMIN — OXYCODONE HYDROCHLORIDE 5 MG: 5 TABLET ORAL at 22:44

## 2021-06-05 RX ADMIN — LIDOCAINE HYDROCHLORIDE 10 ML: 10 INJECTION, SOLUTION EPIDURAL; INFILTRATION; INTRACAUDAL; PERINEURAL at 17:27
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
## 2021-06-05 NOTE — PROGRESS NOTES
Pastoral Care Progress Note    2021  Patient: Kevin Cummings : 1960  Admission Date & Time: 2021 1317  MRN: [id number] [facility]: [id number]                     Chaplaincy Interventions Utilized:   Empowerment: Clarified, confirmed, or reviewed information from treatment team , Encouraged focus on present and Normalized experience of patient/family    Exploration: Explored hope and Explored emotional needs & resources    Collaboration: Advocated for patient/family, Consulted with interdisciplinary team and Encouraged adherence to treatment plan    Relationship Building: Cultivated a relationship of care and support, Listened empathically and Provided silent and supportive presence    Ritual: Provided prayer    Chaplaincy Outcomes Achieved:  Distress reduced    Spiritual Coping Strategies Utilized:   Spiritual comfort
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
## 2021-06-06 ENCOUNTER — OFFICE VISIT (OUTPATIENT)
Dept: URGENT CARE | Age: 61
End: 2021-06-06
Payer: COMMERCIAL

## 2021-06-06 VITALS
OXYGEN SATURATION: 97 % | DIASTOLIC BLOOD PRESSURE: 61 MMHG | TEMPERATURE: 98.3 F | HEART RATE: 70 BPM | WEIGHT: 184.97 LBS | RESPIRATION RATE: 20 BRPM | SYSTOLIC BLOOD PRESSURE: 138 MMHG

## 2021-06-06 VITALS
SYSTOLIC BLOOD PRESSURE: 137 MMHG | DIASTOLIC BLOOD PRESSURE: 74 MMHG | RESPIRATION RATE: 20 BRPM | HEART RATE: 69 BPM | TEMPERATURE: 97.7 F | OXYGEN SATURATION: 97 %

## 2021-06-06 DIAGNOSIS — S00.93XD CONTUSION OF HEAD, UNSPECIFIED PART OF HEAD, SUBSEQUENT ENCOUNTER: Primary | ICD-10-CM

## 2021-06-06 DIAGNOSIS — S80.02XD CONTUSION OF LEFT KNEE, SUBSEQUENT ENCOUNTER: ICD-10-CM

## 2021-06-06 DIAGNOSIS — S46.911D RIGHT SHOULDER STRAIN, SUBSEQUENT ENCOUNTER: ICD-10-CM

## 2021-06-06 DIAGNOSIS — S80.01XD CONTUSION OF RIGHT KNEE, SUBSEQUENT ENCOUNTER: ICD-10-CM

## 2021-06-06 DIAGNOSIS — S01.81XD FACIAL LACERATION, SUBSEQUENT ENCOUNTER: ICD-10-CM

## 2021-06-06 PROBLEM — S01.81XA FACIAL LACERATION: Status: ACTIVE | Noted: 2021-06-06

## 2021-06-06 PROBLEM — M25.511 RIGHT SHOULDER PAIN: Status: ACTIVE | Noted: 2021-06-06

## 2021-06-06 PROBLEM — T07.XXXA ABRASIONS OF MULTIPLE SITES: Status: ACTIVE | Noted: 2021-06-06

## 2021-06-06 PROCEDURE — G0382 LEV 3 HOSP TYPE B ED VISIT: HCPCS | Performed by: PHYSICIAN ASSISTANT

## 2021-06-06 PROCEDURE — NC001 PR NO CHARGE: Performed by: ORTHOPAEDIC SURGERY

## 2021-06-06 RX ORDER — IBUPROFEN 600 MG/1
600 TABLET ORAL EVERY 8 HOURS SCHEDULED
Qty: 30 TABLET | Refills: 0 | Status: SHIPPED | OUTPATIENT
Start: 2021-06-06 | End: 2021-12-30 | Stop reason: HOSPADM

## 2021-06-06 NOTE — LETTER
June 6, 2021     Patient: Tonny Servin   YOB: 1960   Date of Visit: 6/6/2021       To Whom It May Concern: It is my medical opinion that [employer] should remain out of work until evaluated by [employer]        Sincerely,        Ismael Small PA-C    CC: No Recipients
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
## 2021-06-06 NOTE — PROCEDURES
Laceration repair    Date/Time: 6/5/2021 7:47 PM  Performed by: Geneva Padilla MD  Authorized by: Geneva Padilla MD   Consent: Verbal consent obtained  Risks and benefits: risks, benefits and alternatives were discussed  Consent given by: patient  Patient understanding: patient states understanding of the procedure being performed  Patient identity confirmed: verbally with patient  Body area: head/neck  Location details: right cheek  Laceration length: 2 cm  Foreign bodies: no foreign bodies  Anesthesia: local infiltration    Anesthesia:  Local Anesthetic: lidocaine 1% without epinephrine  Anesthetic total: 1 mL    Wound Dehiscence:  Superficial Wound Dehiscence: simple closure      Procedure Details:  Irrigation solution: saline  Irrigation method: syringe  Amount of cleaning: standard  Debridement: none  Degree of undermining: none  Skin closure: 6-0 Prolene  Number of sutures: 2  Technique: simple  Approximation: close  Approximation difficulty: simple  Patient tolerance: patient tolerated the procedure well with no immediate complications    Laceration repair    Date/Time: 6/5/2021 7:51 PM  Performed by: Geneva Padilla MD  Authorized by: Geneva Padilla MD   Consent: Verbal consent obtained    Risks and benefits: risks, benefits and alternatives were discussed  Consent given by: patient  Patient understanding: patient states understanding of the procedure being performed  Patient identity confirmed: verbally with patient  Body area: head/neck  Location details: lower lip  Full thickness lip laceration: no  Vermilion border involved: no  Laceration length: 2 cm  Vascular damage: no  Anesthesia: local infiltration    Anesthesia:  Local Anesthetic: lidocaine 1% without epinephrine  Anesthetic total: 1 mL    Wound Dehiscence:  Superficial Wound Dehiscence: simple closure      Procedure Details:  Irrigation solution: saline  Irrigation method: syringe  Debridement: none  Degree of undermining: none  Skin closure: 6-0 Prolene  Number of sutures: 2  Technique: simple  Approximation: close  Approximation difficulty: simple  Patient tolerance: patient tolerated the procedure well with no immediate complications

## 2021-06-06 NOTE — DISCHARGE INSTRUCTIONS
Consent: Patient has a chronic skin condition and failure to continue treatment at this time can lead to localized and/or systemic complications along with need for systemic immunosuppressants. Mask worn by all staff and patient as per protocol. Patient screened negative for Covid 19 symptoms./signs today Laceration   WHAT YOU NEED TO KNOW:   A laceration is an injury to the skin and the soft tissue underneath it  Lacerations can happen anywhere on the body  DISCHARGE INSTRUCTIONS:   Return to the emergency department if:   · You have heavy bleeding or bleeding that does not stop after 10 minutes of holding firm, direct pressure over the wound  · Your wound opens up  Call your doctor if:   · You have a fever or chills  · Your laceration is red, warm, or swollen  · You have red streaks on your skin coming from your wound  · You have white or yellow drainage from the wound that smells bad  · You have pain that gets worse, even after treatment  · You have questions or concerns about your condition or care  Medicines: You may need any of the following:  · Prescription pain medicine  may be given  Ask your healthcare provider how to take this medicine safely  Some prescription pain medicines contain acetaminophen  Do not take other medicines that contain acetaminophen without talking to your healthcare provider  Too much acetaminophen may cause liver damage  Prescription pain medicine may cause constipation  Ask your healthcare provider how to prevent or treat constipation  · Antibiotics  help treat or prevent a bacterial infection  · Take your medicine as directed  Contact your healthcare provider if you think your medicine is not helping or if you have side effects  Tell him or her if you are allergic to any medicine  Keep a list of the medicines, vitamins, and herbs you take  Include the amounts, and when and why you take them  Bring the list or the pill bottles to follow-up visits  Carry your medicine list with you in case of an emergency  Care for your wound as directed:   · Do not get your wound wet  until your healthcare provider says it is okay  Do not soak your wound in water  Do not go swimming until your healthcare provider says it is okay   Carefully wash the wound with Protocol For Photochemotherapy: Mineral Oil And Nbuvb: The patient received Photochemotherapy: Mineral Oil and NBUVB (mineral oil applied to all lesions prior to phototherapy). soap and water  Gently pat the area dry or allow it to air dry  · Change your bandages  when they get wet, dirty, or after washing  Apply new, clean bandages as directed  Do not apply elastic bandages or tape too tight  Do not put powders or lotions over your incision  · Apply antibiotic ointment as directed  Your healthcare provider may give you antibiotic ointment to put over your wound if you have stitches  If you have strips of tape over your incision, let them dry up and fall off on their own  If they do not fall off within 14 days, gently remove them  If you have glue over your wound, do not remove or pick at it  If your glue comes off, do not replace it with glue that you have at home  · Check your wound every day for signs of infection, such as swelling, redness, or pus  Self-care:   · Apply ice  on your wound for 15 to 20 minutes every hour or as directed  Use an ice pack, or put crushed ice in a plastic bag  Cover it with a towel  Ice helps prevent tissue damage and decreases swelling and pain  · Use a splint as directed  A splint will decrease movement and stress on your wound  It may help it heal faster  A splint may be used for lacerations over joints or areas of your body that bend  Ask your healthcare provider how to apply and remove a splint  · Decrease scarring of your wound  by applying ointments as directed  Do not apply ointments until your healthcare provider says it is okay  You may need to wait until your wound is healed  Ask which ointment to buy and how often to use it  After your wound is healed, use sunscreen over the area when you are out in the sun  You should do this for at least 6 months to 1 year after your injury  Follow up with your doctor as directed: You may need to follow up in 24 to 48 hours to have your wound checked for infection  You will need to return in 3 to 14 days if you have stitches or staples so they can be removed   Care for your wound as Protocol For Nb Uva: The patient received NB UVA. directed to prevent infection and help it heal  Write down your questions so you remember to ask them during your visits  © Copyright [address] Information is for End User's use only and may not be sold, redistributed or otherwise used for commercial purposes  All illustrations and images included in CareNotes® are the copyrighted property of A D A M , Inc  or [facility] Ronny Rios   The above information is an  only  It is not intended as medical advice for individual conditions or treatments  Talk to your doctor, nurse or pharmacist before following any medical regimen to see if it is safe and effective for you  Protocol For Photochemotherapy For Severe Photoresponsive Dermatoses: Petrolatum And Nbuvb: The patient received Photochemotherapy for severe photoresponsive dermatoses: Petrolatum and NBUVB requiring at least 4 to 8 hours of care under direct physician supervision. Total Body Energy: 1175 mJ Protocol For Photochemotherapy: Tar And Nbuvb (Goeckerman Treatment): The patient received Photochemotherapy: Tar and NBUVB (Goeckerman treatment). Protocol For Photochemotherapy: Triamcinolone Ointment And Nbuvb: The patient received Photochemotherapy: Triamcinolone and NBUVB (triamcinolone ointment applied to all lesions prior to phototherapy). Protocol For Broad Band Uvb: The patient received Broad Band UVB. Changes In Treatment Protocol: +50 mJ Render Post-Care In The Note: no Protocol For Photochemotherapy: Petrolatum And Broad Band Uvb: The patient received Photochemotherapy: Petrolatum and Broad Band UVB. Detail Level: Zone Comments: Pt was advised to apply Vaseline prior to clinic or to use mineral oil available in room to enhance results. Protocol: Photochemotherapy: Petrolatum and NBUVB Protocol For Bath Puva: The patient received Bath PUVA. Protocol For Photochemotherapy For Severe Photoresponsive Dermatoses: Puva: The patient received Photochemotherapy for severe photoresponsive dermatoses: PUVA requiring at least 4 to 8 hours of care under direct physician supervision. Treatment Number: 32 Protocol For Photochemotherapy For Severe Photoresponsive Dermatoses: Tar And Nbuvb (Goeckerman Treatment): The patient received Photochemotherapy for severe photoresponsive dermatoses: Tar and NBUVB (Goeckerman treatment) requiring at least 4 to 8 hours of care under direct physician supervision. Protocol For Nbuvb: The patient received NBUVB. Protocol For Protocol For Photochemotherapy For Severe Photoresponsive Dermatoses: Bath Puva: The patient received Photochemotherapy for severe photoresponsive dermatoses: Bath PUVA requiring at least 4 to 8 hours of care under direct physician supervision. Protocol For Uva: The patient received UVA. Protocol For Uva1: The patient received UVA1. Protocol For Photochemotherapy: Petrolatum And Nbuvb: The patient received Photochemotherapy: Petrolatum and NBUVB (petrolatum applied to all lesions prior to phototherapy). Protocol For Photochemotherapy For Severe Photoresponsive Dermatoses: Tar And Broad Band Uvb (Goeckerman Treatment): The patient received Photochemotherapy for severe photoresponsive dermatoses: Tar and Broad Band UVB (Goeckerman treatment) requiring at least 4 to 8 hours of care under direct physician supervision. Protocol For Photochemotherapy: Tar And Broad Band Uvb (Goeckerman Treatment): The patient received Photochemotherapy: Tar and Broad Band UVB (Goeckerman treatment). Protocol For Photochemotherapy For Severe Photoresponsive Dermatoses: Petrolatum And Broad Band Uvb: The patient received Photochemotherapyfor severe photoresponsive dermatoses: Petrolatum and Broad Band UVB requiring at least 4 to 8 hours of care under direct physician supervision. Post-Care Instructions: I reviewed with the patient in detail post-care instructions. Patient is to wear sun protection. Patients may expect sunburn like redness, discomfort and scabbing. Protocol For Photochemotherapy: Mineral Oil And Broad Band Uvb: The patient received Photochemotherapy: Mineral Oil and Broad Band UVB. Protocol For Photochemotherapy: Baby Oil And Nbuvb: The patient received Photochemotherapy: Baby Oil and NBUVB (baby oil applied to all lesions prior to phototherapy). Protocol For Puva: The patient received PUVA.

## 2021-06-06 NOTE — PATIENT INSTRUCTIONS
Ice and elevate frequently  Follow-up with orthopedics as directed by the emergency room  Continue Tylenol as directed  Gradually resume activities as tolerated
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
## 2021-06-07 ENCOUNTER — OFFICE VISIT (OUTPATIENT)
Dept: FAMILY MEDICINE CLINIC | Facility: CLINIC | Age: 61
End: 2021-06-07
Payer: COMMERCIAL

## 2021-06-07 VITALS
DIASTOLIC BLOOD PRESSURE: 78 MMHG | BODY MASS INDEX: 25.42 KG/M2 | SYSTOLIC BLOOD PRESSURE: 118 MMHG | HEIGHT: 74 IN | HEART RATE: 72 BPM | TEMPERATURE: 98.2 F

## 2021-06-07 DIAGNOSIS — S50.311D ABRASION OF RIGHT ELBOW, SUBSEQUENT ENCOUNTER: ICD-10-CM

## 2021-06-07 DIAGNOSIS — S80.219D ABRASION OF KNEE, UNSPECIFIED LATERALITY, SUBSEQUENT ENCOUNTER: ICD-10-CM
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
## 2021-06-07 DIAGNOSIS — S01.81XD FACIAL LACERATION, SUBSEQUENT ENCOUNTER: ICD-10-CM

## 2021-06-07 DIAGNOSIS — S60.519D: ICD-10-CM

## 2021-06-07 DIAGNOSIS — S60.419D ABRASION OF FINGER, SUBSEQUENT ENCOUNTER: ICD-10-CM

## 2021-06-07 DIAGNOSIS — S46.011D TRAUMATIC TEAR OF RIGHT ROTATOR CUFF, UNSPECIFIED TEAR EXTENT, SUBSEQUENT ENCOUNTER: ICD-10-CM

## 2021-06-07 DIAGNOSIS — S40.211D ABRASION OF RIGHT SHOULDER, SUBSEQUENT ENCOUNTER: ICD-10-CM

## 2021-06-07 PROBLEM — S46.011A TRAUMATIC TEAR OF RIGHT ROTATOR CUFF: Status: ACTIVE | Noted: 2021-06-07
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
## 2021-06-07 PROCEDURE — 99214 OFFICE O/P EST MOD 30 MIN: CPT | Performed by: FAMILY MEDICINE

## 2021-06-07 RX ORDER — IBUPROFEN 600 MG/1
TABLET ORAL EVERY 6 HOURS PRN
COMMUNITY
End: 2021-12-30 | Stop reason: HOSPADM
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
## 2021-06-07 NOTE — PATIENT INSTRUCTIONS
Patient to apply Bactroban ointment daily and rewrapped with sterile dressing   Return in 48 hours with Dr Benita Sanders for suture removal   Follow with Orthopedics for right rotator cuff tear

## 2021-06-09 ENCOUNTER — PROCEDURE VISIT (OUTPATIENT)
Dept: FAMILY MEDICINE CLINIC | Facility: CLINIC | Age: 61
End: 2021-06-09
Payer: COMMERCIAL

## 2021-06-09 VITALS
BODY MASS INDEX: 24.77 KG/M2 | HEART RATE: 70 BPM | RESPIRATION RATE: 18 BRPM | WEIGHT: 193 LBS | HEIGHT: 74 IN | DIASTOLIC BLOOD PRESSURE: 68 MMHG | SYSTOLIC BLOOD PRESSURE: 114 MMHG

## 2021-06-09 DIAGNOSIS — M25.511 ACUTE PAIN OF RIGHT SHOULDER: ICD-10-CM

## 2021-06-09 DIAGNOSIS — S01.81XD FACIAL LACERATION, SUBSEQUENT ENCOUNTER: ICD-10-CM

## 2021-06-09 DIAGNOSIS — T07.XXXA ABRASIONS OF MULTIPLE SITES: ICD-10-CM
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
## 2021-06-09 PROBLEM — H10.32 ACUTE BACTERIAL CONJUNCTIVITIS OF LEFT EYE: Status: RESOLVED | Noted: 2021-04-08 | Resolved: 2021-06-09

## 2021-06-09 PROCEDURE — 99213 OFFICE O/P EST LOW 20 MIN: CPT | Performed by: FAMILY MEDICINE
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
## 2021-06-10 ENCOUNTER — OFFICE VISIT (OUTPATIENT)
Dept: OBGYN CLINIC | Facility: MEDICAL CENTER | Age: 61
End: 2021-06-10
Payer: COMMERCIAL

## 2021-06-10 VITALS
SYSTOLIC BLOOD PRESSURE: 138 MMHG | DIASTOLIC BLOOD PRESSURE: 85 MMHG | HEIGHT: 74 IN | HEART RATE: 73 BPM | BODY MASS INDEX: 24.9 KG/M2 | WEIGHT: 194 LBS

## 2021-06-10 DIAGNOSIS — M23.91 ACUTE INTERNAL DERANGEMENT OF RIGHT KNEE: ICD-10-CM

## 2021-06-10 DIAGNOSIS — M24.811 INTERNAL DERANGEMENT OF RIGHT SHOULDER: Primary | ICD-10-CM
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
## 2021-06-10 DIAGNOSIS — S46.011D TRAUMATIC TEAR OF RIGHT ROTATOR CUFF, UNSPECIFIED TEAR EXTENT, SUBSEQUENT ENCOUNTER: ICD-10-CM

## 2021-06-10 PROCEDURE — 99214 OFFICE O/P EST MOD 30 MIN: CPT | Performed by: ORTHOPAEDIC SURGERY
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
## 2021-06-10 NOTE — LETTER
Johnna 10, 2021     Patient: Felix Nash   YOB: 1960   Date of Visit: 6/10/2021       To Whom it May Concern:    Cele Iyer is under my professional care  He was seen in my office on 6/10/2021  He may return to work on 6/14/21  He should not lift, push, or pull anything above 10 lbs  If you have any questions or concerns, please don't hesitate to call           Sincerely,          Laila Guerrero, DO        CC: No Recipients

## 2021-06-11 ENCOUNTER — OFFICE VISIT (OUTPATIENT)
Dept: FAMILY MEDICINE CLINIC | Facility: CLINIC | Age: 61
End: 2021-06-11
Payer: COMMERCIAL

## 2021-06-11 VITALS
DIASTOLIC BLOOD PRESSURE: 84 MMHG | BODY MASS INDEX: 24.95 KG/M2 | TEMPERATURE: 97.9 F | HEIGHT: 74 IN | RESPIRATION RATE: 16 BRPM | HEART RATE: 72 BPM | SYSTOLIC BLOOD PRESSURE: 142 MMHG | WEIGHT: 194.4 LBS

## 2021-06-11 DIAGNOSIS — T07.XXXA ABRASIONS OF MULTIPLE SITES: Primary | ICD-10-CM

## 2021-06-11 DIAGNOSIS — M25.511 ACUTE PAIN OF RIGHT SHOULDER: ICD-10-CM

## 2021-06-11 PROCEDURE — 99212 OFFICE O/P EST SF 10 MIN: CPT | Performed by: FAMILY MEDICINE

## 2021-06-11 NOTE — PROGRESS NOTES
Assessment and Plan:    Problem List Items Addressed This Visit     None                 Diagnoses and all orders for this visit:    Abrasions of multiple sites              Subjective:      Patient ID: Sonya Sexton is a 62 y o  male  CC:    Chief Complaint   Patient presents with    Dressing Change     Pt here for wound dressing       HPI:    Abrasions slowly healing, hands look better, saw ortho and getting MRI r knee and r shoulder      The following portions of the patient's history were reviewed and updated as appropriate: allergies, current medications, past family history, past medical history, past social history, past surgical history and problem list       Review of Systems   Skin: Positive for wound  Multiple abrasions         Data to review:       Objective:    Vitals:    06/11/21 1035   BP: 142/84   BP Location: Left arm   Patient Position: Sitting   Cuff Size: Adult   Pulse: 72   Resp: 16   Temp: 97 9 °F (36 6 °C)   TempSrc: Tympanic   Weight: 88 2 kg (194 lb 6 4 oz)   Height: 6' 2" (1 88 m)        Physical Exam  Vitals signs reviewed  Constitutional:       Appearance: Normal appearance  Skin:     Findings: Rash present  Comments: Abrasions knees, hands and face slowly improving   Neurological:      Mental Status: He is alert

## 2021-06-17 ENCOUNTER — OFFICE VISIT (OUTPATIENT)
Dept: FAMILY MEDICINE CLINIC | Facility: CLINIC | Age: 61
End: 2021-06-17
Payer: COMMERCIAL

## 2021-06-17 VITALS
HEART RATE: 70 BPM | WEIGHT: 192 LBS | HEIGHT: 74 IN | SYSTOLIC BLOOD PRESSURE: 128 MMHG | DIASTOLIC BLOOD PRESSURE: 86 MMHG | BODY MASS INDEX: 24.64 KG/M2 | RESPIRATION RATE: 16 BRPM

## 2021-06-17 DIAGNOSIS — L03.116 CELLULITIS OF LEFT LOWER EXTREMITY: Primary | ICD-10-CM

## 2021-06-17 PROCEDURE — 99213 OFFICE O/P EST LOW 20 MIN: CPT | Performed by: FAMILY MEDICINE

## 2021-06-17 RX ORDER — CEPHALEXIN 500 MG/1
500 CAPSULE ORAL EVERY 8 HOURS SCHEDULED
Qty: 15 CAPSULE | Refills: 0 | Status: SHIPPED | OUTPATIENT
Start: 2021-06-17 | End: 2021-06-22

## 2021-06-17 NOTE — PROGRESS NOTES
Assessment and Plan:    Problem List Items Addressed This Visit     None                 Diagnoses and all orders for this visit:    Cellulitis of left lower extremity  -     cephalexin (KEFLEX) 500 mg capsule; Take 1 capsule (500 mg total) by mouth every 8 (eight) hours for 5 days              Subjective:      Patient ID: Joi Zuniga is a 62 y o  male  CC:    Chief Complaint   Patient presents with    Follow-up       HPI:    F/u multiple abrasions, has some redness  l knee, no real drainage      The following portions of the patient's history were reviewed and updated as appropriate: allergies, current medications, past family history, past medical history, past social history, past surgical history and problem list       Review of Systems   Constitutional: Negative for activity change, appetite change and fatigue  Respiratory: Negative for shortness of breath  Cardiovascular: Negative for chest pain  Skin: Positive for wound  Neurological: Negative for dizziness and headaches  Data to review:       Objective:    Vitals:    06/17/21 1044   BP: 128/86   BP Location: Left arm   Patient Position: Sitting   Cuff Size: Adult   Pulse: 70   Resp: 16   Weight: 87 1 kg (192 lb)   Height: 6' 2" (1 88 m)        Physical Exam  Vitals reviewed  Constitutional:       Appearance: Normal appearance  Skin:     Findings: Erythema present  Comments: All abrasions slowly healing, some redness l knee   Neurological:      Mental Status: He is alert

## 2021-07-01 ENCOUNTER — APPOINTMENT (OUTPATIENT)
Dept: LAB | Age: 61
End: 2021-07-01
Payer: COMMERCIAL

## 2021-07-01 ENCOUNTER — HOSPITAL ENCOUNTER (OUTPATIENT)
Dept: RADIOLOGY | Age: 61
Discharge: HOME | End: 2021-07-01
Payer: COMMERCIAL
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
## 2021-07-01 DIAGNOSIS — M23.91 ACUTE INTERNAL DERANGEMENT OF RIGHT KNEE: ICD-10-CM

## 2021-07-01 DIAGNOSIS — S46.011D TRAUMATIC TEAR OF RIGHT ROTATOR CUFF, UNSPECIFIED TEAR EXTENT, SUBSEQUENT ENCOUNTER: ICD-10-CM

## 2021-07-01 DIAGNOSIS — Z00.8 HEALTH EXAMINATION IN POPULATION SURVEY: ICD-10-CM

## 2021-07-01 DIAGNOSIS — M24.811 INTERNAL DERANGEMENT OF RIGHT SHOULDER: ICD-10-CM

## 2021-07-01 LAB
CHOLEST SERPL-MCNC: 261 MG/DL (ref 50–200)
EST. AVERAGE GLUCOSE BLD GHB EST-MCNC: 114 MG/DL
HBA1C MFR BLD: 5.6 %
HDLC SERPL-MCNC: 47 MG/DL
LDLC SERPL CALC-MCNC: 152 MG/DL (ref 0–100)
NONHDLC SERPL-MCNC: 214 MG/DL
TRIGL SERPL-MCNC: 311 MG/DL

## 2021-07-01 PROCEDURE — G1004 CDSM NDSC: HCPCS

## 2021-07-01 PROCEDURE — 73221 MRI JOINT UPR EXTREM W/O DYE: CPT

## 2021-07-01 PROCEDURE — 80061 LIPID PANEL: CPT

## 2021-07-01 PROCEDURE — 83036 HEMOGLOBIN GLYCOSYLATED A1C: CPT

## 2021-07-01 PROCEDURE — 73721 MRI JNT OF LWR EXTRE W/O DYE: CPT

## 2021-07-01 PROCEDURE — 36415 COLL VENOUS BLD VENIPUNCTURE: CPT

## 2021-07-12 ENCOUNTER — OFFICE VISIT (OUTPATIENT)
Dept: OBGYN CLINIC | Facility: MEDICAL CENTER | Age: 61
End: 2021-07-12
Payer: COMMERCIAL

## 2021-07-12 VITALS
WEIGHT: 192 LBS | BODY MASS INDEX: 24.64 KG/M2 | HEART RATE: 68 BPM | HEIGHT: 74 IN | SYSTOLIC BLOOD PRESSURE: 133 MMHG | DIASTOLIC BLOOD PRESSURE: 82 MMHG

## 2021-07-12 DIAGNOSIS — S46.011A TRAUMATIC INCOMPLETE TEAR OF RIGHT ROTATOR CUFF, INITIAL ENCOUNTER: Primary | ICD-10-CM

## 2021-07-12 DIAGNOSIS — M17.11 PRIMARY OSTEOARTHRITIS OF RIGHT KNEE: ICD-10-CM

## 2021-07-12 PROCEDURE — 99213 OFFICE O/P EST LOW 20 MIN: CPT | Performed by: ORTHOPAEDIC SURGERY
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
## 2021-10-26 ENCOUNTER — OFFICE VISIT (OUTPATIENT)
Dept: OBGYN CLINIC | Facility: HOSPITAL | Age: 61
End: 2021-10-26
Payer: COMMERCIAL

## 2021-10-26 VITALS
HEIGHT: 74 IN | SYSTOLIC BLOOD PRESSURE: 139 MMHG | BODY MASS INDEX: 25.74 KG/M2 | WEIGHT: 200.6 LBS | DIASTOLIC BLOOD PRESSURE: 79 MMHG | HEART RATE: 87 BPM

## 2021-10-26 DIAGNOSIS — M17.0 BILATERAL PRIMARY OSTEOARTHRITIS OF KNEE: Primary | ICD-10-CM

## 2021-10-26 DIAGNOSIS — M24.811 INTERNAL DERANGEMENT OF RIGHT SHOULDER: ICD-10-CM

## 2021-10-26 PROCEDURE — 99214 OFFICE O/P EST MOD 30 MIN: CPT | Performed by: ORTHOPAEDIC SURGERY

## 2021-11-04 ENCOUNTER — OFFICE VISIT (OUTPATIENT)
Dept: OBGYN CLINIC | Facility: OTHER | Age: 61
End: 2021-11-04
Payer: COMMERCIAL

## 2021-11-04 ENCOUNTER — APPOINTMENT (OUTPATIENT)
Dept: RADIOLOGY | Facility: OTHER | Age: 61
End: 2021-11-04
Payer: COMMERCIAL

## 2021-11-04 ENCOUNTER — TELEPHONE (OUTPATIENT)
Dept: FAMILY MEDICINE CLINIC | Facility: CLINIC | Age: 61
End: 2021-11-04

## 2021-11-04 VITALS
HEART RATE: 72 BPM | HEIGHT: 74 IN | DIASTOLIC BLOOD PRESSURE: 84 MMHG | WEIGHT: 198.6 LBS | SYSTOLIC BLOOD PRESSURE: 129 MMHG | BODY MASS INDEX: 25.49 KG/M2

## 2021-11-04 DIAGNOSIS — M25.511 ACUTE PAIN OF RIGHT SHOULDER: Primary | ICD-10-CM

## 2021-11-04 DIAGNOSIS — M25.511 RIGHT SHOULDER PAIN, UNSPECIFIED CHRONICITY: ICD-10-CM

## 2021-11-04 DIAGNOSIS — M75.101 TEAR OF RIGHT SUPRASPINATUS TENDON: ICD-10-CM

## 2021-11-04 DIAGNOSIS — S43.431A LABRAL TEAR OF SHOULDER, RIGHT, INITIAL ENCOUNTER: ICD-10-CM

## 2021-11-04 PROCEDURE — 99214 OFFICE O/P EST MOD 30 MIN: CPT | Performed by: ORTHOPAEDIC SURGERY

## 2021-12-02 ENCOUNTER — EVALUATION (OUTPATIENT)
Dept: PHYSICAL THERAPY | Facility: REHABILITATION | Age: 61
End: 2021-12-02
Payer: COMMERCIAL

## 2021-12-02 DIAGNOSIS — M75.101 TEAR OF RIGHT SUPRASPINATUS TENDON: Primary | ICD-10-CM

## 2021-12-02 DIAGNOSIS — M25.511 ACUTE PAIN OF RIGHT SHOULDER: ICD-10-CM

## 2021-12-02 DIAGNOSIS — S43.431A LABRAL TEAR OF SHOULDER, RIGHT, INITIAL ENCOUNTER: ICD-10-CM

## 2021-12-02 PROCEDURE — 97110 THERAPEUTIC EXERCISES: CPT

## 2021-12-02 PROCEDURE — 97162 PT EVAL MOD COMPLEX 30 MIN: CPT

## 2021-12-15 ENCOUNTER — OFFICE VISIT (OUTPATIENT)
Dept: PHYSICAL THERAPY | Facility: REHABILITATION | Age: 61
End: 2021-12-15
Payer: COMMERCIAL

## 2021-12-15 DIAGNOSIS — M25.511 ACUTE PAIN OF RIGHT SHOULDER: ICD-10-CM

## 2021-12-15 DIAGNOSIS — S43.431A LABRAL TEAR OF SHOULDER, RIGHT, INITIAL ENCOUNTER: ICD-10-CM

## 2021-12-15 DIAGNOSIS — M75.101 TEAR OF RIGHT SUPRASPINATUS TENDON: Primary | ICD-10-CM

## 2021-12-15 PROCEDURE — 97112 NEUROMUSCULAR REEDUCATION: CPT

## 2021-12-15 PROCEDURE — 97110 THERAPEUTIC EXERCISES: CPT

## 2021-12-21 ENCOUNTER — APPOINTMENT (OUTPATIENT)
Dept: PHYSICAL THERAPY | Facility: REHABILITATION | Age: 61
End: 2021-12-21
Payer: COMMERCIAL

## 2021-12-27 ENCOUNTER — APPOINTMENT (INPATIENT)
Dept: NON INVASIVE DIAGNOSTICS | Facility: HOSPITAL | Age: 61
DRG: 176 | End: 2021-12-27
Payer: COMMERCIAL

## 2021-12-27 ENCOUNTER — HOSPITAL ENCOUNTER (INPATIENT)
Facility: HOSPITAL | Age: 61
LOS: 3 days | Discharge: HOME | DRG: 176 | End: 2021-12-30
Attending: EMERGENCY MEDICINE | Admitting: INTERNAL MEDICINE
Payer: COMMERCIAL

## 2021-12-27 ENCOUNTER — APPOINTMENT (EMERGENCY)
Dept: RADIOLOGY | Facility: HOSPITAL | Age: 61
DRG: 176 | End: 2021-12-27
Payer: COMMERCIAL

## 2021-12-27 DIAGNOSIS — I26.99 PULMONARY EMBOLISM (HCC): Primary | ICD-10-CM

## 2021-12-27 LAB
2HR DELTA HS TROPONIN: 0 NG/L
4HR DELTA HS TROPONIN: 2 NG/L
ALBUMIN SERPL BCP-MCNC: 3.9 G/DL (ref 3.5–5)
ALP SERPL-CCNC: 75 U/L (ref 46–116)
ALT SERPL W P-5'-P-CCNC: 31 U/L (ref 12–78)
ANION GAP SERPL CALCULATED.3IONS-SCNC: 7 MMOL/L (ref 4–13)
AORTIC ROOT: 3.4 CM
APICAL FOUR CHAMBER EJECTION FRACTION: 67 %
APTT PPP: 34 SECONDS (ref 23–37)
APTT PPP: 98 SECONDS (ref 23–37)
ASCENDING AORTA: 3.3 CM
AST SERPL W P-5'-P-CCNC: 23 U/L (ref 5–45)
ATRIAL RATE: 113 BPM
BASOPHILS # BLD AUTO: 0.06 THOUSANDS/ΜL (ref 0–0.1)
BASOPHILS NFR BLD AUTO: 1 % (ref 0–1)
BILIRUB SERPL-MCNC: 0.49 MG/DL (ref 0.2–1)
BUN SERPL-MCNC: 22 MG/DL (ref 5–25)
CALCIUM SERPL-MCNC: 9.4 MG/DL (ref 8.3–10.1)
CARDIAC TROPONIN I PNL SERPL HS: 3 NG/L
CARDIAC TROPONIN I PNL SERPL HS: 3 NG/L
CARDIAC TROPONIN I PNL SERPL HS: 5 NG/L
CHLORIDE SERPL-SCNC: 104 MMOL/L (ref 100–108)
CO2 SERPL-SCNC: 26 MMOL/L (ref 21–32)
CREAT SERPL-MCNC: 1.44 MG/DL (ref 0.6–1.3)
D DIMER PPP FEU-MCNC: 3.22 UG/ML FEU
E WAVE DECELERATION TIME: 146 MS
EOSINOPHIL # BLD AUTO: 0.06 THOUSAND/ΜL (ref 0–0.61)
EOSINOPHIL NFR BLD AUTO: 1 % (ref 0–6)
ERYTHROCYTE [DISTWIDTH] IN BLOOD BY AUTOMATED COUNT: 12.8 % (ref 11.6–15.1)
FIBRINOGEN PPP-MCNC: 655 MG/DL (ref 227–495)
FLUAV RNA RESP QL NAA+PROBE: NEGATIVE
FLUBV RNA RESP QL NAA+PROBE: NEGATIVE
FRACTIONAL SHORTENING: 32 % (ref 28–44)
GFR SERPL CREATININE-BSD FRML MDRD: 52 ML/MIN/1.73SQ M
GLUCOSE SERPL-MCNC: 126 MG/DL (ref 65–140)
HCT VFR BLD AUTO: 47.5 % (ref 36.5–49.3)
HGB BLD-MCNC: 15.8 G/DL (ref 12–17)
IMM GRANULOCYTES # BLD AUTO: 0.09 THOUSAND/UL (ref 0–0.2)
IMM GRANULOCYTES NFR BLD AUTO: 1 % (ref 0–2)
INR PPP: 1.15 (ref 0.84–1.19)
INR PPP: 1.4 (ref 0.84–1.19)
INTERVENTRICULAR SEPTUM IN DIASTOLE (PARASTERNAL SHORT AXIS VIEW): 1 CM
LEFT ATRIUM AREA SYSTOLE SINGLE PLANE A4C: 11.7 CM2
LEFT INTERNAL DIMENSION IN SYSTOLE: 3.2 CM (ref 2.1–4)
LEFT VENTRICULAR INTERNAL DIMENSION IN DIASTOLE: 4.7 CM (ref 5.88–8.77)
LEFT VENTRICULAR POSTERIOR WALL IN END DIASTOLE: 1 CM
LEFT VENTRICULAR STROKE VOLUME: 59 ML
LIPASE SERPL-CCNC: 291 U/L (ref 73–393)
LYMPHOCYTES # BLD AUTO: 2.29 THOUSANDS/ΜL (ref 0.6–4.47)
LYMPHOCYTES NFR BLD AUTO: 19 % (ref 14–44)
MCH RBC QN AUTO: 30.9 PG (ref 26.8–34.3)
MCHC RBC AUTO-ENTMCNC: 33.3 G/DL (ref 31.4–37.4)
MCV RBC AUTO: 93 FL (ref 82–98)
MONOCYTES # BLD AUTO: 1.28 THOUSAND/ΜL (ref 0.17–1.22)
MONOCYTES NFR BLD AUTO: 11 % (ref 4–12)
MV E'TISSUE VEL-SEP: 7 CM/S
MV PEAK A VEL: 0.64 M/S
MV PEAK E VEL: 49 CM/S
MV STENOSIS PRESSURE HALF TIME: 0 MS
NEUTROPHILS # BLD AUTO: 8 THOUSANDS/ΜL (ref 1.85–7.62)
NEUTS SEG NFR BLD AUTO: 67 % (ref 43–75)
NRBC BLD AUTO-RTO: 0 /100 WBCS
P AXIS: 48 DEGREES
PLATELET # BLD AUTO: 239 THOUSANDS/UL (ref 149–390)
PLATELET # BLD AUTO: 252 THOUSANDS/UL (ref 149–390)
PMV BLD AUTO: 11 FL (ref 8.9–12.7)
PMV BLD AUTO: 11.4 FL (ref 8.9–12.7)
POTASSIUM SERPL-SCNC: 4 MMOL/L (ref 3.5–5.3)
PR INTERVAL: 166 MS
PROT SERPL-MCNC: 8.1 G/DL (ref 6.4–8.2)
PROTHROMBIN TIME: 14.2 SECONDS (ref 11.6–14.5)
PROTHROMBIN TIME: 16.5 SECONDS (ref 11.6–14.5)
QRS AXIS: 14 DEGREES
QRSD INTERVAL: 76 MS
QT INTERVAL: 304 MS
QTC INTERVAL: 416 MS
RBC # BLD AUTO: 5.12 MILLION/UL (ref 3.88–5.62)
RIGHT ATRIUM AREA SYSTOLE A4C: 9.1 CM2
RIGHT VENTRICLE ID DIMENSION: 2.2 CM
RSV RNA RESP QL NAA+PROBE: NEGATIVE
SARS-COV-2 RNA RESP QL NAA+PROBE: NEGATIVE
SL CV LV EF: 65
SL CV PED ECHO LEFT VENTRICLE DIASTOLIC VOLUME (MOD BIPLANE) 2D: 100 ML
SL CV PED ECHO LEFT VENTRICLE SYSTOLIC VOLUME (MOD BIPLANE) 2D: 41 ML
SODIUM SERPL-SCNC: 137 MMOL/L (ref 136–145)
T WAVE AXIS: 52 DEGREES
TR MAX PG: 20 MMHG
TRICUSPID VALVE PEAK REGURGITATION VELOCITY: 2.28 M/S
TRICUSPID VALVE S': 0.5 CM/S
TV PEAK GRADIENT: 21 MMHG
VENTRICULAR RATE: 113 BPM
WBC # BLD AUTO: 11.78 THOUSAND/UL (ref 4.31–10.16)
Z-SCORE OF LEFT VENTRICULAR DIMENSION IN END SYSTOLE: -4.24

## 2021-12-27 PROCEDURE — 85384 FIBRINOGEN ACTIVITY: CPT | Performed by: INTERNAL MEDICINE

## 2021-12-27 PROCEDURE — 71275 CT ANGIOGRAPHY CHEST: CPT

## 2021-12-27 PROCEDURE — 93010 ELECTROCARDIOGRAM REPORT: CPT | Performed by: INTERNAL MEDICINE

## 2021-12-27 PROCEDURE — 99285 EMERGENCY DEPT VISIT HI MDM: CPT

## 2021-12-27 PROCEDURE — 93306 TTE W/DOPPLER COMPLETE: CPT

## 2021-12-27 PROCEDURE — 85025 COMPLETE CBC W/AUTO DIFF WBC: CPT | Performed by: INTERNAL MEDICINE

## 2021-12-27 PROCEDURE — 93005 ELECTROCARDIOGRAM TRACING: CPT

## 2021-12-27 PROCEDURE — 85730 THROMBOPLASTIN TIME PARTIAL: CPT

## 2021-12-27 PROCEDURE — 0241U HB NFCT DS VIR RESP RNA 4 TRGT: CPT | Performed by: EMERGENCY MEDICINE

## 2021-12-27 PROCEDURE — 85730 THROMBOPLASTIN TIME PARTIAL: CPT | Performed by: INTERNAL MEDICINE

## 2021-12-27 PROCEDURE — 85379 FIBRIN DEGRADATION QUANT: CPT | Performed by: INTERNAL MEDICINE

## 2021-12-27 PROCEDURE — 36415 COLL VENOUS BLD VENIPUNCTURE: CPT | Performed by: INTERNAL MEDICINE

## 2021-12-27 PROCEDURE — 74174 CTA ABD&PLVS W/CONTRAST: CPT

## 2021-12-27 PROCEDURE — 93970 EXTREMITY STUDY: CPT

## 2021-12-27 PROCEDURE — 93306 TTE W/DOPPLER COMPLETE: CPT | Performed by: INTERNAL MEDICINE

## 2021-12-27 PROCEDURE — 99291 CRITICAL CARE FIRST HOUR: CPT | Performed by: EMERGENCY MEDICINE

## 2021-12-27 PROCEDURE — 80053 COMPREHEN METABOLIC PANEL: CPT | Performed by: INTERNAL MEDICINE

## 2021-12-27 PROCEDURE — 93970 EXTREMITY STUDY: CPT | Performed by: SURGERY

## 2021-12-27 PROCEDURE — G1004 CDSM NDSC: HCPCS

## 2021-12-27 PROCEDURE — 85610 PROTHROMBIN TIME: CPT | Performed by: INTERNAL MEDICINE

## 2021-12-27 PROCEDURE — 85049 AUTOMATED PLATELET COUNT: CPT | Performed by: INTERNAL MEDICINE

## 2021-12-27 PROCEDURE — 99223 1ST HOSP IP/OBS HIGH 75: CPT | Performed by: INTERNAL MEDICINE

## 2021-12-27 PROCEDURE — 84484 ASSAY OF TROPONIN QUANT: CPT | Performed by: INTERNAL MEDICINE

## 2021-12-27 PROCEDURE — 83690 ASSAY OF LIPASE: CPT | Performed by: INTERNAL MEDICINE

## 2021-12-27 RX ORDER — HEPARIN SODIUM 1000 [USP'U]/ML
3600 INJECTION, SOLUTION INTRAVENOUS; SUBCUTANEOUS
Status: DISCONTINUED | OUTPATIENT
Start: 2021-12-27 | End: 2021-12-30 | Stop reason: HOSPADM

## 2021-12-27 RX ORDER — HEPARIN SODIUM 1000 [USP'U]/ML
7200 INJECTION, SOLUTION INTRAVENOUS; SUBCUTANEOUS ONCE
Status: COMPLETED | OUTPATIENT
Start: 2021-12-27 | End: 2021-12-27

## 2021-12-27 RX ORDER — ACETAMINOPHEN 325 MG/1
650 TABLET ORAL EVERY 6 HOURS PRN
Status: DISCONTINUED | OUTPATIENT
Start: 2021-12-27 | End: 2021-12-30 | Stop reason: HOSPADM

## 2021-12-27 RX ORDER — TRAMADOL HYDROCHLORIDE 50 MG/1
100 TABLET ORAL EVERY 6 HOURS PRN
Status: DISCONTINUED | OUTPATIENT
Start: 2021-12-27 | End: 2021-12-30 | Stop reason: HOSPADM

## 2021-12-27 RX ORDER — OXYCODONE HYDROCHLORIDE 5 MG/1
5 TABLET ORAL EVERY 4 HOURS PRN
Status: DISCONTINUED | OUTPATIENT
Start: 2021-12-27 | End: 2021-12-27

## 2021-12-27 RX ORDER — GABAPENTIN 300 MG/1
300 CAPSULE ORAL
Status: DISCONTINUED | OUTPATIENT
Start: 2021-12-27 | End: 2021-12-30 | Stop reason: HOSPADM

## 2021-12-27 RX ORDER — ONDANSETRON 4 MG/1
4 TABLET, ORALLY DISINTEGRATING ORAL EVERY 6 HOURS PRN
Status: DISCONTINUED | OUTPATIENT
Start: 2021-12-27 | End: 2021-12-30 | Stop reason: HOSPADM

## 2021-12-27 RX ORDER — SODIUM CHLORIDE 9 MG/ML
3 INJECTION INTRAVENOUS
Status: DISCONTINUED | OUTPATIENT
Start: 2021-12-27 | End: 2021-12-30 | Stop reason: HOSPADM

## 2021-12-27 RX ORDER — TRAMADOL HYDROCHLORIDE 50 MG/1
50 TABLET ORAL EVERY 6 HOURS PRN
Status: DISCONTINUED | OUTPATIENT
Start: 2021-12-27 | End: 2021-12-27

## 2021-12-27 RX ORDER — METHOCARBAMOL 500 MG/1
500 TABLET, FILM COATED ORAL EVERY 6 HOURS PRN
Status: DISCONTINUED | OUTPATIENT
Start: 2021-12-27 | End: 2021-12-30 | Stop reason: HOSPADM

## 2021-12-27 RX ORDER — HEPARIN SODIUM 10000 [USP'U]/100ML
3-30 INJECTION, SOLUTION INTRAVENOUS
Status: DISCONTINUED | OUTPATIENT
Start: 2021-12-27 | End: 2021-12-30 | Stop reason: HOSPADM

## 2021-12-27 RX ORDER — OXYCODONE HYDROCHLORIDE 5 MG/1
2.5 TABLET ORAL EVERY 4 HOURS PRN
Status: DISCONTINUED | OUTPATIENT
Start: 2021-12-27 | End: 2021-12-27

## 2021-12-27 RX ORDER — HEPARIN SODIUM 1000 [USP'U]/ML
7200 INJECTION, SOLUTION INTRAVENOUS; SUBCUTANEOUS
Status: DISCONTINUED | OUTPATIENT
Start: 2021-12-27 | End: 2021-12-30 | Stop reason: HOSPADM

## 2021-12-27 RX ORDER — ACETAMINOPHEN 325 MG/1
975 TABLET ORAL ONCE
Status: COMPLETED | OUTPATIENT
Start: 2021-12-27 | End: 2021-12-27

## 2021-12-27 RX ADMIN — HEPARIN SODIUM 18 UNITS/KG/HR: 10000 INJECTION, SOLUTION INTRAVENOUS at 13:46

## 2021-12-27 RX ADMIN — ACETAMINOPHEN 975 MG: 325 TABLET, FILM COATED ORAL at 11:36

## 2021-12-27 RX ADMIN — TRAMADOL HYDROCHLORIDE 50 MG: 50 TABLET, FILM COATED ORAL at 21:17

## 2021-12-27 RX ADMIN — HEPARIN SODIUM 7200 UNITS: 1000 INJECTION INTRAVENOUS; SUBCUTANEOUS at 13:46

## 2021-12-27 RX ADMIN — METHOCARBAMOL 500 MG: 500 TABLET, FILM COATED ORAL at 23:34

## 2021-12-27 RX ADMIN — GABAPENTIN 300 MG: 300 CAPSULE ORAL at 21:17

## 2021-12-27 RX ADMIN — IOHEXOL 100 ML: 350 INJECTION, SOLUTION INTRAVENOUS at 12:52

## 2021-12-27 RX ADMIN — ACETAMINOPHEN 650 MG: 325 TABLET, FILM COATED ORAL at 18:36

## 2021-12-27 NOTE — ED PROVIDER NOTES
History  Chief Complaint   Patient presents with    Shortness of Breath     "my back is killing me started saturday, sunday and today of a blood thinner due to hx of blood clots in lungs, i think thats whats happening again, i cant breathe in"      HPI  60-year-old male presents to the ED for evaluation of chest pain and shortness of breath  Patient reports symptoms started on Friday and gradually worsened on Saturday, Sunday and today  He reports his chest pain is worse with deep breathing  He reports the pain is constant but intermittently sharp and severe  He reports radiates to his back  He also reports lower quadrant abdominal pain  He states this feels like a previous PE  He states he had PE about 10 years ago  He was initially started on Coumadin then switched to Eliquis  And Saturday and Sunday he took 1 dose of left over Eliquis  He has not tried anything else for his pain  He is not COVID vaccinated  He denies fevers, chills or cough  Patient denies headache, visual changes, dizziness, palpitations, diarrhea, melena, hematochezia, dysuria, new skin rashes or numbness or tingling of the extremities  Prior to Admission Medications   Prescriptions Last Dose Informant Patient Reported? Taking?    Acetaminophen (TYLENOL PO)  Self Yes No   Sig: Take by mouth every 4 (four) hours as needed    Cholecalciferol (VITAMIN D) 50 MCG (2000 UT) CAPS  Self Yes No   Sig: Take by mouth   VITAMIN D PO  Self Yes No   Sig: Take by mouth   aspirin (ECOTRIN LOW STRENGTH) 81 mg EC tablet  Self Yes No   Sig: Take 81 mg by mouth daily    ciprofloxacin (CILOXAN) 0 3 % ophthalmic solution  Self No No   Sig: Administer 1 drop into the left eye every 4 (four) hours   Patient not taking: Reported on 11/4/2021   gabapentin (NEURONTIN) 300 mg capsule   No No   Sig: Take 1 capsule (300 mg total) by mouth daily at bedtime   ibuprofen (MOTRIN) 600 mg tablet  Self Yes No   Sig: Take by mouth every 6 (six) hours as needed for mild pain   Patient not taking: Reported on 10/26/2021   ibuprofen (MOTRIN) 600 mg tablet  Self No No   Sig: Take 1 tablet (600 mg total) by mouth every 8 (eight) hours for 10 days   mupirocin (BACTROBAN) 2 % ointment  Self No No   Sig: Apply topically daily   Patient not taking: Reported on 10/26/2021   nitroglycerin (NITROSTAT) 0 3 mg SL tablet  Self Yes No   Sig: Place 0 3 mg under the tongue every 5 (five) minutes as needed for chest pain   Patient not taking: Reported on 10/26/2021      Facility-Administered Medications: None       Past Medical History:   Diagnosis Date    Arthritis     Blood in stool     Disease of thyroid gland     Hyperlipidemia     Kidney stone     Pulmonary embolism (HCC)        Past Surgical History:   Procedure Laterality Date    COLONOSCOPY      HERNIA REPAIR Right 2009    With mesh    KNEE ARTHROSCOPY      [place] KNEE SCOPE,DIAGNOSTIC Left 6/14/2019    Procedure: ARTHROSCOPY KNEE;  Surgeon: Carlotta Rojas MD;  [facility];  Service: Orthopedics    WISDOM TOOTH EXTRACTION         Family History   Problem Relation Age of Onset    Stroke Mother         CVA    Breast cancer Mother     Diabetes Father         DM    Cancer Father     Melanoma Brother      I have reviewed and agree with the history as documented  E-Cigarette/Vaping    E-Cigarette Use Never User      E-Cigarette/Vaping Substances    Nicotine No     THC No     CBD No     Flavoring No     Other No     Unknown No      Social History     Tobacco Use    Smoking status: Never Smoker    Smokeless tobacco: Never Used   Vaping Use    Vaping Use: Never used   Substance Use Topics    Alcohol use: No    Drug use: No        Review of Systems   All other systems reviewed and are negative        Physical Exam  ED Triage Vitals [12/27/21 1041]   Temperature Pulse Respirations Blood Pressure SpO2   98 8 °F (37 1 °C) (!) 122 22 (!) 157/101 96 %      Temp Source Heart Rate Source Patient Position - Orthostatic VS BP Location FiO2 (%)   Tympanic Monitor Sitting Left arm --      Pain Score       7             Orthostatic Vital Signs  Vitals:    12/27/21 1041 12/27/21 1200 12/27/21 1300   BP: (!) 157/101 120/70 105/73   Pulse: (!) 122 (!) 108 102   Patient Position - Orthostatic VS: Sitting Sitting Sitting       Physical Exam  Constitutional:  Well developed, well nourished, appears uncomfortable  HEENT:  Conjunctiva normal  Oropharynx moist  Respiratory:  Tachypneic, oxygenating well on room air, normal breath sounds  Cardiovascular:  Tachycardic, normal rhythm, no murmurs  GI:  Soft, mildly distended, normal bowel sounds, tenderness to palpation over right and left lower quadrant  :  No costovertebral angle tenderness   Musculoskeletal:  No edema, no tenderness, no deformities  Integument:  Well hydrated, no rash   Lymphatic:  No lymphadenopathy noted   Neurologic:  Alert & oriented, normal motor function, normal sensory function, no focal deficits noted   Psychiatric:  Speech and behavior appropriate     ED Medications  Medications   sodium chloride (PF) 0 9 % injection 3 mL (has no administration in time range)   heparin (porcine) 25,000 units in 0 45% NaCl 250 mL infusion (premix) (18 Units/kg/hr × 90 kg (Order-Specific) Intravenous New Bag 12/27/21 1346)   heparin (porcine) injection 7,200 Units (has no administration in time range)   heparin (porcine) injection 3,600 Units (has no administration in time range)   acetaminophen (TYLENOL) tablet 975 mg (975 mg Oral Given 12/27/21 1136)   iohexol (OMNIPAQUE) 350 MG/ML injection (SINGLE-DOSE) 100 mL (100 mL Intravenous Given 12/27/21 1252)   heparin (porcine) injection 7,200 Units (7,200 Units Intravenous Given 12/27/21 1346)       Diagnostic Studies  Results Reviewed     Procedure Component Value Units Date/Time    APTT [[id number]] Collected: 12/27/21 1352    Lab Status:  In process Specimen: Blood from Arm, Left Updated: 12/27/21 1356    Protime-INR [[id number]] Collected: 12/27/21 1352    Lab Status: In process Specimen: Blood from Arm, Left Updated: 12/27/21 1356    HS Troponin I 2hr [[id number]] Collected: 12/27/21 1325    Lab Status: In process Specimen: Blood from Arm, Left Updated: 12/27/21 1331    COVID/FLU/RSV [[id number]] Collected: 12/27/21 1326    Lab Status:  In process Specimen: Nares from Nasopharyngeal Swab Updated: 12/27/21 1331    D-dimer, quantitative [[id number]]  (Abnormal) Collected: 12/27/21 1132    Lab Status: Final result Specimen: Blood from Arm, Left Updated: 12/27/21 1232     D-Dimer, Quant 3 22 ug/ml FEU     Comprehensive metabolic panel [[id number]]  (Abnormal) Collected: 12/27/21 1132    Lab Status: Final result Specimen: Blood from Arm, Left Updated: 12/27/21 1213     Sodium 137 mmol/L      Potassium 4 0 mmol/L      Chloride 104 mmol/L      CO2 26 mmol/L      ANION GAP 7 mmol/L      BUN 22 mg/dL      Creatinine 1 44 mg/dL      Glucose 126 mg/dL      Calcium 9 4 mg/dL      AST 23 U/L      ALT 31 U/L      Alkaline Phosphatase 75 U/L      Total Protein 8 1 g/dL      Albumin 3 9 g/dL      Total Bilirubin 0 49 mg/dL      eGFR 52 ml/min/1 73sq m     Narrative:      [place] guidelines for Chronic Kidney Disease (CKD):     Stage 1 with normal or high GFR (GFR > 90 mL/min/1 73 square meters)    Stage 2 Mild CKD (GFR = 60-89 mL/min/1 73 square meters)    Stage 3A Moderate CKD (GFR = 45-59 mL/min/1 73 square meters)    Stage 3B Moderate CKD (GFR = 30-44 mL/min/1 73 square meters)    Stage 4 Severe CKD (GFR = 15-29 mL/min/1 73 square meters)    Stage 5 End Stage CKD (GFR <15 mL/min/1 73 square meters)  Note: GFR calculation is accurate only with a steady state creatinine    Lipase [[id number]]  (Normal) Collected: 12/27/21 1132    Lab Status: Final result Specimen: Blood from Arm, Left Updated: 12/27/21 1213     Lipase 291 u/L     HS Troponin 0hr (reflex protocol) [[id number]]  (Normal) Collected: 12/27/21 1132    Lab Status: Final result Specimen: Blood from Arm, Left Updated: 12/27/21 1209     hs TnI 0hr 3 ng/L     HS Troponin I 4hr [[id number]]     Lab Status: No result Specimen: Blood     CBC and differential [[id number]]  (Abnormal) Collected: 12/27/21 1132    Lab Status: Final result Specimen: Blood from Arm, Left Updated: 12/27/21 1142     WBC 11 78 Thousand/uL      RBC 5 12 Million/uL      Hemoglobin 15 8 g/dL      Hematocrit 47 5 %      MCV 93 fL      MCH 30 9 pg      MCHC 33 3 g/dL      RDW 12 8 %      MPV 11 0 fL      Platelets 821 Thousands/uL      nRBC 0 /100 WBCs      Neutrophils Relative 67 %      Immat GRANS % 1 %      Lymphocytes Relative 19 %      Monocytes Relative 11 %      Eosinophils Relative 1 %      Basophils Relative 1 %      Neutrophils Absolute 8 00 Thousands/µL      Immature Grans Absolute 0 09 Thousand/uL      Lymphocytes Absolute 2 29 Thousands/µL      Monocytes Absolute 1 28 Thousand/µL      Eosinophils Absolute 0 06 Thousand/µL      Basophils Absolute 0 06 Thousands/µL                  CTA dissection protocol chest/abdomen/pelvis   Final Result by Miroslava Keen DO (12/27 9448)      1  Moderately extensive burden of bilateral segmental/subsegmental pulmonary emboli  No evidence for RV strain  2   No evidence of aortic dissection or aneurysm  3   No acute intra-abdominal abnormality  4   Nonobstructing right renal calculi  Additional incidental findings as above  I personally discussed this study with Dr Bulmaro Jiménez on 12/27/2021 at 1:26 PM                Workstation performed: [id number]               Procedures  Procedures      ED Course  ED Course as of 12/27/21 1402   Mon Dec 27, 2021   1343 Admitted to [facility] for PE                             SBIRT 20yo+      Most Recent Value   SBIRT (23 yo +)    In order to provide better care to our patients, we are screening all of our patients for alcohol and drug use   Would it be okay to ask you these screening questions? No Filed at: 12/27/2021 1152                MDM  Number of Diagnoses or Management Options  Pulmonary embolism [facility])  Diagnosis management comments: 60-year-old male presents to the ED for evaluation of chest pain and shortness of breath  Patient found to have bilateral segmental pulmonary emboli  Patient started on high-dose heparin in the ER  Admitted to medicine for further management       Amount and/or Complexity of Data Reviewed  Clinical lab tests: ordered and reviewed  Tests in the radiology section of CPT®: ordered and reviewed  Discussion of test results with the performing providers: yes  Review and summarize past medical records: yes  Discuss the patient with other providers: yes    Risk of Complications, Morbidity, and/or Mortality  Presenting problems: high  Diagnostic procedures: high  Management options: high    Patient Progress  Patient progress: stable      Disposition  Final diagnoses:   Pulmonary embolism ([address] )     Time reflects when diagnosis was documented in both MDM as applicable and the Disposition within this note     Time User Action Codes Description Comment    12/27/2021  1:41 PM Daryle Maki Add [I26 [id number]] Pulmonary embolism [facility])       ED Disposition     ED Disposition Condition Date/Time Comment    Admit Stable Mon Dec 27, 2021  1:41 PM Case was discussed with LADAN and the patient's admission status was agreed to be Admission Status: inpatient status to the service of Dr Keanu Sullivan   Follow-up Information    None         Patient's Medications   Discharge Prescriptions    No medications on file     No discharge procedures on file  PDMP Review     None           ED Provider  Attending physically available and evaluated Deepthi Dawn I managed the patient along with the ED Attending      Electronically Signed by         Virginia Medina MD  12/27/21 6031

## 2021-12-27 NOTE — ASSESSMENT & PLAN NOTE
· Likely due to discontinuing his Eliquis over the summer, he does state that he was checked for thrombophilia sinuses been negative in the past, given his recurrence this may need to be repeated  · Admit to inpatient given the degree of pulmonary emboli burden  · Continue IV heparin with eventual transition to Eliquis  · Check echocardiogram to evaluate for right heart strain  · Check lower extremity Dopplers to evaluate for residual proximal DVT  · Monitor hemoglobin and platelets while on IV heparin  · Currently hemodynamically stable and on room air

## 2021-12-27 NOTE — ED ATTENDING ATTESTATION
12/27/2021  I, Nando Cruz MD, saw and evaluated the patient  I have discussed the patient with the resident/non-physician practitioner and agree with the resident's/non-physician practitioner's findings, Plan of Care, and MDM as documented in the resident's/non-physician practitioner's note, except where noted  All available labs and Radiology studies were reviewed  I was present for key portions of any procedure(s) performed by the resident/non-physician practitioner and I was immediately available to provide assistance  At this point I agree with the current assessment done in the Emergency Department  I have conducted an independent evaluation of this patient a history and physical is as follows:    OA: 61 Y/o m with h/o PE, not currently anticoagulated who presented with R calf pain now with SOB and CP  Pt does have left over eliquis from 10 years ago and did take two doses over the weekend when his symptoms began  CP is described as sharp and constant with intermittent worsening and radiating to his back  Sxms similar to previous PE 10 years ago  Denies any fevers/chills/cough/congestion  Pt is not covid vaccinated  PE, uncomfortable appearing, tachycardic, NC/AT, MMM, pink conjunctiva, neck supple/FROM, - murmurs, lungs CTAB, -w/r, abd soft, + mild ttp over lower abdomen, -r/g, - mass, - CVA ttp, - LE edema, + R lower calf ttp, intact distal pulses and capillary refill < 2 sec, AAO  A/p CP and SOB  Eval with labs, cardiac eval    ED Course     + PE on CTA, RV ratio < 9  Will send hypercoaguable workup and start on heparin   [address] Time  CriticalCare Time  Performed by: Nando Cruz MD  Authorized by: Nando Cruz MD     Critical care provider statement:     Critical care time (minutes):  31    Critical care time was exclusive of:  Separately billable procedures and treating other patients and teaching time    Critical care was necessary to treat or prevent imminent or life-threatening deterioration of the following conditions:  Circulatory failure and cardiac failure    Critical care was time spent personally by me on the following activities:  Blood draw for specimens, obtaining history from patient or surrogate, development of treatment plan with patient or surrogate, discussions with consultants, evaluation of patient's response to treatment, examination of patient, ordering and performing treatments and interventions, ordering and review of laboratory studies, ordering and review of radiographic studies, re-evaluation of patient's condition and review of old charts

## 2021-12-27 NOTE — H&P
[facility]  H&P- Corey Steel 1960, 62 y o  male MRN: [id number]  Unit/Bed#: ED 14 Encounter: [id number]  Primary Care Provider: Barclay Osler, MD   Date and time admitted to hospital: 12/27/2021 10:43 AM    * Acute pulmonary embolism without acute cor pulmonale (HCC)  Assessment & Plan  · Likely due to discontinuing his Eliquis over the summer, he does state that he was checked for thrombophilia sinuses been negative in the past, given his recurrence this may need to be repeated  · Admit to inpatient given the degree of pulmonary emboli burden  · Continue IV heparin with eventual transition to Eliquis  · Check echocardiogram to evaluate for right heart strain  · Check lower extremity Dopplers to evaluate for residual proximal DVT  · Monitor hemoglobin and platelets while on IV heparin  · Currently hemodynamically stable and on room air  Chronic pain of left knee  Assessment & Plan  · Continue gabapentin at bedtime, and p r n  Tylenol      VTE Pharmacologic Prophylaxis:   Moderate Risk (Score 3-4) - Pharmacological DVT Prophylaxis Ordered: heparin drip  Code Status: Level 1 - Full Code   Discussion with family: Patient declined call to   Anticipated Length of Stay: Patient will be admitted on an inpatient basis with an anticipated length of stay of greater than 2 midnights secondary to acute PE  Total Time for Visit, including Counseling / Coordination of Care: 45 minutes Greater than 50% of this total time spent on direct patient counseling and coordination of care  Chief Complaint:  Shortness of breath and right leg pain    History of Present Illness:  Corey Steel is a 62 y o  male with a PMH of pulmonary embolism who presents with shortness of breath and right leg pain  Patient reports that he stopped his Eliquis over the summer to participate and motorcycle riding, to reduce his risk of mortality if he accident    Over the [place] holiday he did note that he had some of right calf pain, he suspected a DVT and restarted his Eliquis despite that the following day he developed shortness of breath and presented to the ED  He had a CT angiogram of the chest performed with acute PE with moderate clot burden, he was referred to Internal Medicine for admission and further evaluation  Currently he does have some shortness of breath but denies any cough, hemoptysis  He denies any recent immobilization  Denies any history of thrombophilia stating that he was evaluated for this when he had his prior pulmonary emboli, and this was negative  He does report chronic osteoarthritis pain  Review of Systems:  Review of Systems   All other systems reviewed and are negative  Past Medical and Surgical History:   Past Medical History:   Diagnosis Date    Arthritis     Blood in stool     Disease of thyroid gland     Hyperlipidemia     Kidney stone     Pulmonary embolism [facility])        Past Surgical History:   Procedure Laterality Date    COLONOSCOPY      HERNIA REPAIR Right 2009    With mesh    KNEE ARTHROSCOPY      PA KNEE SCOPE,DIAGNOSTIC Left 6/14/2019    Procedure: ARTHROSCOPY KNEE;  Surgeon: Kaci Woo MD;  Location: [facility];  Service: Orthopedics    WISDOM TOOTH EXTRACTION         Meds/Allergies:  Prior to Admission medications    Medication Sig Start Date End Date Taking?  Authorizing Provider   Acetaminophen (TYLENOL PO) Take by mouth every 4 (four) hours as needed     Historical Provider, MD   aspirin (ECOTRIN LOW STRENGTH) 81 mg EC tablet Take 81 mg by mouth daily     Historical Provider, MD   Cholecalciferol (VITAMIN D) 50 MCG (2000 UT) CAPS Take by mouth    Historical Provider, MD   ciprofloxacin (CILOXAN) 0 3 % ophthalmic solution Administer 1 drop into the left eye every 4 (four) hours  Patient not taking: Reported on 11/4/2021 4/8/21   NALLELY Solorio   gabapentin (NEURONTIN) 300 mg capsule Take 1 capsule (300 mg total) by mouth daily at bedtime 9/30/20 6/7/21  Maria D Villanueva MD   ibuprofen (MOTRIN) 600 mg tablet Take by mouth every 6 (six) hours as needed for mild pain  Patient not taking: Reported on 10/26/2021    Historical Provider, MD   ibuprofen (MOTRIN) 600 mg tablet Take 1 tablet (600 mg total) by mouth every 8 (eight) hours for 10 days 6/6/21 6/16/21  Ismael Small PA-C   mupirocin (BACTROBAN) 2 % ointment Apply topically daily  Patient not taking: Reported on 10/26/2021 6/7/21   Arjun Elizabeth DO   nitroglycerin (NITROSTAT) 0 3 mg SL tablet Place 0 3 mg under the tongue every 5 (five) minutes as needed for chest pain  Patient not taking: Reported on 10/26/2021    Historical Provider, MD   VITAMIN D PO Take by mouth    Historical Provider, MD     I have reviewed home medications with patient personally  Allergies:    Allergies   Allergen Reactions    Amoxicillin Vomiting    Codeine Vomiting    Percocet [Oxycodone-Acetaminophen] GI Intolerance    Codeine GI Intolerance     vomitting       Social History:  Marital Status: /Civil Union   Occupation:  None listed  Patient Pre-hospital Living Situation: Home  Patient Pre-hospital Level of Mobility: walks  Patient Pre-hospital Diet Restrictions:  None  Substance Use History:   Social History     Substance and Sexual Activity   Alcohol Use No     Social History     Tobacco Use   Smoking Status Never Smoker   Smokeless Tobacco Never Used     Social History     Substance and Sexual Activity   Drug Use No       Family History:  Family History   Problem Relation Age of Onset    Stroke Mother         CVA    Breast cancer Mother     Diabetes Father         DM    Cancer Father     Melanoma Brother        Physical Exam:     Vitals:   Blood Pressure: 105/73 (12/27/21 1515)  Pulse: 100 (12/27/21 1515)  Temperature: 98 8 °F (37 1 °C) (12/27/21 1041)  Temp Source: Tympanic (12/27/21 1041)  Respirations: 20 (12/27/21 1300)  Height: 6' 2" (188 cm) (12/27/21 1515)  Weight - Scale: 89 8 kg (198 lb) (12/27/21 1515)  SpO2: 96 % (12/27/21 1300)    Physical Exam  Constitutional:       General: He is not in acute distress  Appearance: Normal appearance  He is not toxic-appearing  HENT:      Head: Normocephalic and atraumatic  Nose: Nose normal       Mouth/Throat:      Mouth: Mucous membranes are moist       Pharynx: Oropharynx is clear  Eyes:      Extraocular Movements: Extraocular movements intact  Cardiovascular:      Rate and Rhythm: Regular rhythm  Tachycardia present  Pulmonary:      Effort: Pulmonary effort is normal       Breath sounds: No wheezing or rales  Abdominal:      Palpations: Abdomen is soft  Musculoskeletal:         General: Tenderness present  Right lower leg: No edema  Left lower leg: No edema  Neurological:      General: No focal deficit present  Mental Status: He is alert and oriented to person, place, and time  Psychiatric:         Mood and Affect: Mood normal          Behavior: Behavior normal           Additional Data:     Lab Results:  Results from last 7 days   Lab Units 12/27/21  1444 12/27/21  1132 12/27/21  1132   WBC Thousand/uL  --   --  11 78*   HEMOGLOBIN g/dL  --   --  15 8   HEMATOCRIT %  --   --  47 5   PLATELETS Thousands/uL 239   < > 252   NEUTROS PCT %  --   --  67   LYMPHS PCT %  --   --  19   MONOS PCT %  --   --  11   EOS PCT %  --   --  1    < > = values in this interval not displayed       Results from last 7 days   Lab Units 12/27/21  1132   SODIUM mmol/L 137   POTASSIUM mmol/L 4 0   CHLORIDE mmol/L 104   CO2 mmol/L 26   BUN mg/dL 22   CREATININE mg/dL 1 44*   ANION GAP mmol/L 7   CALCIUM mg/dL 9 4   ALBUMIN g/dL 3 9   TOTAL BILIRUBIN mg/dL 0 49   ALK PHOS U/L 75   ALT U/L 31   AST U/L 23   GLUCOSE RANDOM mg/dL 126     Results from last 7 days   Lab Units 12/27/21  1444   INR  1 40*                   Imaging: Reviewed radiology reports from this admission including: chest CT scan  CTA dissection protocol chest/abdomen/pelvis   Final Result by Gregorio Murphy DO (12/27 0131)      1  Moderately extensive burden of bilateral segmental/subsegmental pulmonary emboli  No evidence for RV strain  2   No evidence of aortic dissection or aneurysm  3   No acute intra-abdominal abnormality  4   Nonobstructing right renal calculi  Additional incidental findings as above  I personally discussed this study with Dr Nisreen Savage on 12/27/2021 at 1:26 PM                Workstation performed: [id number]         VAS lower limb venous duplex study, complete bilateral    (Results Pending)       EKG and Other Studies Reviewed on Admission:   · EKG: Sinus Tachycardia    ** Please Note: This note has been constructed using a voice recognition system   **

## 2021-12-28 ENCOUNTER — APPOINTMENT (OUTPATIENT)
Dept: PHYSICAL THERAPY | Facility: REHABILITATION | Age: 61
End: 2021-12-28
Payer: COMMERCIAL

## 2021-12-28 LAB
ANION GAP SERPL CALCULATED.3IONS-SCNC: 7 MMOL/L (ref 4–13)
APTT PPP: 53 SECONDS (ref 23–37)
APTT PPP: 70 SECONDS (ref 23–37)
APTT PPP: 83 SECONDS (ref 23–37)
BUN SERPL-MCNC: 23 MG/DL (ref 5–25)
CALCIUM SERPL-MCNC: 9.6 MG/DL (ref 8.3–10.1)
CHLORIDE SERPL-SCNC: 102 MMOL/L (ref 100–108)
CO2 SERPL-SCNC: 26 MMOL/L (ref 21–32)
CREAT SERPL-MCNC: 1.29 MG/DL (ref 0.6–1.3)
ERYTHROCYTE [DISTWIDTH] IN BLOOD BY AUTOMATED COUNT: 13.2 % (ref 11.6–15.1)
GFR SERPL CREATININE-BSD FRML MDRD: 59 ML/MIN/1.73SQ M
GLUCOSE SERPL-MCNC: 132 MG/DL (ref 65–140)
HCT VFR BLD AUTO: 43 % (ref 36.5–49.3)
HGB BLD-MCNC: 14.7 G/DL (ref 12–17)
MCH RBC QN AUTO: 32 PG (ref 26.8–34.3)
MCHC RBC AUTO-ENTMCNC: 34.2 G/DL (ref 31.4–37.4)
MCV RBC AUTO: 94 FL (ref 82–98)
PLATELET # BLD AUTO: 262 THOUSANDS/UL (ref 149–390)
PMV BLD AUTO: 11.1 FL (ref 8.9–12.7)
POTASSIUM SERPL-SCNC: 3.9 MMOL/L (ref 3.5–5.3)
RBC # BLD AUTO: 4.59 MILLION/UL (ref 3.88–5.62)
SODIUM SERPL-SCNC: 135 MMOL/L (ref 136–145)
WBC # BLD AUTO: 14.06 THOUSAND/UL (ref 4.31–10.16)

## 2021-12-28 PROCEDURE — 80048 BASIC METABOLIC PNL TOTAL CA: CPT | Performed by: INTERNAL MEDICINE

## 2021-12-28 PROCEDURE — 85730 THROMBOPLASTIN TIME PARTIAL: CPT | Performed by: INTERNAL MEDICINE

## 2021-12-28 PROCEDURE — 99232 SBSQ HOSP IP/OBS MODERATE 35: CPT | Performed by: PHYSICIAN ASSISTANT

## 2021-12-28 PROCEDURE — 85027 COMPLETE CBC AUTOMATED: CPT | Performed by: INTERNAL MEDICINE

## 2021-12-28 RX ORDER — MORPHINE SULFATE 15 MG/1
15 TABLET ORAL EVERY 4 HOURS PRN
Status: DISCONTINUED | OUTPATIENT
Start: 2021-12-28 | End: 2021-12-30 | Stop reason: HOSPADM

## 2021-12-28 RX ORDER — HYDROMORPHONE HCL/PF 1 MG/ML
0.5 SYRINGE (ML) INJECTION EVERY 4 HOURS PRN
Status: DISCONTINUED | OUTPATIENT
Start: 2021-12-28 | End: 2021-12-30 | Stop reason: HOSPADM

## 2021-12-28 RX ADMIN — TRAMADOL HYDROCHLORIDE 100 MG: 50 TABLET ORAL at 16:10

## 2021-12-28 RX ADMIN — MORPHINE SULFATE 15 MG: 15 TABLET ORAL at 23:51

## 2021-12-28 RX ADMIN — METHOCARBAMOL 500 MG: 500 TABLET, FILM COATED ORAL at 13:46

## 2021-12-28 RX ADMIN — METHOCARBAMOL 500 MG: 500 TABLET, FILM COATED ORAL at 20:58

## 2021-12-28 RX ADMIN — HEPARIN SODIUM 18 UNITS/KG/HR: 10000 INJECTION, SOLUTION INTRAVENOUS at 23:51

## 2021-12-28 RX ADMIN — HYDROMORPHONE HYDROCHLORIDE 0.5 MG: 1 INJECTION, SOLUTION INTRAMUSCULAR; INTRAVENOUS; SUBCUTANEOUS at 20:58

## 2021-12-28 RX ADMIN — GABAPENTIN 300 MG: 300 CAPSULE ORAL at 21:01

## 2021-12-28 RX ADMIN — ACETAMINOPHEN 650 MG: 325 TABLET, FILM COATED ORAL at 07:33

## 2021-12-28 RX ADMIN — HEPARIN SODIUM 3600 UNITS: 1000 INJECTION INTRAVENOUS; SUBCUTANEOUS at 12:09

## 2021-12-28 RX ADMIN — MORPHINE SULFATE 15 MG: 15 TABLET ORAL at 18:23

## 2021-12-28 RX ADMIN — METHOCARBAMOL 500 MG: 500 TABLET, FILM COATED ORAL at 07:33

## 2021-12-28 RX ADMIN — HEPARIN SODIUM 16 UNITS/KG/HR: 10000 INJECTION, SOLUTION INTRAVENOUS at 07:29

## 2021-12-28 RX ADMIN — ACETAMINOPHEN 650 MG: 325 TABLET, FILM COATED ORAL at 13:46

## 2021-12-28 NOTE — ASSESSMENT & PLAN NOTE
· Likely due to discontinuing his Eliquis over the summer, he does state that he was checked for thrombophilia sinuses been negative in the past, given his recurrence this may need to be repeated  · Having a lot of back pain suspect due to PE, CTA negative for dissection   · Admit to inpatient given the degree of pulmonary emboli burden  · Continue IV heparin with eventual transition to Eliquis  · Check echocardiogram to evaluate for right heart strain - negative   · B/L LE venous duplex study revealed occlusive acute thrombosis in one of the paired posterior tibial veins of RLE  · Monitor hemoglobin and platelets while on IV heparin  · Currently hemodynamically stable and on room air    · Hematology consult

## 2021-12-28 NOTE — PROGRESS NOTES
[facility]  Progress Note - Colton Childs 1960, 62 y o  male MRN: [id number]  Unit/Bed#: -01 Encounter: [id number]  Primary Care Provider: Maurisio Hercules MD   Date and time admitted to hospital: 12/27/2021 10:43 AM    * Acute pulmonary embolism without acute cor pulmonale (HCC)  Assessment & Plan  · Likely due to discontinuing his Eliquis over the summer, he does state that he was checked for thrombophilia sinuses been negative in the past, given his recurrence this may need to be repeated  · Having a lot of back pain suspect due to PE, CTA negative for dissection   · Admit to inpatient given the degree of pulmonary emboli burden  · Continue IV heparin with eventual transition to Eliquis  · Check echocardiogram to evaluate for right heart strain - negative   · B/L LE venous duplex study revealed occlusive acute thrombosis in one of the paired posterior tibial veins of RLE  · Monitor hemoglobin and platelets while on IV heparin  · Currently hemodynamically stable and on room air  · Hematology consult     Chronic pain of left knee  Assessment & Plan  · Continue gabapentin at bedtime, and p r n  Tylenol          VTE Pharmacologic Prophylaxis:   Moderate Risk (Score 3-4) - Pharmacological DVT Prophylaxis Ordered: heparin drip  Patient Centered Rounds: I performed bedside rounds with nursing staff today  Discussions with Specialists or Other Care Team Provider: RN, TT heme    Education and Discussions with Family / Patient: Patient declined call to   Time Spent for Care: 30 minutes  More than 50% of total time spent on counseling and coordination of care as described above  Current Length of Stay: 1 day(s)  Current Patient Status: Inpatient   Certification Statement: The patient will continue to require additional inpatient hospital stay due to PE on heparin gtt  Discharge Plan: Anticipate discharge in 24-48 hrs to home      Code Status: Level 1 - Full Code    Subjective:   Pt reports to severe aching back pain, he felt similar when he had PE many years ago  Calf pain has resolved  He reports to some SOB     Objective:     Vitals:   Temp (24hrs), Av 3 °F (37 4 °C), Min:98 2 °F (36 8 °C), Max:100 5 °F (38 1 °C)    Temp:  [98 2 °F (36 8 °C)-100 5 °F (38 1 °C)] 100 5 °F (38 1 °C)  HR:  [88-99] 99  Resp:  [19] 19  BP: (118-151)/() 133/84  SpO2:  [89 %-93 %] 89 %  Body mass index is 25 42 kg/m²  Input and Output Summary (last 24 hours): Intake/Output Summary (Last 24 hours) at 2021 1646  Last data filed at 2021 1400  Gross per 24 hour   Intake 1804 95 ml   Output 250 ml   Net 1554 95 ml       Physical Exam:   Physical Exam  Vitals reviewed  Constitutional:       Appearance: He is not toxic-appearing  Comments: Uncomfortable, restless, grimacing occ   HENT:      Head: Normocephalic and atraumatic  Eyes:      Extraocular Movements: Extraocular movements intact  Cardiovascular:      Rate and Rhythm: Normal rate and regular rhythm  Pulmonary:      Effort: Pulmonary effort is normal       Breath sounds: Normal breath sounds  Abdominal:      General: Bowel sounds are normal  There is no distension  Palpations: Abdomen is soft  Tenderness: There is no abdominal tenderness  Musculoskeletal:         General: Normal range of motion  Cervical back: Normal range of motion  Neurological:      General: No focal deficit present  Mental Status: He is alert and oriented to person, place, and time  Psychiatric:         Mood and Affect: Mood normal          Behavior: Behavior normal          Thought Content:  Thought content normal           Additional Data:     Labs:  Results from last 7 days   Lab Units 21  0256 21  1444 21  1132   WBC Thousand/uL 14 06*   < > 11 78*   HEMOGLOBIN g/dL 14 7   < > 15 8   HEMATOCRIT % 43 0   < > 47 5   PLATELETS Thousands/uL 262   < > 252   NEUTROS PCT %  -- --  67   LYMPHS PCT %  --   --  19   MONOS PCT %  --   --  11   EOS PCT %  --   --  1    < > = values in this interval not displayed  Results from last 7 days   Lab Units 12/28/21  0256 12/27/21  1132 12/27/21  1132   SODIUM mmol/L 135*   < > 137   POTASSIUM mmol/L 3 9   < > 4 0   CHLORIDE mmol/L 102   < > 104   CO2 mmol/L 26   < > 26   BUN mg/dL 23   < > 22   CREATININE mg/dL 1 29   < > 1 44*   ANION GAP mmol/L 7   < > 7   CALCIUM mg/dL 9 6   < > 9 4   ALBUMIN g/dL  --   --  3 9   TOTAL BILIRUBIN mg/dL  --   --  0 49   ALK PHOS U/L  --   --  75   ALT U/L  --   --  31   AST U/L  --   --  23   GLUCOSE RANDOM mg/dL 132   < > 126    < > = values in this interval not displayed  Results from last 7 days   Lab Units 12/27/21  1444   INR  1 40*                   Lines/Drains:  Invasive Devices  Report    Peripheral Intravenous Line            Peripheral IV 12/27/21 Left Antecubital 1 day    Peripheral IV 12/27/21 Right Forearm 1 day                  Telemetry:  Telemetry Orders (From admission, onward)             24 Hour Telemetry Monitoring  Continuous x 24 Hours (Telem)        References:    Telemetry Guidelines   Question:  Reason for 24 Hour Telemetry  Answer:  Pulmonary Embolism - 24 hours without resp  compromise, dysrhythmias, hemodynamically stable                 Telemetry Reviewed: Normal Sinus Rhythm  Indication for Continued Telemetry Use: PE             Imaging: No pertinent imaging reviewed      Recent Cultures (last 7 days):         Last 24 Hours Medication List:   Current Facility-Administered Medications   Medication Dose Route Frequency Provider Last Rate    acetaminophen  650 mg Oral Q6H PRN Adolfo Browne MD      gabapentin  300 mg Oral HS Adolfo Browne MD      heparin (porcine)  3-30 Units/kg/hr (Order-Specific) Intravenous Titrated Adolfo Browne MD 18 Units/kg/hr (12/28/21 1210)    heparin (porcine)  3,600 Units Intravenous Q1H PRN Adolfo Browne MD      heparin (porcine)  7,200 Units Intravenous Q1H PRN Jorge Bailey MD      HYDROmorphone  0 5 mg Intravenous Q4H PRN Desiree Barr PA-C      methocarbamol  500 mg Oral Q6H PRN Fredy Weiner PA-C      morphine  15 mg Oral Q4H PRN Desiree Barr PA-C      ondansetron  4 mg Oral Q6H PRN Fredy Weiner PA-C      sodium chloride (PF)  3 mL Intravenous Q1H PRN Virginia Medina MD      traMADol  100 mg Oral Q6H PRN Fredy Weiner PA-C          Today, Patient Was Seen By: Thor Rich PA-C    **Please Note: This note may have been constructed using a voice recognition system  **

## 2021-12-28 NOTE — PLAN OF CARE
Problem: CARDIOVASCULAR - ADULT  Goal: Maintains optimal cardiac output and hemodynamic stability  Description: INTERVENTIONS:  - Monitor I/O, vital signs and rhythm  - Monitor for S/S and trends of decreased cardiac output  - Administer and titrate ordered vasoactive medications to optimize hemodynamic stability  - Assess quality of pulses, skin color and temperature  - Assess for signs of decreased coronary artery perfusion  - Instruct patient to report change in severity of symptoms  Outcome: Progressing  Goal: Absence of cardiac dysrhythmias or at baseline rhythm  Description: INTERVENTIONS:  - Continuous cardiac monitoring, vital signs, obtain 12 lead EKG if ordered  - Administer antiarrhythmic and heart rate control medications as ordered  - Monitor electrolytes and administer replacement therapy as ordered  Outcome: Progressing     Problem: RESPIRATORY - ADULT  Goal: Achieves optimal ventilation and oxygenation  Description: INTERVENTIONS:  - Assess for changes in respiratory status  - Assess for changes in mentation and behavior  - Position to facilitate oxygenation and minimize respiratory effort  - Oxygen administered by appropriate delivery if ordered  - Initiate smoking cessation education as indicated  - Encourage broncho-pulmonary hygiene including cough, deep breathe, Incentive Spirometry  - Assess the need for suctioning and aspirate as needed  - Assess and instruct to report SOB or any respiratory difficulty  - Respiratory Therapy support as indicated  Outcome: Progressing     Problem: METABOLIC, FLUID AND ELECTROLYTES - ADULT  Goal: Electrolytes maintained within normal limits  Description: INTERVENTIONS:  - Monitor labs and assess patient for signs and symptoms of electrolyte imbalances  - Administer electrolyte replacement as ordered  - Monitor response to electrolyte replacements, including repeat lab results as appropriate  - Instruct patient on fluid and nutrition as appropriate  Outcome: Progressing  Goal: Fluid balance maintained      Description: INTERVENTIONS:  - Monitor labs   - Monitor I/O and WT  - Instruct patient on fluid and nutrition as appropriate  - Assess for signs & symptoms of volume excess or deficit  Outcome: Progressing  Goal: Glucose maintained within target range  Description: INTERVENTIONS:  - Monitor Blood Glucose as ordered  - Assess for signs and symptoms of hyperglycemia and hypoglycemia  - Administer ordered medications to maintain glucose within target range  - Assess nutritional intake and initiate nutrition service referral as needed  Outcome: Progressing
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
## 2021-12-29 LAB — APTT PPP: 81 SECONDS (ref 23–37)

## 2021-12-29 PROCEDURE — 99232 SBSQ HOSP IP/OBS MODERATE 35: CPT | Performed by: PHYSICIAN ASSISTANT

## 2021-12-29 PROCEDURE — 99253 IP/OBS CNSLTJ NEW/EST LOW 45: CPT | Performed by: INTERNAL MEDICINE

## 2021-12-29 RX ADMIN — MORPHINE SULFATE 15 MG: 15 TABLET ORAL at 18:50

## 2021-12-29 RX ADMIN — METHOCARBAMOL 500 MG: 500 TABLET, FILM COATED ORAL at 22:06

## 2021-12-29 RX ADMIN — HEPARIN SODIUM 18 UNITS/KG/HR: 10000 INJECTION, SOLUTION INTRAVENOUS at 15:57

## 2021-12-29 RX ADMIN — GABAPENTIN 300 MG: 300 CAPSULE ORAL at 22:05

## 2021-12-29 RX ADMIN — METHOCARBAMOL 500 MG: 500 TABLET, FILM COATED ORAL at 15:51

## 2021-12-29 RX ADMIN — MORPHINE SULFATE 15 MG: 15 TABLET ORAL at 13:03

## 2021-12-29 RX ADMIN — MORPHINE SULFATE 15 MG: 15 TABLET ORAL at 08:31

## 2021-12-29 NOTE — PLAN OF CARE
Problem: CARDIOVASCULAR - ADULT  Goal: Maintains optimal cardiac output and hemodynamic stability  Description: INTERVENTIONS:  - Monitor I/O, vital signs and rhythm  - Monitor for S/S and trends of decreased cardiac output  - Administer and titrate ordered vasoactive medications to optimize hemodynamic stability  - Assess quality of pulses, skin color and temperature  - Assess for signs of decreased coronary artery perfusion  - Instruct patient to report change in severity of symptoms  Outcome: Progressing  Goal: Absence of cardiac dysrhythmias or at baseline rhythm  Description: INTERVENTIONS:  - Continuous cardiac monitoring, vital signs, obtain 12 lead EKG if ordered  - Administer antiarrhythmic and heart rate control medications as ordered  - Monitor electrolytes and administer replacement therapy as ordered  Outcome: Progressing     Problem: RESPIRATORY - ADULT  Goal: Achieves optimal ventilation and oxygenation  Description: INTERVENTIONS:  - Assess for changes in respiratory status  - Assess for changes in mentation and behavior  - Position to facilitate oxygenation and minimize respiratory effort  - Oxygen administered by appropriate delivery if ordered  - Initiate smoking cessation education as indicated  - Encourage broncho-pulmonary hygiene including cough, deep breathe, Incentive Spirometry  - Assess the need for suctioning and aspirate as needed  - Assess and instruct to report SOB or any respiratory difficulty  - Respiratory Therapy support as indicated  Outcome: Progressing     Problem: METABOLIC, FLUID AND ELECTROLYTES - ADULT  Goal: Electrolytes maintained within normal limits  Description: INTERVENTIONS:  - Monitor labs and assess patient for signs and symptoms of electrolyte imbalances  - Administer electrolyte replacement as ordered  - Monitor response to electrolyte replacements, including repeat lab results as appropriate  - Instruct patient on fluid and nutrition as appropriate  Outcome: Progressing  Goal: Fluid balance maintained  Description: INTERVENTIONS:  - Monitor labs   - Monitor I/O and WT  - Instruct patient on fluid and nutrition as appropriate  - Assess for signs & symptoms of volume excess or deficit  Outcome: Progressing  Goal: Glucose maintained within target range  Description: INTERVENTIONS:  - Monitor Blood Glucose as ordered  - Assess for signs and symptoms of hyperglycemia and hypoglycemia  - Administer ordered medications to maintain glucose within target range  - Assess nutritional intake and initiate nutrition service referral as needed  Outcome: Progressing     Problem: HEMATOLOGIC - ADULT  Goal: Maintains hematologic stability  Description: INTERVENTIONS  - Assess for signs and symptoms of bleeding or hemorrhage  - Monitor labs  - Administer supportive blood products/factors as ordered and appropriate  Outcome: Progressing     Problem: MUSCULOSKELETAL - ADULT  Goal: Maintain or return mobility to safest level of function  Description: INTERVENTIONS:  - Assess patient's ability to carry out ADLs; assess patient's baseline for ADL function and identify physical deficits which impact ability to perform ADLs (bathing, care of mouth/teeth, toileting, grooming, dressing, etc )  - Assess/evaluate cause of self-care deficits   - Assess range of motion  - Assess patient's mobility  - Assess patient's need for assistive devices and provide as appropriate  - Encourage maximum independence but intervene and supervise when necessary  - Involve family in performance of ADLs  - Assess for home care needs following discharge   - Consider OT consult to assist with ADL evaluation and planning for discharge  - Provide patient education as appropriate  Outcome: Progressing

## 2021-12-29 NOTE — ASSESSMENT & PLAN NOTE
· Likely due to discontinuing his Eliquis over the summer, he does state that he was checked for thrombophilia sinuses been negative in the past, given his recurrence this may need to be repeated  · Having a lot of back pain suspect due to PE, CTA negative for dissection - PRN pain control   · Admit to inpatient given the degree of pulmonary emboli burden  · Continue IV heparin with eventual transition to Eliquis  · Check echocardiogram to evaluate for right heart strain - negative   · B/L LE venous duplex study revealed occlusive acute thrombosis in one of the paired posterior tibial veins of RLE  · Monitor hemoglobin and platelets while on IV heparin  · Currently hemodynamically stable and on room air    · Hematology consult
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
## 2021-12-29 NOTE — PROGRESS NOTES
[facility] [facility]  Progress Note - Kym Olivier 1960, 62 y o  male MRN: [id number]  Unit/Bed#: -01 Encounter: [id number]  Primary Care Provider: Edwige Apodaca MD   Date and time admitted to hospital: 12/27/2021 10:43 AM    * Acute pulmonary embolism without acute cor pulmonale (HCC)  Assessment & Plan  · Likely due to discontinuing his Eliquis over the summer, he does state that he was checked for thrombophilia sinuses been negative in the past, given his recurrence this may need to be repeated  · Having a lot of back pain suspect due to PE, CTA negative for dissection - PRN pain control   · Admit to inpatient given the degree of pulmonary emboli burden  · Continue IV heparin with eventual transition to Eliquis  · Check echocardiogram to evaluate for right heart strain - negative   · B/L LE venous duplex study revealed occlusive acute thrombosis in one of the paired posterior tibial veins of RLE  · Monitor hemoglobin and platelets while on IV heparin  · Currently hemodynamically stable and on room air  · Hematology consult     Chronic pain of left knee  Assessment & Plan  · Continue gabapentin at bedtime, and p r n  Tylenol          VTE Pharmacologic Prophylaxis:   Moderate Risk (Score 3-4) - Pharmacological DVT Prophylaxis Ordered: heparin drip  Patient Centered Rounds: I performed bedside rounds with nursing staff today  Discussions with Specialists or Other Care Team Provider: RN    Education and Discussions with Family / Patient: Updated  (wife) via phone  Time Spent for Care: 20 minutes  More than 50% of total time spent on counseling and coordination of care as described above  Current Length of Stay: 2 day(s)  Current Patient Status: Inpatient   Certification Statement: The patient will continue to require additional inpatient hospital stay due to acute PE on heparin gtt  Discharge Plan: Anticipate discharge in 24-48 hrs to home      Code Status: Level 1 - Full Code    Subjective:   Pt reports he is feeling better, he finally slept last night, pain is better with prn morphine     Objective:     Vitals:   Temp (24hrs), Av °F (37 2 °C), Min:97 8 °F (36 6 °C), Max:100 5 °F (38 1 °C)    Temp:  [97 8 °F (36 6 °C)-100 5 °F (38 1 °C)] 98 °F (36 7 °C)  HR:  [73-99] 79  Resp:  [19-21] 21  BP: (129-151)/() 130/78  SpO2:  [89 %-94 %] 94 %  Body mass index is 25 42 kg/m²  Input and Output Summary (last 24 hours): Intake/Output Summary (Last 24 hours) at 2021 1124  Last data filed at 2021 7421  Gross per 24 hour   Intake 1129 17 ml   Output 225 ml   Net 904 17 ml       Physical Exam:   Physical Exam  Vitals reviewed  Constitutional:       General: He is not in acute distress  Appearance: He is not toxic-appearing  HENT:      Head: Normocephalic and atraumatic  Eyes:      Extraocular Movements: Extraocular movements intact  Cardiovascular:      Rate and Rhythm: Normal rate and regular rhythm  Pulmonary:      Effort: Pulmonary effort is normal       Breath sounds: Normal breath sounds  Abdominal:      General: Bowel sounds are normal  There is no distension  Palpations: Abdomen is soft  Tenderness: There is no abdominal tenderness  Musculoskeletal:         General: Normal range of motion  Cervical back: Normal range of motion  Neurological:      General: No focal deficit present  Mental Status: He is alert and oriented to person, place, and time  Psychiatric:         Mood and Affect: Mood normal          Behavior: Behavior normal          Thought Content:  Thought content normal           Additional Data:     Labs:  Results from last 7 days   Lab Units 21  0256 21  1444 21  1132   WBC Thousand/uL 14 06*   < > 11 78*   HEMOGLOBIN g/dL 14 7   < > 15 8   HEMATOCRIT % 43 0   < > 47 5   PLATELETS Thousands/uL 262   < > 252   NEUTROS PCT %  --   --  67   LYMPHS PCT %  --   --  19 MONOS PCT %  --   --  11   EOS PCT %  --   --  1    < > = values in this interval not displayed  Results from last 7 days   Lab Units 12/28/21  0256 12/27/21  1132 12/27/21  1132   SODIUM mmol/L 135*   < > 137   POTASSIUM mmol/L 3 9   < > 4 0   CHLORIDE mmol/L 102   < > 104   CO2 mmol/L 26   < > 26   BUN mg/dL 23   < > 22   CREATININE mg/dL 1 29   < > 1 44*   ANION GAP mmol/L 7   < > 7   CALCIUM mg/dL 9 6   < > 9 4   ALBUMIN g/dL  --   --  3 9   TOTAL BILIRUBIN mg/dL  --   --  0 49   ALK PHOS U/L  --   --  75   ALT U/L  --   --  31   AST U/L  --   --  23   GLUCOSE RANDOM mg/dL 132   < > 126    < > = values in this interval not displayed  Results from last 7 days   Lab Units 12/27/21  1444   INR  1 40*                   Lines/Drains:  Invasive Devices  Report    Peripheral Intravenous Line            Peripheral IV 12/27/21 Left Antecubital 1 day    Peripheral IV 12/27/21 Right Forearm 1 day                  Telemetry:  Telemetry Orders (From admission, onward)             24 Hour Telemetry Monitoring  Continuous x 24 Hours (Telem)        Expiring   References:    Telemetry Guidelines   Question:  Reason for 24 Hour Telemetry  Answer:  Pulmonary Embolism - 24 hours without resp  compromise, dysrhythmias, hemodynamically stable                 Telemetry Reviewed: Normal Sinus Rhythm  Indication for Continued Telemetry Use: PE             Imaging: No pertinent imaging reviewed      Recent Cultures (last 7 days):         Last 24 Hours Medication List:   Current Facility-Administered Medications   Medication Dose Route Frequency Provider Last Rate    acetaminophen  650 mg Oral Q6H PRN Reyes Duron MD      gabapentin  300 mg Oral HS Reyes Duron MD      heparin (porcine)  3-30 Units/kg/hr (Order-Specific) Intravenous Titrated Reyes Duron MD 18 Units/kg/hr (12/28/21 4271)    heparin (porcine)  3,600 Units Intravenous Q1H PRN Reyes Duron MD      heparin (porcine)  7,200 Units Intravenous Q1H PRN Surya Ates Aileen Junior MD      HYDROmorphone  0 5 mg Intravenous Q4H PRN Raven Perry PA-C      methocarbamol  500 mg Oral Q6H PRN Lee ShuttersJAIRO      morphine  15 mg Oral Q4H PRN Desiree Barr PA-C      ondansetron  4 mg Oral Q6H PRN Lee ShuttersJAIRO      sodium chloride (PF)  3 mL Intravenous Q1H PRN Josiah Hines MD      traMADol  100 mg Oral Q6H PRN Lee ShuttJAIRO lieberman          Today, Patient Was Seen By: Raven Perry PA-C    **Please Note: This note may have been constructed using a voice recognition system  **

## 2021-12-29 NOTE — UTILIZATION REVIEW
Continued Stay Review    Date: 12/28/2021                         Current Patient Class: Inpatient  Current Level of Care: Med Surg    HPI:61 y o  male initially admitted on 12/27/2021     Assessment/Plan: Pt reports to severe back pain, he felt similar when he had PE many years ago  Calf pain has resolved  Reports to some SOB  CTA negative for dissection  B/L LE venous duplex study revealed occlusive acute thrombosis in one of the paired posterior tibial veins of RLE  Cont IV Heparin  Cont to mon Hgb and platelets while on IV Heparin  Hematology consulted  Cont pain control prn  Physical exam: Pulmonary effort is normal  Normal breath sounds  Normal heart rate and rhythm        Vital Signs:   Date/Time Temp Pulse Resp BP MAP (mmHg) SpO2 Calculated FIO2 (%) - Nasal Cannula Nasal Cannula O2 Flow Rate (L/min) O2 Device Patient Position - Orthostatic VS   12/28/21 22:36:37 97 8 °F (36 6 °C) 77 20 151/95 114 93 % 28 2 L/min Nasal cannula --   12/28/21 2130 -- -- -- -- -- -- -- -- None (Room air) --   12/28/21 19:11:54 99 6 °F (37 6 °C) 96 20 132/84 100 90 % -- -- -- --   12/28/21 15:12:41 100 5 °F (38 1 °C) 99 19 133/84 100 89 % Abnormal  -- -- -- --   12/28/21 13:52:02 100 1 °F (37 8 °C) 98 -- 143/113 Abnormal  123 90 % -- -- -- --   12/28/21 07:34:21 98 2 °F (36 8 °C) 94 -- 139/86 104 92 % -- -- -- --       Pertinent Labs/Diagnostic Results:   Results from last 7 days   Lab Units 12/27/21  1326   SARS-COV-2  Negative     Results from last 7 days   Lab Units 12/28/21  0256 12/27/21  1444 12/27/21  1132   WBC Thousand/uL 14 06*  --  11 78*   HEMOGLOBIN g/dL 14 7  --  15 8   HEMATOCRIT % 43 0  --  47 5   PLATELETS Thousands/uL 262 239 252   NEUTROS ABS Thousands/µL  --   --  8 00*         Results from last 7 days   Lab Units 12/28/21  0256 12/27/21  1132   SODIUM mmol/L 135* 137   POTASSIUM mmol/L 3 9 4 0   CHLORIDE mmol/L 102 104   CO2 mmol/L 26 26   ANION GAP mmol/L 7 7   BUN mg/dL 23 22   CREATININE mg/dL 1 29 1 44*   EGFR ml/min/1 73sq m 59 52   CALCIUM mg/dL 9 6 9 4     Results from last 7 days   Lab Units 12/27/21  1132   AST U/L 23   ALT U/L 31   ALK PHOS U/L 75   TOTAL PROTEIN g/dL 8 1   ALBUMIN g/dL 3 9   TOTAL BILIRUBIN mg/dL 0 49         Results from last 7 days   Lab Units 12/28/21  0256 12/27/21  1132   GLUCOSE RANDOM mg/dL 132 126     Results from last 7 days   Lab Units 12/27/21  1647 12/27/21  1325 12/27/21  1132   HS TNI 0HR ng/L  --   --  3   HS TNI 2HR ng/L  --  3  --    HSTNI D2 ng/L  --  0  --    HS TNI 4HR ng/L 5  --   --    HSTNI D4 ng/L 2  --   --      Results from last 7 days   Lab Units 12/27/21  1132   D-DIMER QUANTITATIVE ug/ml FEU 3 22*     Results from last 7 days   Lab Units 12/28/21  2356 12/28/21  1800 12/28/21  1107 12/27/21  2004 12/27/21  1444 12/27/21  1352   PROTIME seconds  --   --   --   --  16 5* 14 2   INR   --   --   --   --  1 40* 1 15   PTT seconds 81* 83* 53*   < >  --  34    < > = values in this interval not displayed       Results from last 7 days   Lab Units 12/27/21  1132   LIPASE u/L 291     Results from last 7 days   Lab Units 12/27/21  1326   INFLUENZA A PCR  Negative   INFLUENZA B PCR  Negative   RSV PCR  Negative     Medications:   Scheduled Medications:  gabapentin, 300 mg, Oral, HS      Continuous IV Infusions:  heparin (porcine), 3-30 Units/kg/hr (Order-Specific), Intravenous, Titrated      PRN Meds:  acetaminophen, 650 mg, Oral, Q6H PRN  heparin (porcine), 3,600 Units, Intravenous, Q1H PRN 12/28 x 1  heparin (porcine), 7,200 Units, Intravenous, Q1H PRN  HYDROmorphone, 0 5 mg, Intravenous, Q4H PRN 12/28 x 1  methocarbamol, 500 mg, Oral, Q6H PRN 12/28 x 2  morphine, 15 mg, Oral, Q4H PRN 12/28 x 2  ondansetron, 4 mg, Oral, Q6H PRN  sodium chloride (PF), 3 mL, Intravenous, Q1H PRN  traMADol, 100 mg, Oral, Q6H PRN        Discharge Plan: TBD    Network Utilization Review Department  ATTENTION: Please call with any questions or concerns to [phone] and carefully listen to the prompts so that you are directed to the right person  All voicemails are confidential   Eric Benjamin all requests for admission clinical reviews, approved or denied determinations and any other requests to dedicated fax number below belonging to the campus where the patient is receiving treatment   List of dedicated fax numbers for the Facilities:  [address] DENIALS (Administrative/Medical Necessity) [phone]   [address] (Maternity/NICU/Pediatrics) [phone]   [address]  [address] Medical [place] [phone] [id number] [address] [facility] [place] [address] [phone]

## 2021-12-29 NOTE — UTILIZATION REVIEW
Inpatient Admission Authorization Request   NOTIFICATION OF INPATIENT ADMISSION/INPATIENT AUTHORIZATION REQUEST   SERVICING FACILITY:   [facility]  Address: [address] [facility], [address] [place] [address]  Tax ID: [id number]  NPI: [id number]  Place of Service: Inpatient [address] [facility] Code: 24     ATTENDING PROVIDER:  Attending Name and NPI#: Virlolada Lia [[id number]]  Address: [address] [place] [address], [address] [place] [address]  Phone: [phone]     UTILIZATION REVIEW CONTACT:  Bettye Fuentes Utilization   Network Utilization Review Department  Phone: [phone]  Fax: [phone]  Email: [email or web address]     PHYSICIAN ADVISORY SERVICES:  FOR ITQX-LY-ACDI REVIEW - MEDICAL NECESSITY DENIAL  Phone: [phone]  Fax: [phone]  Email: [email or web address]     TYPE OF REQUEST:  Inpatient Status     ADMISSION INFORMATION:  ADMISSION DATE/TIME: 12/27/21  1:42 PM  PATIENT DIAGNOSIS CODE/DESCRIPTION:  Pulmonary embolism (HCC) [I26 99]  SOB (shortness of breath) [R06 02]  DISCHARGE DATE/TIME: No discharge date for patient encounter  DISCHARGE DISPOSITION (IF DISCHARGED): Home/Self Care     IMPORTANT INFORMATION:  Please contact the Bettye Fuentes directly with any questions or concerns regarding this request  Department voicemails are confidential     Send requests for admission clinical reviews, concurrent reviews, approvals, and administrative denials due to lack of clinical to fax [phone]

## 2021-12-30 ENCOUNTER — TRANSITIONAL CARE MANAGEMENT (OUTPATIENT)
Dept: FAMILY MEDICINE CLINIC | Facility: CLINIC | Age: 61
End: 2021-12-30

## 2021-12-30 VITALS
TEMPERATURE: 98.5 F | SYSTOLIC BLOOD PRESSURE: 138 MMHG | BODY MASS INDEX: 25.41 KG/M2 | HEIGHT: 74 IN | RESPIRATION RATE: 20 BRPM | DIASTOLIC BLOOD PRESSURE: 84 MMHG | HEART RATE: 78 BPM | OXYGEN SATURATION: 91 % | WEIGHT: 198 LBS

## 2021-12-30 LAB — APTT PPP: 55 SECONDS (ref 23–37)

## 2021-12-30 PROCEDURE — 85730 THROMBOPLASTIN TIME PARTIAL: CPT | Performed by: INTERNAL MEDICINE

## 2021-12-30 PROCEDURE — 99239 HOSP IP/OBS DSCHRG MGMT >30: CPT | Performed by: PHYSICIAN ASSISTANT

## 2021-12-30 RX ADMIN — HEPARIN SODIUM 3600 UNITS: 1000 INJECTION INTRAVENOUS; SUBCUTANEOUS at 06:33

## 2021-12-30 RX ADMIN — TRAMADOL HYDROCHLORIDE 100 MG: 50 TABLET ORAL at 05:18

## 2021-12-30 RX ADMIN — HEPARIN SODIUM 18 UNITS/KG/HR: 10000 INJECTION, SOLUTION INTRAVENOUS at 05:15

## 2021-12-30 RX ADMIN — METHOCARBAMOL 500 MG: 500 TABLET, FILM COATED ORAL at 05:18
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
## 2021-12-30 NOTE — DISCHARGE SUMMARY
[facility]  Discharge- Cadence Woodall 1960, 62 y o  male MRN: [id number]  Unit/Bed#: -Isai Encounter: [id number]  Primary Care Provider: Jamila Padilla MD   Date and time admitted to hospital: 12/27/2021 10:43 AM    * Acute pulmonary embolism without acute cor pulmonale (HCC)  Assessment & Plan  · Likely due to discontinuing his Eliquis over the summer, he does state that he was checked for thrombophilia sinuses been negative in the past, given his recurrence this may need to be repeated  · Having a lot of back pain suspect due to PE, CTA negative for dissection - PRN pain control   · Admit to inpatient given the degree of pulmonary emboli burden  · Check echocardiogram to evaluate for right heart strain - negative   · B/L LE venous duplex study revealed occlusive acute thrombosis in one of the paired posterior tibial veins of RLE  · Currently hemodynamically stable and on room air  · Hematology consult appreciated - indefinite AC  · Transition from heparin gtt to Eliquis on d/c, 10 mg BID x 7 days then 5 mg BID thereafter     Chronic pain of left knee  Assessment & Plan  · Continue gabapentin at bedtime, and p r n  Tylenol        Medical Problems             Resolved Problems  Date Reviewed: 12/29/2021    None              Discharging Physician / Practitioner: Jemima Monge PA-C  PCP: Jamila Padilla MD  Admission Date:   Admission Orders (From admission, onward)     Ordered        12/27/21 1342  Inpatient Admission  Once                      Discharge Date: 12/30/21    Consultations During Hospital Stay:  · Heme/onc    Procedures Performed:   · None    Significant Findings / Test Results:   · CTA chest/abdomen/pelvis: Moderately extensive burden of b/l segmental/subsegmental PE  No evidence of RV strain    No aortic dissection or aneurysm   · Lower extremity duplex: RLE occlusive acute DVT in paired posterior tibial vein     Incidental Findings:   · None     Test Results Pending at Discharge (will require follow up): · None     Outpatient Tests Requested:  · None    Complications:  None    Reason for Admission: acute PE     Hospital Course:   Desean Rain is a 62 y o  male patient who originally presented to the hospital on 12/27/2021 due to chest pain, shortness of breath, and right leg pain  He has hx of PE and had similar sx when diagnosed in the past   CTA did demonstrate b/l PE  He was started on a heparin gtt and admitted  He was found to have RLE DVT as well  Given recurrence off of Eliquis, he was seen by heme/onc who recommended indefinite AC  He has symptomatically improved, on room air, stable for discharge on Eliquis 10 mg BID x 7 days then 5 mg BID  Please see above list of diagnoses and related plan for additional information  Condition at Discharge: good    Discharge Day Visit / Exam:   Subjective:  Pt reports his pain has improved significantly, he denies any SOB   Vitals: Blood Pressure: 138/84 (12/30/21 1152)  Pulse: 78 (12/30/21 1152)  Temperature: 98 5 °F (36 9 °C) (12/30/21 1152)  Temp Source: Oral (12/30/21 1152)  Respirations: 20 (12/30/21 1152)  Height: 6' 2" (188 cm) (12/27/21 1515)  Weight - Scale: 89 8 kg (198 lb) (12/27/21 1515)  SpO2: 91 % (12/30/21 1152)  Exam:   Physical Exam  Vitals reviewed  Constitutional:       General: He is not in acute distress  Appearance: He is not toxic-appearing  HENT:      Head: Normocephalic and atraumatic  Eyes:      Extraocular Movements: Extraocular movements intact  Cardiovascular:      Rate and Rhythm: Normal rate and regular rhythm  Pulmonary:      Effort: Pulmonary effort is normal       Breath sounds: Normal breath sounds  Abdominal:      General: Bowel sounds are normal  There is no distension  Palpations: Abdomen is soft  Tenderness: There is no abdominal tenderness  Musculoskeletal:         General: No swelling  Normal range of motion        Cervical back: Normal range of motion  Neurological:      General: No focal deficit present  Mental Status: He is alert and oriented to person, place, and time  Psychiatric:         Mood and Affect: Mood normal          Behavior: Behavior normal          Thought Content: Thought content normal           Discussion with Family: Patient declined call to   Discharge instructions/Information to patient and family:   See after visit summary for information provided to patient and family  Provisions for Follow-Up Care:  See after visit summary for information related to follow-up care and any pertinent home health orders  Disposition:   Home    Planned Readmission: no     Discharge Statement:  I spent 35 minutes discharging the patient  This time was spent on the day of discharge  I had direct contact with the patient on the day of discharge  Greater than 50% of the total time was spent examining patient, answering all patient questions, arranging and discussing plan of care with patient as well as directly providing post-discharge instructions  Additional time then spent on discharge activities  Discharge Medications:  See after visit summary for reconciled discharge medications provided to patient and/or family        **Please Note: This note may have been constructed using a voice recognition system**

## 2021-12-30 NOTE — DISCHARGE INSTRUCTIONS
You were diagnosed with a pulmonary embolism, or blood clot in your lungs  You also had a DVT (blood clot) in a vein in your calf  Please follow up with your primary care provider on discharge  Take Eliquis 10 mg daily x 7 days, then take 5 mg daily after that  Pulmonary Embolism   WHAT YOU NEED TO KNOW:   A pulmonary embolism (PE) is the sudden blockage of a blood vessel in the lungs by an embolus  A PE can become life-threatening  Go to follow-up appointments and take blood thinners as directed  These are especially important if you were discharged home from the emergency department  DISCHARGE INSTRUCTIONS:   Call your local emergency number (911 in the [address] [facility]) if:   · You feel lightheaded, short of breath, and have chest pain  · You cough up blood  Call your doctor if:   · Your arm or leg feels warm, tender, and painful  It may look swollen and red  · You have questions or concerns about your condition or care  Medicines:   · Blood thinners  help prevent blood clots  Clots can cause strokes, heart attacks, and death  The following are general safety guidelines to follow while you are taking a blood thinner:    ? Watch for bleeding and bruising while you take blood thinners  Watch for bleeding from your gums or nose  Watch for blood in your urine and bowel movements  Use a soft washcloth on your skin, and a soft toothbrush to brush your teeth  This can keep your skin and gums from bleeding  If you shave, use an electric shaver  Do not play contact sports  ? Tell your dentist and other healthcare providers that you take a blood thinner  Wear a bracelet or necklace that says you take this medicine  ? Do not start or stop any other medicines unless your healthcare provider tells you to  Many medicines cannot be used with blood thinners  ? Take your blood thinner exactly as prescribed by your healthcare provider  Do not skip does or take less than prescribed   Tell your provider right away if you forget to take your blood thinner, or if you take too much  ? Warfarin  is a blood thinner that you may need to take  The following are things you should be aware of if you take warfarin:     § Foods and medicines can affect the amount of warfarin in your blood  Do not make major changes to your diet while you take warfarin  Warfarin works best when you eat about the same amount of vitamin K every day  Vitamin K is found in green leafy vegetables and certain other foods  Ask for more information about what to eat when you are taking warfarin  § You will need to see your healthcare provider for follow-up visits when you are on warfarin  You will need regular blood tests  These tests are used to decide how much medicine you need  · Take your medicine as directed  Contact your healthcare provider if you think your medicine is not helping or if you have side effects  Tell him or her if you are allergic to any medicine  Keep a list of the medicines, vitamins, and herbs you take  Include the amounts, and when and why you take them  Bring the list or the pill bottles to follow-up visits  Carry your medicine list with you in case of an emergency  Prevent another PE:   · Change your body position or move around often  Move and stretch in your seat several times each hour if you travel by car or work at a desk  In an airplane, get up and walk every hour  · Exercise regularly to help increase your blood flow  Walking is a good low-impact exercise  Talk to your healthcare provider about the best exercise plan for you  · Maintain a healthy weight  Ask your healthcare provider how much you should weigh  Ask him or her to help you create a weight loss plan if you are overweight  · Do not smoke  Nicotine and other chemicals in cigarettes and cigars can damage blood vessels and increase your risk for another PE   Ask your healthcare provider for information if you currently smoke and need help to quit  E-cigarettes or smokeless tobacco still contain nicotine  Talk to your healthcare provider before you use these products  · Ask about birth control if you are a woman who takes the pill  A birth control pill increases the risk for blood clots in certain women  The risk is higher if you are also older than 35, smoke cigarettes, or have a blood clotting disorder  Talk to your healthcare provider about other ways to prevent pregnancy, such as a cervical cap or intrauterine device (IUD)  Follow up with your doctor or specialist as directed: You may need to come in regularly for scans to check for blood clots  Your blood may checked to see how long it takes to clot  Your doctor or specialist will tell you if you need to have this test and how often to have it  Write down your questions so you remember to ask them during your visits  © Copyright [employer] 2021 Information is for End User's use only and may not be sold, redistributed or otherwise used for commercial purposes  All illustrations and images included in CareNotes® are the copyrighted property of [employer]  or Tawana Rios   The above information is an  only  It is not intended as medical advice for individual conditions or treatments  Talk to your doctor, nurse or pharmacist before following any medical regimen to see if it is safe and effective for you

## 2021-12-30 NOTE — ASSESSMENT & PLAN NOTE
· Likely due to discontinuing his Eliquis over the summer, he does state that he was checked for thrombophilia sinuses been negative in the past, given his recurrence this may need to be repeated  · Having a lot of back pain suspect due to PE, CTA negative for dissection - PRN pain control   · Admit to inpatient given the degree of pulmonary emboli burden  · Check echocardiogram to evaluate for right heart strain - negative   · B/L LE venous duplex study revealed occlusive acute thrombosis in one of the paired posterior tibial veins of RLE  · Currently hemodynamically stable and on room air    · Hematology consult appreciated - indefinite AC  · Transition from heparin gtt to Eliquis on d/c, 10 mg BID x 7 days then 5 mg BID thereafter

## 2022-01-03 ENCOUNTER — TELEPHONE (OUTPATIENT)
Dept: OBGYN CLINIC | Facility: HOSPITAL | Age: 62
End: 2022-01-03

## 2022-01-03 ENCOUNTER — OFFICE VISIT (OUTPATIENT)
Dept: FAMILY MEDICINE CLINIC | Facility: CLINIC | Age: 62
End: 2022-01-03
Payer: COMMERCIAL

## 2022-01-03 VITALS
HEART RATE: 84 BPM | SYSTOLIC BLOOD PRESSURE: 118 MMHG | DIASTOLIC BLOOD PRESSURE: 66 MMHG | BODY MASS INDEX: 25.28 KG/M2 | WEIGHT: 197 LBS | HEIGHT: 74 IN

## 2022-01-03 DIAGNOSIS — E78.5 HYPERLIPIDEMIA, UNSPECIFIED HYPERLIPIDEMIA TYPE: ICD-10-CM

## 2022-01-03 DIAGNOSIS — D68.59 HYPERCOAGULABLE STATE (HCC): ICD-10-CM

## 2022-01-03 DIAGNOSIS — E55.9 VITAMIN D DEFICIENCY: ICD-10-CM

## 2022-01-03 DIAGNOSIS — I26.99 OTHER ACUTE PULMONARY EMBOLISM WITHOUT ACUTE COR PULMONALE (HCC): Primary | ICD-10-CM

## 2022-01-03 DIAGNOSIS — I82.441 ACUTE DEEP VEIN THROMBOSIS (DVT) OF TIBIAL VEIN OF RIGHT LOWER EXTREMITY (HCC): ICD-10-CM

## 2022-01-03 DIAGNOSIS — M25.562 CHRONIC PAIN OF LEFT KNEE: ICD-10-CM

## 2022-01-03 DIAGNOSIS — S46.011D TRAUMATIC TEAR OF RIGHT ROTATOR CUFF, UNSPECIFIED TEAR EXTENT, SUBSEQUENT ENCOUNTER: ICD-10-CM

## 2022-01-03 DIAGNOSIS — G89.29 CHRONIC PAIN OF LEFT KNEE: ICD-10-CM

## 2022-01-03 PROCEDURE — 99496 TRANSJ CARE MGMT HIGH F2F 7D: CPT | Performed by: NURSE PRACTITIONER

## 2022-01-03 RX ORDER — ALBUTEROL SULFATE 90 UG/1
2 AEROSOL, METERED RESPIRATORY (INHALATION) EVERY 6 HOURS PRN
Qty: 8 G | Refills: 0 | Status: SHIPPED | OUTPATIENT
Start: 2022-01-03 | End: 2022-07-15 | Stop reason: ALTCHOICE

## 2022-01-03 RX ORDER — ATORVASTATIN CALCIUM 10 MG/1
10 TABLET, FILM COATED ORAL DAILY
Qty: 30 TABLET | Refills: 5 | Status: SHIPPED | OUTPATIENT
Start: 2022-01-03 | End: 2022-03-29 | Stop reason: SDUPTHER

## 2022-01-03 NOTE — ASSESSMENT & PLAN NOTE
Patient is having continued dyspnea on exertion since this diagnosis  Albuterol inhaler ordered to be used as needed for shortness of breath  Patient is taking Eliquis as directed  Patient does have upcoming follow ups with pulmonology and heme Onc

## 2022-01-03 NOTE — ASSESSMENT & PLAN NOTE
Patient denies any current issues or pain regarding this  Patient is taking Eliquis as directed  Patient does have upcoming follow-up with heme Onc regarding this

## 2022-01-03 NOTE — PATIENT INSTRUCTIONS
Pulmonary Embolism   WHAT YOU NEED TO KNOW:   A pulmonary embolism (PE) is the sudden blockage of a blood vessel in the lungs by an embolus  A PE can become life-threatening  Go to follow-up appointments and take blood thinners as directed  These are especially important if you were discharged home from the emergency department  DISCHARGE INSTRUCTIONS:   Call your local emergency number (911 in the [address] [facility]) if:   · You feel lightheaded, short of breath, and have chest pain  · You cough up blood  Call your doctor if:   · Your arm or leg feels warm, tender, and painful  It may look swollen and red  · You have questions or concerns about your condition or care  Medicines:   · Blood thinners  help prevent blood clots  Clots can cause strokes, heart attacks, and death  The following are general safety guidelines to follow while you are taking a blood thinner:    ? Watch for bleeding and bruising while you take blood thinners  Watch for bleeding from your gums or nose  Watch for blood in your urine and bowel movements  Use a soft washcloth on your skin, and a soft toothbrush to brush your teeth  This can keep your skin and gums from bleeding  If you shave, use an electric shaver  Do not play contact sports  ? Tell your dentist and other healthcare providers that you take a blood thinner  Wear a bracelet or necklace that says you take this medicine  ? Do not start or stop any other medicines unless your healthcare provider tells you to  Many medicines cannot be used with blood thinners  ? Take your blood thinner exactly as prescribed by your healthcare provider  Do not skip does or take less than prescribed  Tell your provider right away if you forget to take your blood thinner, or if you take too much  ? Warfarin  is a blood thinner that you may need to take   The following are things you should be aware of if you take warfarin:     § Foods and medicines can affect the amount of warfarin in your blood  Do not make major changes to your diet while you take warfarin  Warfarin works best when you eat about the same amount of vitamin K every day  Vitamin K is found in green leafy vegetables and certain other foods  Ask for more information about what to eat when you are taking warfarin  § You will need to see your healthcare provider for follow-up visits when you are on warfarin  You will need regular blood tests  These tests are used to decide how much medicine you need  · Take your medicine as directed  Contact your healthcare provider if you think your medicine is not helping or if you have side effects  Tell him or her if you are allergic to any medicine  Keep a list of the medicines, vitamins, and herbs you take  Include the amounts, and when and why you take them  Bring the list or the pill bottles to follow-up visits  Carry your medicine list with you in case of an emergency  Prevent another PE:   · Change your body position or move around often  Move and stretch in your seat several times each hour if you travel by car or work at a desk  In an airplane, get up and walk every hour  · Exercise regularly to help increase your blood flow  Walking is a good low-impact exercise  Talk to your healthcare provider about the best exercise plan for you  · Maintain a healthy weight  Ask your healthcare provider how much you should weigh  Ask him or her to help you create a weight loss plan if you are overweight  · Do not smoke  Nicotine and other chemicals in cigarettes and cigars can damage blood vessels and increase your risk for another PE  Ask your healthcare provider for information if you currently smoke and need help to quit  E-cigarettes or smokeless tobacco still contain nicotine  Talk to your healthcare provider before you use these products  · Ask about birth control if you are a woman who takes the pill    A birth control pill increases the risk for blood clots in certain women  The risk is higher if you are also older than 35, smoke cigarettes, or have a blood clotting disorder  Talk to your healthcare provider about other ways to prevent pregnancy, such as a cervical cap or intrauterine device (IUD)  Follow up with your doctor or specialist as directed:  Make an appointment as soon as possible  You may also need to come in regularly for scans to check for blood clots  Your blood may checked to see how long it takes to clot  Your doctor or specialist will tell you if you need to have this test and how often to have it  Write down your questions so you remember to ask them during your visits  © Copyright [employer] 2021 Information is for End User's use only and may not be sold, redistributed or otherwise used for commercial purposes  All illustrations and images included in CareNotes® are the copyrighted property of [employer]  or Tawana Burden  The above information is an  only  It is not intended as medical advice for individual conditions or treatments  Talk to your doctor, nurse or pharmacist before following any medical regimen to see if it is safe and effective for you

## 2022-01-03 NOTE — TELEPHONE ENCOUNTER
DR Gabby Walden   RE: blood thinner   # [phone]    Patient is scheduled for SX 01/26 w/ Dr Umesh Sanchez    On 12/27, patient was admitted for pulmonary embolism     Patient asked when he needs to stop taking blood thinner prior to [facility]

## 2022-01-03 NOTE — PROGRESS NOTES
Assessment and Plan:    Problem List Items Addressed This Visit        Cardiovascular and Mediastinum    Acute pulmonary embolism without acute cor pulmonale ([facility] [address] ) - Primary     Patient is having continued dyspnea on exertion since this diagnosis  Albuterol inhaler ordered to be used as needed for shortness of breath  Patient is taking Eliquis as directed  Patient does have upcoming follow ups with pulmonology and heme Onc  Relevant Medications    albuterol (PROVENTIL HFA,VENTOLIN HFA) 90 mcg/act inhaler    Acute deep vein thrombosis (DVT) of tibial vein of right lower extremity ([facility] [address] )     Patient denies any current issues or pain regarding this  Patient is taking Eliquis as directed  Patient does have upcoming follow-up with heme Onc regarding this  Musculoskeletal and Integument    Traumatic tear of right rotator cuff     Patient does have upcoming preop exam to be completed prior to his rotator cuff repair  Other    Chronic pain of left knee     Well controlled on current regimen  Hyperlipidemia     Patient started on Lipitor 10 mg daily  Lipid panel also ordered to reassess the status of this  Relevant Medications    atorvastatin (LIPITOR) 10 mg tablet    Other Relevant Orders    Lipid Panel with Direct LDL reflex    Vitamin D deficiency     Well controlled on current regimen  Hypercoagulable state [facility])     Patient is currently managed on Eliquis  Patient does have upcoming follow-up with Heme-Onc  Diagnoses and all orders for this visit:    Other acute pulmonary embolism without acute cor pulmonale (HCC)  -     albuterol (PROVENTIL HFA,VENTOLIN HFA) 90 mcg/act inhaler; Inhale 2 puffs every 6 (six) hours as needed for wheezing or shortness of breath    Hyperlipidemia, unspecified hyperlipidemia type  -     atorvastatin (LIPITOR) 10 mg tablet;  Take 1 tablet (10 mg total) by mouth daily  -     Lipid Panel with Direct LDL reflex; Future    Traumatic tear of right rotator cuff, unspecified tear extent, subsequent encounter    Chronic pain of left knee    Vitamin D deficiency    Hypercoagulable state ([facility] [address] )    Acute deep vein thrombosis (DVT) of tibial vein of right lower extremity (HCC)              Subjective:      Patient ID: Grover Chavez is a 62 y o  male  CC:    Chief Complaint   Patient presents with    Transition of Care Management     patient is here for a TCM for P E  admitted 12/27, d/c'd 12/30 [facility]  patient sees Pulmonary Dr next week  ak       HPI:    Patient presented to the ED on 12/27/2021 for symptoms of chest pain, shortness of breath, and right leg pain  Patient has a history of PE in the past and was noted to have similar symptoms in the past when he was diagnosed with his previous PE  CTA did demonstrate bilateral PE  He was also noted to have right lower extremity DVT  Patient was admitted for management of his PE's and DVT  Patient was discharged from the hospital on 12/30/2021  Acute PE without acute cor pulmonale:  Patient was started on Eliquis in the past for his previous PE but the medication had since been discontinued  Patient was initially started on heparin drip but was transitioned to Eliquis 10 mg b i d  for 7 days and then 5 mg b i d  at time of discharge  Heme Onc was also consulted while patient was hospitalized and it was noted that since this was a recurrence of PE patient would need to be on this medication indefinitely  It was noted that CTA was negative for dissection of the PE  Echocardiogram was negative for right heart strain  Patient is scheduled to see hematology next week  He reports he was screened for clotting disorders in the past and he was not found to have any  Patient reports that he does still have frequent SOB  He states he is still getting intermittent pain in the right side of his back  Patient reports he is taking Tylenol PRN and this does control his pain   He does report he is experiencing MENDOZA as well  Patient does report that shortness of breath is relieved quickly with rest   Patient also has upcoming follow-up with pulmonology scheduled on 01/12/2022  DVT RLE: Venous duplex study revealed occlusive acute thrombosis in one of the paired posterior tibial veins of RLE  Patient was discharged on daily Eliquis therapy  The patient denies any current pain or problems with his RLE  Chronic pain of left knee:  Patient is currently managed on gabapentin HS and Tylenol as needed for this  Hyperlipidemia:  The patient's most recent lipid panel showed elevated total cholesterol, triglycerides, and LDL  Patient is not currently on cholesterol medication  Vitamin D deficiency:  Patient is currently managed on vitamin-D supplement daily  Patient currently denies any increased fatigue  Patient's most recent vitamin-D level was normal     Traumatic tear of right rotator cuff:  Patient is scheduled to have right rotator cuff repair surgery performed on 01/26/2022 with Dr Zully Alcala of [facility] ortho  He was advised to discontinue Eliquis 5 days before the procedure but he reports he will speak to hem-onc and pulmonology about this as well prior to the procedure      The following portions of the patient's history were reviewed and updated as appropriate: allergies, current medications, past family history, past medical history, past social history, past surgical history and problem list       Review of Systems   Constitutional: Negative for chills and fever  HENT: Negative for ear pain and sore throat  Eyes: Negative for pain and visual disturbance  Respiratory: Positive for chest tightness (MENDOZA) and shortness of breath (MENDOZA)  Negative for cough and wheezing  Cardiovascular: Negative for chest pain, palpitations and leg swelling  Gastrointestinal: Negative for abdominal pain, constipation, diarrhea, nausea and vomiting     Endocrine: Negative for cold intolerance and heat intolerance  Genitourinary: Negative for decreased urine volume, dysuria and hematuria  Musculoskeletal: Positive for back pain (right sided-where PE is )  Negative for arthralgias and myalgias  Skin: Negative for color change and rash  Allergic/Immunologic: Negative for environmental allergies  Neurological: Negative for dizziness, seizures, syncope, weakness, light-headedness, numbness and headaches  Hematological: Negative for adenopathy  Psychiatric/Behavioral: Negative for confusion  The patient is not nervous/anxious  All other systems reviewed and are negative  Data to review:       Objective:    Vitals:    01/03/22 1358   BP: 118/66   Pulse: 84   Weight: 89 4 kg (197 lb)   Height: 6' 2" (1 88 m)     TCM Call (since 12/3/2021)     Date and time call was made  12/30/2021 11:28 AM    Hospital care reviewed  Records reviewed        Patient was hospitialized at  [facility]        Date of discharge  12/27/21  pt is to be discharged 12/31/21     Diagnosis  blood clots     Disposition  Home    Were the patients medications reviewed and updated  No    Current Symptoms  None      TCM Call (since 12/3/2021)     Post hospital issues  None    Should patient be enrolled in anticoag monitoring? No    Scheduled for follow up? Yes    Did you obtain your prescribed medications  Yes    Do you need help managing your prescriptions or medications  No    Is transportation to your appointment needed  No    I have advised the patient to call PCP with any new or worsening symptoms  Jessica Holden, Practice Administrator            Physical Exam  Vitals and nursing note reviewed  Constitutional:       General: He is not in acute distress  Appearance: Normal appearance  He is well-developed  He is not ill-appearing  HENT:      Head: Normocephalic and atraumatic  Eyes:      Conjunctiva/sclera: Conjunctivae normal    Cardiovascular:      Rate and Rhythm: Normal rate and regular rhythm  Pulses: Normal pulses  Carotid pulses are 2+ on the right side and 2+ on the left side  Radial pulses are 2+ on the right side and 2+ on the left side  Posterior tibial pulses are 2+ on the right side and 2+ on the left side  Heart sounds: Normal heart sounds  No murmur heard  Pulmonary:      Effort: Pulmonary effort is normal  No respiratory distress  Breath sounds: Normal breath sounds  No decreased breath sounds, wheezing, rhonchi or rales  Abdominal:      General: Abdomen is flat  Bowel sounds are normal  There is no distension  Palpations: Abdomen is soft  Tenderness: There is no abdominal tenderness  There is no guarding  Musculoskeletal:         General: Normal range of motion  Cervical back: Normal range of motion and neck supple  Right lower leg: No edema  Left lower leg: No edema  Skin:     General: Skin is warm and dry  Capillary Refill: Capillary refill takes less than 2 seconds  Neurological:      General: No focal deficit present  Mental Status: He is alert and oriented to person, place, and time  Psychiatric:         Mood and Affect: Mood normal          Behavior: Behavior normal          Thought Content: Thought content normal          Judgment: Judgment normal            BMI Counseling: Body mass index is 25 29 kg/m²  The BMI is above normal  Nutrition recommendations include decreasing portion sizes, encouraging healthy choices of fruits and vegetables, moderation in carbohydrate intake and increasing intake of lean protein  Exercise recommendations include exercising 3-5 times per week and obtaining a gym membership  No pharmacotherapy was ordered  Rationale for BMI follow-up plan is due to patient being overweight or obese

## 2022-01-03 NOTE — TELEPHONE ENCOUNTER
Called patient back and he was advised of information below - he will check with doctor who is managing anticoagulant

## 2022-01-03 NOTE — ASSESSMENT & PLAN NOTE
[facility]  Patient does have upcoming follow-up with Heme-Onc

## 2022-01-12 ENCOUNTER — TELEPHONE (OUTPATIENT)
Dept: OBGYN CLINIC | Facility: MEDICAL CENTER | Age: 62
End: 2022-01-12

## 2022-01-12 ENCOUNTER — OFFICE VISIT (OUTPATIENT)
Dept: PULMONOLOGY | Facility: CLINIC | Age: 62
End: 2022-01-12
Payer: COMMERCIAL

## 2022-01-12 VITALS
HEART RATE: 89 BPM | BODY MASS INDEX: 25.28 KG/M2 | WEIGHT: 197 LBS | SYSTOLIC BLOOD PRESSURE: 124 MMHG | HEIGHT: 74 IN | DIASTOLIC BLOOD PRESSURE: 76 MMHG | TEMPERATURE: 98.1 F | OXYGEN SATURATION: 96 %

## 2022-01-12 DIAGNOSIS — I82.441 ACUTE DEEP VEIN THROMBOSIS (DVT) OF TIBIAL VEIN OF RIGHT LOWER EXTREMITY (HCC): ICD-10-CM

## 2022-01-12 DIAGNOSIS — M25.511 ACUTE PAIN OF RIGHT SHOULDER: ICD-10-CM
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
## 2022-01-12 DIAGNOSIS — S43.431A LABRAL TEAR OF SHOULDER, RIGHT, INITIAL ENCOUNTER: ICD-10-CM

## 2022-01-12 DIAGNOSIS — I26.99 OTHER ACUTE PULMONARY EMBOLISM WITHOUT ACUTE COR PULMONALE (HCC): Primary | ICD-10-CM

## 2022-01-12 PROCEDURE — 99244 OFF/OP CNSLTJ NEW/EST MOD 40: CPT | Performed by: INTERNAL MEDICINE

## 2022-01-12 NOTE — LETTER
January 12, 2022     Sheyla Lamar, [address] [place] [id number]    Patient: Jacinta Coronado   YOB: 1960   Date of Visit: 1/12/2022       Dear Dr Jeffrey Horton Recipients: Thank you for referring Taina Burks to me for evaluation  Below are my notes for this consultation  If you have questions, please do not hesitate to call me  I look forward to following your patient along with you           Sincerely,        Mg Srivastava,         CC: No Recipients
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
## 2022-01-12 NOTE — ASSESSMENT & PLAN NOTE
Recommending any elective surgeries until at least after 3 months from continuing anticoagulation after the most recent pulmonary embolism

## 2022-01-12 NOTE — ASSESSMENT & PLAN NOTE
Recommend lifelong anticoagulation, detailed above, no evidence of right heart strain on rash recent echocardiography

## 2022-01-12 NOTE — TELEPHONE ENCOUNTER
Left message confirming we are cancelling the 1/26/2022 sx date   Advised him to call me when he gets a chance to reschedule new date

## 2022-01-12 NOTE — TELEPHONE ENCOUNTER
Patient sees Dr Marizol Dowling  Patient calling to cancel surgery on 1/26/2022 for rt shoulder   His doctor found significant clotting in his lungs, and wants him to hold off for surgery until end of February  His doctor will be faxing over reports to Dr Kathi Bolivar' office  Please give him call to reschedule surgery       # [phone]

## 2022-01-20 ENCOUNTER — OFFICE VISIT (OUTPATIENT)
Dept: PHYSICAL THERAPY | Facility: REHABILITATION | Age: 62
End: 2022-01-20
Payer: COMMERCIAL
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
## 2022-01-20 DIAGNOSIS — S46.011D TRAUMATIC TEAR OF RIGHT ROTATOR CUFF, UNSPECIFIED TEAR EXTENT, SUBSEQUENT ENCOUNTER: ICD-10-CM

## 2022-01-20 DIAGNOSIS — S43.431A LABRAL TEAR OF SHOULDER, RIGHT, INITIAL ENCOUNTER: Primary | ICD-10-CM

## 2022-01-20 DIAGNOSIS — M25.511 ACUTE PAIN OF RIGHT SHOULDER: ICD-10-CM

## 2022-01-20 PROCEDURE — 97010 HOT OR COLD PACKS THERAPY: CPT

## 2022-01-20 PROCEDURE — 97110 THERAPEUTIC EXERCISES: CPT

## 2022-01-20 PROCEDURE — 97112 NEUROMUSCULAR REEDUCATION: CPT
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
## 2022-01-21 ENCOUNTER — OFFICE VISIT (OUTPATIENT)
Dept: FAMILY MEDICINE CLINIC | Facility: CLINIC | Age: 62
End: 2022-01-21
Payer: COMMERCIAL

## 2022-01-21 VITALS
BODY MASS INDEX: 25.67 KG/M2 | SYSTOLIC BLOOD PRESSURE: 122 MMHG | OXYGEN SATURATION: 95 % | DIASTOLIC BLOOD PRESSURE: 76 MMHG | HEIGHT: 74 IN | WEIGHT: 200 LBS | HEART RATE: 86 BPM

## 2022-01-21 DIAGNOSIS — S46.011D TRAUMATIC TEAR OF RIGHT ROTATOR CUFF, UNSPECIFIED TEAR EXTENT, SUBSEQUENT ENCOUNTER: Primary | ICD-10-CM

## 2022-01-21 DIAGNOSIS — Z23 NEED FOR INFLUENZA VACCINATION: ICD-10-CM

## 2022-01-21 DIAGNOSIS — I26.99 PULMONARY EMBOLISM (HCC): ICD-10-CM

## 2022-01-21 DIAGNOSIS — D72.829 LEUKOCYTOSIS, UNSPECIFIED TYPE: ICD-10-CM

## 2022-01-21 PROCEDURE — 99214 OFFICE O/P EST MOD 30 MIN: CPT | Performed by: FAMILY MEDICINE

## 2022-01-21 PROCEDURE — 90471 IMMUNIZATION ADMIN: CPT | Performed by: FAMILY MEDICINE

## 2022-01-21 PROCEDURE — 90682 RIV4 VACC RECOMBINANT DNA IM: CPT | Performed by: FAMILY MEDICINE

## 2022-01-21 NOTE — ASSESSMENT & PLAN NOTE
Having shoulder surgery 3/2-pulmonary said to hold Eliquis for 2 days, would check with them and ortho whether 36 hour hold is okay, clot was somewhat provoked since drove up to [facility], since 2nd episode lifelong blood thinner
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
## 2022-01-21 NOTE — ASSESSMENT & PLAN NOTE
Having shoulder surgery 3/2-pulmonary said to hold Eliquis for 2 days, would check with them and ortho whether 36 hour hold is okay

## 2022-01-27 ENCOUNTER — APPOINTMENT (OUTPATIENT)
Dept: PHYSICAL THERAPY | Facility: REHABILITATION | Age: 62
End: 2022-01-27
Payer: COMMERCIAL

## 2022-02-03 ENCOUNTER — APPOINTMENT (OUTPATIENT)
Dept: PHYSICAL THERAPY | Facility: REHABILITATION | Age: 62
End: 2022-02-03
Payer: COMMERCIAL

## 2022-02-10 ENCOUNTER — OFFICE VISIT (OUTPATIENT)
Dept: PHYSICAL THERAPY | Facility: REHABILITATION | Age: 62
End: 2022-02-10
Payer: COMMERCIAL

## 2022-02-10 DIAGNOSIS — M25.511 ACUTE PAIN OF RIGHT SHOULDER: ICD-10-CM
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
## 2022-02-10 DIAGNOSIS — S46.011D TRAUMATIC TEAR OF RIGHT ROTATOR CUFF, UNSPECIFIED TEAR EXTENT, SUBSEQUENT ENCOUNTER: ICD-10-CM

## 2022-02-10 DIAGNOSIS — S43.431A LABRAL TEAR OF SHOULDER, RIGHT, INITIAL ENCOUNTER: Primary | ICD-10-CM

## 2022-02-10 PROCEDURE — 97010 HOT OR COLD PACKS THERAPY: CPT
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
## 2022-03-11 ENCOUNTER — TELEPHONE (OUTPATIENT)
Dept: HEMATOLOGY ONCOLOGY | Facility: CLINIC | Age: 62
End: 2022-03-11

## 2022-03-14 ENCOUNTER — TELEPHONE (OUTPATIENT)
Dept: PULMONOLOGY | Facility: CLINIC | Age: 62
End: 2022-03-14

## 2022-03-14 NOTE — TELEPHONE ENCOUNTER
Called patient to discuss his upcoming shoulder surgery at the end of march  He has no underlying pulmonary disease  He is a never smoker  His ARISCAT score is 3 indicating a low,  1 6% risk of post-operative respiratory complications (from as minor as wheezing atalectasis to as major as re-intubation)  These risks can be minimized by limiting anesthesia time where possible  He had some recent segmental pulmonary emboli in December 2021  He had no evidence of right heart strain on his CT or echocardiogram at that point  He did not have a troponin elevation  He has been on anticoagulation without interruption for 3 months  At this point, would be okay to hold anticoagulation as needed for surgery  Would resume anticoagulation within 24-48 hours after surgery if there is no on-going bleeding risk  On his next visit we can discuss checking a d-dimer and potentially discontinuing anticoagulation vs continuing lifelong  Repeating an echo would be of limited utility as there was no evidence of hemodynamic change initially

## 2022-03-14 NOTE — TELEPHONE ENCOUNTER
Established patient needing pre-op clearance  Surgery: SHOULDER ARTHROSCOPIC ROTATOR CUFF REPAIR (Right Shoulder  Surgery date: 3/30/22  Last seen by us: 1/12/22  On oxygen: No  Kind of Sedation: General w/ Interscalene Block  Length of surgery: 80 minutes  Surgeon: Salina Heard  Office contact: [phone]  Form/Office Note: Note/Form  Fax: [phone]     Would you like to clear patient via a phone call from last visit or would like us to schedule them with you?

## 2022-03-16 ENCOUNTER — ANESTHESIA EVENT (OUTPATIENT)
Dept: PERIOP | Facility: AMBULARY SURGERY CENTER | Age: 62
End: 2022-03-16
Payer: COMMERCIAL

## 2022-03-28 ENCOUNTER — TELEPHONE (OUTPATIENT)
Dept: FAMILY MEDICINE CLINIC | Facility: CLINIC | Age: 62
End: 2022-03-28

## 2022-03-28 NOTE — TELEPHONE ENCOUNTER
Medication :ELIQUIS 5 MG  Day supply: [facility]  Last office visit: 1/21/2022    Upcoming office visit: 4/28/2022      Medication: ATORVASTATIN 10 MG  Day supply: [facility]    Last office visit: 1/21/2022    Upcoming office visit: 4/28/2022

## 2022-03-29 DIAGNOSIS — E78.5 HYPERLIPIDEMIA, UNSPECIFIED HYPERLIPIDEMIA TYPE: ICD-10-CM

## 2022-03-29 DIAGNOSIS — I26.99 PULMONARY EMBOLISM (HCC): ICD-10-CM

## 2022-03-29 RX ORDER — ATORVASTATIN CALCIUM 10 MG/1
10 TABLET, FILM COATED ORAL DAILY
Qty: 30 TABLET | Refills: 5 | Status: SHIPPED | OUTPATIENT
Start: 2022-03-29 | End: 2022-07-15 | Stop reason: DRUGHIGH

## 2022-03-29 NOTE — ANESTHESIA PREPROCEDURE EVALUATION
Procedure:  SHOULDER ARTHROSCOPIC ROTATOR CUFF REPAIR (Right Shoulder)    Relevant Problems   CARDIO   (+) Acute deep vein thrombosis (DVT) of tibial vein of right lower extremity (HCC)   (+) Chest pain   (+) Hyperlipidemia      /RENAL   (+) Nephrolithiasis      HEMATOLOGY   (+) Hypercoagulable state ([address] )      MUSCULOSKELETAL   (+) DDD (degenerative disc disease), lumbosacral    Narrative     Left Ventricle: Left ventricular cavity size is normal  The left    ventricular ejection fraction is 65%  Systolic function is normal  Wall    motion is normal  Diastolic function is mildly abnormal, consistent with    grade I (abnormal) relaxation    The left ventricular wall motion is normal      Right Ventricle: Right ventricular cavity size is normal  Systolic    function is normal     Tricuspid Valve: The right ventricular systolic pressure is normal      Physical Exam    Airway    Mallampati score: II  TM Distance: >3 FB  Neck ROM: full     Dental       Cardiovascular      Pulmonary      Other Findings        Anesthesia Plan  ASA Score- 2     Anesthesia Type- general with ASA Monitors  Additional Monitors:   Airway Plan: LMA  Comment: IS Block for post op pain per surgeon request          Plan Factors-    Chart reviewed  Induction- intravenous  Postoperative Plan-     Informed Consent- Anesthetic plan and risks discussed with patient  I personally reviewed this patient with the CRNA  Discussed and agreed on the Anesthesia Plan with the CRNA  Tonia Gaytan

## 2022-03-30 ENCOUNTER — ANESTHESIA (OUTPATIENT)
Dept: PERIOP | Facility: AMBULARY SURGERY CENTER | Age: 62
End: 2022-03-30
Payer: COMMERCIAL

## 2022-03-30 ENCOUNTER — HOSPITAL ENCOUNTER (OUTPATIENT)
Facility: AMBULARY SURGERY CENTER | Age: 62
Setting detail: OUTPATIENT SURGERY
Discharge: HOME | End: 2022-03-30
Attending: ORTHOPAEDIC SURGERY | Admitting: ORTHOPAEDIC SURGERY
Payer: COMMERCIAL

## 2022-03-30 VITALS
DIASTOLIC BLOOD PRESSURE: 70 MMHG | TEMPERATURE: 97.8 F | RESPIRATION RATE: 17 BRPM | SYSTOLIC BLOOD PRESSURE: 134 MMHG | OXYGEN SATURATION: 93 % | HEART RATE: 87 BPM

## 2022-03-30 DIAGNOSIS — S46.011D TRAUMATIC TEAR OF RIGHT ROTATOR CUFF, UNSPECIFIED TEAR EXTENT, SUBSEQUENT ENCOUNTER: Primary | ICD-10-CM

## 2022-03-30 DIAGNOSIS — I26.99 PULMONARY EMBOLISM (HCC): ICD-10-CM

## 2022-03-30 PROCEDURE — 29827 SHO ARTHRS SRG RT8TR CUF RPR: CPT | Performed by: PHYSICIAN ASSISTANT

## 2022-03-30 PROCEDURE — 29826 SHO ARTHRS SRG DECOMPRESSION: CPT | Performed by: PHYSICIAN ASSISTANT

## 2022-03-30 PROCEDURE — 29823 SHO ARTHRS SRG XTNSV DBRDMT: CPT | Performed by: ORTHOPAEDIC SURGERY

## 2022-03-30 PROCEDURE — C9290 INJ, BUPIVACAINE LIPOSOME: HCPCS | Performed by: ANESTHESIOLOGY

## 2022-03-30 PROCEDURE — 29826 SHO ARTHRS SRG DECOMPRESSION: CPT | Performed by: ORTHOPAEDIC SURGERY

## 2022-03-30 PROCEDURE — NC001 PR NO CHARGE: Performed by: ORTHOPAEDIC SURGERY

## 2022-03-30 PROCEDURE — 29823 SHO ARTHRS SRG XTNSV DBRDMT: CPT | Performed by: PHYSICIAN ASSISTANT

## 2022-03-30 PROCEDURE — C1713 ANCHOR/SCREW BN/BN,TIS/BN: HCPCS | Performed by: ORTHOPAEDIC SURGERY

## 2022-03-30 PROCEDURE — 29827 SHO ARTHRS SRG RT8TR CUF RPR: CPT | Performed by: ORTHOPAEDIC SURGERY

## 2022-03-30 DEVICE — ANCHOR SUT 4.75 X 19.1MM CLD EYELET SWIVELOCK: Type: IMPLANTABLE DEVICE | Site: SHOULDER | Status: FUNCTIONAL

## 2022-03-30 DEVICE — ANCHOR SUT 4.75 X 22MM SWIVELOCK C DBL LOADED: Type: IMPLANTABLE DEVICE | Site: SHOULDER | Status: FUNCTIONAL

## 2022-03-30 RX ORDER — ACETAMINOPHEN 325 MG/1
650 TABLET ORAL EVERY 6 HOURS PRN
Status: CANCELLED | OUTPATIENT
Start: 2022-03-30

## 2022-03-30 RX ORDER — CEFAZOLIN SODIUM 2 G/50ML
2000 SOLUTION INTRAVENOUS ONCE
Status: COMPLETED | OUTPATIENT
Start: 2022-03-30 | End: 2022-03-30

## 2022-03-30 RX ORDER — FENTANYL CITRATE/PF 50 MCG/ML
50 SYRINGE (ML) INJECTION
Status: DISCONTINUED | OUTPATIENT
Start: 2022-03-30 | End: 2022-03-30 | Stop reason: HOSPADM

## 2022-03-30 RX ORDER — HYDROCODONE BITARTRATE AND ACETAMINOPHEN 5; 325 MG/1; MG/1
1 TABLET ORAL EVERY 6 HOURS PRN
Qty: 12 TABLET | Refills: 0 | Status: SHIPPED | OUTPATIENT
Start: 2022-03-30 | End: 2022-04-09

## 2022-03-30 RX ORDER — BUPIVACAINE HYDROCHLORIDE 5 MG/ML
INJECTION, SOLUTION EPIDURAL; INTRACAUDAL AS NEEDED
Status: DISCONTINUED | OUTPATIENT
Start: 2022-03-30 | End: 2022-03-30

## 2022-03-30 RX ORDER — EPHEDRINE SULFATE 50 MG/ML
INJECTION INTRAVENOUS AS NEEDED
Status: DISCONTINUED | OUTPATIENT
Start: 2022-03-30 | End: 2022-03-30

## 2022-03-30 RX ORDER — METOCLOPRAMIDE HYDROCHLORIDE 5 MG/ML
10 INJECTION INTRAMUSCULAR; INTRAVENOUS ONCE AS NEEDED
Status: DISCONTINUED | OUTPATIENT
Start: 2022-03-30 | End: 2022-03-30 | Stop reason: HOSPADM

## 2022-03-30 RX ORDER — SODIUM CHLORIDE, SODIUM LACTATE, POTASSIUM CHLORIDE, CALCIUM CHLORIDE 600; 310; 30; 20 MG/100ML; MG/100ML; MG/100ML; MG/100ML
INJECTION, SOLUTION INTRAVENOUS CONTINUOUS PRN
Status: DISCONTINUED | OUTPATIENT
Start: 2022-03-30 | End: 2022-03-30

## 2022-03-30 RX ORDER — ONDANSETRON 4 MG/1
4 TABLET, FILM COATED ORAL EVERY 8 HOURS PRN
Qty: 20 TABLET | Refills: 0 | Status: SHIPPED | OUTPATIENT
Start: 2022-03-30 | End: 2022-07-15 | Stop reason: ALTCHOICE

## 2022-03-30 RX ORDER — ONDANSETRON 2 MG/ML
4 INJECTION INTRAMUSCULAR; INTRAVENOUS EVERY 6 HOURS PRN
Status: CANCELLED | OUTPATIENT
Start: 2022-03-30

## 2022-03-30 RX ORDER — FENTANYL CITRATE 50 UG/ML
INJECTION, SOLUTION INTRAMUSCULAR; INTRAVENOUS AS NEEDED
Status: DISCONTINUED | OUTPATIENT
Start: 2022-03-30 | End: 2022-03-30

## 2022-03-30 RX ORDER — HYDROCODONE BITARTRATE AND ACETAMINOPHEN 5; 325 MG/1; MG/1
1 TABLET ORAL EVERY 6 HOURS PRN
Qty: 12 TABLET | Refills: 0 | Status: CANCELLED | OUTPATIENT
Start: 2022-03-30 | End: 2022-04-09

## 2022-03-30 RX ORDER — LIDOCAINE HYDROCHLORIDE 10 MG/ML
INJECTION, SOLUTION EPIDURAL; INFILTRATION; INTRACAUDAL; PERINEURAL AS NEEDED
Status: DISCONTINUED | OUTPATIENT
Start: 2022-03-30 | End: 2022-03-30

## 2022-03-30 RX ORDER — PROPOFOL 10 MG/ML
INJECTION, EMULSION INTRAVENOUS AS NEEDED
Status: DISCONTINUED | OUTPATIENT
Start: 2022-03-30 | End: 2022-03-30

## 2022-03-30 RX ORDER — ONDANSETRON 2 MG/ML
4 INJECTION INTRAMUSCULAR; INTRAVENOUS ONCE AS NEEDED
Status: DISCONTINUED | OUTPATIENT
Start: 2022-03-30 | End: 2022-03-30 | Stop reason: HOSPADM

## 2022-03-30 RX ORDER — ONDANSETRON 2 MG/ML
INJECTION INTRAMUSCULAR; INTRAVENOUS AS NEEDED
Status: DISCONTINUED | OUTPATIENT
Start: 2022-03-30 | End: 2022-03-30

## 2022-03-30 RX ORDER — MIDAZOLAM HYDROCHLORIDE 2 MG/2ML
INJECTION, SOLUTION INTRAMUSCULAR; INTRAVENOUS AS NEEDED
Status: DISCONTINUED | OUTPATIENT
Start: 2022-03-30 | End: 2022-03-30

## 2022-03-30 RX ORDER — SODIUM CHLORIDE, SODIUM LACTATE, POTASSIUM CHLORIDE, CALCIUM CHLORIDE 600; 310; 30; 20 MG/100ML; MG/100ML; MG/100ML; MG/100ML
50 INJECTION, SOLUTION INTRAVENOUS CONTINUOUS
Status: DISCONTINUED | OUTPATIENT
Start: 2022-03-30 | End: 2022-03-30 | Stop reason: HOSPADM

## 2022-03-30 RX ORDER — DEXAMETHASONE SODIUM PHOSPHATE 10 MG/ML
INJECTION, SOLUTION INTRAMUSCULAR; INTRAVENOUS AS NEEDED
Status: DISCONTINUED | OUTPATIENT
Start: 2022-03-30 | End: 2022-03-30

## 2022-03-30 RX ADMIN — FENTANYL CITRATE 25 MCG: 50 INJECTION, SOLUTION INTRAMUSCULAR; INTRAVENOUS at 09:16

## 2022-03-30 RX ADMIN — ONDANSETRON 4 MG: 2 INJECTION INTRAMUSCULAR; INTRAVENOUS at 09:24

## 2022-03-30 RX ADMIN — BUPIVACAINE 20 ML: 13.3 INJECTION, SUSPENSION, LIPOSOMAL INFILTRATION at 08:35

## 2022-03-30 RX ADMIN — MIDAZOLAM HYDROCHLORIDE 2 MG: 1 INJECTION, SOLUTION INTRAMUSCULAR; INTRAVENOUS at 08:24

## 2022-03-30 RX ADMIN — SODIUM CHLORIDE, SODIUM LACTATE, POTASSIUM CHLORIDE, AND CALCIUM CHLORIDE: .6; .31; .03; .02 INJECTION, SOLUTION INTRAVENOUS at 08:18

## 2022-03-30 RX ADMIN — PROPOFOL 200 MG: 10 INJECTION, EMULSION INTRAVENOUS at 09:21

## 2022-03-30 RX ADMIN — CEFAZOLIN SODIUM 2000 MG: 2 SOLUTION INTRAVENOUS at 09:12

## 2022-03-30 RX ADMIN — EPHEDRINE SULFATE 10 MG: 50 INJECTION, SOLUTION INTRAVENOUS at 09:52

## 2022-03-30 RX ADMIN — DEXAMETHASONE SODIUM PHOSPHATE 10 MG: 10 INJECTION, SOLUTION INTRAMUSCULAR; INTRAVENOUS at 09:24

## 2022-03-30 RX ADMIN — LIDOCAINE HYDROCHLORIDE 20 MG: 10 INJECTION, SOLUTION EPIDURAL; INFILTRATION; INTRACAUDAL at 09:21

## 2022-03-30 RX ADMIN — FENTANYL CITRATE 25 MCG: 50 INJECTION, SOLUTION INTRAMUSCULAR; INTRAVENOUS at 08:24

## 2022-03-30 RX ADMIN — EPHEDRINE SULFATE 20 MG: 50 INJECTION, SOLUTION INTRAVENOUS at 09:43

## 2022-03-30 RX ADMIN — BUPIVACAINE HYDROCHLORIDE 7.5 ML: 5 INJECTION, SOLUTION EPIDURAL; INTRACAUDAL at 08:35

## 2022-03-30 RX ADMIN — EPHEDRINE SULFATE 20 MG: 50 INJECTION, SOLUTION INTRAVENOUS at 09:30

## 2022-03-30 NOTE — DISCHARGE INSTRUCTIONS
You are being scheduled for a shoulder arthroscopy to treat your symptoms  Below are some instructions and information on what to expect before and after your surgery  Pre-Surgical Preparation for Arthroscopic Shoulder Surgery: You will be contacted the evening prior to your surgery to confirm the scheduled time of the procedure and when to arrive at the hospital    Do not eat or drink anything after midnight the night before your surgery  Since you are having out-patient surgery, make sure that you have someone who can drive you home later in the day  Also, prepare that person for a long day, as the process of safely preparing for and recovering from the procedure is more time consuming than the actual procedure! As you will be in a sling after surgery, please wear or bring a loose fitting button-down shirt so that you can easily place this over the sling when you leave the surgical suite  This avoids having to place the operative arm in a sleeve  Most patients find that this is the easiest outfit to wear for the first week or so after surgery so you may want to plan accordingly  Most patients find that lying down in bed after shoulder surgery accentuates their discomfort  This is likely related to the effect of gravity on the swelling in the shoulder  As a result, most patients sleep better in a recliner or in bed with pillows propped up behind their back for the first few days or weeks after surgery  It is a good idea to plan for this ahead of time so there will be less hassle getting things set up the night after surgery  What to Expect After Arthroscopic Shoulder Surgery: It is normal to have swelling and discomfort in the shoulder for several days or a week after surgery  It is also normal to have a small amount of drainage from the surgical wounds (especially the first few days after surgery), as we put fluid into the shoulder to visualize the structures during surgery  It is NOT normal to have foul smelling, purulent drainage and if this is noted, please contact the office immediately or proceed to the emergency room for evaluation as this may indicate an infection  Applying ice bags to the shoulder may help with pain that is not controlled by the regional block  Ice should be applied 20-30 minutes at a time, every hour or two  Make sure to put a thin towel or T-shirt next to your skin to avoid direct contact of the ice with the skin  Icing is most helpful in the first 48 hours, although many people find that continuing past this time frame lessens their postoperative pain  Please note that your post-operative dressing is not conductive to ice, so if you need to, it is okay to remove that dressing even the night after surgery and place band-aids over the wounds in order for the ice to take effect  Pain Control    Most patients will receive a nerve block, the local anesthetic may keep your whole arm numb for up to 4 days  You will be given a prescription for narcotic pain medication when you are discharged from the hospital   With the newer nerve block that is being utilized, patients are rarely requiring the use of this narcotic pain medication  If you find you do not tolerate that type of pain medicine well, call our office and we will try another one  In addition to the narcotic pain medication, it is safe to use an anti-inflammatory (unless the patient has a medical condition that would not allow safe use of this mediation)  This includes the Advil, Motrin, Ibuprofen and Alleve category of medications  Simply follow the over the counter dosing on the package and use as indicated as another adjunct  Importantly since these medications are all very similar, use only one of them  Tylenol is a separate medication that can be utilized as well and can be taken at the same time as the other medication or given in a "staggered" manner    Just make sure that you follow the dosing on the over the counter bottle instructions  Also make sure that the pain medication prescribed by Dr Jenae Santoyo team does not contain acetaminophen (this is found in Percocet and Vicodin)  Typically we do not prescribe those types of pain medications but if for some reason that has been prescribed DO NOT add more Tylenol (acetaminophen) as you could end up taking too much of that medication  As mentioned above, most patients find that lying down accentuates their discomfort  You might sleep better in a recliner, or propped up in bed  Dr Escobar Cummings encourages patients to safely ambulate around the house as much as possible in the first few days after the procedure as this can help with blood circulation in the legs  While the incidence of blood clots is very rare following shoulder surgery, early ambulation is a great way to help decrease the already low rate  24 hours after the surgery you may remove the bandage and cover incisions with Band-Aids if needed  At that time you may shower, the wounds will have a surgical glue that will protect them from shower water but do not submerge your incisions directly (bathing or swimming) until at least 2 weeks post-operatively  It is safe to let the arm hang at the side and take a shower and put on a shirt without the sling on  Just make sure that you do not use the operative are to reach out and grab anything as that may damage the repair  When you are done showering and getting dressed please return the operative arm to the sling  Unless noted otherwise in your discharge paperwork, Dr Escobar Cummings uses absorbable sutures so they do not need to be removed  Dr Alden Petit physician assistant (PA) will see you in the office a few days after the procedure to review the intra-operative findings and to initiate physical therapy if appropriate    A post-operative appointment should have been scheduled for you already, but if for some reason this did not happen, please call the office to make one  Physical therapy is important after nearly all shoulder surgeries and a detailed rehabilitation plan based on the specific intra-operative findings and procedures will be provided to your therapist at the first post-operative office visit  Most patients have post-operative therapy appointments scheduled pre-operatively, but if you do not, that will be handled at the first post-operative office visit  Unless expressly directed otherwise it is safe to remove the sling even the first day after the surgery and let the arm hang by the side  This allows patients to shower and even put a shirt on (bad arm in the sleeve first)  It is also safe to flex and extend their wrist, hand and fingers as much as possible when the block wears off  These simple motions can serve to pump fluid out of the forearm and decrease swelling in the arm
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
## 2022-03-30 NOTE — ANESTHESIA POSTPROCEDURE EVALUATION
Post-Op Assessment Note    CV Status:  Stable  Pain Score: 0    Pain management: adequate     Mental Status:  Alert and awake   Hydration Status:  Euvolemic   PONV Controlled:  Controlled   Airway Patency:  Patent      Post Op Vitals Reviewed: Yes      Staff: CRNA         No complications documented      BP   128/71   Temp  96 8   Pulse  89   Resp   16   SpO2   97

## 2022-03-30 NOTE — OP NOTE
OPERATIVE REPORT  PATIENT NAME: Grace Pelayo    :  1960  MRN: [id number]  Pt Location: [facility] OR [facility]OM 05    SURGERY DATE: 3/30/2022     SURGEON: Charo Johnson MD     ASSISTANT: Angeles Singh PA-C     NOTE: Angeles Singh PA-C was present throughout the entire procedure and performed essential assistance with patient prepping, draping, positioning, suture management, wound closure, sterile dressing application and sling application, all under my direct supervision  NOTE: No qualified resident physician was available for assistance    PREOPERATIVE DIAGNOSIS:  Right Shoulder Supraspinatus Tear and SLAP Tear    POSTOPERATIVE DIAGNOSIS: Same    PROCEDURES: Surgical Arthroscopy Right Shoulder with Rotator Cuff Repair and Extensive Debridement    ANESTHESIA STAFF: Daryrn Villaseñor MD     ANESTHESIA TYPE: General LMA with ultrasound guided interscalene block (Exparel)  The interscalene block was provided by the anesthesia staff per my request for postoperative pain control and to decrease the use of postoperative narcotic medication for pain control  COMPLICATIONS: None    FINDINGS: Supraspinatus Tear and Type 1 SLAP Tear    SPECIMEN(S):  None    ESTIMATED BLOOD LOSS: Minimal    INDICATION:  Briefly, the patient is a 62 y o   male with right shoulder pain  MRI scan confirmed a full thickness supraspinatus tear and SLAP tear  The patient elected for arthroscopic treatment  Informed consent was obtained after a thorough discussion of the risks and benefits of the procedure, as well as alternatives to the procedure  OPERATIVE TECHNIQUE:  On the day of surgery, I identified the patients right shoulder and marked it with my initials  The patient was taken to the operating room where anesthesia was induced and 2 grams of IV Cefazolin were given  The patient was examined in the supine position and was found to have full range of motion of the right shoulder with no instability    The patient was then positioned in the [address] [place] [address] position  All bony prominences were padded  The shoulder was prepped and draped in normal sterile fashion  After a time-out for safety, a standard posterolateral arthroscopic portal was made  Glenohumeral evaluation revealed intact glenohumeral articular cartilage with some mild early degenerative change and no intra-articular loose bodies  There was a full thickness supraspinatus tear as anticipated based on preoperative imaging and there was a Type 1 SLAP tear which did not disrupt the long-head biceps tendon anchor and the long-head biceps tendon itself was stable as the bicipital pulley was not disrupted by the supraspinatus tendon tear  The subscapularis and infraspinatus were intact  Shaving device introduced and extensive debridement glenohumeral joint including the superior labrum, the anterior and posterior glenoid labrum as well as the articular cartilage of the humeral head and glenoid were debrided to a stable border, after debridement no significant full-thickness cartilage loss was seen and the labrum was stable not requiring repair  After the intra-articular work was completed, the scope was then placed in the subacromial space through the same portal where a thorough bursectomy was performed  The full thickness supraspinatus tear was found to measure 1 2 cm from anterior to posterior and was not retracted and was able to be easily reduced to the tuberosity  The tuberosity was prepared in routine fashion and a double row suture bridge configuration repair with one medial double loaded 4 75 mm [employer]rex Bio[employer] SwiveLock anchor and one lateral 4 75 mm Ar[employer]rex Bio[employer] SwiveLock anchor using four stiches through the tendon in horizontal mattress fashion was performed achieving anatomic reduction of the rotator cuff tendon to the tuberosity  The long head of biceps was not indicated for tenodesis given intra-operative appearance   The CA ligament was frayed so it was released off the anterolateral edge of acromion and a gentle acromioplasty was performed  The area was then irrigated  Scope was withdrawn  Wounds were closed with 4-0 Monocryl and Histoacryl  Sterile dressings and a sling with an abduction pillow was placed  The patient was awoken without complication and returned to the recovery room in good condition  We will see the patient back in the office next week to initiate therapy following standard rotator cuff repair rehabilitation protocol  At the end of procedure, the counts were correct       PATIENT DISPOSITION:  Stable to PACU      SIGNATURE: Talya Palomino MD  DATE: March 30, 2022  TIME: 10:12 AM

## 2022-03-30 NOTE — ANESTHESIA PROCEDURE NOTES
Peripheral Block    Patient location during procedure: holding area  Start time: 3/30/2022 8:35 AM  Reason for block: at surgeon's request and post-op pain management  Staffing  Performed: Anesthesiologist   Anesthesiologist: Demar Elizondo MD  Preanesthetic Checklist  Completed: patient identified, IV checked, site marked, risks and benefits discussed, surgical consent, monitors and equipment checked, pre-op evaluation and timeout performed  Peripheral Block  Patient position: supine  Prep: ChloraPrep  Patient monitoring: continuous pulse ox and frequent blood pressure checks  Block type: interscalene  Laterality: right  Injection technique: single-shot  Procedures: ultrasound guided, Ultrasound guidance required for the procedure to increase accuracy and safety of medication placement and decrease risk of complications    Ultrasound permanent image saved  Needle  Needle type: Stimuplex   Needle gauge: 22 G  Needle length: 5 cm  Needle localization: ultrasound guidance  Test dose: negative  Assessment  Injection assessment: incremental injection, local visualized surrounding nerve on ultrasound, no paresthesia on injection and negative aspiration for heme  Paresthesia pain: none  Heart rate change: no  Slow fractionated injection: yes  Post-procedure:  site cleaned  patient tolerated the procedure well with no immediate complications

## 2022-04-04 ENCOUNTER — OFFICE VISIT (OUTPATIENT)
Dept: OBGYN CLINIC | Facility: OTHER | Age: 62
End: 2022-04-04

## 2022-04-04 VITALS
SYSTOLIC BLOOD PRESSURE: 110 MMHG | BODY MASS INDEX: 26.18 KG/M2 | HEART RATE: 99 BPM | WEIGHT: 204 LBS | HEIGHT: 74 IN | DIASTOLIC BLOOD PRESSURE: 75 MMHG

## 2022-04-04 DIAGNOSIS — M75.101 TEAR OF RIGHT SUPRASPINATUS TENDON: Primary | ICD-10-CM

## 2022-04-04 PROCEDURE — 99024 POSTOP FOLLOW-UP VISIT: CPT | Performed by: ORTHOPAEDIC SURGERY

## 2022-04-04 NOTE — PROGRESS NOTES
Assessment:  1  Tear of right supraspinatus tendon           Surgery: Shoulder Arthroscopic Rotator Cuff Repair; Extensive Debridement - Right  Date of Surgery: 3/30/2022        Discussion and Plan:   · Continue sling per protocol  · Start PT per protocol (patient given a copy today)    Follow Up:  Return in about 8 weeks (around 5/30/2022)  CHIEF COMPLAINT:  Chief Complaint   Patient presents with    Right Shoulder - Follow-up         SUBJECTIVE:  Oh Jimenez is a 62y o  year old male who presents for follow up after Shoulder Arthroscopic Rotator Cuff Repair; Extensive Debridement - Right  Today patient has minimal pain  Pain is controlled by OTC Tylenol  He reports he only had to take 1 pain pill         PHYSICAL EXAMINATION:  General: well developed and well nourished, alert, oriented times 3 and appears comfortable  Psychiatric: Normal    MUSCULOSKELETAL EXAMINATION:  Incision: Clean, dry, intact  Surgery Site: normal, no evidence of infection   Range of Motion: As expected  Neurovascular status: Neuro intact, good cap refill  Activity Restrictions: sling         Scribe Attestation    I,:  Micah Prather am acting as a scribe while in the presence of the attending physician :       I,:  Nikia Loomis MD personally performed the services described in this documentation    as scribed in my presence :

## 2022-04-06 ENCOUNTER — OFFICE VISIT (OUTPATIENT)
Dept: PHYSICAL THERAPY | Facility: REHABILITATION | Age: 62
End: 2022-04-06
Payer: COMMERCIAL

## 2022-04-06 DIAGNOSIS — Z98.890 S/P RIGHT ROTATOR CUFF REPAIR: ICD-10-CM

## 2022-04-06 DIAGNOSIS — M25.511 ACUTE PAIN OF RIGHT SHOULDER: Primary | ICD-10-CM

## 2022-04-06 PROCEDURE — 97110 THERAPEUTIC EXERCISES: CPT

## 2022-04-06 PROCEDURE — 97162 PT EVAL MOD COMPLEX 30 MIN: CPT

## 2022-04-06 NOTE — PROGRESS NOTES
PT Evaluation     Today's date: 2022  Patient name: Wendy Smith  : 1960  MRN: [id number]  Referring provider: Lenin Woodruff  Dx:   Encounter Diagnosis     ICD-10-CM    1  Acute pain of right shoulder  M25 511    2  S/P right rotator cuff repair  Z98 890        Start Time: 5315  Stop Time: 6557  Total time in clinic (min): 60 minutes    Assessment  Assessment details: Wendy Smith is a 62 y o  male presenting with R shoulder pain s/p supraspinatus repair and labral debridement on 3/30/2022  Primary impairments include R shoulder pain with functional activities, RUE weakness, R shoulder AROM dysfunction  Will benefit from skilled PT interventions for community reintegration, ADL management/independence, return to work/hobbies  Impairments: abnormal coordination, abnormal muscle firing, abnormal muscle tone, abnormal or restricted ROM, activity intolerance, impaired physical strength, lacks appropriate home exercise program, pain with function, scapular dyskinesis, poor posture  and poor body mechanics  Functional limitations: ADLs (dressing, showering, toileting, etc ), any RUE usage  Symptom irritability: moderateBarriers to therapy: none  Understanding of Dx/Px/POC: good   Prognosis: good    Goals    Short Term Goals: In 8 weeks, the patient will:  1  Improve R shoulder global strength to at least 3/5 MMT  2  Improve R shoulder flexion and abduction AROM to at least 165 degrees  3  Supervision with HEP for self-care  4  Improve  strength to at least 95 pounds    Long Term Goals: In 16 weeks, the patient will:  1  Improve R shoulder global strength to at least 4/5 MMT  2  FOTO to greater than predicted value  3  Independent with HEP for self-care  4   Return to full work duties    Plan  Patient would benefit from: skilled physical therapy  Planned modality interventions: manual electrical stimulation  Planned therapy interventions: abdominal trunk stabilization, activity modification, balance, balance/weight bearing training, behavior modification, body mechanics training, community reintegration, coordination, fine motor coordination training, flexibility, functional ROM exercises, gait training, graded activity, graded exercise, graded motor, home exercise program, work reintegration, therapeutic training, therapeutic exercise, therapeutic activities, stretching, strengthening, self care, postural training, patient education, neuromuscular re-education, motor coordination training, massage, manual therapy, joint mobilization and ADL training  Frequency: 2x week  Duration in weeks: 16  Plan of Care beginning date: 4/6/2022  Plan of Care expiration date: 7/27/2022  Treatment plan discussed with: patient        Subjective    Pain Location: anterior R shoulder  Pain Intensity: current 0/10, occasional "achey pain" 2/10   ASAD: R supraspinatus and SLAP repair  DOI: 3/30/2022  Aggravating Factors: has avoided using RUE, mild sleep dysfunction  Alleviating Factors: ice, rest, pendulums  Living Situation: modified independence with ADLs; difficulty with showering, toileting, getting dressed, etc   Constitutional S/S: initially had RUE paresthesias - has since resolved   Dominant Hand: R  Goals: "not to f*ck it up"   PLOF: retired , [employer]; works as       Objective    Flowsheet Rows      Most Recent Value   PT/OT G-Codes    Current Score 21   Projected Score 67       OBJECTIVE:    Standing         Head Position x Protracted  Neutral  Retracted   Scapular Position x Protracted  Neutral  Retracted   Thoracic Spine  Inc Kyphosis x Neutral     Lumbar Spine  Inc Lordosis x Neutral  Dec Lordosis   Pelvis  Anterior Tilt x Neutral  Posterior Tilt   Iliac Crest  L elevated x Neutral  R elevated   Scoliotic Curvature  "C" Curve  "S" Curve     Lateral Shift  Right  Left x None     Strength and ROM evaluated B from a regional biomechanical perspective and values relevant to this episode recorded in table below    ROM: Goniometric measurement revealed the following findings  Shoulder ROM Right PROM Left PROM   Flexion 80* 180   Abduction 60* 180   ER -5* 90   IR 40* 70     Strength: MMT revealed the following findings    Joint Motion Right Left   Sh  Flexion 1/5 5/5   Sh  Abduction 1/5 5/5   Sh  ER 1/5 5/5   Sh  IR 1/5 5/5   Shoulder extension 80 lbs 98 lbs     Additional Assessments:  Palpation: TTP R shoulder anteriorly, superiorly, and posteriorly, TTP incision sites  Joint mobility: mild co-contraction, joint mobility WFL, mild capsular tightness                                                              Precautions: none    Phase 1:  3/30 - 5/11    Pertinent Findings:                                                                                                                                                     Test / Measure  4/6/2022   FOTO 21   R shoulder global MMT 1/5   R shoulder flexion ROM 80 deg   R shoulder abduction ROM 60 deg       Manuals 4/6            R Sh PROM MM 8'                                                   Neuro Re-Ed             Pendulums 4 way 1' ea            Scap retr 2x15                                                                             Ther Ex             Pulleys             Elbow flex/ext 30x ea            Wrist flex/ext 30x ea            Forearm pro/sup 30x ea            R UT/LS S             Gripper 2x1' black                                      Ther Activity                                       Gait Training                                       Modalities

## 2022-04-11 ENCOUNTER — OFFICE VISIT (OUTPATIENT)
Dept: PHYSICAL THERAPY | Facility: REHABILITATION | Age: 62
End: 2022-04-11
Payer: COMMERCIAL

## 2022-04-11 DIAGNOSIS — Z98.890 S/P RIGHT ROTATOR CUFF REPAIR: ICD-10-CM

## 2022-04-11 DIAGNOSIS — M25.511 ACUTE PAIN OF RIGHT SHOULDER: Primary | ICD-10-CM

## 2022-04-11 PROCEDURE — 97140 MANUAL THERAPY 1/> REGIONS: CPT

## 2022-04-11 PROCEDURE — 97112 NEUROMUSCULAR REEDUCATION: CPT

## 2022-04-11 PROCEDURE — 97110 THERAPEUTIC EXERCISES: CPT

## 2022-04-11 NOTE — PROGRESS NOTES
Daily Note     Today's date: 2022  Patient name: Amisha Chatman  : 1960  MRN: [id number]  Referring provider: Nery Sullivan  Dx:   Encounter Diagnosis     ICD-10-CM    1  Acute pain of right shoulder  M25 511    2  S/P right rotator cuff repair  Z98 890        Start Time: 908  Stop Time: 5779  Total time in clinic (min): 38 minutes    Subjective: Pt reports taking Tylenol prior to tx session  States current "achey" feeling in R shoulder  Compliant with HEP  Objective: See treatment diary below      Assessment: Tolerated treatment well  Patient would benefit from continued PT  Pt maintaining status quo following initial evaluation  Able to tolerate phase 1 interventions  Mild pain increase with supination secondary labral debridement during surgery  Able to tolerate stretching within protocols in all directions  1:1 with Leyla Welch DPT entirety of tx  Plan: Progress treatment as tolerated         Precautions: none    Phase 1:  3/30 -     Pertinent Findings:                                                                                                                                                     Test / Measure  2022   FOTO 21   R shoulder global MMT    R shoulder flexion ROM 80 deg   R shoulder abduction ROM 60 deg       Manuals            R Sh PROM MM 8' MM 8'                                                  Neuro Re-Ed             Pendulums 4 way 1' ea 1' ea           Scap retr 2x15 30x3"                                                                            Ther Ex             Pulleys  6'           Elbow flex/ext 30x ea 30x ea           Wrist flex/ext 30x ea 30x ea           Forearm pro/sup 30x ea 30x ea           R UT/LS S  3x30" ea           Gripper 2x1' black 2' black                                     Ther Activity                                       Gait Training                                       Modalities

## 2022-04-14 ENCOUNTER — OFFICE VISIT (OUTPATIENT)
Dept: PHYSICAL THERAPY | Facility: REHABILITATION | Age: 62
End: 2022-04-14
Payer: COMMERCIAL

## 2022-04-14 DIAGNOSIS — M25.511 ACUTE PAIN OF RIGHT SHOULDER: Primary | ICD-10-CM

## 2022-04-14 DIAGNOSIS — Z98.890 S/P RIGHT ROTATOR CUFF REPAIR: ICD-10-CM

## 2022-04-14 PROCEDURE — 97110 THERAPEUTIC EXERCISES: CPT

## 2022-04-14 PROCEDURE — 97010 HOT OR COLD PACKS THERAPY: CPT

## 2022-04-14 PROCEDURE — 97140 MANUAL THERAPY 1/> REGIONS: CPT

## 2022-04-14 NOTE — PROGRESS NOTES
Daily Note     Today's date: 2022  Patient name: Roberto Cook  : 1960  MRN: [id number]  Referring provider: Fauzia Samaniego  Dx:   Encounter Diagnosis     ICD-10-CM    1  Acute pain of right shoulder  M25 511    2  S/P right rotator cuff repair  Z98 890        Start Time: 712  Stop Time: 920  Total time in clinic (min): 23 minutes    Subjective: Pt with an increase in pain at anterior R shoulder  Pt reports mowing the lawn yesterday  He states aggravation of symptoms though with sudden/quick movement attempting to catch falling pill  Has been compliant with HEP and icing R shoulder  Objective: See treatment diary below      Assessment: Tolerated treatment well  Patient would benefit from continued PT  Pt with mild improvement of R shoulder PROM within protocol limits - no aggravation of symptoms  Able to complete elbow/forearm/wrist/hand interventions today along with scapular retractions  Pt significantly tender to palpation along tendon of long head of the biceps insertion site at the R shoulder  It does not appear that any damage was done to the repaired structures - advised pt to avoid any heavy activities at home that may disrupt surgical intervention  1:1 with Ness Oh DPT entirety of tx  Plan: Progress treatment as tolerated         Precautions: none    Phase 1:  3/30 -     Pertinent Findings:                                                                                                                                                     Test / Measure  2022   FOTO 21   R shoulder global MMT    R shoulder flexion ROM 80 deg   R shoulder abduction ROM 60 deg       Manuals           R Sh PROM MM 8' MM 8' MM 8'                                                 Neuro Re-Ed             Pendulums 4 way 1' ea 1' ea 1' ea          Scap retr 2x15 30x3" 30x3"                                                                           Ther Ex Pulleys  6' 6'          Elbow flex/ext 30x ea 30x ea 30x ea          Wrist flex/ext 30x ea 30x ea 30x ea          Forearm pro/sup 30x ea 30x ea 30x ea          R UT/LS S  3x30" ea 3x30" ea          Gripper 2x1' black 2' black 2' black                                    Ther Activity                                       Gait Training                                       Modalities             Cold pack   Perf

## 2022-04-18 ENCOUNTER — OFFICE VISIT (OUTPATIENT)
Dept: PHYSICAL THERAPY | Facility: REHABILITATION | Age: 62
End: 2022-04-18
Payer: COMMERCIAL

## 2022-04-18 DIAGNOSIS — M25.511 ACUTE PAIN OF RIGHT SHOULDER: Primary | ICD-10-CM

## 2022-04-18 DIAGNOSIS — Z98.890 S/P RIGHT ROTATOR CUFF REPAIR: ICD-10-CM

## 2022-04-18 PROCEDURE — 97112 NEUROMUSCULAR REEDUCATION: CPT

## 2022-04-18 PROCEDURE — 97140 MANUAL THERAPY 1/> REGIONS: CPT

## 2022-04-18 PROCEDURE — 97110 THERAPEUTIC EXERCISES: CPT

## 2022-04-18 NOTE — PROGRESS NOTES
Daily Note     Today's date: 2022  Patient name: Kate Mathew  : 1960  MRN: [id number]  Referring provider: Ash Merrill  Dx:   Encounter Diagnosis     ICD-10-CM    1  Acute pain of right shoulder  M25 511    2  S/P right rotator cuff repair  Z98 890        Start Time:   Stop Time: 7199  Total time in clinic (min): 38 minutes    Subjective: Pt without sling upon entering clinic  States R shoulder is feeling much better than last tx session  Has been more careful with activities that may negatively impact healing of R shoulder  Objective: See treatment diary below      Assessment: Tolerated treatment well  Patient would benefit from continued PT  Tolerated continued RUE AROM excluding shoulder movements  Tolerated PROM within protocol without aggravation of pain symptomology  1:1 with Francisco Javier Keane DPT entirety of tx  Plan: Progress treatment as tolerated         Precautions: none    Phase 1:  3/30 -     Pertinent Findings:                                                                                                                                                     Test / Measure  2022   FOTO 21   R shoulder global MMT    R shoulder flexion ROM 80 deg   R shoulder abduction ROM 60 deg       Manuals          R Sh PROM MM 8' MM 8' MM 8' MM 8'                                                Neuro Re-Ed             Pendulums 4 way 1' ea 1' ea 1' ea 1' ea         Scap retr 2x15 30x3" 30x3" 30x3"                                                                          Ther Ex             Pulleys  6' 6' 6'         Elbow flex/ext 30x ea 30x ea 30x ea 30x ea         Wrist flex/ext 30x ea 30x ea 30x ea 30x ea         Forearm pro/sup 30x ea 30x ea 30x ea 30x ea         R UT/LS S  3x30" ea 3x30" ea 3x30" ea         Gripper 2x1' black 2' black 2' black 2' black                                   Ther Activity                                       Gait Training                                       Modalities             Cold pack   Perf

## 2022-04-21 ENCOUNTER — OFFICE VISIT (OUTPATIENT)
Dept: PHYSICAL THERAPY | Facility: REHABILITATION | Age: 62
End: 2022-04-21
Payer: COMMERCIAL

## 2022-04-21 DIAGNOSIS — Z98.890 S/P RIGHT ROTATOR CUFF REPAIR: ICD-10-CM

## 2022-04-21 DIAGNOSIS — M25.511 ACUTE PAIN OF RIGHT SHOULDER: Primary | ICD-10-CM

## 2022-04-21 PROCEDURE — 97110 THERAPEUTIC EXERCISES: CPT

## 2022-04-21 PROCEDURE — 97112 NEUROMUSCULAR REEDUCATION: CPT

## 2022-04-21 PROCEDURE — 97140 MANUAL THERAPY 1/> REGIONS: CPT

## 2022-04-21 NOTE — PROGRESS NOTES
Daily Note     Today's date: 2022  Patient name: Wendy Smith  : 1960  MRN: [id number]  Referring provider: Lenin Woodruff  Dx:   Encounter Diagnosis     ICD-10-CM    1  Acute pain of right shoulder  M25 511    2  S/P right rotator cuff repair  Z98 890        Start Time: 0612  Stop Time: 9540  Total time in clinic (min): 38 minutes    Subjective: Pt reports taking analgesic prior to PT session  States R shoulder is improved compared to last tx session  Objective: See treatment diary below      Assessment: Tolerated treatment well  Patient would benefit from continued PT  Pt meeting protocol PROM limits in all directions  1:1 with Mj Heredia DPT entirety of tx  Plan: Continue per plan of care        Precautions: none    Phase 1:  3/30 -   Phase 2:      Pertinent Findings:                                                                                                                                                     Test / Measure  2022   FOTO 21 47   R shoulder global MMT     R shoulder flexion ROM 80 deg    R shoulder abduction ROM 60 deg        Manuals         R Sh PROM MM 8' MM 8' MM 8' MM 8' MM 8'                                               Neuro Re-Ed             Pendulums 4 way 1' ea 1' ea 1' ea 1' ea 1' ea        Scap retr 2x15 30x3" 30x3" 30x3" 30x3"                                                                         Ther Ex             Pulleys  6' 6' 6' 6'        Elbow flex/ext 30x ea 30x ea 30x ea 30x ea 30x ea        Wrist flex/ext 30x ea 30x ea 30x ea 30x ea 30x ea        Forearm pro/sup 30x ea 30x ea 30x ea 30x ea 30x ea        R UT/LS S  3x30" ea 3x30" ea 3x30" ea 3x30" ea        Gripper 2x1' black 2' black 2' black 2' black 2' black                                  Ther Activity                                       Gait Training                                       Modalities             Cold pack   Perf

## 2022-04-22 ENCOUNTER — APPOINTMENT (OUTPATIENT)
Dept: PHYSICAL THERAPY | Facility: REHABILITATION | Age: 62
End: 2022-04-22
Payer: COMMERCIAL

## 2022-04-25 ENCOUNTER — OFFICE VISIT (OUTPATIENT)
Dept: PHYSICAL THERAPY | Facility: REHABILITATION | Age: 62
End: 2022-04-25
Payer: COMMERCIAL

## 2022-04-25 DIAGNOSIS — Z98.890 S/P RIGHT ROTATOR CUFF REPAIR: ICD-10-CM

## 2022-04-25 DIAGNOSIS — M25.511 ACUTE PAIN OF RIGHT SHOULDER: Primary | ICD-10-CM

## 2022-04-25 PROCEDURE — 97110 THERAPEUTIC EXERCISES: CPT

## 2022-04-25 PROCEDURE — 97140 MANUAL THERAPY 1/> REGIONS: CPT

## 2022-04-25 PROCEDURE — 97010 HOT OR COLD PACKS THERAPY: CPT

## 2022-04-25 PROCEDURE — 97112 NEUROMUSCULAR REEDUCATION: CPT

## 2022-04-25 NOTE — PROGRESS NOTES
Daily Note     Today's date: 2022  Patient name: Melina Mckeon  : 1960  MRN: [id number]  Referring provider: Jose Daily  Dx:   Encounter Diagnosis     ICD-10-CM    1  Acute pain of right shoulder  M25 511    2  S/P right rotator cuff repair  Z98 890        Start Time:   Stop Time: 5436  Total time in clinic (min): 57 minutes    Subjective: Pt reports that R shoulder "was very good yesterday"  States no exacerbation of pain over the weekend  Objective: See treatment diary below      Assessment: Tolerated treatment well  Patient would benefit from continued PT  Pt tolerated trial of supine AAROM flexion utilizing LUE to complete - pt without aggravation of symptoms  Progressing well within protocol  1:1 with Heriberto Stoner DPT entirety of tx  Plan: Continue per plan of care        Precautions: none    Phase 1:  3/30 -   Phase 2:   -   Phase 3:   -   Phase 4:   -      Pertinent Findings:                                                                                                                                                     Test / Measure  2022   FOTO 21 47   R shoulder global MMT     R shoulder flexion ROM 80 deg    R shoulder abduction ROM 60 deg        Manuals        R Sh PROM MM 8' MM 8' MM 8' MM 8' MM 8' MM 8'                                              Neuro Re-Ed             Pendulums 4 way 1' ea 1' ea 1' ea 1' ea 1' ea 1' ea       Scap retr 2x15 30x3" 30x3" 30x3" 30x3" 30x3"       Supine flex AAROM      5x                                                           Ther Ex             Pulleys  6' 6' 6' 6' 6'       Elbow flex/ext 30x ea 30x ea 30x ea 30x ea 30x ea 30x ea       Wrist flex/ext 30x ea 30x ea 30x ea 30x ea 30x ea 30x ea       Forearm pro/sup 30x ea 30x ea 30x ea 30x ea 30x ea 30x ea       R UT/LS S  3x30" ea 3x30" ea 3x30" ea 3x30" ea 3x30" ea       Gripper 2x1' black 2' black 2' black 2' black 2' black 2' black                                 Ther Activity                                       Gait Training                                       Modalities             Cold pack   Perf   Perf

## 2022-04-28 ENCOUNTER — OFFICE VISIT (OUTPATIENT)
Dept: PHYSICAL THERAPY | Facility: REHABILITATION | Age: 62
End: 2022-04-28
Payer: COMMERCIAL

## 2022-04-28 DIAGNOSIS — Z98.890 S/P RIGHT ROTATOR CUFF REPAIR: ICD-10-CM

## 2022-04-28 DIAGNOSIS — M25.511 ACUTE PAIN OF RIGHT SHOULDER: Primary | ICD-10-CM

## 2022-04-28 PROCEDURE — 97140 MANUAL THERAPY 1/> REGIONS: CPT

## 2022-04-28 PROCEDURE — 97110 THERAPEUTIC EXERCISES: CPT

## 2022-04-28 NOTE — PROGRESS NOTES
Daily Note     Today's date: 2022  Patient name: Camila Sharif  : 1960  MRN: [id number]  Referring provider: Olivia Mcgowan  Dx:   Encounter Diagnosis     ICD-10-CM    1  Acute pain of right shoulder  M25 511    2  S/P right rotator cuff repair  Z98 890        Start Time: 1300  Stop Time: 8040  Total time in clinic (min): 45 minutes    Subjective: Pt reports mild R shoulder soreness secondary to increased activity yesterday, "other than that, it's doing good "      Objective: See treatment diary below      Assessment: Tolerated treatment well  Patient would benefit from continued PT  Added in [facility] further this session secondary to PROM progressing well within protocol  1:1 with Aurelia Almeida DPT entirety of tx  Plan: Progress treatment as tolerated         Precautions: none    Phase 1:  3/30 -   Phase 2:   -   Phase 3:   -   Phase 4:   -      Pertinent Findings:                                                                                                                                                     Test / Measure  2022   FOTO 21 47   R shoulder global MMT     R shoulder flexion ROM 80 deg    R shoulder abduction ROM 60 deg        Manuals       R Sh PROM MM 8' MM 8' MM 8' MM 8' MM 8' MM 8' MM 8'                                             Neuro Re-Ed             Pendulums 4 way 1' ea 1' ea 1' ea 1' ea 1' ea 1' ea 1' ea      Scap retr 2x15 30x3" 30x3" 30x3" 30x3" 30x3" 30x5"      Supine flex AAROM      5x 15x      Table slides       15x                                             Ther Ex             Pulleys  6' 6' 6' 6' 6' 6'      Elbow flex/ext 30x ea 30x ea 30x ea 30x ea 30x ea 30x ea 30x ea      Wrist flex/ext 30x ea 30x ea 30x ea 30x ea 30x ea 30x ea 30x ea      Forearm pro/sup 30x ea 30x ea 30x ea 30x ea 30x ea 30x ea 30x ea       R UT/LS S  3x30" ea 3x30" ea 3x30" ea 3x30" ea 3x30" ea 3x30" ea Gripper 2x1' black 2' black 2' black 2' black 2' black 2' black 2' black                                Ther Activity                                       Gait Training                                       Modalities             Cold pack   Perf   Perf

## 2022-05-04 ENCOUNTER — OFFICE VISIT (OUTPATIENT)
Dept: PHYSICAL THERAPY | Facility: REHABILITATION | Age: 62
End: 2022-05-04
Payer: COMMERCIAL

## 2022-05-04 DIAGNOSIS — M25.511 ACUTE PAIN OF RIGHT SHOULDER: Primary | ICD-10-CM

## 2022-05-04 DIAGNOSIS — Z98.890 S/P RIGHT ROTATOR CUFF REPAIR: ICD-10-CM

## 2022-05-04 PROCEDURE — 97112 NEUROMUSCULAR REEDUCATION: CPT | Performed by: PHYSICAL THERAPIST

## 2022-05-04 PROCEDURE — 97140 MANUAL THERAPY 1/> REGIONS: CPT | Performed by: PHYSICAL THERAPIST

## 2022-05-04 PROCEDURE — 97110 THERAPEUTIC EXERCISES: CPT | Performed by: PHYSICAL THERAPIST

## 2022-05-04 NOTE — PROGRESS NOTES
Daily Note     Today's date: 2022  Patient name: Sandie Shaw  : 1960  MRN: [id number]  Referring provider: Army Scheuermann  Dx:   Encounter Diagnosis     ICD-10-CM    1  Acute pain of right shoulder  M25 511    2  S/P right rotator cuff repair  Z98 890                   Subjective: Patient stated no pain prior to treatment session  Objective: See treatment diary below      Assessment: Patient performed exercises without c/o pain; minimal pain with manual shoulder PROM  Plan: Progress treatment as tolerated         Precautions: none    Phase 1:  3/30 -   Phase 2:   -   Phase 3:   -   Phase 4:   -      Pertinent Findings:                                                                                                                                                     Test / Measure  2022   FOTO 21 47   R shoulder global MMT     R shoulder flexion ROM 80 deg    R shoulder abduction ROM 60 deg        Manuals      R Sh PROM MM 8' MM 8' MM 8' MM 8' MM 8' MM 8' MM 8' KK                                            Neuro Re-Ed             Pendulums 4 way 1' ea 1' ea 1' ea 1' ea 1' ea 1' ea 1' ea 1' ea     Scap retr 2x15 30x3" 30x3" 30x3" 30x3" 30x3" 30x5" 30x5"     Supine flex AAROM      5x 15x 20x     Table slides       15x 20x                                            Ther Ex             Pulleys  6' 6' 6' 6' 6' 6' 6'     Elbow flex/ext 30x ea 30x ea 30x ea 30x ea 30x ea 30x ea 30x ea 30x ea     Wrist flex/ext 30x ea 30x ea 30x ea 30x ea 30x ea 30x ea 30x ea 30x ea     Forearm pro/sup 30x ea 30x ea 30x ea 30x ea 30x ea 30x ea 30x ea  30x ea     R UT/LS S  3x30" ea 3x30" ea 3x30" ea 3x30" ea 3x30" ea 3x30" ea 3x30" ea     Gripper 2x1' black 2' black 2' black 2' black 2' black 2' black 2' black 2' black                               Ther Activity                                       Gait Training Modalities             Cold pack   Perf   Perf

## 2022-05-06 ENCOUNTER — OFFICE VISIT (OUTPATIENT)
Dept: PHYSICAL THERAPY | Facility: REHABILITATION | Age: 62
End: 2022-05-06
Payer: COMMERCIAL

## 2022-05-06 DIAGNOSIS — M25.511 ACUTE PAIN OF RIGHT SHOULDER: Primary | ICD-10-CM

## 2022-05-06 DIAGNOSIS — Z98.890 S/P RIGHT ROTATOR CUFF REPAIR: ICD-10-CM

## 2022-05-06 PROCEDURE — 97112 NEUROMUSCULAR REEDUCATION: CPT

## 2022-05-06 PROCEDURE — 97140 MANUAL THERAPY 1/> REGIONS: CPT

## 2022-05-06 PROCEDURE — 97110 THERAPEUTIC EXERCISES: CPT

## 2022-05-06 NOTE — PROGRESS NOTES
Daily Note     Today's date: 2022  Patient name: Kristal Sykes  : 1960  MRN: [id number]  Referring provider: Rosa Dickens  Dx:   Encounter Diagnosis     ICD-10-CM    1  Acute pain of right shoulder  M25 511    2  S/P right rotator cuff repair  Z98 890          Subjective: Patient noted no new complaints happy with how his shoulder is healing  Objective: See treatment diary below      Assessment: Tolerated treatment fair  Patient was able to perform listed exercises without complaints and correct form  Patient noted that he performed pendulums today at home prior to PT treatment  Patient would benefit from continued PT  Plan: Continue per plan of care        Precautions: none    Phase 1:  3/30 -   Phase 2:   -   Phase 3:   -   Phase 4:   -      Pertinent Findings:                                                                                                                                                     Test / Measure  2022   FOTO 21 47   R shoulder global MMT     R shoulder flexion ROM 80 deg    R shoulder abduction ROM 60 deg        Manuals     R Sh PROM MM 8' MM 8' MM 8' MM 8' MM 8' MM 8' MM 8' KK AF                                           Neuro Re-Ed             Pendulums 4 way 1' ea 1' ea 1' ea 1' ea 1' ea 1' ea 1' ea 1' ea 1' ea home     Scap retr 2x15 30x3" 30x3" 30x3" 30x3" 30x3" 30x5" 30x5" 30 x5"    Supine flex AAROM      5x 15x 20x 30x     Table slides       15x 20x 20 x                                            Ther Ex             Pulleys  6' 6' 6' 6' 6' 6' 6' 6'     Elbow flex/ext 30x ea 30x ea 30x ea 30x ea 30x ea 30x ea 30x ea 30x ea 30 x ea    Wrist flex/ext 30x ea 30x ea 30x ea 30x ea 30x ea 30x ea 30x ea 30x ea 30 x ea     Forearm pro/sup 30x ea 30x ea 30x ea 30x ea 30x ea 30x ea 30x ea  30x ea 30 x ea     R UT/LS S  3x30" ea 3x30" ea 3x30" ea 3x30" ea 3x30" ea 3x30" ea 3x30" ea 3 x 30" ea    Gripper 2x1' black 2' black 2' black 2' black 2' black 2' black 2' black 2' black 2' black                              Ther Activity                                       Gait Training                                       Modalities             Cold pack   Perf   Perf

## 2022-05-11 ENCOUNTER — OFFICE VISIT (OUTPATIENT)
Dept: PHYSICAL THERAPY | Facility: REHABILITATION | Age: 62
End: 2022-05-11
Payer: COMMERCIAL

## 2022-05-11 DIAGNOSIS — M25.511 ACUTE PAIN OF RIGHT SHOULDER: Primary | ICD-10-CM

## 2022-05-11 DIAGNOSIS — Z98.890 S/P RIGHT ROTATOR CUFF REPAIR: ICD-10-CM

## 2022-05-11 PROCEDURE — 97110 THERAPEUTIC EXERCISES: CPT

## 2022-05-11 PROCEDURE — 97010 HOT OR COLD PACKS THERAPY: CPT

## 2022-05-11 PROCEDURE — 97140 MANUAL THERAPY 1/> REGIONS: CPT

## 2022-05-11 NOTE — PROGRESS NOTES
Daily Note     Today's date: 2022  Patient name: Zulma Sorensen  : 1960  MRN: [id number]  Referring provider: Saul Silva:   Encounter Diagnosis     ICD-10-CM    1  Acute pain of right shoulder  M25 511    2  S/P right rotator cuff repair  Z98 890        Start Time: 9908  Stop Time: 0900  Total time in clinic (min): 49 minutes    Subjective: Pt reports improvement of R shoulder status over the past week  Started work this week but remains compliant with protocol  States L shoulder is sore secondary to overuse from work  Objective: See treatment diary below      Assessment: Tolerated treatment well  Patient would benefit from continued PT  Started phase 2 today  Pt with continually improving R shoulder PROM and decreased co-contraction during PROM to allow stretching  Unable to complete side lying abduction and standing scaption at this point, but working AAROM of flexion and abduction proved useful to work into these directions  Advised pt to complete new exercises at home and discontinue phase 1 exercises  1:1 with Luis A Ashby DPT entirety of tx  Plan: Progress treatment as tolerated         Precautions: none    Phase 1:  3/30 -   Phase 2:   -   Phase 3:   -   Phase 4:   -      Pertinent Findings:                                                                                                                                                     Test / Measure  2022   FOTO 21 47 55   R shoulder global MMT      R shoulder flexion ROM 80 deg     R shoulder abduction ROM 60 deg         Manuals    R Sh PROM MM 8' MM 8' MM 8' MM 8' MM 8' MM 8' MM 8' KK AF MM 8'                                          Neuro Re-Ed             Pendulums 4 way 1' ea 1' ea 1' ea 1' ea 1' ea 1' ea 1' ea 1' ea 1' ea home  D/C   Scap retr 2x15 30x3" 30x3" 30x3" 30x3" 30x3" 30x5" 30x5" 30 x5" 30x5" Supine flex AAROM      5x 15x 20x 30x  30x   Table slides       15x 20x 20 x  15x ea + scap   R Sh iso - 6 way          5x5" ea   Wall slides          10x                Ther Ex             Pulleys  6' 6' 6' 6' 6' 6' 6' 6'  6'   Elbow flex/ext 30x ea 30x ea 30x ea 30x ea 30x ea 30x ea 30x ea 30x ea 30 x ea D/C   Wrist flex/ext 30x ea 30x ea 30x ea 30x ea 30x ea 30x ea 30x ea 30x ea 30 x ea  D/C   Forearm pro/sup 30x ea 30x ea 30x ea 30x ea 30x ea 30x ea 30x ea  30x ea 30 x ea  D/C   R UT/LS S  3x30" ea 3x30" ea 3x30" ea 3x30" ea 3x30" ea 3x30" ea 3x30" ea 3 x 30" ea    Gripper 2x1' black 2' black 2' black 2' black 2' black 2' black 2' black 2' black 2' black D/C   Scaption          unable   S/L ER          20x   S/L flexion          AAROM 20x   S/L abduction          unable   Ther Activity                                       Gait Training                                       Modalities             Cold pack   Perf   Perf    Perf

## 2022-05-13 ENCOUNTER — OFFICE VISIT (OUTPATIENT)
Dept: PHYSICAL THERAPY | Facility: REHABILITATION | Age: 62
End: 2022-05-13
Payer: COMMERCIAL

## 2022-05-13 DIAGNOSIS — Z98.890 S/P RIGHT ROTATOR CUFF REPAIR: ICD-10-CM

## 2022-05-13 DIAGNOSIS — M25.511 ACUTE PAIN OF RIGHT SHOULDER: Primary | ICD-10-CM

## 2022-05-13 PROCEDURE — 97010 HOT OR COLD PACKS THERAPY: CPT

## 2022-05-13 PROCEDURE — 97140 MANUAL THERAPY 1/> REGIONS: CPT

## 2022-05-13 PROCEDURE — 97110 THERAPEUTIC EXERCISES: CPT

## 2022-05-13 PROCEDURE — 97112 NEUROMUSCULAR REEDUCATION: CPT

## 2022-05-13 NOTE — PROGRESS NOTES
Daily Note     Today's date: 2022  Patient name: Samia Woods  : 1960  MRN: [id number]  Referring provider: Roberth Quinn  Dx:   Encounter Diagnosis     ICD-10-CM    1  Acute pain of right shoulder  M25 511    2  S/P right rotator cuff repair  Z98 890        Start Time: 08  Stop Time: 0845  Total time in clinic (min): 42 minutes    Subjective: Pt reported mild R shoulder soreness following last tx session, but it is feeling much better this morning  States moderate tightness  Objective: See treatment diary below      Assessment: Tolerated treatment well  Patient would benefit from continued PT   AAROM and AROM in advantageous positions are difficult especially into flexion and abduction  Capsular tightness in all directions of PROM  1:1 with Hien Younger DPT entirety of tx  Plan: Progress treament per protocol        Precautions: none    Phase 1:  3/30 -   Phase 2:   -   Phase 3:   -   Phase 4:   -      Pertinent Findings:                                                                                                                                                     Test / Measure  2022   FOTO 21 47 55   R shoulder global MMT      R shoulder flexion ROM 80 deg     R shoulder abduction ROM 60 deg         Manuals    R Sh PROM MM 8' MM 8' MM 8' MM 8' MM 8' MM 8' KK AF MM 8' MM 8'                                          Neuro Re-Ed             Pendulums 4 way 1' ea 1' ea 1' ea 1' ea 1' ea 1' ea 1' ea 1' ea home  D/C    Scap retr 30x3" 30x3" 30x3" 30x3" 30x3" 30x5" 30x5" 30 x5" 30x5" 30x5"   Supine flex AAROM     5x 15x 20x 30x  30x 30x   Table slides      15x 20x 20 x  15x ea + scap 5x ea + scap   R Sh iso - 6 way         5x5" ea 5x5" ea   Wall slides         10x 10x1   Serratus punches          15x   Ther Ex             Pulleys 6' 6' 6' 6' 6' 6' 6' 6'  6' 6'   Elbow flex/ext 30x ea 30x ea 30x ea 30x ea 30x ea 30x ea 30x ea 30 x ea D/C    Wrist flex/ext 30x ea 30x ea 30x ea 30x ea 30x ea 30x ea 30x ea 30 x ea  D/C    Forearm pro/sup 30x ea 30x ea 30x ea 30x ea 30x ea 30x ea  30x ea 30 x ea  D/C    R UT/LS S 3x30" ea 3x30" ea 3x30" ea 3x30" ea 3x30" ea 3x30" ea 3x30" ea 3 x 30" ea     Gripper 2' black 2' black 2' black 2' black 2' black 2' black 2' black 2' black D/C    Scaption         unable    S/L ER         20x 30x   S/L flexion         AAROM 20x AAROM 30x   S/L abduction         unable    Ther Activity                                       Gait Training                                       Modalities             Cold pack  Perf   Perf    Perf Perf

## 2022-05-16 ENCOUNTER — OFFICE VISIT (OUTPATIENT)
Dept: PHYSICAL THERAPY | Facility: REHABILITATION | Age: 62
End: 2022-05-16
Payer: COMMERCIAL

## 2022-05-16 DIAGNOSIS — Z98.890 S/P RIGHT ROTATOR CUFF REPAIR: Primary | ICD-10-CM

## 2022-05-16 DIAGNOSIS — M25.511 ACUTE PAIN OF RIGHT SHOULDER: ICD-10-CM

## 2022-05-16 PROCEDURE — 97110 THERAPEUTIC EXERCISES: CPT | Performed by: PHYSICAL THERAPIST

## 2022-05-16 PROCEDURE — 97112 NEUROMUSCULAR REEDUCATION: CPT | Performed by: PHYSICAL THERAPIST

## 2022-05-16 NOTE — PROGRESS NOTES
Daily Note     Today's date: 2022  Patient name: Yisel Brown  : 1960  MRN: [id number]  Referring provider: Sam Garcia  Dx:   Encounter Diagnosis     ICD-10-CM    1  S/P right rotator cuff repair  Z98 890    2  Acute pain of right shoulder  M25 511                   Subjective: Pt reports 6/10 pain after mowing his lawn and lifting unspecified heavy objects over the weekend  Objective: See treatment diary below      Assessment: Instructed the patient to avoid any heavy lifting during this phase of his repair  The patient demonstrated near functional flexion and ER PROM and good tolerance to all new exercises from last visit  Plan: Progress treament per protocol        Precautions: none    Phase 1:  3/30 -   Phase 2:   -   Phase 3:   -   Phase 4:   -      Pertinent Findings:                                                                                                                                                     Test / Measure  2022   FOTO 21 47 55   R shoulder global MMT      R shoulder flexion ROM 80 deg     R shoulder abduction ROM 60 deg         Manuals    R Sh PROM NB 8'        MM 8' MM 8'                                          Neuro Re-Ed             Pendulums 4 way         D/C    Scap retr 30x5"        30x5" 30x5"   Supine flex AAROM 30x        30x 30x   Table slides 15x ea + scap        15x ea + scap 5x ea + scap   R Sh iso - 6 way 5"5 ea        5x5" ea 5x5" ea   Wall slides 10x        10x 10x1   Serratus punches 15x         15x   Ther Ex             Pulleys 6'        6' 6'   Elbow flex/ext         D/C    Wrist flex/ext         D/C    Forearm pro/sup         D/C    R UT/LS S             Gripper         D/C    Scaption         unable    S/L ER x30        20x 30x   S/L flexion AAROM 30x        AAROM 20x AAROM 30x   S/L abduction         unable    Ther Activity Gait Training                                       Modalities             Cold pack         Perf Perf

## 2022-05-20 ENCOUNTER — OFFICE VISIT (OUTPATIENT)
Dept: PHYSICAL THERAPY | Facility: REHABILITATION | Age: 62
End: 2022-05-20
Payer: COMMERCIAL

## 2022-05-20 DIAGNOSIS — Z98.890 S/P RIGHT ROTATOR CUFF REPAIR: Primary | ICD-10-CM

## 2022-05-20 DIAGNOSIS — M25.511 ACUTE PAIN OF RIGHT SHOULDER: ICD-10-CM

## 2022-05-20 PROCEDURE — 97110 THERAPEUTIC EXERCISES: CPT

## 2022-05-20 PROCEDURE — 97010 HOT OR COLD PACKS THERAPY: CPT

## 2022-05-20 PROCEDURE — 97112 NEUROMUSCULAR REEDUCATION: CPT

## 2022-05-20 PROCEDURE — 97140 MANUAL THERAPY 1/> REGIONS: CPT

## 2022-05-20 NOTE — PROGRESS NOTES
Daily Note     Today's date: 2022  Patient name: Robert Romero  : 1960  MRN: [id number]  Referring provider: Wilfrid Padgett  Dx:   Encounter Diagnosis     ICD-10-CM    1  S/P right rotator cuff repair  Z98 890    2  Acute pain of right shoulder  M25 511        Start Time: 0800  Stop Time: 0845  Total time in clinic (min): 45 minutes    Subjective: Pt reports mild soreness this morning  States that he was power washing yesterday but assures that he was not over utilizing RUE  Objective: See treatment diary below      Assessment: Tolerated treatment well  Patient would benefit from continued PT  Pt with improving R shoulder PROM nearing end range in all directions  Able to tolerate mild progression of repetitions of existing therapeutic interventions  Able to better tolerate AAROM in side lying for flexion and abduction  1:1 with Jorgito Davis for first 15 minutes then with Connee Apgar, DPT for remainder of tx  Plan: Progress treatment as tolerated         Precautions: none    Phase 1:  3/30 -   Phase 2:   -   Phase 3:   -   Phase 4:   -      Pertinent Findings:                                                                                                                                                     Test / Measure  2022   FOTO 21 47 55   R shoulder global MMT      R shoulder flexion ROM 80 deg     R shoulder abduction ROM 60 deg         Manuals    R Sh PROM  MM 8' MM 8' NB 8' MM 8'                           Neuro Re-Ed        Scap retr  30x5" 30x5" 30x5" 30x5"   Supine flex AAROM  30x 30x 30x 30x   Table slides  15x ea + scap 5x ea + scap 15x ea + scap 30x ea + scap   R Sh iso - 6 way  5x5" ea 5x5" ea 5"5 ea 8x5" ea   Wall slides  10x 10x 10x    Serratus punches   15x 15x 30x   Ther Ex        Pulleys  6' 6' 6' 8'   Scaption  unable      S/L ER  20x 30x x30 30x   S/L flexion  AAROM 20x AAROM 30x AAROM 30x AAROM 30x   S/L abduction  unable   15x AAROM   Ther Activity                        Gait Training                        Modalities        Cold pack  Perf Perf  Perf

## 2022-05-23 ENCOUNTER — OFFICE VISIT (OUTPATIENT)
Dept: PHYSICAL THERAPY | Facility: REHABILITATION | Age: 62
End: 2022-05-23
Payer: COMMERCIAL

## 2022-05-23 DIAGNOSIS — M25.511 ACUTE PAIN OF RIGHT SHOULDER: ICD-10-CM

## 2022-05-23 DIAGNOSIS — Z98.890 S/P RIGHT ROTATOR CUFF REPAIR: Primary | ICD-10-CM

## 2022-05-23 PROCEDURE — 97110 THERAPEUTIC EXERCISES: CPT

## 2022-05-23 PROCEDURE — 97112 NEUROMUSCULAR REEDUCATION: CPT

## 2022-05-23 NOTE — PROGRESS NOTES
Daily Note     Today's date: 2022  Patient name: Sandie Shaw  : 1960  MRN: [id number]  Referring provider: Army Scheuermann  Dx:   Encounter Diagnosis     ICD-10-CM    1  S/P right rotator cuff repair  Z98 890    2  Acute pain of right shoulder  M25 511        Start Time: 0809  Stop Time: 0855  Total time in clinic (min): 46 minutes    Subjective: Pt reports R shoulder soreness throughout the weekend requiring frequent icing and rest       Objective: See treatment diary below      Assessment: Tolerated treatment well  Patient would benefit from continued PT  Pt progressing well through POC  R shoulder PROM nearing full ROM in flexion, lacking behind slightly in abduction  1:1 with Suan Gitelman, DPT entirety of tx  Plan: Continue per plan of care        Precautions: none    Phase 1:  3/30 -   Phase 2:   -   Phase 3:   -   Phase 4:   -      Pertinent Findings:                                                                                                                                                     Test / Measure  2022   FOTO 21 47 55   R shoulder global MMT      R shoulder flexion ROM 80 deg     R shoulder abduction ROM 60 deg         Manuals    R Sh PROM  MM 8' MM 8' NB 8' MM 8' MM 6'   R Sh mobs      MM 2'                     Neuro Re-Ed         Scap retr  30x5" 30x5" 30x5" 30x5" 30x5"   Supine flex AAROM  30x 30x 30x 30x 30x   Table slides - flex + scap/abd  15x ea 5x ea 15x ea 30x ea 30x ea w/ PB   R Sh iso - 6 way  5x5" ea 5x5" ea 5"5 ea 8x5" ea 8x5" ea   Wall slides  10x 10x 10x 10x 15x   Serratus punches   15x 15x 30x 30x   Ther Ex         Pulleys  6' 6' 6' 8' 8'   Scaption  unable       S/L ER  20x 30x x30 30x 30x   S/L flexion  AAROM 20x AAROM 30x AAROM 30x AAROM 30x AAROM 30x   S/L abduction  unable   15x AAROM 15x AAROM   Ther Activity                           Gait Training Modalities         Cold pack  Perf Perf  Perf

## 2022-05-27 ENCOUNTER — OFFICE VISIT (OUTPATIENT)
Dept: PHYSICAL THERAPY | Facility: REHABILITATION | Age: 62
End: 2022-05-27
Payer: COMMERCIAL

## 2022-05-27 DIAGNOSIS — M25.511 ACUTE PAIN OF RIGHT SHOULDER: ICD-10-CM

## 2022-05-27 DIAGNOSIS — Z98.890 S/P RIGHT ROTATOR CUFF REPAIR: Primary | ICD-10-CM

## 2022-05-27 PROCEDURE — 97010 HOT OR COLD PACKS THERAPY: CPT

## 2022-05-27 PROCEDURE — 97110 THERAPEUTIC EXERCISES: CPT

## 2022-05-27 PROCEDURE — 97112 NEUROMUSCULAR REEDUCATION: CPT

## 2022-05-27 PROCEDURE — 97140 MANUAL THERAPY 1/> REGIONS: CPT

## 2022-05-27 NOTE — PROGRESS NOTES
Daily Note     Today's date: 2022  Patient name: Grace Pelayo  : 1960  MRN: [id number]  Referring provider: Georgia Hernandes  Dx:   Encounter Diagnosis     ICD-10-CM    1  S/P right rotator cuff repair  Z98 890    2  Acute pain of right shoulder  M25 511        Start Time: 0750  Stop Time: 0845  Total time in clinic (min): 55 minutes    Subjective: Pt reports mild aggravation of R shoulder after completing yard work and helping move a couch yesterday  Took Tylenol prior to PT session  Objective: See treatment diary below      Assessment: Tolerated treatment fair  Patient would benefit from continued PT  Pt without increase in pain with R shoulder flexion, abduction, ER AAROM interventions  Does not appear that the surgical site had any set backs  Decreased scapular and upper trapezius compensation with AAROM overhead tasks  Advised pt to try to rest the remainder of the day to assess shoulder pain/discomfort  1:1 with Darleen Caruso DPT entirety of tx  Plan: Progress treatment as tolerated         Precautions: none    Phase 1:  3/30 -   Phase 2:   -   Phase 3:   -   Phase 4:   -      Pertinent Findings:                                                                                                                                                     Test / Measure  2022   FOTO 21 47 55 64   R shoulder global MMT       R shoulder flexion ROM 80 deg      R shoulder abduction ROM 60 deg          Manuals    R Sh PROM  MM 8' MM 8' NB 8' MM 8' MM 6' MM 6'   R Sh mobs      MM 2' MM 2'                       Neuro Re-Ed          Scap retr  30x5" 30x5" 30x5" 30x5" 30x5" D/C   SRER       30x5" 0#   Supine flex AAROM  30x 30x 30x 30x 30x 30x   Table slides - flex + scap/abd  15x ea 5x ea 15x ea 30x ea 30x ea w/ PB 30x ea w/ PB   R Sh iso - 6 way  5x5" ea 5x5" ea 5"5 ea 8x5" ea 8x5" ea 8x5" ea Wall slides  10x 10x 10x 10x 15x 15x   Serratus punches   15x 15x 30x 30x 30x 1#   Ther Ex          Pulleys  6' 6' 6' 8' 8' 8'   Scaption  unable        S/L ER  20x 30x x30 30x 30x 30x   S/L flexion  AAROM 20x AAROM 30x AAROM 30x AAROM 30x AAROM 30x AAROM 30x   S/L abduction  unable   15x AAROM 15x AAROM 15x AAROM   Ther Activity                              Gait Training                              Modalities          Cold pack  Perf Perf  Perf  Perf

## 2022-06-01 ENCOUNTER — APPOINTMENT (OUTPATIENT)
Dept: PHYSICAL THERAPY | Facility: REHABILITATION | Age: 62
End: 2022-06-01
Payer: COMMERCIAL

## 2022-06-03 ENCOUNTER — OFFICE VISIT (OUTPATIENT)
Dept: PHYSICAL THERAPY | Facility: REHABILITATION | Age: 62
End: 2022-06-03
Payer: COMMERCIAL

## 2022-06-03 DIAGNOSIS — M25.511 ACUTE PAIN OF RIGHT SHOULDER: ICD-10-CM

## 2022-06-03 DIAGNOSIS — Z98.890 S/P RIGHT ROTATOR CUFF REPAIR: Primary | ICD-10-CM

## 2022-06-03 PROCEDURE — 97110 THERAPEUTIC EXERCISES: CPT

## 2022-06-03 PROCEDURE — 97112 NEUROMUSCULAR REEDUCATION: CPT

## 2022-06-03 PROCEDURE — 97140 MANUAL THERAPY 1/> REGIONS: CPT

## 2022-06-03 PROCEDURE — 97010 HOT OR COLD PACKS THERAPY: CPT

## 2022-06-03 NOTE — PROGRESS NOTES
Daily Note     Today's date: 6/3/2022  Patient name: Beryle Friends  : 1960  MRN: [id number]  Referring provider: Donice Barthel  Dx:   Encounter Diagnosis     ICD-10-CM    1  S/P right rotator cuff repair  Z98 890    2  Acute pain of right shoulder  M25 511        Start Time: 6534  Stop Time: 0740  Total time in clinic (min): 45 minutes    Subjective: Pt reports mild R shoulder soreness, but it is improved since last week  Able to complete work duties with mild discomfort  Objective: See treatment diary below      Assessment: Tolerated treatment well  Patient would benefit from continued PT  Pt progressing well through protocol  Pt nearing full flexion AAROM in supine and with wall slides  Did not require AAROM for side lying flexion and abduction  R shoulder fatigues quickly with AROM interventions, utilizes rest breaks to allow muscle recovery  1:1 with Shanita Shrestha DPT entirety of tx  Plan: Progress treatment as tolerated         Precautions: none    Phase 1:  3/30 -   Phase 2:   -   Phase 3:   -   Phase 4:   -      Pertinent Findings:                                                                                                                                                     Test / Measure  2022   FOTO 21 47 55 64   R shoulder global MMT       R shoulder flexion ROM 80 deg      R shoulder abduction ROM 60 deg          Manuals  6/3   R Sh PROM  MM 8' MM 8' NB 8' MM 8' MM 6' MM 6' MM 6'   R Sh mobs      MM 2' MM 2' MM 2'                         Neuro Re-Ed           Scap retr  30x5" 30x5" 30x5" 30x5" 30x5" D/C    SRER       30x5" 0# 30x5" 0#   Supine flex AAROM  30x 30x 30x 30x 30x 30x 30x   Table slides - flex + scap/abd  15x ea 5x ea 15x ea 30x ea 30x ea w/ PB 30x ea w/ PB 30x ea w/ PB   R Sh iso - 6 way  5x5" ea 5x5" ea 5"5 ea 8x5" ea 8x5" ea 8x5" ea 8x5" ea   Wall slides 10x 10x 10x 10x 15x 15x 15x   Serratus punches   15x 15x 30x 30x 30x 1# 15x 1#   Ther Ex           Pulleys  6' 6' 6' 8' 8' 8' 8'   Scaption  unable         S/L ER  20x 30x x30 30x 30x 30x 30x   S/L flexion  AAROM 20x AAROM 30x AAROM 30x AAROM 30x AAROM 30x AAROM 30x 15x   S/L abduction  unable   15x AAROM 15x AAROM 15x AAROM 15x   Ther Activity                                 Gait Training                                 Modalities           Cold pack  Perf Perf  Perf  Perf Perf

## 2022-06-06 ENCOUNTER — OFFICE VISIT (OUTPATIENT)
Dept: PHYSICAL THERAPY | Facility: REHABILITATION | Age: 62
End: 2022-06-06
Payer: COMMERCIAL

## 2022-06-06 ENCOUNTER — OFFICE VISIT (OUTPATIENT)
Dept: OBGYN CLINIC | Facility: OTHER | Age: 62
End: 2022-06-06

## 2022-06-06 VITALS
DIASTOLIC BLOOD PRESSURE: 80 MMHG | HEART RATE: 76 BPM | BODY MASS INDEX: 26.83 KG/M2 | WEIGHT: 209 LBS | SYSTOLIC BLOOD PRESSURE: 139 MMHG

## 2022-06-06 DIAGNOSIS — Z98.890 S/P RIGHT ROTATOR CUFF REPAIR: Primary | ICD-10-CM

## 2022-06-06 DIAGNOSIS — Z47.89 AFTERCARE FOLLOWING SURGERY OF THE MUSCULOSKELETAL SYSTEM: Primary | ICD-10-CM

## 2022-06-06 DIAGNOSIS — M25.511 ACUTE PAIN OF RIGHT SHOULDER: ICD-10-CM

## 2022-06-06 PROCEDURE — 99024 POSTOP FOLLOW-UP VISIT: CPT | Performed by: PHYSICIAN ASSISTANT

## 2022-06-06 PROCEDURE — 97112 NEUROMUSCULAR REEDUCATION: CPT

## 2022-06-06 PROCEDURE — 97010 HOT OR COLD PACKS THERAPY: CPT

## 2022-06-06 PROCEDURE — 97110 THERAPEUTIC EXERCISES: CPT

## 2022-06-06 PROCEDURE — 97140 MANUAL THERAPY 1/> REGIONS: CPT

## 2022-06-06 NOTE — PROGRESS NOTES
Daily Note     Today's date: 2022  Patient name: Melina Mckeon  : 1960  MRN: [id number]  Referring provider: Jose Daily  Dx:   Encounter Diagnosis     ICD-10-CM    1  S/P right rotator cuff repair  Z98 890    2  Acute pain of right shoulder  M25 511        Start Time: 06  Stop Time: 07  Total time in clinic (min): 40 minutes    Subjective: Pt reports R shoulder pain symptomology aggravated this weekend with mild overuse  States pain intensity has slightly improved this morning  Objective: See treatment diary below      Assessment: Tolerated treatment well  Patient would benefit from continued PT  Pt progressing well through treatment protocol  Able to to complete scaption AROM with minimal compensatory patterns  Advised pt to attempt scaption AROM throughout the day to promote ROM against gravity  1:1 with Heriberto Stoner DPT entirety of tx  Plan: Progress treatment as tolerated         Precautions: none    Phase 1:  3/30 -   Phase 2:   -   Phase 3:   -   Phase 4:   -      Pertinent Findings:                                                                                                                                                     Test / Measure  2022   FOTO 21 47 55 64   R shoulder global MMT       R shoulder flexion ROM 80 deg      R shoulder abduction ROM 60 deg          Manuals 5/11 5/13 5/16 5/20 5/23 5/27 6/3 6/6   R Sh PROM MM 8' MM 8' NB 8' MM 8' MM 6' MM 6' MM 6' MM 6'   R Sh mobs     MM 2' MM 2' MM 2' MM 2'                         Neuro Re-Ed           Scap retr 30x5" 30x5" 30x5" 30x5" 30x5" D/C     SRER      30x5" 0# 30x5" 0# 30x5" 0#   Supine flex AAROM 30x 30x 30x 30x 30x 30x 30x 30x   Table slides - flex + scap/abd 15x ea 5x ea 15x ea 30x ea 30x ea w/ PB 30x ea w/ PB 30x ea w/ PB 30x ea w/ PB   R Sh iso - 6 way 5x5" ea 5x5" ea 5"5 ea 8x5" ea 8x5" ea 8x5" ea 8x5" ea 8x5" ea   Wall slides 10x 10x 10x 10x 15x 15x 15x 15x   Serratus punches  15x 15x 30x 30x 30x 1# 15x 1# 15x 2#   Ther Ex           Pulleys 6' 6' 6' 8' 8' 8' 8' 6'   Scaption unable       10x   S/L ER 20x 30x x30 30x 30x 30x 30x 30x   S/L flexion AAROM 20x AAROM 30x AAROM 30x AAROM 30x AAROM 30x AAROM 30x 15x 15x   S/L abduction unable   15x AAROM 15x AAROM 15x AAROM 15x 15x   Ther Activity                                 Gait Training                                 Modalities           Cold pack Perf Perf  Perf  Perf Perf Perf

## 2022-06-06 NOTE — PROGRESS NOTES
Assessment:       1  Aftercare following surgery of the musculoskeletal system          Plan:        Patient is doing well postoperatively  Operative note, images, and rehab protocol were reviewed  All questions were addressed to the patient's satisfaction  Patient will continue PT, preparing to start strengthening phase at week 12 from date of surgery  Follow-up will be in 2 months to assess patient's progress  Subjective:     Patient ID: Radha Lockhart is a 62 y o  male  Chief Complaint:    HPI  Patient presents to the office for follow-up status post right shoulder arthroscopy with rotator cuff repair on 03/30/2022  He is making progress with PT  He has no new complaints  Social History     Occupational History     Comment: full-time employment   Tobacco Use    Smoking status: Never Smoker    Smokeless tobacco: Never Used   Vaping Use    Vaping Use: Never used   Substance and Sexual Activity    Alcohol use: Never    Drug use: No    Sexual activity: Not on file      Review of Systems   Constitutional: Negative  Respiratory: Negative  Cardiovascular: Negative  Musculoskeletal: Negative  Skin: Negative for wound  Neurological: Positive for weakness  Negative for numbness  Psychiatric/Behavioral: Negative  Objective:     Ortho ExamPhysical Exam  HENT:      Head: Atraumatic  Cardiovascular:      Pulses: Normal pulses  Pulmonary:      Effort: Pulmonary effort is normal    Musculoskeletal:      Comments: Active range of motion right shoulder: Forward flexion 135°, external rotation 90°, internal rotation to lumbar spine  Skin:     General: Skin is warm and dry  Capillary Refill: Capillary refill takes less than 2 seconds  Comments: Surgical incisions dry and clean, healed  Neurological:      Mental Status: He is alert and oriented to person, place, and time  Sensory: No sensory deficit     Psychiatric:         Mood and Affect: Mood normal  Behavior: Behavior normal

## 2022-06-10 ENCOUNTER — OFFICE VISIT (OUTPATIENT)
Dept: PHYSICAL THERAPY | Facility: REHABILITATION | Age: 62
End: 2022-06-10
Payer: COMMERCIAL

## 2022-06-10 DIAGNOSIS — Z98.890 S/P RIGHT ROTATOR CUFF REPAIR: Primary | ICD-10-CM

## 2022-06-10 DIAGNOSIS — M25.511 ACUTE PAIN OF RIGHT SHOULDER: ICD-10-CM

## 2022-06-10 PROCEDURE — 97112 NEUROMUSCULAR REEDUCATION: CPT

## 2022-06-10 PROCEDURE — 97110 THERAPEUTIC EXERCISES: CPT

## 2022-06-10 PROCEDURE — 97140 MANUAL THERAPY 1/> REGIONS: CPT

## 2022-06-10 NOTE — PROGRESS NOTES
Daily Note     Today's date: 6/10/2022  Patient name: Guilelrmo Ruiz  : 1960  MRN: [id number]  Referring provider: Reina Henderson  Dx:   Encounter Diagnosis     ICD-10-CM    1  S/P right rotator cuff repair  Z98 890    2  Acute pain of right shoulder  M25 511        Start Time: 8549  Stop Time: 0740  Total time in clinic (min): 45 minutes    Subjective: Pt reports mild R shoulder pain/discomfort following having to pain walls/ceilings yesterday  Was able to complete with RUE at 0 degrees abduction  States he iced it after work which helped alleviate symptoms partially  Objective: See treatment diary below      Assessment: Tolerated treatment well  Patient would benefit from continued PT  Pt gradually and continually making progress  Added in sleeper stretch this session due to lack of IR PROM  Pt tolerated PROM and overpressure at end range  End range flexion and abduction resulted in mild shoulder musculature spasms which slightly relaxed with prolonged holding of the stretch  Educated pt on stretching basics and advised him to utilize prolonged stretching in flexion, abduction, and IR   1:1 with Liborio Gaytan DPT entirety of tx  Plan: Progress treatment as tolerated         Precautions: none    Phase 1:  3/30 -   Phase 2:   -   Phase 3:   -   Phase 4:   -      Pertinent Findings:                                                                                                                                                     Test / Measure  2022   FOTO 21 47 55 64   R shoulder global MMT       R shoulder flexion ROM 80 deg      R shoulder abduction ROM 60 deg          Manuals 5/11 5/13 5/16 5/20 5/23 5/27 6/3 6/6 6/10   R Sh PROM MM 8' MM 8' NB 8' MM 8' MM 6' MM 6' MM 6' MM 6' MM 6'   R Sh mobs     MM 2' MM 2' MM 2' MM 2' MM 2'                           Neuro Re-Ed            Scap retr 30x5" 30x5" 30x5" 30x5" 30x5" D/C      SRER      30x5" 0# 30x5" 0# 30x5" 0# 15x5" 0#   Supine flex AAROM 30x 30x 30x 30x 30x 30x 30x 30x 15x AROM   Table slides - flex + scap/abd 15x ea 5x ea 15x ea 30x ea 30x ea w/ PB 30x ea w/ PB 30x ea w/ PB 30x ea w/ PB    R Sh iso - 6 way 5x5" ea 5x5" ea 5"5 ea 8x5" ea 8x5" ea 8x5" ea 8x5" ea 8x5" ea 8x5" ea   Wall slides 10x 10x 10x 10x 15x 15x 15x 15x 15x   Serratus punches  15x 15x 30x 30x 30x 1# 15x 1# 15x 2# 15x 2#   Ther Ex            Pulleys 6' 6' 6' 8' 8' 8' 8' 6' 6'   Scaption unable       10x 15x   S/L ER 20x 30x x30 30x 30x 30x 30x 30x 20x   S/L flexion AAROM 20x AAROM 30x AAROM 30x AAROM 30x AAROM 30x AAROM 30x 15x 15x 20x   S/L abduction unable   15x AAROM 15x AAROM 15x AAROM 15x 15x 20x   Prone rows         15x 0#   Prone Sh ext         15x 0#   Sleeper S         8x15"   Ther Activity                                    Gait Training                                    Modalities            Cold pack Perf Perf  Perf  Perf Perf Perf

## 2022-06-13 ENCOUNTER — OFFICE VISIT (OUTPATIENT)
Dept: PHYSICAL THERAPY | Facility: REHABILITATION | Age: 62
End: 2022-06-13
Payer: COMMERCIAL

## 2022-06-13 DIAGNOSIS — M25.511 ACUTE PAIN OF RIGHT SHOULDER: ICD-10-CM

## 2022-06-13 DIAGNOSIS — Z98.890 S/P RIGHT ROTATOR CUFF REPAIR: Primary | ICD-10-CM

## 2022-06-13 PROCEDURE — 97112 NEUROMUSCULAR REEDUCATION: CPT

## 2022-06-13 PROCEDURE — 97140 MANUAL THERAPY 1/> REGIONS: CPT

## 2022-06-13 PROCEDURE — 97010 HOT OR COLD PACKS THERAPY: CPT

## 2022-06-13 PROCEDURE — 97110 THERAPEUTIC EXERCISES: CPT

## 2022-06-13 NOTE — PROGRESS NOTES
Daily Note     Today's date: 2022  Patient name: Zulma Sorensen  : 1960  MRN: [id number]  Referring provider: Saul Silva:   Encounter Diagnosis     ICD-10-CM    1  S/P right rotator cuff repair  Z98 890    2  Acute pain of right shoulder  M25 511        Start Time: 0700  Stop Time: 0745  Total time in clinic (min): 45 minutes    Subjective: Pt reports increase in soreness following last tx session  No further aggravation of pain symptomology over the weekend  Objective: See treatment diary below      Assessment: Tolerated treatment well  Patient would benefit from continued PT  Pt progressing well through tx protocol  Shoulder PROM improving in all directions  Starting phase 4 next week  Mild upper trapezius compensation with scaption  1:1 with Luis A Ashby DPT entirety of tx  Plan: Continue per plan of care  Re-evaluation next visit       Precautions: none    Phase 1:  3/30 -   Phase 2:   -   Phase 3:   -   Phase 4:   -      Pertinent Findings:                                                                                                                                                     Test / Measure  2022   FOTO 21 47 55 64   R shoulder global MMT       R shoulder flexion ROM 80 deg      R shoulder abduction ROM 60 deg          Manuals 5/13 5/16 5/20 5/23 5/27 6/3 6/6 6/10 6/13   R Sh PROM MM 8' NB 8' MM 8' MM 6' MM 6' MM 6' MM 6' MM 6' MM 6'   R Sh mobs    MM 2' MM 2' MM 2' MM 2' MM 2' MM 2'                           Neuro Re-Ed            Scap retr 30x5" 30x5" 30x5" 30x5" D/C       SRER     30x5" 0# 30x5" 0# 30x5" 0# 15x5" 0# 15x5" 0#   Supine flex AAROM 30x 30x 30x 30x 30x 30x 30x 15x AROM    Table slides - flex + scap/abd 5x ea 15x ea 30x ea 30x ea w/ PB 30x ea w/ PB 30x ea w/ PB 30x ea w/ PB     R Sh iso - 6 way 5x5" ea 5"5 ea 8x5" ea 8x5" ea 8x5" ea 8x5" ea 8x5" ea 8x5" ea 8x5" ea   Wall slides 10x 10x 10x 15x 15x 15x 15x 15x 15x   Serratus punches 15x 15x 30x 30x 30x 1# 15x 1# 15x 2# 15x 2#    Ther Ex            Pulleys 6' 6' 8' 8' 8' 8' 6' 6' 6'   Scaption       10x 15x 15x   S/L ER 30x x30 30x 30x 30x 30x 30x 20x 15x   S/L flexion AAROM 30x AAROM 30x AAROM 30x AAROM 30x AAROM 30x 15x 15x 20x 15x   S/L abduction   15x AAROM 15x AAROM 15x AAROM 15x 15x 20x 15x   Prone rows        15x 0# 15x 0#   Prone Sh ext        15x 0# 15x 0#   Sleeper S        8x15" 8x15"   Ther Activity                                    Gait Training                                    Modalities            Cold pack Perf  Perf  Perf Perf Perf  Perf

## 2022-06-17 ENCOUNTER — OFFICE VISIT (OUTPATIENT)
Dept: PHYSICAL THERAPY | Facility: REHABILITATION | Age: 62
End: 2022-06-17
Payer: COMMERCIAL

## 2022-06-17 DIAGNOSIS — M25.511 ACUTE PAIN OF RIGHT SHOULDER: ICD-10-CM

## 2022-06-17 DIAGNOSIS — Z98.890 S/P RIGHT ROTATOR CUFF REPAIR: Primary | ICD-10-CM

## 2022-06-17 PROCEDURE — 97112 NEUROMUSCULAR REEDUCATION: CPT

## 2022-06-17 PROCEDURE — 97164 PT RE-EVAL EST PLAN CARE: CPT

## 2022-06-17 PROCEDURE — 97010 HOT OR COLD PACKS THERAPY: CPT

## 2022-06-17 PROCEDURE — 97140 MANUAL THERAPY 1/> REGIONS: CPT

## 2022-06-17 PROCEDURE — 97110 THERAPEUTIC EXERCISES: CPT

## 2022-06-17 NOTE — PROGRESS NOTES
Re-Evaluation     Today's date: 2022  Patient name: Megha Aden  : 1960  MRN: [id number]  Referring provider: Hector Forrest  Dx:   Encounter Diagnosis     ICD-10-CM    1  S/P right rotator cuff repair  Z98 890    2  Acute pain of right shoulder  M25 511        Start Time: 0700  Stop Time: 0745  Total time in clinic (min): 45 minutes    Subjective: Pt reports current rehabilitation status at 80%  Pain at its worst is 4/10 - "throbbing" sensation  Overhead tasks are most difficult  Requires frequent overhead work duties  R shoulder ER AROM remains difficult as well  Compliant with HEP  Objective: See treatment diary below    Strength and ROM evaluated B from a regional biomechanical perspective and values relevant to this episode recorded in table below    ROM: Goniometric measurement revealed the following findings  Shoulder ROM Right PROM: IE Left PROM:  IE Right: 2022   Flexion 80* 180 140 AROM  165 PROM   Abduction 60* 180 150 AROM  170 PROM   ER -5* 90 45 AROM   IR 40* 70 40 AROM     Strength: MMT revealed the following findings  Joint Motion Right:  IE Left:  IE Right:  2022   Sh  Flexion 1/5 5/5 3+/5*   Sh  Abduction 1/5 5/5 3+/5*   Sh  ER 1/5 5/5 3+/5*   Sh  IR 1/5 5/5 4+/5    strength 80 lbs 98 lbs 115       Assessment: Tolerated treatment well  Patient would benefit from continued PT   1:1 with Ellen Chase, DPT entirety of tx  Pt progressing well through treatment protocol  R shoulder AROM still lacking in comparison to PROM  R shoulder PROM is progressing well nearing full motion - soft tissue stretch at end range  Anticipate progressing protocol next week  Strength is moving forward as well  Continues to have pain/discomfort at end ranges and with resisted movements  Mild UT compensation with scaption      Assessment details: Megha Aden is a 62 y o  male presenting with R shoulder pain s/p supraspinatus repair and labral debridement on 3/30/2022  Primary impairments include R shoulder pain with functional activities, RUE weakness, R shoulder AROM dysfunction  Will benefit from skilled PT interventions for community reintegration, ADL management/independence, return to work/hobbies  Impairments: abnormal coordination, abnormal muscle firing, abnormal muscle tone, abnormal or restricted ROM, activity intolerance, impaired physical strength, lacks appropriate home exercise program, pain with function, scapular dyskinesis, poor posture  and poor body mechanics  Functional limitations: ADLs (dressing, showering, toileting, etc ), any RUE usage  Symptom irritability: moderateBarriers to therapy: none  Understanding of Dx/Px/POC: good   Prognosis: good    Goals    Short Term Goals: In 8 weeks, the patient will:  1  Improve R shoulder global strength to at least 3/5 MMT - MET  2  Improve R shoulder flexion and abduction AROM to at least 165 degrees - ONGOING  3  Supervision with HEP for self-care - MET  4  Improve  strength to at least 95 pounds - MET    Long Term Goals: In 16 weeks, the patient will:  1  Improve R shoulder global strength to at least 4/5 MMT - ONGOING  2  FOTO to greater than predicted value - ONGOING  3  Independent with HEP for self-care - ONGOING  4  Return to full work duties - ONGOING    Plan: Progress treatment as tolerated         Patient would benefit from: skilled physical therapy  Planned modality interventions: manual electrical stimulation  Planned therapy interventions: abdominal trunk stabilization, activity modification, balance, balance/weight bearing training, behavior modification, body mechanics training, community reintegration, coordination, fine motor coordination training, flexibility, functional ROM exercises, gait training, graded activity, graded exercise, graded motor, home exercise program, work reintegration, therapeutic training, therapeutic exercise, therapeutic activities, stretching, strengthening, self care, postural training, patient education, neuromuscular re-education, motor coordination training, massage, manual therapy, joint mobilization and ADL training  Frequency: 2x week  Duration in weeks: 16  Plan of Care beginning date: 4/6/2022  Plan of Care expiration date: 7/27/2022  Treatment plan discussed with: patient     Precautions: none    Phase 1:  3/30 - 5/11  Phase 2:  5/11 - 6/22  Phase 3:  6/22 - 7/20  Phase 4:  7/20 - 8/31     Pertinent Findings:                                                                                                                                                     Test / Measure  4/6/2022 4/21/2022 5/11/2022 5/27/2022 6/17/2022   FOTO 21 47 55 64 62   R shoulder global MMT 1/5    3+/5 to 4+/5   R shoulder flexion ROM 80 deg    140 AROM  165 PROM   R shoulder abduction ROM 60 deg    150 AROM  170 PROM       Manuals 5/16 5/20 5/23 5/27 6/3 6/6 6/10 6/13 6/17   R Sh PROM NB 8' MM 8' MM 6' MM 6' MM 6' MM 6' MM 6' MM 6' MM 6'   R Sh mobs   MM 2' MM 2' MM 2' MM 2' MM 2' MM 2' MM 2'   R Sh MREs         MM 2'               Neuro Re-Ed            Scap retr 30x5" 30x5" 30x5" D/C        SRER    30x5" 0# 30x5" 0# 30x5" 0# 15x5" 0# 15x5" 0# 15x5" 0#   Supine flex AAROM 30x 30x 30x 30x 30x 30x 15x AROM     Table slides - flex + scap/abd 15x ea 30x ea 30x ea w/ PB 30x ea w/ PB 30x ea w/ PB 30x ea w/ PB      R Sh iso - 6 way 5"5 ea 8x5" ea 8x5" ea 8x5" ea 8x5" ea 8x5" ea 8x5" ea 8x5" ea 5x5" ea   Wall slides 10x 10x 15x 15x 15x 15x 15x 15x 15x   Serratus punches 15x 30x 30x 30x 1# 15x 1# 15x 2# 15x 2#     Ther Ex            Pulleys 6' 8' 8' 8' 8' 6' 6' 6' 6'   Scaption      10x 15x 15x 20x   S/L ER x30 30x 30x 30x 30x 30x 20x 15x 15x   S/L flexion AAROM 30x AAROM 30x AAROM 30x AAROM 30x 15x 15x 20x 15x 15x   S/L abduction  15x AAROM 15x AAROM 15x AAROM 15x 15x 20x 15x 15x   Prone rows       15x 0# 15x 0# 25x 0#   Prone Sh ext       15x 0# 15x 0# 25x 0#   Sleeper S 8x15" 8x15"    Ther Activity                                    Gait Training                                    Modalities            Cold pack  Perf  Perf Perf Perf  Perf Perf

## 2022-06-20 ENCOUNTER — APPOINTMENT (OUTPATIENT)
Dept: PHYSICAL THERAPY | Facility: REHABILITATION | Age: 62
End: 2022-06-20
Payer: COMMERCIAL

## 2022-06-24 ENCOUNTER — OFFICE VISIT (OUTPATIENT)
Dept: PHYSICAL THERAPY | Facility: REHABILITATION | Age: 62
End: 2022-06-24
Payer: COMMERCIAL

## 2022-06-24 DIAGNOSIS — Z98.890 S/P RIGHT ROTATOR CUFF REPAIR: Primary | ICD-10-CM

## 2022-06-24 DIAGNOSIS — M25.511 ACUTE PAIN OF RIGHT SHOULDER: ICD-10-CM

## 2022-06-24 PROCEDURE — 97140 MANUAL THERAPY 1/> REGIONS: CPT

## 2022-06-24 PROCEDURE — 97110 THERAPEUTIC EXERCISES: CPT

## 2022-06-24 PROCEDURE — 97010 HOT OR COLD PACKS THERAPY: CPT

## 2022-06-24 PROCEDURE — 97112 NEUROMUSCULAR REEDUCATION: CPT

## 2022-06-24 NOTE — PROGRESS NOTES
Daily Note     Today's date: 2022  Patient name: Precious Murphy  : 1960  MRN: [id number]  Referring provider: Jesús Sage  Dx:   Encounter Diagnosis     ICD-10-CM    1  S/P right rotator cuff repair  Z98 890    2  Acute pain of right shoulder  M25 511        Start Time: 1660  Stop Time: 0733  Total time in clinic (min): 38 minutes    Subjective: Pt reports potentially over doing it this weekend  States he was lightly using his RUE more than usual for several days in a row  Pt reports he was staying within protocol but has an increase in soreness secondary to repeated light use  Objective: See treatment diary below      Assessment: Tolerated treatment well  Patient would benefit from continued PT  Initiated next phase of rehabilitation protocol this morning - pt with most difficulty performing R shoulder ER and elevation (flexion and scaption)  PROM in all directions is progressing, but pt still limited about 10 degrees in flexion and 20 degrees in abduction  Reminded pt about rehabilitation protocol and the precautions and recommendations for phase 4   1:1 with Verito Urbina DPT entirety of tx  Plan: Continue per plan of care        Precautions: none    Phase 1:  3/30 -   Phase 2:   -   Phase 3:   -   Phase 4:   -      Pertinent Findings:                                                                                                                                                     Test / Measure  2022   FOTO 21 47 55 64 62   R shoulder global MMT     3+/5 to 4+/5   R shoulder flexion ROM 80 deg    140 AROM  165 PROM   R shoulder abduction ROM 60 deg    150 AROM  170 PROM       Manuals 5/20 5/23 5/27 6/3 6/6 6/10 6/13 6/17 6/24   R Sh PROM MM 8' MM 6' MM 6' MM 6' MM 6' MM 6' MM 6' MM 6' MM 4'   R Sh mobs  MM 2' MM 2' MM 2' MM 2' MM 2' MM 2' MM 2' MM 2'   R Sh MREs        MM 2' MM 2'               Neuro SUBJECTIVE:  Jovi Aldana presents today for follow up of DIABETES MELLITUS.  Patient is being treated with oral agents, diet and oral agents,diet and exercise.  Patient glucose self monitoring as follows: one time daily, once daily.   Results as follows: fasting glucose- 120-130  Lab Results   Component Value Date    A1C 7.5 05/05/2020     Lab Results   Component Value Date    HDL 66 08/09/2017     Lab Results   Component Value Date     08/09/2017     Lab Results   Component Value Date     01/30/2017     BP:  162/84  History   Smoking Status     Never Smoker   Smokeless Tobacco     Never Used     ASA yes  Exercise minimal  Patient concerns: has no specific complaints and has been feeling well.     Patient Active Problem List    Diagnosis Date Noted     Morbid obesity (H) 05/05/2020     Priority: Medium     Supraventricular tachycardia (H) 05/05/2020     Priority: Medium     Hypertension      Priority: Medium     Type 2 diabetes mellitus with complication, without long-term current use of insulin (H)      Priority: Medium     Arthritis      Priority: Medium     Sleep apnea      Priority: Medium     CPAP       Hyperlipidemia      Priority: Medium     Venous insufficiency      Priority: Medium     Coronary artery disease      Priority: Medium     Adrenal mass (H)      Priority: Medium     Trigeminal neuralgia 06/12/2013     Priority: Medium     ACP (advance care planning) 05/05/2020     Priority: Low     Health Care Home 05/05/2020     Priority: Low     Current Meds  Current Outpatient Medications   Medication     aspirin 81 MG tablet     BACLOFEN PO     carBAMazepine (TEGRETOL) 200 MG tablet     cetirizine (ZYRTEC) 10 MG tablet     clobetasol (TEMOVATE) 0.05 % ointment     Desonide Crea-Wound Dress Crea (DESONIL CREAM EX)     fexofenadine (ALLEGRA) 180 MG tablet     Furosemide (LASIX) 20 MG tablet     gabapentin (NEURONTIN) 300 MG capsule     ketoconazole (NIZORAL) 2 % external cream      "lisinopril (PRINIVIL/ZESTRIL) 40 MG tablet     Magnesium 400 MG CAPS     metFORMIN (GLUCOPHAGE-XR) 500 MG 24 hr tablet     OMEPRAZOLE PO     Polyethylene Glycol 3350 (MIRALAX PO)     senna-docusate (SENOKOT-S/PERICOLACE) 8.6-50 MG tablet     sitagliptin (JANUVIA) 100 MG tablet     spironolactone (ALDACTONE) 25 MG tablet     UNABLE TO FIND     VITAMIN D, CHOLECALCIFEROL, PO     vitamin E 400 UNITS TABS     amLODIPine (NORVASC) 2.5 MG tablet     fluticasone (FLONASE) 50 MCG/ACT nasal spray     niacin (SLO-NIACIN) 250 MG TBCR CR tablet     No current facility-administered medications for this visit.        ROS:  CONSTITUTIONAL: NEGATIVE for fever, chills  EYES: NEGATIVE for vision changes   RESP: NEGATIVE for significant cough or SOB  CV: NEGATIVE for chest pain, palpitations   GI: NEGATIVE for nausea, abdominal pain, heartburn, or change in bowel habits  : NEGATIVE for frequency, dysuria, or hematuria  MUSCULOSKELETAL: NEGATIVE for significant arthralgias or myalgia  NEURO: NEGATIVE for weakness, dizziness or paresthesias or headache    EXAM:  BP (!) 162/84 (BP Location: Left arm, Patient Position: Sitting, Cuff Size: Adult Large)   Pulse 74   Temp 99.5  F (37.5  C) (Oral)   Ht 1.854 m (6' 1\")   Wt (!) 159.2 kg (351 lb)   SpO2 97%   BMI 46.31 kg/m    GENERAL APPEARANCE: healthy, alert and no distress  EYES: EOMI,  PERRL  HENT: ear canals and TM's normal and nose and mouth without ulcers or lesions  RESP: lungs clear to auscultation - no rales, rhonchi or wheezes  CV: regular rates and rhythm, normal S1 S2, no S3 or S4 and no murmur, click or rub -  ABDOMEN:  soft, nontender, no HSM or masses and bowel sounds normal        Foot Exam: Not done    ASSESSMENT:  DIABETES Type II:                      Control   good     Lab Results   Component Value Date    A1C 7.5 05/05/2020               HTN:                                              Control   Recheck 142/78   Last BP: 162/84        HYPERCHOLESTEROLEMIA:   " Re-Ed            Scap retr 30x5" 30x5" D/C         SRER   30x5" 0# 30x5" 0# 30x5" 0# 15x5" 0# 15x5" 0# 15x5" 0# 2x15 gtb   Supine flex AAROM 30x 30x 30x 30x 30x 15x AROM      Table slides - flex + scap/abd 30x ea 30x ea w/ PB 30x ea w/ PB 30x ea w/ PB 30x ea w/ PB       R Sh iso - 6 way 8x5" ea 8x5" ea 8x5" ea 8x5" ea 8x5" ea 8x5" ea 8x5" ea 5x5" ea 5x5" ea   Wall slides 10x 15x 15x 15x 15x 15x 15x 15x    Serratus punches 30x 30x 30x 1# 15x 1# 15x 2# 15x 2#      Sh flex, abd iso         3x10 gtb   Ther Ex            Pulleys 8' 8' 8' 8' 6' 6' 6' 6' 6'   Scaption     10x 15x 15x 20x 10x 2#  2x10 3#   S/L ER 30x 30x 30x 30x 30x 20x 15x 15x    S/L flexion AAROM 30x AAROM 30x AAROM 30x 15x 15x 20x 15x 15x    S/L abduction 15x AAROM 15x AAROM 15x AAROM 15x 15x 20x 15x 15x    Prone rows      15x 0# 15x 0# 25x 0#    Prone Sh ext      15x 0# 15x 0# 25x 0#    Sleeper S      8x15" 8x15"     TB rows         3x10 btb   TB sh ext         3x10 btb   TB IR         3x10 btb   TB ER         3x10 gtb   Ther Activity                                    Gait Training                                    Modalities            Cold pack Perf  Perf Perf Perf  Perf Perf Perf Control   Unable to take statins     Lab Results   Component Value Date     08/09/2017           CAD:    no  Weight loss     PLAN:  Exercise recommended  Hemoglobin A1c obtained today  Weight loss recommended  Follow up in 3 months.

## 2022-06-27 ENCOUNTER — APPOINTMENT (OUTPATIENT)
Dept: PHYSICAL THERAPY | Facility: REHABILITATION | Age: 62
End: 2022-06-27
Payer: COMMERCIAL

## 2022-06-29 ENCOUNTER — OFFICE VISIT (OUTPATIENT)
Dept: PHYSICAL THERAPY | Facility: REHABILITATION | Age: 62
End: 2022-06-29
Payer: COMMERCIAL

## 2022-06-29 DIAGNOSIS — M25.511 ACUTE PAIN OF RIGHT SHOULDER: ICD-10-CM

## 2022-06-29 DIAGNOSIS — Z98.890 S/P RIGHT ROTATOR CUFF REPAIR: Primary | ICD-10-CM

## 2022-06-29 PROCEDURE — 97110 THERAPEUTIC EXERCISES: CPT | Performed by: PHYSICAL THERAPIST

## 2022-06-29 PROCEDURE — 97112 NEUROMUSCULAR REEDUCATION: CPT | Performed by: PHYSICAL THERAPIST

## 2022-06-29 PROCEDURE — 97010 HOT OR COLD PACKS THERAPY: CPT | Performed by: PHYSICAL THERAPIST

## 2022-06-29 PROCEDURE — 97140 MANUAL THERAPY 1/> REGIONS: CPT | Performed by: PHYSICAL THERAPIST

## 2022-06-29 NOTE — PROGRESS NOTES
Daily Note     Today's date: 2022  Patient name: Tyron Marroquin  : 1960  MRN: [id number]  Referring provider: Juan David Bunn  Dx:   Encounter Diagnosis     ICD-10-CM    1  S/P right rotator cuff repair  Z98 890    2  Acute pain of right shoulder  M25 511        Start Time: 0700  Stop Time: 0745  Total time in clinic (min): 45 minutes    Subjective: Pt reports that his shoulder is feeling much better after a long weekend of rest        Objective: See treatment diary below      Assessment: Tolerated treatment well  Patient would benefit from continued PT  Pt tolerated today's session well  He was able to perform exercises with minimal irritability  He demonstrated improved PROM with biggest improvement with ER and IR  Continue to progress as tolerated within protocol  1:1 with LACEY Neil under the direct supervision of Rand aMtute DPT for entirety of tx  Plan: Continue per plan of care        Precautions: none    Phase 1:  3/30 -   Phase 2:   -   Phase 3:   -   Phase 4:   -      Pertinent Findings:                                                                                                                                                     Test / Measure  2022   FOTO 21 47 55 64 62   R shoulder global MMT     3+/5 to 4+/5   R shoulder flexion ROM 80 deg    140 AROM  165 PROM   R shoulder abduction ROM 60 deg    150 AROM  170 PROM       Manuals 5/20 5/23 5/27 6/3 6/6 6/10 6/13 6/17 6/24 6/29   R Sh PROM MM 8' MM 6' MM 6' MM 6' MM 6' MM 6' MM 6' MM 6' MM 4' BV 4'   R Sh mobs  MM 2' MM 2' MM 2' MM 2' MM 2' MM 2' MM 2' MM 2' BV 2'   R Sh MREs        MM 2' MM 2' BV 2'                Neuro Re-Ed             Scap retr 30x5" 30x5" D/C          SRER   30x5" 0# 30x5" 0# 30x5" 0# 15x5" 0# 15x5" 0# 15x5" 0# 2x15 gtb 2x15 gtb   Supine flex AAROM 30x 30x 30x 30x 30x 15x AROM       Table slides - flex + scap/abd 30x ea 30x ea w/ PB 30x ea w/ PB 30x ea w/ PB 30x ea w/ PB        R Sh iso - 6 way 8x5" ea 8x5" ea 8x5" ea 8x5" ea 8x5" ea 8x5" ea 8x5" ea 5x5" ea 5x5" ea 5x5" ea   Wall slides 10x 15x 15x 15x 15x 15x 15x 15x     Serratus punches 30x 30x 30x 1# 15x 1# 15x 2# 15x 2#       Sh flex, abd iso         3x10 gtb 3x10 gtb   Ther Ex             Pulleys 8' 8' 8' 8' 6' 6' 6' 6' 6' 6'   Scaption     10x 15x 15x 20x 10x 2#  2x10 3# 10x 2#  2x10 3#   S/L ER 30x 30x 30x 30x 30x 20x 15x 15x     S/L flexion AAROM 30x AAROM 30x AAROM 30x 15x 15x 20x 15x 15x     S/L abduction 15x AAROM 15x AAROM 15x AAROM 15x 15x 20x 15x 15x     Prone rows      15x 0# 15x 0# 25x 0#     Prone Sh ext      15x 0# 15x 0# 25x 0#     Sleeper S      8x15" 8x15"      TB rows         3x10 btb 3x10 btb   TB sh ext         3x10 btb 3x10 btb   TB IR         3x10 btb 3x10 btb   TB ER         3x10 gtb 3x10 btb   Ther Activity                                       Gait Training                                       Modalities             Cold pack Perf  Perf Perf Perf  Perf Perf Perf Perf

## 2022-07-01 ENCOUNTER — APPOINTMENT (OUTPATIENT)
Dept: PHYSICAL THERAPY | Facility: REHABILITATION | Age: 62
End: 2022-07-01
Payer: COMMERCIAL

## 2022-07-04 ENCOUNTER — APPOINTMENT (OUTPATIENT)
Dept: PHYSICAL THERAPY | Facility: REHABILITATION | Age: 62
End: 2022-07-04
Payer: COMMERCIAL

## 2022-07-05 ENCOUNTER — OFFICE VISIT (OUTPATIENT)
Dept: PHYSICAL THERAPY | Facility: REHABILITATION | Age: 62
End: 2022-07-05
Payer: COMMERCIAL

## 2022-07-05 DIAGNOSIS — M25.511 ACUTE PAIN OF RIGHT SHOULDER: ICD-10-CM

## 2022-07-05 DIAGNOSIS — Z98.890 S/P RIGHT ROTATOR CUFF REPAIR: Primary | ICD-10-CM

## 2022-07-05 PROCEDURE — 97140 MANUAL THERAPY 1/> REGIONS: CPT

## 2022-07-05 PROCEDURE — 97110 THERAPEUTIC EXERCISES: CPT

## 2022-07-05 PROCEDURE — 97010 HOT OR COLD PACKS THERAPY: CPT

## 2022-07-05 PROCEDURE — 97112 NEUROMUSCULAR REEDUCATION: CPT

## 2022-07-05 NOTE — PROGRESS NOTES
Daily Note     Today's date: 2022  Patient name: Kym Olivier  : 1960  MRN: [id number]  Referring provider: Mark Castañeda  Dx:   Encounter Diagnosis     ICD-10-CM    1  S/P right rotator cuff repair  Z98 890    2  Acute pain of right shoulder  M25 511        Start Time: 0700  Stop Time: 0738  Total time in clinic (min): 38 minutes    Subjective: Pt reports mild R shoulder discomfort at start of tx session but no worse than typical discomfort  Does states L shoulder pain following overuse  Objective: See treatment diary below      Assessment: Tolerated treatment well  Patient would benefit from continued PT  Pt tolerated continuation of POC within protocol and the addition of lateral raises  Has most difficulty completing R shoulder elevation exercises such as scaption and lateral raises  Mild upper trapezius compensation with these movements  1:1 with Oliva Bishop DPT entirety of tx  Plan: Progress treatment as tolerated         Precautions: none    Phase 1:  3/30 -   Phase 2:   -   Phase 3:   -   Phase 4:   -      Pertinent Findings:                                                                                                                                                     Test / Measure  2022   FOTO 21 47 55 64 62   R shoulder global MMT     3+/5 to 4+/5   R shoulder flexion ROM 80 deg    140 AROM  165 PROM   R shoulder abduction ROM 60 deg    150 AROM  170 PROM       Manuals 5/23 5/27 6/3 6/6 6/10 6/13 6/17 6/24 6/29 7   R Sh PROM MM 6' MM 6' MM 6' MM 6' MM 6' MM 6' MM 6' MM 4' BV 4' MM 4'   R Sh mobs MM 2' MM 2' MM 2' MM 2' MM 2' MM 2' MM 2' MM 2' BV 2' MM 2'   R Sh MREs       MM 2' MM 2' BV 2' MM 2'                Neuro Re-Ed             Scap retr 30x5" D/C           SRER  30x5" 0# 30x5" 0# 30x5" 0# 15x5" 0# 15x5" 0# 15x5" 0# 2x15 gtb 2x15 gtb 2x15 btb   Supine flex AAROM 30x 30x 30x 30x 15x AROM        Table slides - flex + scap/abd 30x ea w/ PB 30x ea w/ PB 30x ea w/ PB 30x ea w/ PB         R Sh iso - 6 way 8x5" ea 8x5" ea 8x5" ea 8x5" ea 8x5" ea 8x5" ea 5x5" ea 5x5" ea 5x5" ea 5x5" ea   Wall slides 15x 15x 15x 15x 15x 15x 15x      Serratus punches 30x 30x 1# 15x 1# 15x 2# 15x 2#        Sh flex, abd iso        3x10 gtb 3x10 gtb 3x10 btb   Ther Ex             Pulleys 8' 8' 8' 6' 6' 6' 6' 6' 6' 6'   Scaption    10x 15x 15x 20x 10x 2#  2x10 3# 10x 2#  2x10 3# 3x10 3#   Lateral raises             S/L ER 30x 30x 30x 30x 20x 15x 15x      S/L flexion AAROM 30x AAROM 30x 15x 15x 20x 15x 15x      S/L abduction 15x AAROM 15x AAROM 15x 15x 20x 15x 15x      Prone rows     15x 0# 15x 0# 25x 0#      Prone Sh ext     15x 0# 15x 0# 25x 0#      Sleeper S     8x15" 8x15"       TB rows        3x10 btb 3x10 btb 3x10 btb   TB sh ext        3x10 btb 3x10 btb 3x10 btb   TB IR        3x10 btb 3x10 btb 3x10 btb   TB ER        3x10 gtb 3x10 btb 3x10 btb   Ther Activity                                       Gait Training                                       Modalities             Cold pack  Perf Perf Perf  Perf Perf Perf Perf Perf

## 2022-07-08 ENCOUNTER — OFFICE VISIT (OUTPATIENT)
Dept: PHYSICAL THERAPY | Facility: REHABILITATION | Age: 62
End: 2022-07-08
Payer: COMMERCIAL

## 2022-07-08 DIAGNOSIS — Z98.890 S/P RIGHT ROTATOR CUFF REPAIR: Primary | ICD-10-CM

## 2022-07-08 DIAGNOSIS — M25.511 ACUTE PAIN OF RIGHT SHOULDER: ICD-10-CM

## 2022-07-08 PROCEDURE — 97140 MANUAL THERAPY 1/> REGIONS: CPT

## 2022-07-08 PROCEDURE — 97112 NEUROMUSCULAR REEDUCATION: CPT

## 2022-07-08 PROCEDURE — 97010 HOT OR COLD PACKS THERAPY: CPT

## 2022-07-08 PROCEDURE — 97110 THERAPEUTIC EXERCISES: CPT

## 2022-07-08 NOTE — PROGRESS NOTES
Daily Note     Today's date: 2022  Patient name: Kevin Cummings  : 1960  MRN: [id number]  Referring provider: Swati Bautista  Dx:   Encounter Diagnosis     ICD-10-CM    1  S/P right rotator cuff repair  Z98 890    2  Acute pain of right shoulder  M25 511        Start Time: 4073  Stop Time: 0740  Total time in clinic (min): 45 minutes    Subjective: Pt reports that R shoulder is slightly improved since last tx session  Has mild complaints of lumbar pain after carrying ladders the past few days  Objective: See treatment diary below      Assessment: Tolerated treatment well  Patient would benefit from continued PT  Pt nearing full R shoulder flexion/abduction PROM - still lacking 10-15 degrees in both directions  Strength and motor control of R shoulder is gradually improving well within protocol  R shoulder challenged by introduction of new therapeutic interventions causing mild fatigue post-session; although, pt did not have aggravation of pain symptomology throuhgout or after tx session  1:1 with Keke Broussard, DPT entirety of tx  Plan: Continue per plan of care        Precautions: none    Phase 1:  3/30 -   Phase 2:   -   Phase 3:   -   Phase 4:   -      Pertinent Findings:                                                                                                                                                     Test / Measure  2022   FOTO 21 47 55 64 62   R shoulder global MMT     3+/5 to 4+/5   R shoulder flexion ROM 80 deg    140 AROM  165 PROM   R shoulder abduction ROM 60 deg    150 AROM  170 PROM       Manuals 5/27 6/3 6/6 6/10 6/13 6/17 6/24 6/29 7/5 7/7   R Sh PROM MM 6' MM 6' MM 6' MM 6' MM 6' MM 6' MM 4' BV 4' MM 4' MM 4'   R Sh mobs MM 2' MM 2' MM 2' MM 2' MM 2' MM 2' MM 2' BV 2' MM 2' MM 4'   R Sh MREs      MM 2' MM 2' BV 2' MM 2'                 Neuro Re-Ed             Scap retr D/C SRER 30x5" 0# 30x5" 0# 30x5" 0# 15x5" 0# 15x5" 0# 15x5" 0# 2x15 gtb 2x15 gtb 2x15 btb 2x15 btb   Supine flex AAROM 30x 30x 30x 15x AROM         Table slides - flex + scap/abd 30x ea w/ PB 30x ea w/ PB 30x ea w/ PB          R Sh iso - 6 way 8x5" ea 8x5" ea 8x5" ea 8x5" ea 8x5" ea 5x5" ea 5x5" ea 5x5" ea 5x5" ea 3x5" ea   Wall slides 15x 15x 15x 15x 15x 15x       Serratus punches 30x 1# 15x 1# 15x 2# 15x 2#         Sh flex, abd iso       3x10 gtb 3x10 gtb 3x10 btb 3x10 btb   Ther Ex             Pulleys 8' 8' 6' 6' 6' 6' 6' 6' 6' 4'   Scaption   10x 15x 15x 20x 10x 2#  2x10 3# 10x 2#  2x10 3# 3x10 3# 3x10 3#   Lateral raises         2x10 3# 3x10 3#   S/L ER 30x 30x 30x 20x 15x 15x       S/L flexion AAROM 30x 15x 15x 20x 15x 15x       S/L abduction 15x AAROM 15x 15x 20x 15x 15x       Prone rows    15x 0# 15x 0# 25x 0#       Prone Sh ext    15x 0# 15x 0# 25x 0#       Sleeper S    8x15" 8x15"        TB rows       3x10 btb 3x10 btb 3x10 btb 2x15 mtb   TB sh ext       3x10 btb 3x10 btb 3x10 btb 2x15 mtb   TB IR       3x10 btb 3x10 btb 3x10 btb 2x15 mtb   TB ER       3x10 gtb 3x10 btb 3x10 btb 2x15 mtb   TB ER walkout          15x mtb   Supine chest press          3x10 5#   Supine chest flys          2x10 2#   Ther Activity                                       Gait Training                                       Modalities             Cold pack Perf Perf Perf  Perf Perf Perf Perf Perf Perf

## 2022-07-11 ENCOUNTER — OFFICE VISIT (OUTPATIENT)
Dept: PHYSICAL THERAPY | Facility: REHABILITATION | Age: 62
End: 2022-07-11
Payer: COMMERCIAL

## 2022-07-11 DIAGNOSIS — M25.511 ACUTE PAIN OF RIGHT SHOULDER: ICD-10-CM

## 2022-07-11 DIAGNOSIS — Z98.890 S/P RIGHT ROTATOR CUFF REPAIR: Primary | ICD-10-CM

## 2022-07-11 PROCEDURE — 97140 MANUAL THERAPY 1/> REGIONS: CPT

## 2022-07-11 PROCEDURE — 97112 NEUROMUSCULAR REEDUCATION: CPT

## 2022-07-11 PROCEDURE — 97110 THERAPEUTIC EXERCISES: CPT

## 2022-07-11 PROCEDURE — 97010 HOT OR COLD PACKS THERAPY: CPT

## 2022-07-11 NOTE — PROGRESS NOTES
Daily Note     Today's date: 2022  Patient name: Concha Ledesma  : 1960  MRN: [id number]  Referring provider: Yulisa Dorsey  Dx:   Encounter Diagnosis     ICD-10-CM    1  S/P right rotator cuff repair  Z98 890    2  Acute pain of right shoulder  M25 511        Start Time: 5278  Stop Time: 0733  Total time in clinic (min): 38 minutes    Subjective: Pt reports status of R shoulder is improving - lumbar pain is aggravated though  Objective: See treatment diary below      Assessment: Tolerated treatment well  Patient would benefit from continued PT  Pt progressing well through surgical protocol  Nearing full R shoulder PROM but remains limited in flexion and abduction about 10 degrees  Pt able to complete therapeutic interventions without further aggravation of pain symptomology  Educated pt to attempt to avoid overhead activities per surgical protocol  1:1 with Florencio Hassan DPT entirety of tx  Plan: Progress treatment as tolerated         Precautions: none    Phase 1:  3/30 -   Phase 2:   -   Phase 3:   -   Phase 4:   -      Pertinent Findings:                                                                                                                                                     Test / Measure  2022   FOTO 21 47 55 64 62 68   R shoulder global MMT     3+/5 to 4+/5    R shoulder flexion ROM 80 deg    140 AROM  165 PROM    R shoulder abduction ROM 60 deg    150 AROM  170 PROM        Manuals 6/3 6/6 6/10 6/13 6/17 6/24 6/29 7/5 7/7 7/11   R Sh PROM MM 6' MM 6' MM 6' MM 6' MM 6' MM 4' BV 4' MM 4' MM 4' MM 4'   R Sh mobs MM 2' MM 2' MM 2' MM 2' MM 2' MM 2' BV 2' MM 2' MM 4' MM 4'   R Sh MREs     MM 2' MM 2' BV 2' MM 2'                  Neuro Re-Ed             SRER 30x5" 0# 30x5" 0# 15x5" 0# 15x5" 0# 15x5" 0# 2x15 gtb 2x15 gtb 2x15 btb 2x15 btb 2x15 btb   Supine flex AAROM 30x 30x 15x AROM Table slides - flex + scap/abd 30x ea w/ PB 30x ea w/ PB           R Sh iso - 6 way 8x5" ea 8x5" ea 8x5" ea 8x5" ea 5x5" ea 5x5" ea 5x5" ea 5x5" ea 3x5" ea 3x5" ea   Wall slides 15x 15x 15x 15x 15x     15x   Serratus punches 15x 1# 15x 2# 15x 2#          Sh flex, abd iso      3x10 gtb 3x10 gtb 3x10 btb 3x10 btb 3x10 btb   Horiz abd          2x10 btb   Ther Ex             Pulleys 8' 6' 6' 6' 6' 6' 6' 6' 4' 4'   Scaption  10x 15x 15x 20x 10x 2#  2x10 3# 10x 2#  2x10 3# 3x10 3# 3x10 3# 3x10 3#   Lateral raises        2x10 3# 3x10 3# 3x10 3#   S/L ER 30x 30x 20x 15x 15x        S/L flexion 15x 15x 20x 15x 15x        S/L abduction 15x 15x 20x 15x 15x        Prone rows   15x 0# 15x 0# 25x 0#        Prone Sh ext   15x 0# 15x 0# 25x 0#        Sleeper S   8x15" 8x15"         TB rows      3x10 btb 3x10 btb 3x10 btb 2x15 mtb 2x15 mtb   TB sh ext      3x10 btb 3x10 btb 3x10 btb 2x15 mtb 2x15 mtb   TB IR      3x10 btb 3x10 btb 3x10 btb 2x15 mtb 2x15 mtb   TB ER      3x10 gtb 3x10 btb 3x10 btb 2x15 mtb 2x15 mtb   TB ER walkout         15x mtb 15x mtb   Supine chest press         3x10 5# 3x10 5#   Supine chest flys         2x10 2# 2x10 2#   Ther Activity                                       Gait Training                                       Modalities             Cold pack Perf Perf  Perf Perf Perf Perf Perf Perf Perf

## 2022-07-12 ENCOUNTER — APPOINTMENT (OUTPATIENT)
Dept: LAB | Age: 62
End: 2022-07-12
Payer: COMMERCIAL

## 2022-07-12 ENCOUNTER — TELEPHONE (OUTPATIENT)
Dept: FAMILY MEDICINE CLINIC | Facility: CLINIC | Age: 62
End: 2022-07-12

## 2022-07-12 DIAGNOSIS — E78.5 HYPERLIPIDEMIA, UNSPECIFIED HYPERLIPIDEMIA TYPE: ICD-10-CM

## 2022-07-12 DIAGNOSIS — Z00.8 HEALTH EXAMINATION IN POPULATION SURVEY: ICD-10-CM

## 2022-07-12 DIAGNOSIS — D72.829 LEUKOCYTOSIS, UNSPECIFIED TYPE: ICD-10-CM

## 2022-07-12 LAB
BASOPHILS # BLD AUTO: 0.04 THOUSANDS/ΜL (ref 0–0.1)
BASOPHILS NFR BLD AUTO: 1 % (ref 0–1)
BILIRUB UR QL STRIP: NEGATIVE
CHOLEST SERPL-MCNC: 238 MG/DL
CLARITY UR: CLEAR
COLOR UR: NORMAL
EOSINOPHIL # BLD AUTO: 0.16 THOUSAND/ΜL (ref 0–0.61)
EOSINOPHIL NFR BLD AUTO: 2 % (ref 0–6)
ERYTHROCYTE [DISTWIDTH] IN BLOOD BY AUTOMATED COUNT: 12.8 % (ref 11.6–15.1)
EST. AVERAGE GLUCOSE BLD GHB EST-MCNC: 126 MG/DL
GLUCOSE UR STRIP-MCNC: NEGATIVE MG/DL
HBA1C MFR BLD: 6 %
HCT VFR BLD AUTO: 43.7 % (ref 36.5–49.3)
HDLC SERPL-MCNC: 40 MG/DL
HGB BLD-MCNC: 14.7 G/DL (ref 12–17)
HGB UR QL STRIP.AUTO: NEGATIVE
IMM GRANULOCYTES # BLD AUTO: 0.06 THOUSAND/UL (ref 0–0.2)
IMM GRANULOCYTES NFR BLD AUTO: 1 % (ref 0–2)
KETONES UR STRIP-MCNC: NEGATIVE MG/DL
LDLC SERPL DIRECT ASSAY-MCNC: 99 MG/DL (ref 0–100)
LEUKOCYTE ESTERASE UR QL STRIP: NEGATIVE
LYMPHOCYTES # BLD AUTO: 2.96 THOUSANDS/ΜL (ref 0.6–4.47)
LYMPHOCYTES NFR BLD AUTO: 41 % (ref 14–44)
MCH RBC QN AUTO: 31.7 PG (ref 26.8–34.3)
MCHC RBC AUTO-ENTMCNC: 33.6 G/DL (ref 31.4–37.4)
MCV RBC AUTO: 94 FL (ref 82–98)
MONOCYTES # BLD AUTO: 0.6 THOUSAND/ΜL (ref 0.17–1.22)
MONOCYTES NFR BLD AUTO: 8 % (ref 4–12)
NEUTROPHILS # BLD AUTO: 3.38 THOUSANDS/ΜL (ref 1.85–7.62)
NEUTS SEG NFR BLD AUTO: 47 % (ref 43–75)
NITRITE UR QL STRIP: NEGATIVE
NRBC BLD AUTO-RTO: 0 /100 WBCS
PH UR STRIP.AUTO: 5.5 [PH]
PLATELET # BLD AUTO: 195 THOUSANDS/UL (ref 149–390)
PMV BLD AUTO: 12.2 FL (ref 8.9–12.7)
PROT UR STRIP-MCNC: NEGATIVE MG/DL
RBC # BLD AUTO: 4.64 MILLION/UL (ref 3.88–5.62)
SP GR UR STRIP.AUTO: 1.02 (ref 1–1.03)
TRIGL SERPL-MCNC: 443 MG/DL
UROBILINOGEN UR STRIP-ACNC: <2 MG/DL
WBC # BLD AUTO: 7.2 THOUSAND/UL (ref 4.31–10.16)

## 2022-07-12 PROCEDURE — 81003 URINALYSIS AUTO W/O SCOPE: CPT | Performed by: FAMILY MEDICINE

## 2022-07-12 PROCEDURE — 83036 HEMOGLOBIN GLYCOSYLATED A1C: CPT

## 2022-07-12 PROCEDURE — 80061 LIPID PANEL: CPT

## 2022-07-12 PROCEDURE — 83721 ASSAY OF BLOOD LIPOPROTEIN: CPT

## 2022-07-12 PROCEDURE — 36415 COLL VENOUS BLD VENIPUNCTURE: CPT

## 2022-07-12 PROCEDURE — 85025 COMPLETE CBC W/AUTO DIFF WBC: CPT

## 2022-07-12 NOTE — TELEPHONE ENCOUNTER
----- Message from Varun Amin DO sent at 7/12/2022 12:18 PM EDT -----  Patient's cholesterol is elevated 238  Patient's triglycerides a very high at 443    I recommend patient make appoint with Dr Lázaro Henriquez to discuss

## 2022-07-15 ENCOUNTER — OFFICE VISIT (OUTPATIENT)
Dept: FAMILY MEDICINE CLINIC | Facility: CLINIC | Age: 62
End: 2022-07-15
Payer: COMMERCIAL

## 2022-07-15 ENCOUNTER — OFFICE VISIT (OUTPATIENT)
Dept: PHYSICAL THERAPY | Facility: REHABILITATION | Age: 62
End: 2022-07-15
Payer: COMMERCIAL

## 2022-07-15 VITALS
WEIGHT: 210 LBS | HEART RATE: 82 BPM | BODY MASS INDEX: 26.95 KG/M2 | DIASTOLIC BLOOD PRESSURE: 72 MMHG | SYSTOLIC BLOOD PRESSURE: 126 MMHG | HEIGHT: 74 IN | OXYGEN SATURATION: 94 %

## 2022-07-15 DIAGNOSIS — Z98.890 S/P RIGHT ROTATOR CUFF REPAIR: Primary | ICD-10-CM

## 2022-07-15 DIAGNOSIS — R73.02 IMPAIRED GLUCOSE TOLERANCE: ICD-10-CM

## 2022-07-15 DIAGNOSIS — M25.511 ACUTE PAIN OF RIGHT SHOULDER: ICD-10-CM

## 2022-07-15 DIAGNOSIS — G89.29 CHRONIC PAIN OF LEFT KNEE: ICD-10-CM

## 2022-07-15 DIAGNOSIS — E55.9 VITAMIN D DEFICIENCY: ICD-10-CM

## 2022-07-15 DIAGNOSIS — M25.562 CHRONIC PAIN OF LEFT KNEE: ICD-10-CM

## 2022-07-15 DIAGNOSIS — E78.5 HYPERLIPIDEMIA, UNSPECIFIED HYPERLIPIDEMIA TYPE: ICD-10-CM

## 2022-07-15 DIAGNOSIS — S83.242A ACUTE MEDIAL MENISCAL TEAR, LEFT, INITIAL ENCOUNTER: Primary | ICD-10-CM

## 2022-07-15 PROBLEM — R07.9 CHEST PAIN: Status: RESOLVED | Noted: 2020-09-11 | Resolved: 2022-07-15
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
## 2022-07-15 PROBLEM — T07.XXXA ABRASIONS OF MULTIPLE SITES: Status: RESOLVED | Noted: 2021-06-06 | Resolved: 2022-07-15

## 2022-07-15 PROBLEM — R94.6 THYROID FUNCTION STUDY ABNORMALITY: Status: RESOLVED | Noted: 2020-01-03 | Resolved: 2022-07-15

## 2022-07-15 PROBLEM — M75.101 TEAR OF RIGHT SUPRASPINATUS TENDON: Status: RESOLVED | Noted: 2022-04-04 | Resolved: 2022-07-15
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
## 2022-07-15 PROBLEM — S01.81XA FACIAL LACERATION: Status: RESOLVED | Noted: 2021-06-06 | Resolved: 2022-07-15

## 2022-07-15 PROBLEM — Z00.00 HEALTH CARE MAINTENANCE: Status: RESOLVED | Noted: 2019-12-20 | Resolved: 2022-07-15

## 2022-07-15 PROBLEM — B34.9 VIRAL ILLNESS: Status: RESOLVED | Noted: 2020-02-21 | Resolved: 2022-07-15

## 2022-07-15 PROBLEM — R73.9 HYPERGLYCEMIA: Status: RESOLVED | Noted: 2019-12-20 | Resolved: 2022-07-15

## 2022-07-15 PROCEDURE — 97010 HOT OR COLD PACKS THERAPY: CPT

## 2022-07-15 PROCEDURE — 99214 OFFICE O/P EST MOD 30 MIN: CPT | Performed by: FAMILY MEDICINE

## 2022-07-15 PROCEDURE — 97140 MANUAL THERAPY 1/> REGIONS: CPT

## 2022-07-15 PROCEDURE — 97110 THERAPEUTIC EXERCISES: CPT

## 2022-07-15 PROCEDURE — 97112 NEUROMUSCULAR REEDUCATION: CPT

## 2022-07-15 RX ORDER — ATORVASTATIN CALCIUM 20 MG/1
20 TABLET, FILM COATED ORAL DAILY
Qty: 90 TABLET | Refills: 1 | Status: SHIPPED | OUTPATIENT
Start: 2022-07-15

## 2022-07-15 NOTE — PROGRESS NOTES
Daily Note     Today's date: 7/15/2022  Patient name: Nehal Hopkins  : 1960  MRN: [id number]  Referring provider: Kristen Ahumada  Dx:   Encounter Diagnosis     ICD-10-CM    1  S/P right rotator cuff repair  Z98 890    2  Acute pain of right shoulder  M25 511        Start Time: 1442  Stop Time: 0726  Total time in clinic (min): 38 minutes    Subjective: Pt reports intermittent anterior R shoulder discomfort especially with overhead tasks and eccentric lowering  Objective: See treatment diary below      Assessment: Tolerated treatment well  Patient would benefit from continued PT  Pt continuing to make progress in regards to functionality, ROM, and strength  Lacking about 10-15 degrees in flexion and abduction; IR remains limited as well  Tolerated table pushups this session - plan on adding table shoulder taps next session to address slight discomfort with weight bearing through RUE  Significant anterior shoulder/LHB tightness noted - added stretching intervention  Pt able to complete work duties other than lifting heavy objects  1:1 with Sarah Spain DPT entirety of tx  Plan: Progress treatment as tolerated         Precautions: none    Phase 1:  3/30 -   Phase 2:   -   Phase 3:   -   Phase 4:   -      Pertinent Findings:                                                                                                                                                     Test / Measure  2022   FOTO 21 47 55 64 62 68   R shoulder global MMT     3+/5 to 4+/5    R shoulder flexion ROM 80 deg    140 AROM  165 PROM    R shoulder abduction ROM 60 deg    150 AROM  170 PROM        Manuals 6/6 6/10 6/13 6/17 6/24 6/29 7/5 7/7 7/11 7/15   R Sh PROM MM 6' MM 6' MM 6' MM 6' MM 4' BV 4' MM 4' MM 4' MM 4' MM 4'   R Sh mobs MM 2' MM 2' MM 2' MM 2' MM 2' BV 2' MM 2' MM 4' MM 4' MM 4'   R Sh MREs    MM 2' MM 2' BV 2' MM 2'   MM 2'                Neuro Re-Ed             SRER 30x5" 0# 15x5" 0# 15x5" 0# 15x5" 0# 2x15 gtb 2x15 gtb 2x15 btb 2x15 btb 2x15 btb 2x15 mtb   Supine flex AAROM 30x 15x AROM           Table slides - flex + scap/abd 30x ea w/ PB            R Sh iso - 6 way 8x5" ea 8x5" ea 8x5" ea 5x5" ea 5x5" ea 5x5" ea 5x5" ea 3x5" ea 3x5" ea    Wall slides 15x 15x 15x 15x     15x    Serratus punches 15x 2# 15x 2#           Sh flex, abd iso     3x10 gtb 3x10 gtb 3x10 btb 3x10 btb 3x10 btb 2x10 mtb   Horiz abd         2x10 btb 2x10 mtb   Table pushups          2x10   Table shoulder taps          NV -->   Ther Ex             Pulleys 6' 6' 6' 6' 6' 6' 6' 4' 4' 4'   Scaption 10x 15x 15x 20x 10x 2#  2x10 3# 10x 2#  2x10 3# 3x10 3# 3x10 3# 3x10 3# 3x10 4#   Lateral raises       2x10 3# 3x10 3# 3x10 3# 3x10 4#   S/L ER 30x 20x 15x 15x         S/L flexion 15x 20x 15x 15x         S/L abduction 15x 20x 15x 15x         Prone rows  15x 0# 15x 0# 25x 0#         Prone Sh ext  15x 0# 15x 0# 25x 0#         Sleeper S  8x15" 8x15"          TB rows     3x10 btb 3x10 btb 3x10 btb 2x15 mtb 2x15 mtb 2x15 mtb   TB sh ext     3x10 btb 3x10 btb 3x10 btb 2x15 mtb 2x15 mtb 2x15 mtb   TB IR     3x10 btb 3x10 btb 3x10 btb 2x15 mtb 2x15 mtb 2x15 mtb   TB ER     3x10 gtb 3x10 btb 3x10 btb 2x15 mtb 2x15 mtb 2x15 mtb   TB ER walkout        15x mtb 15x mtb 15x mtb   Supine chest press        3x10 5# 3x10 5# 3x10 8#   Supine chest flys        2x10 2# 2x10 2# 2x10 3#   Supine LHB S          2x1' 3#   Ther Activity             Bodyblade ER/IR @ 0 deg abd                          Gait Training                                       Modalities             Cold pack Perf  Perf Perf Perf Perf Perf Perf Perf Perf

## 2022-07-15 NOTE — PROGRESS NOTES
Assessment and Plan:    Problem List Items Addressed This Visit        Musculoskeletal and Integument    Acute medial meniscal tear, left, initial encounter - Primary     Having chronic knee problems, will talk to ortho            Other    Chronic pain of left knee     Having chronic knee problems, will talk to ortho         Hyperlipidemia     Increase atorvastatin to 20         Relevant Medications    atorvastatin (LIPITOR) 20 mg tablet    Other Relevant Orders    Lipid panel    Comprehensive metabolic panel    TSH, 3rd generation    Vitamin D deficiency    Relevant Orders    Vitamin D 25 hydroxy      Other Visit Diagnoses     Impaired glucose tolerance        Relevant Orders    Hemoglobin A1C                 Diagnoses and all orders for this visit:    Acute medial meniscal tear, left, initial encounter    Hyperlipidemia, unspecified hyperlipidemia type  -     atorvastatin (LIPITOR) 20 mg tablet; Take 1 tablet (20 mg total) by mouth daily  -     Lipid panel; Future  -     Comprehensive metabolic panel; Future  -     TSH, 3rd generation; Future    Chronic pain of left knee    Vitamin D deficiency  -     Vitamin D 25 hydroxy; Future    Impaired glucose tolerance  -     Hemoglobin A1C; Future            Subjective:      Patient ID: Pressley Harada is a 62 y o  male  CC:    Chief Complaint   Patient presents with    Follow-up     Patient present today for a follow up on his lab results  HPI:    F/u lipids, tg up  Eating more carbs, getting back to exercise routine      The following portions of the patient's history were reviewed and updated as appropriate: allergies, current medications, past family history, past medical history, past social history, past surgical history and problem list         Review of Systems   Constitutional: Negative for activity change and fatigue  Respiratory: Negative for shortness of breath  Cardiovascular: Negative for chest pain  Musculoskeletal: Positive for arthralgias  L knee pain   Neurological: Negative for dizziness and headaches  Psychiatric/Behavioral: The patient is not nervous/anxious  Data to review:       Objective:    Vitals:    07/15/22 1016   BP: 126/72   BP Location: Right arm   Patient Position: Sitting   Cuff Size: Large   Pulse: 82   SpO2: 94%   Weight: 95 3 kg (210 lb)   Height: 6' 2" (1 88 m)        Physical Exam  Vitals reviewed  Constitutional:       Appearance: Normal appearance  Cardiovascular:      Rate and Rhythm: Normal rate and regular rhythm  Pulses: Normal pulses  Heart sounds: Normal heart sounds  Pulmonary:      Effort: Pulmonary effort is normal       Breath sounds: Normal breath sounds  Musculoskeletal:      Right lower leg: No edema  Left lower leg: No edema  Lymphadenopathy:      Cervical: No cervical adenopathy  Neurological:      Mental Status: He is alert     Psychiatric:         Mood and Affect: Mood normal

## 2022-07-18 ENCOUNTER — OFFICE VISIT (OUTPATIENT)
Dept: PHYSICAL THERAPY | Facility: REHABILITATION | Age: 62
End: 2022-07-18
Payer: COMMERCIAL

## 2022-07-18 DIAGNOSIS — M25.511 ACUTE PAIN OF RIGHT SHOULDER: ICD-10-CM

## 2022-07-18 DIAGNOSIS — Z98.890 S/P RIGHT ROTATOR CUFF REPAIR: Primary | ICD-10-CM

## 2022-07-18 PROCEDURE — 97110 THERAPEUTIC EXERCISES: CPT

## 2022-07-18 PROCEDURE — 97112 NEUROMUSCULAR REEDUCATION: CPT

## 2022-07-18 PROCEDURE — 97140 MANUAL THERAPY 1/> REGIONS: CPT

## 2022-07-18 NOTE — PROGRESS NOTES
Daily Note     Today's date: 2022  Patient name: Donna Neito  : 1960  MRN: [id number]  Referring provider: Ashlie Santoyo  Dx:   Encounter Diagnosis     ICD-10-CM    1  S/P right rotator cuff repair  Z98 890    2  Acute pain of right shoulder  M25 511        Start Time: 7254  Stop Time: 0740  Total time in clinic (min): 45 minutes    Subjective: Pt reports anterior R shoulder pain intermittently over the weekend  States R shoulder is continually making progress  Objective: See treatment diary below      Assessment: Tolerated treatment well  Patient would benefit from continued PT  Pt tolerated mild progression of POC in conjunction with update to surgical protocol this week  No difficulty or pain with therapeutic interventions this tx session  R shoulder tightness persists with flexion, abduction, and ER, but shoulder motion improved with repetitive and prolonged manual stretching  Pt would benefit from further LHB isolation interventions  1:1 with Isamar Christensen DPT entirety of tx  Plan: Progress treament per protocol        Precautions: none    Phase 1:  3/30 -   Phase 2:   -   Phase 3:   -   Phase 4:   -      Pertinent Findings:                                                                                                                                                     Test / Measure  2022   FOTO 21 47 55 64 62 68   R shoulder global MMT     3+/5 to 4+/5    R shoulder flexion ROM 80 deg    140 AROM  165 PROM    R shoulder abduction ROM 60 deg    150 AROM  170 PROM        Manuals 6/10 6/13 6/17 6/24 6/29 7/5 7/7 7/11 7/15 7/18   R Sh PROM MM 6' MM 6' MM 6' MM 4' BV 4' MM 4' MM 4' MM 4' MM 4' MM 4'   R Sh mobs MM 2' MM 2' MM 2' MM 2' BV 2' MM 2' MM 4' MM 4' MM 4' MM 4'   R Sh MREs   MM 2' MM 2' BV 2' MM 2'   MM 2'                 Neuro Re-Ed             SRER 15x5" 0# 15x5" 0# 15x5" 0# 2x15 gtb 2x15 gtb 2x15 btb 2x15 btb 2x15 btb 2x15 mtb 15x mtb   Supine flex AAROM 15x AROM            Table slides - flex + scap/abd             R Sh iso - 6 way 8x5" ea 8x5" ea 5x5" ea 5x5" ea 5x5" ea 5x5" ea 3x5" ea 3x5" ea     Wall slides 15x 15x 15x     15x     Serratus punches 15x 2#            Sh flex, abd iso    3x10 gtb 3x10 gtb 3x10 btb 3x10 btb 3x10 btb 2x10 mtb 15x mtb   Horiz abd        2x10 btb 2x10 mtb 15x mtb   Table pushups         2x10    Table shoulder taps         NV --> 2x10   Moffat Sh flex supinated          NV --->                Ther Ex             UBE          2'/2'   Pulleys 6' 6' 6' 6' 6' 6' 4' 4' 4' 2'   Scaption 15x 15x 20x 10x 2#  2x10 3# 10x 2#  2x10 3# 3x10 3# 3x10 3# 3x10 3# 3x10 4# 2x10 5#   Lateral raises      2x10 3# 3x10 3# 3x10 3# 3x10 4# 2x10 5#   S/L ER 20x 15x 15x          S/L flexion 20x 15x 15x          S/L abduction 20x 15x 15x          Prone rows 15x 0# 15x 0# 25x 0#          Prone Sh ext 15x 0# 15x 0# 25x 0#          Sleeper S 8x15" 8x15"           TB rows    3x10 btb 3x10 btb 3x10 btb 2x15 mtb 2x15 mtb 2x15 mtb 2x15 mtb   TB sh ext    3x10 btb 3x10 btb 3x10 btb 2x15 mtb 2x15 mtb 2x15 mtb 2x15 mtb   TB IR    3x10 btb 3x10 btb 3x10 btb 2x15 mtb 2x15 mtb 2x15 mtb 2x15 mtb   TB ER    3x10 gtb 3x10 btb 3x10 btb 2x15 mtb 2x15 mtb 2x15 mtb 2x15 mtb   TB ER walkout       15x mtb 15x mtb 15x mtb 2x15 mtb   Supine chest press       3x10 5# 3x10 5# 3x10 8# 3x10 8#   Supine chest flys       2x10 2# 2x10 2# 2x10 3# 2x10 3#   Supine LHB S         2x1' 3# 15x10" + curls 4#   Curl to OHP          NV --->   Ther Activity             Bodyblade ER/IR @ 0 deg abd         3x30"                 Gait Training                                       Modalities             Cold pack  Perf Perf Perf Perf Perf Perf Perf Perf

## 2022-07-21 ENCOUNTER — APPOINTMENT (OUTPATIENT)
Dept: PHYSICAL THERAPY | Facility: REHABILITATION | Age: 62
End: 2022-07-21
Payer: COMMERCIAL

## 2022-07-22 ENCOUNTER — APPOINTMENT (OUTPATIENT)
Dept: PHYSICAL THERAPY | Facility: REHABILITATION | Age: 62
End: 2022-07-22
Payer: COMMERCIAL

## 2022-07-25 ENCOUNTER — OFFICE VISIT (OUTPATIENT)
Dept: PHYSICAL THERAPY | Facility: REHABILITATION | Age: 62
End: 2022-07-25
Payer: COMMERCIAL

## 2022-07-25 DIAGNOSIS — Z98.890 S/P RIGHT ROTATOR CUFF REPAIR: Primary | ICD-10-CM

## 2022-07-25 DIAGNOSIS — M25.511 ACUTE PAIN OF RIGHT SHOULDER: ICD-10-CM

## 2022-07-25 PROCEDURE — 97140 MANUAL THERAPY 1/> REGIONS: CPT

## 2022-07-25 PROCEDURE — 97112 NEUROMUSCULAR REEDUCATION: CPT

## 2022-07-25 PROCEDURE — 97110 THERAPEUTIC EXERCISES: CPT

## 2022-07-25 NOTE — PROGRESS NOTES
Daily Note     Today's date: 2022  Patient name: Kapil Lindsay  : 1960  MRN: [id number]  Referring provider: Leopold Barker  Dx:   Encounter Diagnosis     ICD-10-CM    1  S/P right rotator cuff repair  Z98 890    2  Acute pain of right shoulder  M25 511        Start Time: 1505  Stop Time: 0733  Total time in clinic (min): 38 minutes    Subjective: Pt reports slight aggravation of R shoulder since last tx session  Most pain located at anterior shoulder following long head of biceps tendon  Objective: See treatment diary below      Assessment: Tolerated treatment well  Patient would benefit from continued PT  Pt with slight regression - tightness noted at end range flexion and abduction lacking about 15-20 degrees each compared to contralateral limb  Able to continue with existing therapeutic interventions with decreased weight/volume for lateral raises and supine LHB isolation due to pain and fatigue  Had difficulty completing overhead press for the first time due to strength deficit  1:1 with Sandi Last DPT entirety of tx  Plan: Progress treatment as tolerated         Precautions: none    Phase 1:  3/30 -   Phase 2:   -   Phase 3:   -   Phase 4:   -      Pertinent Findings:                                                                                                                                                     Test / Measure  2022   FOTO 21 47 55 64 62 68   R shoulder global MMT     3+/5 to 4+/5    R shoulder flexion ROM 80 deg    140 AROM  165 PROM    R shoulder abduction ROM 60 deg    150 AROM  170 PROM        Manuals 6/13 6/17 6/24 6/29 7/5 7/7 7/11 7/15 7/18 7/25   R Sh PROM MM 6' MM 6' MM 4' BV 4' MM 4' MM 4' MM 4' MM 4' MM 4' MM 4'   R Sh mobs MM 2' MM 2' MM 2' BV 2' MM 2' MM 4' MM 4' MM 4' MM 4' MM 4'   R Sh MREs  MM 2' MM 2' BV 2' MM 2'   MM 2'                  Neuro Re-Ed SRER 15x5" 0# 15x5" 0# 2x15 gtb 2x15 gtb 2x15 btb 2x15 btb 2x15 btb 2x15 mtb 15x mtb    R Sh iso - 6 way 8x5" ea 5x5" ea 5x5" ea 5x5" ea 5x5" ea 3x5" ea 3x5" ea      Wall slides 15x 15x     15x   15x flex + abd   Sh flex, abd iso   3x10 gtb 3x10 gtb 3x10 btb 3x10 btb 3x10 btb 2x10 mtb 15x mtb    Horiz abd       2x10 btb 2x10 mtb 15x mtb    Table pushups        2x10     Table shoulder taps        NV --> 2x10    Maxwell Sh flex supinated         NV ---> 2x10 ytb                Ther Ex             UBE         2'/2'    Pulleys 6' 6' 6' 6' 6' 4' 4' 4' 2' 2'   Scaption 15x 20x 10x 2#  2x10 3# 10x 2#  2x10 3# 3x10 3# 3x10 3# 3x10 3# 3x10 4# 2x10 5# 2x10 5#   Lateral raises     2x10 3# 3x10 3# 3x10 3# 3x10 4# 2x10 5# 2x10 3#   S/L ER 15x 15x           S/L flexion 15x 15x           S/L abduction 15x 15x           Prone rows 15x 0# 25x 0#           Prone Sh ext 15x 0# 25x 0#           Sleeper S 8x15"            TB rows   3x10 btb 3x10 btb 3x10 btb 2x15 mtb 2x15 mtb 2x15 mtb 2x15 mtb 2x15 mtb   TB sh ext   3x10 btb 3x10 btb 3x10 btb 2x15 mtb 2x15 mtb 2x15 mtb 2x15 mtb 2x15 mtb   TB IR   3x10 btb 3x10 btb 3x10 btb 2x15 mtb 2x15 mtb 2x15 mtb 2x15 mtb 2x15 mtb   TB ER   3x10 gtb 3x10 btb 3x10 btb 2x15 mtb 2x15 mtb 2x15 mtb 2x15 mtb 2x15 mtb   TB ER walkout      15x mtb 15x mtb 15x mtb 2x15 mtb    Supine chest press      3x10 5# 3x10 5# 3x10 8# 3x10 8#    Supine chest flys      2x10 2# 2x10 2# 2x10 3# 2x10 3#    Supine LHB S        2x1' 3# 15x10" + curls 4# 15x10" + curls 4#   Curl to OHP         NV ---> 2x10 3#   Ther Activity             Bodyblade ER/IR @ 0 deg abd        3x30"                  Gait Training                                       Modalities             Cold pack Perf Perf Perf Perf Perf Perf Perf Perf

## 2022-07-29 ENCOUNTER — OFFICE VISIT (OUTPATIENT)
Dept: PHYSICAL THERAPY | Facility: REHABILITATION | Age: 62
End: 2022-07-29
Payer: COMMERCIAL

## 2022-07-29 DIAGNOSIS — M25.511 ACUTE PAIN OF RIGHT SHOULDER: ICD-10-CM

## 2022-07-29 DIAGNOSIS — Z98.890 S/P RIGHT ROTATOR CUFF REPAIR: Primary | ICD-10-CM

## 2022-07-29 PROCEDURE — 97112 NEUROMUSCULAR REEDUCATION: CPT

## 2022-07-29 PROCEDURE — 97110 THERAPEUTIC EXERCISES: CPT

## 2022-07-29 NOTE — PROGRESS NOTES
Daily Note     Today's date: 2022  Patient name: Tayler Lora  : 1960  MRN: [id number]  Referring provider: Milagro Joseph  Dx:   Encounter Diagnosis     ICD-10-CM    1  S/P right rotator cuff repair  Z98 890    2  Acute pain of right shoulder  M25 511        Start Time: 0655  Stop Time: 0718  Total time in clinic (min): 23 minutes    Subjective: Pt reports mild soreness following more strenuous day at work yesterday carrying and climbing ladders  Objective: See treatment diary below      Assessment: Tolerated treatment well  Patient would benefit from continued PT  Pt tolerated tx well with progression of POC  R shoulder tightness persists as pt is lacking full ROM but nearing end range  Strength and neuromuscular control is improving gradually as he progresses through aggressive rehab phase of surgical protocol  1:1 with Kelin Yeboah DPT entirety of tx  Plan: Progress treatment as tolerated         Precautions: none    Phase 1:  3/30 -   Phase 2:   -   Phase 3:   -   Phase 4:   -      Pertinent Findings:                                                                                                                                                     Test / Measure  2022   FOTO 21 47 55 64 62 68   R shoulder global MMT     3+/5 to 4+/5    R shoulder flexion ROM 80 deg    140 AROM  165 PROM    R shoulder abduction ROM 60 deg    150 AROM  170 PROM        Manuals 6/17 6/24 6/29 7/5 7/7 7/11 7/15 7/18 7/25 7/29   R Sh PROM MM 6' MM 4' BV 4' MM 4' MM 4' MM 4' MM 4' MM 4' MM 4' MM 4'   R Sh mobs MM 2' MM 2' BV 2' MM 2' MM 4' MM 4' MM 4' MM 4' MM 4' MM 4'   R Sh MREs MM 2' MM 2' BV 2' MM 2'   MM 2'                   Neuro Re-Ed             SRER 15x5" 0# 2x15 gtb 2x15 gtb 2x15 btb 2x15 btb 2x15 btb 2x15 mtb 15x mtb     R Sh iso - 6 way 5x5" ea 5x5" ea 5x5" ea 5x5" ea 3x5" ea 3x5" ea       Wall slides 15x     15x   15x flex + abd 15x flex + abd   Sh flex, abd iso  3x10 gtb 3x10 gtb 3x10 btb 3x10 btb 3x10 btb 2x10 mtb 15x mtb     Horiz abd      2x10 btb 2x10 mtb 15x mtb     Table pushups       2x10      Table shoulder taps       NV --> 2x10     Maxwell Sh flex supinated        NV ---> 2x10 ytb 2x10 1 5#                Ther Ex             UBE        2'/2'  4'/4'   Pulleys 6' 6' 6' 6' 4' 4' 4' 2' 2'    Scaption 20x 10x 2#  2x10 3# 10x 2#  2x10 3# 3x10 3# 3x10 3# 3x10 3# 3x10 4# 2x10 5# 2x10 5# 2x10 5# + ytb   Lateral raises    2x10 3# 3x10 3# 3x10 3# 3x10 4# 2x10 5# 2x10 3#    S/L ER 15x            S/L flexion 15x            S/L abduction 15x            Prone rows 25x 0#            Prone Sh ext 25x 0#            Sleeper S             TB rows  3x10 btb 3x10 btb 3x10 btb 2x15 mtb 2x15 mtb 2x15 mtb 2x15 mtb 2x15 mtb 2x15 13#   TB sh ext  3x10 btb 3x10 btb 3x10 btb 2x15 mtb 2x15 mtb 2x15 mtb 2x15 mtb 2x15 mtb 2x15 13#   TB IR  3x10 btb 3x10 btb 3x10 btb 2x15 mtb 2x15 mtb 2x15 mtb 2x15 mtb 2x15 mtb D/C   TB ER  3x10 gtb 3x10 btb 3x10 btb 2x15 mtb 2x15 mtb 2x15 mtb 2x15 mtb 2x15 mtb 2x15 3#   Facepulls          2x10 8#   TB ER walkout     15x mtb 15x mtb 15x mtb 2x15 mtb     Supine chest press     3x10 5# 3x10 5# 3x10 8# 3x10 8#     Supine chest flys     2x10 2# 2x10 2# 2x10 3# 2x10 3#     Supine LHB S       2x1' 3# 15x10" + curls 4# 15x10" + curls 4#    Curl to OHP        NV ---> 2x10 3# 2x10 5#   Ther Activity             Bodyblade ER/IR @ 0 deg abd       3x30"                   Gait Training                                       Modalities             Cold pack Perf Perf Perf Perf Perf Perf Perf

## 2022-08-01 ENCOUNTER — OFFICE VISIT (OUTPATIENT)
Dept: PHYSICAL THERAPY | Facility: REHABILITATION | Age: 62
End: 2022-08-01
Payer: COMMERCIAL

## 2022-08-01 DIAGNOSIS — M25.511 ACUTE PAIN OF RIGHT SHOULDER: ICD-10-CM

## 2022-08-01 DIAGNOSIS — Z98.890 S/P RIGHT ROTATOR CUFF REPAIR: Primary | ICD-10-CM

## 2022-08-01 PROCEDURE — 97140 MANUAL THERAPY 1/> REGIONS: CPT

## 2022-08-01 PROCEDURE — 97010 HOT OR COLD PACKS THERAPY: CPT

## 2022-08-01 PROCEDURE — 97110 THERAPEUTIC EXERCISES: CPT

## 2022-08-01 PROCEDURE — 97112 NEUROMUSCULAR REEDUCATION: CPT

## 2022-08-01 NOTE — PROGRESS NOTES
Daily Note     Today's date: 2022  Patient name: Annie Boyce  : 1960  MRN: [id number]  Referring provider: Liana Jiménez  Dx:   Encounter Diagnosis     ICD-10-CM    1  S/P right rotator cuff repair  Z98 890    2  Acute pain of right shoulder  M25 511        Start Time: 0700  Stop Time: 0738  Total time in clinic (min): 38 minutes    Subjective: Pt reports continued difficulty lifting heavy objects onto a counter as well as reaching into the backseat while in the front seat of the car  Objective: See treatment diary below      Assessment: Tolerated treatment well  Patient would benefit from continued PT  Pt with good form completing therapeutic interventions this session  Pt remains limited in R shoulder PROM in both flexion and abduction - co-contraction noted at end range  Strength is gradually improving  Decreased tenderness along LHB tendon but slightly increased tenderness at supraspinatus insertion  1:1 with Lyndon Standard, DPT entirety of tx  Plan: Progress treatment as tolerated         Precautions: none    Phase 1:  3/30 -   Phase 2:   -   Phase 3:   -   Phase 4:   -      Pertinent Findings:                                                                                                                                                     Test / Measure  2022   FOTO 21 47 55 64 62 68 64   R shoulder global MMT     3+/5 to 4+/5     R shoulder flexion ROM 80 deg    140 AROM  165 PROM     R shoulder abduction ROM 60 deg    150 AROM  170 PROM         Manuals 6/24 6/29 7/5 7/7 7/11 7/15 7/18 7/25 7/29 8/1   R Sh PROM MM 4' BV 4' MM 4' MM 4' MM 4' MM 4' MM 4' MM 4' MM 4' MM 4'   R Sh mobs MM 2' BV 2' MM 2' MM 4' MM 4' MM 4' MM 4' MM 4' MM 4' MM 4'   R Sh MREs MM 2' BV 2' MM 2'   MM 2'                    Neuro Re-Ed             SRER 2x15 gtb 2x15 gtb 2x15 btb 2x15 btb 2x15 btb 2x15 mtb 15x mtb      R Sh iso - 6 way 5x5" ea 5x5" ea 5x5" ea 3x5" ea 3x5" ea        Wall slides     15x   15x flex + abd 15x flex + abd 15x flex + abd   Sh flex, abd iso 3x10 gtb 3x10 gtb 3x10 btb 3x10 btb 3x10 btb 2x10 mtb 15x mtb      Horiz abd     2x10 btb 2x10 mtb 15x mtb      Table pushups      2x10       Table shoulder taps      NV --> 2x10      [place] Sh flex supinated       NV ---> 2x10 ytb 2x10 1 5#                 Ther Ex             UBE       2'/2'  4'/4' 4'/4' L2   Pulleys 6' 6' 6' 4' 4' 4' 2' 2'     Scaption 10x 2#  2x10 3# 10x 2#  2x10 3# 3x10 3# 3x10 3# 3x10 3# 3x10 4# 2x10 5# 2x10 5# 2x10 5# + ytb    Lateral raises   2x10 3# 3x10 3# 3x10 3# 3x10 4# 2x10 5# 2x10 3#     S/L ER             S/L flexion             S/L abduction             Prone rows             Prone Sh ext             Sleeper S             TB rows 3x10 btb 3x10 btb 3x10 btb 2x15 mtb 2x15 mtb 2x15 mtb 2x15 mtb 2x15 mtb 2x15 13# 2x15 14#   TB sh ext 3x10 btb 3x10 btb 3x10 btb 2x15 mtb 2x15 mtb 2x15 mtb 2x15 mtb 2x15 mtb 2x15 13# 2x15 14#   TB IR 3x10 btb 3x10 btb 3x10 btb 2x15 mtb 2x15 mtb 2x15 mtb 2x15 mtb 2x15 mtb D/C    TB ER 3x10 gtb 3x10 btb 3x10 btb 2x15 mtb 2x15 mtb 2x15 mtb 2x15 mtb 2x15 mtb 2x15 3# 2x15 3 5#   Facepulls         2x10 8# 2x10 9#   TB ER walkout    15x mtb 15x mtb 15x mtb 2x15 mtb      Supine chest press    3x10 5# 3x10 5# 3x10 8# 3x10 8#      Supine chest flys    2x10 2# 2x10 2# 2x10 3# 2x10 3#      Supine LHB S      2x1' 3# 15x10" + curls 4# 15x10" + curls 4#     Curl to OHP       NV ---> 2x10 3# 2x10 5#    Ther Activity             Bodyblade ER/IR @ 0 deg abd      3x30"                    Gait Training                                       Modalities             Cold pack Perf Perf Perf Perf Perf Perf   Perf Perf

## 2022-08-04 ENCOUNTER — OFFICE VISIT (OUTPATIENT)
Dept: OBGYN CLINIC | Facility: OTHER | Age: 62
End: 2022-08-04
Payer: COMMERCIAL

## 2022-08-04 VITALS
BODY MASS INDEX: 27.03 KG/M2 | SYSTOLIC BLOOD PRESSURE: 133 MMHG | HEIGHT: 74 IN | WEIGHT: 210.6 LBS | DIASTOLIC BLOOD PRESSURE: 82 MMHG | HEART RATE: 72 BPM

## 2022-08-04 DIAGNOSIS — S46.011D TRAUMATIC TEAR OF RIGHT ROTATOR CUFF, UNSPECIFIED TEAR EXTENT, SUBSEQUENT ENCOUNTER: Primary | ICD-10-CM

## 2022-08-04 PROCEDURE — 99213 OFFICE O/P EST LOW 20 MIN: CPT | Performed by: PHYSICIAN ASSISTANT

## 2022-08-04 NOTE — PROGRESS NOTES
Assessment  Diagnoses and all orders for this visit:    Traumatic tear of right rotator cuff, unspecified tear extent, subsequent encounter        Discussion and Plan:    Trevor Mcguire continues to improve just 4 months from arthroscopic rotator cuff repair  He will continue with PT and transition to an independent HEP when appropriate  He may slowly resume activities to tolerance  Follow up as needed for right shoulder  Subjective:   Patient ID: Colton Childs is a 62 y o  male      Trevor Mcguire returns to the office in follow up of of the right shoulder  He is 4 months from arthroscopic rotator cuff repair  He is pleased with his progress  Admits to soreness in AM upon waking if he slept too long on it  He has pain when reaching back away from his body, like into backseat  Other than those activities, Trevor Mcguire is able to use the right shoulder for ADLs and work without pain or limitation  He has been compliant with PT and his HEP  He denies new injury or trauma  He denies numbness or tingling  Trevor Mcguire expresses his appreciation for Dr Frankie Gaxiola, anesthesia who provided his block, PT and the surgery center staff  The following portions of the patient's history were reviewed and updated as appropriate: allergies, current medications, past family history, past medical history, past social history, past surgical history and problem list     Review of Systems   Constitutional: Negative for chills and fever  HENT: Negative for hearing loss  Eyes: Negative for visual disturbance  Respiratory: Negative for shortness of breath  Cardiovascular: Negative for chest pain  Gastrointestinal: Negative for abdominal pain  Musculoskeletal:        As reviewed in the HPI   Skin: Negative for rash  Neurological:        As reviewed in the HPI   Psychiatric/Behavioral: Negative for agitation         Objective:  /82 (BP Location: Left arm, Patient Position: Sitting, Cuff Size: Adult)   Pulse 72   Ht 6' 2" (1 88 m)   Wt 95 5 kg (210 lb 9 6 oz)   BMI 27 04 kg/m²       Left Shoulder Exam     Tenderness   The patient is experiencing no tenderness  Range of Motion   Passive abduction: normal   External rotation: normal   Forward flexion: 160   Internal rotation 0 degrees: normal     Muscle Strength   External rotation: 5/5   Supraspinatus: 5/5     Tests   Reis test: negative  Impingement: negative  Drop arm: negative    Other   Erythema: absent  Sensation: normal  Pulse: present             Physical Exam  Constitutional:       Appearance: He is well-developed  HENT:      Head: Normocephalic  Pulmonary:      Effort: No respiratory distress  Breath sounds: No wheezing  Musculoskeletal:      Cervical back: Normal range of motion  Skin:     General: Skin is warm and dry  Neurological:      Mental Status: He is alert and oriented to person, place, and time  Psychiatric:         Behavior: Behavior normal          Thought Content:  Thought content normal          Judgment: Judgment normal

## 2022-08-05 ENCOUNTER — APPOINTMENT (OUTPATIENT)
Dept: PHYSICAL THERAPY | Facility: REHABILITATION | Age: 62
End: 2022-08-05
Payer: COMMERCIAL

## 2022-08-08 ENCOUNTER — OFFICE VISIT (OUTPATIENT)
Dept: PHYSICAL THERAPY | Facility: REHABILITATION | Age: 62
End: 2022-08-08
Payer: COMMERCIAL

## 2022-08-08 DIAGNOSIS — Z98.890 S/P RIGHT ROTATOR CUFF REPAIR: Primary | ICD-10-CM

## 2022-08-08 DIAGNOSIS — M25.511 ACUTE PAIN OF RIGHT SHOULDER: ICD-10-CM

## 2022-08-08 PROCEDURE — 97110 THERAPEUTIC EXERCISES: CPT

## 2022-08-08 PROCEDURE — 97140 MANUAL THERAPY 1/> REGIONS: CPT

## 2022-08-08 PROCEDURE — 97010 HOT OR COLD PACKS THERAPY: CPT

## 2022-08-08 NOTE — PROGRESS NOTES
Daily Note     Today's date: 2022  Patient name: Tha Paz  : 1960  MRN: [id number]  Referring provider: Jose Martin Brown  Dx:   Encounter Diagnosis     ICD-10-CM    1  S/P right rotator cuff repair  Z98 890    2  Acute pain of right shoulder  M25 511        Start Time: 0739  Stop Time: 0810  Total time in clinic (min): 31 minutes    Subjective: Pt reports no significant changes in R shoulder status since last tx session  States ortho was pleased with progress thus far in the rehabilitation process  L shoulder has been bothersome due to increased demand over the past several months  Objective: See treatment diary below      Assessment: Tolerated treatment well  Patient would benefit from continued PT   R shoulder strength and endurance is gradually improving, but remains limited especially in overhead tasks  Capsular tightness persists especially in flexion and abduction - limited about 15-20 degrees actively and about 5-10 degrees with overpressure  1:1 with Thierno Dawn DPT entirety of tx  Plan: Progress treatment as tolerated         Precautions: none    Phase 1:  3/30 -   Phase 2:   -   Phase 3:   -   Phase 4:   -      Pertinent Findings:                                                                                                                                                     Test / Measure  2022   FOTO 21 47 55 64 62 68 64   R shoulder global MMT /    3+/5 to 4+/5     R shoulder flexion ROM 80 deg    140 AROM  165 PROM     R shoulder abduction ROM 60 deg    150 AROM  170 PROM         Manuals 6/24 6/29 7/5 7/7 7/11 7/15 7/18 7/25 7/29 8/1 8/8   R Sh PROM MM 4' BV 4' MM 4' MM 4' MM 4' MM 4' MM 4' MM 4' MM 4' MM 4' MM 4'   R Sh mobs MM 2' BV 2' MM 2' MM 4' MM 4' MM 4' MM 4' MM 4' MM 4' MM 4' MM 4'   R Sh MREs MM 2' BV 2' MM 2'   MM 2'                      Neuro Re-Ed SRER 2x15 gtb 2x15 gtb 2x15 btb 2x15 btb 2x15 btb 2x15 mtb 15x mtb       R Sh iso - 6 way 5x5" ea 5x5" ea 5x5" ea 3x5" ea 3x5" ea         Wall slides     15x   15x flex + abd 15x flex + abd 15x flex + abd 15x flex + abd   Sh flex, abd iso 3x10 gtb 3x10 gtb 3x10 btb 3x10 btb 3x10 btb 2x10 mtb 15x mtb       Horiz abd     2x10 btb 2x10 mtb 15x mtb       Table pushups      2x10        Table shoulder taps      NV --> 2x10       [place] Sh flex supinated       NV ---> 2x10 ytb 2x10 1 5#     Full DB abd           2x10 2#   Ther Ex              UBE       2'/2'  4'/4' 4'/4' L2 4'/4' L5   Pulleys 6' 6' 6' 4' 4' 4' 2' 2'      Scaption 10x 2#  2x10 3# 10x 2#  2x10 3# 3x10 3# 3x10 3# 3x10 3# 3x10 4# 2x10 5# 2x10 5# 2x10 5# + ytb     Lateral raises   2x10 3# 3x10 3# 3x10 3# 3x10 4# 2x10 5# 2x10 3#      S/L ER              S/L flexion              S/L abduction              Prone rows              Prone Sh ext              Sleeper S              TB rows 3x10 btb 3x10 btb 3x10 btb 2x15 mtb 2x15 mtb 2x15 mtb 2x15 mtb 2x15 mtb 2x15 13# 2x15 14# 2x15 15#   TB sh ext 3x10 btb 3x10 btb 3x10 btb 2x15 mtb 2x15 mtb 2x15 mtb 2x15 mtb 2x15 mtb 2x15 13# 2x15 14# 2x15 15#   TB IR 3x10 btb 3x10 btb 3x10 btb 2x15 mtb 2x15 mtb 2x15 mtb 2x15 mtb 2x15 mtb D/C     TB ER 3x10 gtb 3x10 btb 3x10 btb 2x15 mtb 2x15 mtb 2x15 mtb 2x15 mtb 2x15 mtb 2x15 3# 2x15 3 5# 2x15 4#   Facepulls         2x10 8# 2x10 9# 2x10 10#   TB ER walkout    15x mtb 15x mtb 15x mtb 2x15 mtb       Supine chest press    3x10 5# 3x10 5# 3x10 8# 3x10 8#       Supine chest flys    2x10 2# 2x10 2# 2x10 3# 2x10 3#       Supine LHB S      2x1' 3# 15x10" + curls 4# 15x10" + curls 4#      Curl to OHP       NV ---> 2x10 3# 2x10 5#  2x10 5#   Ther Activity              Bodyblade ER/IR @ 0 deg abd      3x30"        Box lifts to overhead counter           15x 18#   Gait Training                                          Modalities              Cold pack Perf Perf Perf Perf Perf Perf   Perf Perf Perf

## 2022-08-12 ENCOUNTER — OFFICE VISIT (OUTPATIENT)
Dept: PHYSICAL THERAPY | Facility: REHABILITATION | Age: 62
End: 2022-08-12
Payer: COMMERCIAL

## 2022-08-12 DIAGNOSIS — M25.511 ACUTE PAIN OF RIGHT SHOULDER: ICD-10-CM

## 2022-08-12 DIAGNOSIS — Z98.890 S/P RIGHT ROTATOR CUFF REPAIR: Primary | ICD-10-CM

## 2022-08-12 PROCEDURE — 97110 THERAPEUTIC EXERCISES: CPT

## 2022-08-12 PROCEDURE — 97112 NEUROMUSCULAR REEDUCATION: CPT

## 2022-08-12 NOTE — PROGRESS NOTES
Daily Note     Today's date: 2022  Patient name: Curry Phillips  : 1960  MRN: [id number]  Referring provider: Davon Stroud  Dx:   Encounter Diagnosis     ICD-10-CM    1  S/P right rotator cuff repair  Z98 890    2  Acute pain of right shoulder  M25 511        Start Time: 3845  Stop Time: 0718  Total time in clinic (min): 23 minutes    Subjective: Pt reports continued anterior R shoulder pain/discomfort but functionality is gradually improving  Pt had to cut session short secondary to other obligations  Objective: See treatment diary below      Assessment: Tolerated treatment well  Patient would benefit from continued PT  Pt able to progress current POC in short tx session today  Continued anterior shoudler discomfort - in the region of LHB insertion/labral involvement  Strength and mobility is continually improving  Overhead tasks remain most difficult  1:1 with Deepak Majano DPT entirety of tx  Plan: Progress treatment as tolerated         Precautions: none    Phase 1:  3/30 -   Phase 2:   -   Phase 3:   -   Phase 4:   -      Pertinent Findings:                                                                                                                                                     Test / Measure  2022   FOTO 21 47 55 64 62 68 64   R shoulder global MMT     3+/5 to 4+/5     R shoulder flexion ROM 80 deg    140 AROM  165 PROM     R shoulder abduction ROM 60 deg    150 AROM  170 PROM         Manuals 6/24 6/29 7/5 7/7 7/11 7/15 7/18 7/25 7/29 8/1 8/8 8/12   R Sh PROM MM 4' BV 4' MM 4' MM 4' MM 4' MM 4' MM 4' MM 4' MM 4' MM 4' MM 4'    R Sh mobs MM 2' BV 2' MM 2' MM 4' MM 4' MM 4' MM 4' MM 4' MM 4' MM 4' MM 4'    R Sh MREs MM 2' BV 2' MM 2'   MM 2'                        Neuro Re-Ed               SRER 2x15 gtb 2x15 gtb 2x15 btb 2x15 btb 2x15 btb 2x15 mtb 15x mtb        R Sh iso - 6 way 5x5" ea 5x5" ea 5x5" ea 3x5" ea 3x5" ea          Wall slides     15x   15x flex + abd 15x flex + abd 15x flex + abd 15x flex + abd 15x flex + abd   Sh flex, abd iso 3x10 gtb 3x10 gtb 3x10 btb 3x10 btb 3x10 btb 2x10 mtb 15x mtb        Horiz abd     2x10 btb 2x10 mtb 15x mtb        Table pushups      2x10      10x   Table shoulder taps      NV --> 2x10     10x   [place] Sh flex supinated       NV ---> 2x10 ytb 2x10 1 5#   2x10 1 5#   Full DB abd           2x10 2# 2x10 2#   Maxwell D1 flex            2x10 1#   Maxwell D2 flex            2x10 1#   RC series IR            10x 0 5#   RC series ER            10x 0 5#   Halos            10x B 8#   Ther Ex               UBE       2'/2'  4'/4' 4'/4' L2 4'/4' L5    Pulleys 6' 6' 6' 4' 4' 4' 2' 2'       Scaption 10x 2#  2x10 3# 10x 2#  2x10 3# 3x10 3# 3x10 3# 3x10 3# 3x10 4# 2x10 5# 2x10 5# 2x10 5# + ytb   2x10 5#   Lateral raises   2x10 3# 3x10 3# 3x10 3# 3x10 4# 2x10 5# 2x10 3#       TB rows 3x10 btb 3x10 btb 3x10 btb 2x15 mtb 2x15 mtb 2x15 mtb 2x15 mtb 2x15 mtb 2x15 13# 2x15 14# 2x15 15#    TB sh ext 3x10 btb 3x10 btb 3x10 btb 2x15 mtb 2x15 mtb 2x15 mtb 2x15 mtb 2x15 mtb 2x15 13# 2x15 14# 2x15 15#    TB IR 3x10 btb 3x10 btb 3x10 btb 2x15 mtb 2x15 mtb 2x15 mtb 2x15 mtb 2x15 mtb D/C      TB ER 3x10 gtb 3x10 btb 3x10 btb 2x15 mtb 2x15 mtb 2x15 mtb 2x15 mtb 2x15 mtb 2x15 3# 2x15 3 5# 2x15 4#    Facepulls         2x10 8# 2x10 9# 2x10 10#    TB ER walkout    15x mtb 15x mtb 15x mtb 2x15 mtb        Supine chest press    3x10 5# 3x10 5# 3x10 8# 3x10 8#        Supine chest flys    2x10 2# 2x10 2# 2x10 3# 2x10 3#        Supine LHB S      2x1' 3# 15x10" + curls 4# 15x10" + curls 4#    15x10" + curls 3#   Curl to OHP       NV ---> 2x10 3# 2x10 5#  2x10 5# 10x 5#  5x 8#   Ther Activity               Bodyblade ER/IR @ 0 deg abd      3x30"         Box lifts to overhead counter           15x 18# 10x 20#   Gait Training                                             Modalities Cold pack Perf Perf Perf Perf Perf Perf   Perf Perf Perf

## 2022-08-15 ENCOUNTER — OFFICE VISIT (OUTPATIENT)
Dept: PHYSICAL THERAPY | Facility: REHABILITATION | Age: 62
End: 2022-08-15
Payer: COMMERCIAL

## 2022-08-15 DIAGNOSIS — M25.511 ACUTE PAIN OF RIGHT SHOULDER: ICD-10-CM

## 2022-08-15 DIAGNOSIS — Z98.890 S/P RIGHT ROTATOR CUFF REPAIR: Primary | ICD-10-CM

## 2022-08-15 PROCEDURE — 97140 MANUAL THERAPY 1/> REGIONS: CPT

## 2022-08-15 PROCEDURE — 97112 NEUROMUSCULAR REEDUCATION: CPT

## 2022-08-15 PROCEDURE — 97110 THERAPEUTIC EXERCISES: CPT

## 2022-08-15 NOTE — PROGRESS NOTES
Daily Note     Today's date: 8/15/2022  Patient name: Corey Steel  : 1960  MRN: [id number]  Referring provider: Trace Huang  Dx:   Encounter Diagnosis     ICD-10-CM    1  S/P right rotator cuff repair  Z98 890    2  Acute pain of right shoulder  M25 511        Start Time: 0700  Stop Time: 0745  Total time in clinic (min): 45 minutes    Subjective: Pt reports R shoulder is "pretty good"  Objective: See treatment diary below      Assessment: Tolerated treatment well  Patient would benefit from continued PT  Pt R shoulder strength and endurance is gradually improving - still lacking flexion and abduction about 10 degrees  Advised pt to push stretching routine at home more often  No aggravation of symptoms throughout tx session  1:1 with Deyanira Jolley DPT entirety of tx  Plan: Progress treatment as tolerated         Precautions: none    Phase 1:  3/30 -   Phase 2:   -   Phase 3:   -   Phase 4:   -      Pertinent Findings:                                                                                                                                                     Test / Measure  2022   FOTO 21 47 55 64 62 68 64   R shoulder global MMT     3+/5 to 4+/5     R shoulder flexion ROM 80 deg    140 AROM  165 PROM     R shoulder abduction ROM 60 deg    150 AROM  170 PROM         Manuals 7/5 7/7 7/11 7/15 7/18 7/25 7/29 8/1 8/8 8/12 8/15   R Sh PROM MM 4' MM 4' MM 4' MM 4' MM 4' MM 4' MM 4' MM 4' MM 4'  MM 4'   R Sh mobs MM 2' MM 4' MM 4' MM 4' MM 4' MM 4' MM 4' MM 4' MM 4'  MM 4'   R Sh MREs MM 2'   MM 2'                        Neuro Re-Ed              SRER 2x15 btb 2x15 btb 2x15 btb 2x15 mtb 15x mtb         R Sh iso - 6 way 5x5" ea 3x5" ea 3x5" ea           Wall slides   15x   15x flex + abd 15x flex + abd 15x flex + abd 15x flex + abd 15x flex + abd 15x flex + abd   Sh flex, abd iso 3x10 btb 3x10 btb 3x10 btb 2x10 mtb 15x mtb         Horiz abd   2x10 btb 2x10 mtb 15x mtb         Table pushups    2x10      10x    Table shoulder taps    NV --> 2x10     10x    [place] Sh flex supinated     NV ---> 2x10 ytb 2x10 1 5#   2x10 1 5#    Full DB abd         2x10 2# 2x10 2#    [place] D1 flex          2x10 1# 2x10 1#   [place] D2 flex          2x10 1# 2x10 1#   RC series IR          10x 0 5# 10x 0 5#   RC series ER          10x 0 5# 10x 0 5#   Halos          10x B 8# 10x B 12#   Ther Ex              UBE     2'/2'  4'/4' 4'/4' L2 4'/4' L5  4'/4' L6   Pulleys 6' 4' 4' 4' 2' 2'        Scaption 3x10 3# 3x10 3# 3x10 3# 3x10 4# 2x10 5# 2x10 5# 2x10 5# + ytb   2x10 5# 10x 5#  5x 8#   Lateral raises 2x10 3# 3x10 3# 3x10 3# 3x10 4# 2x10 5# 2x10 3#        TB rows 3x10 btb 2x15 mtb 2x15 mtb 2x15 mtb 2x15 mtb 2x15 mtb 2x15 13# 2x15 14# 2x15 15#     TB sh ext 3x10 btb 2x15 mtb 2x15 mtb 2x15 mtb 2x15 mtb 2x15 mtb 2x15 13# 2x15 14# 2x15 15#     TB IR 3x10 btb 2x15 mtb 2x15 mtb 2x15 mtb 2x15 mtb 2x15 mtb D/C       TB ER 3x10 btb 2x15 mtb 2x15 mtb 2x15 mtb 2x15 mtb 2x15 mtb 2x15 3# 2x15 3 5# 2x15 4#     Facepulls       2x10 8# 2x10 9# 2x10 10#     TB ER walkout  15x mtb 15x mtb 15x mtb 2x15 mtb         Supine chest press  3x10 5# 3x10 5# 3x10 8# 3x10 8#         Supine chest flys  2x10 2# 2x10 2# 2x10 3# 2x10 3#         Supine LHB S    2x1' 3# 15x10" + curls 4# 15x10" + curls 4#    15x10" + curls 3#    Curl to OHP     NV ---> 2x10 3# 2x10 5#  2x10 5# 10x 5#  5x 8# 10x 8#  8x 10#   Ther Activity              Bodyblade ER/IR @ 0 deg abd    3x30"          Box lifts to overhead counter         15x 18# 10x 20#    Gait Training                                          Modalities              Cold pack Perf Perf Perf Perf   Perf Perf Perf

## 2022-08-19 ENCOUNTER — APPOINTMENT (OUTPATIENT)
Dept: PHYSICAL THERAPY | Facility: REHABILITATION | Age: 62
End: 2022-08-19
Payer: COMMERCIAL

## 2022-08-22 ENCOUNTER — OFFICE VISIT (OUTPATIENT)
Dept: PHYSICAL THERAPY | Facility: REHABILITATION | Age: 62
End: 2022-08-22
Payer: COMMERCIAL

## 2022-08-22 DIAGNOSIS — Z98.890 S/P RIGHT ROTATOR CUFF REPAIR: Primary | ICD-10-CM

## 2022-08-22 DIAGNOSIS — M25.511 ACUTE PAIN OF RIGHT SHOULDER: ICD-10-CM

## 2022-08-22 PROCEDURE — 97110 THERAPEUTIC EXERCISES: CPT

## 2022-08-22 PROCEDURE — 97140 MANUAL THERAPY 1/> REGIONS: CPT

## 2022-08-22 PROCEDURE — 97112 NEUROMUSCULAR REEDUCATION: CPT

## 2022-08-22 NOTE — PROGRESS NOTES
Daily Note     Today's date: 2022  Patient name: Markos Winters  : 1960  MRN: [id number]  Referring provider: Jean-Pierre Go  Dx:   Encounter Diagnosis     ICD-10-CM    1  S/P right rotator cuff repair  Z98 890    2  Acute pain of right shoulder  M25 511        Start Time: 0700  Stop Time: 0740  Total time in clinic (min): 40 minutes    Subjective: Pt reports R shoulder soreness at the end of the day  "No pain, just throbbing and sore "      Objective: See treatment diary below      Assessment: Tolerated treatment well  Patient would benefit from continued PT  Pt progressing well through rehabilitation protocol - overhead tasks remain most difficult  Scapular strength is gradually improving  Nearing full R shoulder flexion/abduction/ER PROM but lacking IR  Added in IR stretching and advised to complete frequently at home  1:1 with Annemarie Shane DPT entirety of tx  Plan: Progress treatment as tolerated         Precautions: none    Phase 1:  3/30 -   Phase 2:   -   Phase 3:   -   Phase 4:   -      Pertinent Findings:                                                                                                                                                     Test / Measure  2022   FOTO 21 47 55 64 62 68 64   R shoulder global MMT     3+/5 to 4+/5     R shoulder flexion ROM 80 deg    140 AROM  165 PROM     R shoulder abduction ROM 60 deg    150 AROM  170 PROM         Manuals 7/11 7/15 7/18 7/25 7/29 8/1 8/8 8/12 8/15 8/22   R Sh PROM MM 4' MM 4' MM 4' MM 4' MM 4' MM 4' MM 4'  MM 4' MM 4'   R Sh mobs MM 4' MM 4' MM 4' MM 4' MM 4' MM 4' MM 4'  MM 4' MM 4'   R Sh MREs  MM 2'                        Neuro Re-Ed             SRER 2x15 btb 2x15 mtb 15x mtb          R Sh iso - 6 way 3x5" ea            Wall slides 15x   15x flex + abd 15x flex + abd 15x flex + abd 15x flex + abd 15x flex + abd 15x flex + abd 15x flex + abd   Sh flex, abd iso 3x10 btb 2x10 mtb 15x mtb          Horiz abd 2x10 btb 2x10 mtb 15x mtb          Table pushups  2x10      10x     Table shoulder taps  NV --> 2x10     10x     Max[place] Sh flex supinated   NV ---> 2x10 ytb 2x10 1 5#   2x10 1 5#     Full DB abd       2x10 2# 2x10 2#     Maxwell D1 flex        2x10 1# 2x10 1# 2x10 3 5#   Maxwell D2 flex        2x10 1# 2x10 1# 2x10 3 5#   RC series IR        10x 0 5# 10x 0 5# 10x 1 5#   RC series ER        10x 0 5# 10x 0 5# 10x 1 5#   Halos        10x B 8# 10x B 12# 10x B 12#   Ther Ex             UBE   2'/2'  4'/4' 4'/4' L2 4'/4' L5  4'/4' L6 4'/4' L7   Pulleys 4' 4' 2' 2'         Scaption 3x10 3# 3x10 4# 2x10 5# 2x10 5# 2x10 5# + ytb   2x10 5# 10x 5#  5x 8#    Lateral raises 3x10 3# 3x10 4# 2x10 5# 2x10 3#         TB rows 2x15 mtb 2x15 mtb 2x15 mtb 2x15 mtb 2x15 13# 2x15 14# 2x15 15#      TB sh ext 2x15 mtb 2x15 mtb 2x15 mtb 2x15 mtb 2x15 13# 2x15 14# 2x15 15#      TB IR 2x15 mtb 2x15 mtb 2x15 mtb 2x15 mtb D/C        TB ER 2x15 mtb 2x15 mtb 2x15 mtb 2x15 mtb 2x15 3# 2x15 3 5# 2x15 4#      Facepulls     2x10 8# 2x10 9# 2x10 10#   + press 2x10 9#   TB ER walkout 15x mtb 15x mtb 2x15 mtb          Supine chest press 3x10 5# 3x10 8# 3x10 8#          Supine chest flys 2x10 2# 2x10 3# 2x10 3#          Supine LHB S  2x1' 3# 15x10" + curls 4# 15x10" + curls 4#    15x10" + curls 3#     Curl to OHP   NV ---> 2x10 3# 2x10 5#  2x10 5# 10x 5#  5x 8# 10x 8#  8x 10# 10x 10#   Stand IR S          3x30"   Ther Activity             Bodyblade ER/IR @ 0 deg abd  3x30"           Box lifts to overhead counter       15x 18# 10x 20#     Gait Training                                       Modalities             Cold pack Perf Perf   Perf Perf Perf

## 2022-08-24 ENCOUNTER — OFFICE VISIT (OUTPATIENT)
Dept: PHYSICAL THERAPY | Facility: REHABILITATION | Age: 62
End: 2022-08-24
Payer: COMMERCIAL

## 2022-08-24 DIAGNOSIS — M25.511 ACUTE PAIN OF RIGHT SHOULDER: ICD-10-CM

## 2022-08-24 DIAGNOSIS — Z98.890 S/P RIGHT ROTATOR CUFF REPAIR: Primary | ICD-10-CM

## 2022-08-24 PROCEDURE — 97110 THERAPEUTIC EXERCISES: CPT | Performed by: PHYSICAL THERAPIST

## 2022-08-24 PROCEDURE — 97140 MANUAL THERAPY 1/> REGIONS: CPT | Performed by: PHYSICAL THERAPIST

## 2022-08-24 PROCEDURE — 97112 NEUROMUSCULAR REEDUCATION: CPT | Performed by: PHYSICAL THERAPIST

## 2022-08-24 NOTE — PROGRESS NOTES
Daily Note     Today's date: 2022  Patient name: Concha Ledesma  : 1960  MRN: [id number]  Referring provider: Dionicio Arroyo  Dx:   Encounter Diagnosis     ICD-10-CM    1  S/P right rotator cuff repair  Z98 890    2  Acute pain of right shoulder  M25 511        Start Time: 0700  Stop Time: 0743  Total time in clinic (min): 43 minutes    Subjective: Pt progressing well, pain well controlled  Objective: See treatment diary below      Assessment: Tolerated treatment well  Patient would benefit from continued PT 1:1 with Walter Gutierrez DPT for entirety of tx  Pt progressing well, pain under control  Added OHP, on wall for improved motor control and overpressure in terminal flexion  Plan: Continue per plan of care        Precautions: none    Phase 1:  3/30 -   Phase 2:   -   Phase 3:   -   Phase 4:   -      Pertinent Findings:                                                                                                                                                     Test / Measure  2022   FOTO 21 47 55 64 62 68 64 67   R shoulder global MMT     3+/5 to 4+/5      R shoulder flexion ROM 80 deg    140 AROM  165 PROM      R shoulder abduction ROM 60 deg    150 AROM  170 PROM          Manuals 7/11 7/15 7/18 7/25 7/29 8/1 8/8 8/12 8/15 8/22 8/24   R Sh PROM MM 4' MM 4' MM 4' MM 4' MM 4' MM 4' MM 4'  MM 4' MM 4' KS 3'   R Sh mobs MM 4' MM 4' MM 4' MM 4' MM 4' MM 4' MM 4'  MM 4' MM 4' KS 3'   R Sh MREs  MM 2'            End Range PNF CR           KS 2'   Neuro Re-Ed              SRER 2x15 btb 2x15 mtb 15x mtb           R Sh iso - 6 way 3x5" ea             Wall slides 15x   15x flex + abd 15x flex + abd 15x flex + abd 15x flex + abd 15x flex + abd 15x flex + abd 15x flex + abd    OHP wall w/ foam roller           3x10   Sh flex, abd iso 3x10 btb 2x10 mtb 15x mtb           Horiz abd 2x10 btb 2x10 mtb 15x mtb           Table pushups  2x10      10x      Table shoulder taps  NV --> 2x10     10x      [place] Sh flex supinated   NV ---> 2x10 ytb 2x10 1 5#   2x10 1 5#      Full DB abd       2x10 2# 2x10 2#      [place] D1 flex        2x10 1# 2x10 1# 2x10 3 5# 2x10 3 5#   [place] D2 flex        2x10 1# 2x10 1# 2x10 3 5# 2x10 3 5#   RC series IR        10x 0 5# 10x 0 5# 10x 1 5# 10x 1 5#   RC series ER        10x 0 5# 10x 0 5# 10x 1 5# 10x 1 5#   Halos        10x B 8# 10x B 12# 10x B 12# 2x10 15# KB   Ther Ex              UBE   2'/2'  4'/4' 4'/4' L2 4'/4' L5  4'/4' L6 4'/4' L7 4'/4' L7   Pulleys 4' 4' 2' 2'          Scaption 3x10 3# 3x10 4# 2x10 5# 2x10 5# 2x10 5# + ytb   2x10 5# 10x 5#  5x 8#  10x2 5#  2x5 8#   Lateral raises 3x10 3# 3x10 4# 2x10 5# 2x10 3#          TB rows 2x15 mtb 2x15 mtb 2x15 mtb 2x15 mtb 2x15 13# 2x15 14# 2x15 15#       TB sh ext 2x15 mtb 2x15 mtb 2x15 mtb 2x15 mtb 2x15 13# 2x15 14# 2x15 15#       TB IR 2x15 mtb 2x15 mtb 2x15 mtb 2x15 mtb D/C         TB ER 2x15 mtb 2x15 mtb 2x15 mtb 2x15 mtb 2x15 3# 2x15 3 5# 2x15 4#       Facepulls     2x10 8# 2x10 9# 2x10 10#   + press 2x10 9# + press 2x10 9#   TB ER walkout 15x mtb 15x mtb 2x15 mtb           Supine chest press 3x10 5# 3x10 8# 3x10 8#           Supine chest flys 2x10 2# 2x10 3# 2x10 3#           Supine LHB S  2x1' 3# 15x10" + curls 4# 15x10" + curls 4#    15x10" + curls 3#      Curl to OHP   NV ---> 2x10 3# 2x10 5#  2x10 5# 10x 5#  5x 8# 10x 8#  8x 10# 10x 10# 10x210#   Stand IR S          3x30" 3x30"   Prone Ts           3x10 2#   Ther Activity              Bodyblade ER/IR @ 0 deg abd  3x30"            Box lifts to overhead counter       15x 18# 10x 20#      Gait Training                                          Modalities              Cold pack Perf Perf   Perf Perf Perf

## 2022-08-26 ENCOUNTER — APPOINTMENT (OUTPATIENT)
Dept: PHYSICAL THERAPY | Facility: REHABILITATION | Age: 62
End: 2022-08-26
Payer: COMMERCIAL

## 2022-08-29 ENCOUNTER — OFFICE VISIT (OUTPATIENT)
Dept: PHYSICAL THERAPY | Facility: REHABILITATION | Age: 62
End: 2022-08-29
Payer: COMMERCIAL

## 2022-08-29 DIAGNOSIS — M25.511 ACUTE PAIN OF RIGHT SHOULDER: ICD-10-CM

## 2022-08-29 DIAGNOSIS — Z98.890 S/P RIGHT ROTATOR CUFF REPAIR: Primary | ICD-10-CM

## 2022-08-29 PROCEDURE — 97140 MANUAL THERAPY 1/> REGIONS: CPT

## 2022-08-29 PROCEDURE — 97010 HOT OR COLD PACKS THERAPY: CPT

## 2022-08-29 PROCEDURE — 97112 NEUROMUSCULAR REEDUCATION: CPT

## 2022-08-29 PROCEDURE — 97110 THERAPEUTIC EXERCISES: CPT

## 2022-08-29 NOTE — PROGRESS NOTES
Daily Note     Today's date: 2022  Patient name: Lisa Healy  : 1960  MRN: [id number]  Referring provider: Nuris Hernandez  Dx:   Encounter Diagnosis     ICD-10-CM    1  S/P right rotator cuff repair  Z98 890    2  Acute pain of right shoulder  M25 511        Start Time: 0700  Stop Time: 0745  Total time in clinic (min): 45 minutes    Subjective: Pt reports that R shoulder is slightly sore this morning  States soreness may be attributed to work  Objective: See treatment diary below      Assessment: Tolerated treatment well  Patient would benefit from continued PT  Pt able to tolerate slight increase in resistance for existing therapeutic interventions  Pt fatigued post-session - asked for cold pack to alleviate mild soreness  Remains limited with scapular control  1:1 with Bala Duong DPT entirety of tx  Plan: Progress treatment as tolerated         Precautions: none    Phase 1:  3/30 -   Phase 2:   -   Phase 3:   -   Phase 4:   -      Pertinent Findings:                                                                                                                                                     Test / Measure  2022   FOTO 21 47 55 64 62 68 64 67   R shoulder global MMT     3+/5 to 4+/5      R shoulder flexion ROM 80 deg    140 AROM  165 PROM      R shoulder abduction ROM 60 deg    150 AROM  170 PROM          Manuals 7/15 7/18 7/25 7/29 8/1 8/8 8/12 8/15 8/22 8/24 8/29   R Sh PROM MM 4' MM 4' MM 4' MM 4' MM 4' MM 4'  MM 4' MM 4' KS 3' MM 4'   R Sh mobs MM 4' MM 4' MM 4' MM 4' MM 4' MM 4'  MM 4' MM 4' KS 3' MM 4'   R Sh MREs MM 2'             End Range PNF CR          KS 2'    Neuro Re-Ed              SRER 2x15 mtb 15x mtb            R Sh iso - 6 way              Wall slides   15x flex + abd 15x flex + abd 15x flex + abd 15x flex + abd 15x flex + abd 15x flex + abd 15x flex + abd     OHP wall w/ foam roller          3x10    Sh flex, abd iso 2x10 mtb 15x mtb            Horiz abd 2x10 mtb 15x mtb            Table pushups 2x10      10x       Table shoulder taps NV --> 2x10     10x       [place] Sh flex supinated  NV ---> 2x10 ytb 2x10 1 5#   2x10 1 5#       Full DB abd      2x10 2# 2x10 2#       [place] D1 flex       2x10 1# 2x10 1# 2x10 3 5# 2x10 3 5# 2x10 4#   [place] D2 flex       2x10 1# 2x10 1# 2x10 3 5# 2x10 3 5# 2x10 4#   RC series IR       10x 0 5# 10x 0 5# 10x 1 5# 10x 1 5# 10x 2#   RC series ER       10x 0 5# 10x 0 5# 10x 1 5# 10x 1 5# 10x 2#   Halos       10x B 8# 10x B 12# 10x B 12# 2x10 15# KB    "Cobbrandon Guevara"               Ther Ex              UBE  2'/2'  4'/4' 4'/4' L2 4'/4' L5  4'/4' L6 4'/4' L7 4'/4' L7 4'/4' L7   Pulleys 4' 2' 2'           Scaption 3x10 4# 2x10 5# 2x10 5# 2x10 5# + ytb   2x10 5# 10x 5#  5x 8#  10x2 5#  2x5 8# 3x6 8#   Lateral raises 3x10 4# 2x10 5# 2x10 3#           TB rows 2x15 mtb 2x15 mtb 2x15 mtb 2x15 13# 2x15 14# 2x15 15#        TB sh ext 2x15 mtb 2x15 mtb 2x15 mtb 2x15 13# 2x15 14# 2x15 15#        TB IR 2x15 mtb 2x15 mtb 2x15 mtb D/C          TB ER 2x15 mtb 2x15 mtb 2x15 mtb 2x15 3# 2x15 3 5# 2x15 4#        Facepulls    2x10 8# 2x10 9# 2x10 10#   + press 2x10 9# + press 2x10 9# + press 2x10 10#   TB ER walkout 15x mtb 2x15 mtb            Supine chest press 3x10 8# 3x10 8#            Supine chest flys 2x10 3# 2x10 3#            Supine LHB S 2x1' 3# 15x10" + curls 4# 15x10" + curls 4#    15x10" + curls 3#       Curl to OHP  NV ---> 2x10 3# 2x10 5#  2x10 5# 10x 5#  5x 8# 10x 8#  8x 10# 10x 10# 10x2 10# 10x 8#  10x 10#  3x 12#   Stand IR S         3x30" 3x30" Sleeper S  3x30"   Prone Ts          3x10 2# + I, Y  15x ea 2#  5x ea 3#   Ther Activity              Bodyblade ER/IR @ 0 deg abd 3x30"             Box lifts to overhead counter      15x 18# 10x 20#       Gait Training                                          Modalities              Cold pack Perf Perf Perf Perf     Perf

## 2022-09-02 ENCOUNTER — OFFICE VISIT (OUTPATIENT)
Dept: PHYSICAL THERAPY | Facility: REHABILITATION | Age: 62
End: 2022-09-02
Payer: COMMERCIAL

## 2022-09-02 DIAGNOSIS — Z98.890 S/P RIGHT ROTATOR CUFF REPAIR: Primary | ICD-10-CM

## 2022-09-02 DIAGNOSIS — M25.511 ACUTE PAIN OF RIGHT SHOULDER: ICD-10-CM

## 2022-09-02 PROCEDURE — 97110 THERAPEUTIC EXERCISES: CPT

## 2022-09-02 PROCEDURE — 97112 NEUROMUSCULAR REEDUCATION: CPT

## 2022-09-02 PROCEDURE — 97140 MANUAL THERAPY 1/> REGIONS: CPT

## 2022-09-02 NOTE — PROGRESS NOTES
Daily Note     Today's date: 2022  Patient name: Annie Boyce  : 1960  MRN: [id number]  Referring provider: iLana Jiménez  Dx:   Encounter Diagnosis     ICD-10-CM    1  S/P right rotator cuff repair  Z98 890    2  Acute pain of right shoulder  M25 511                   Subjective:Pt states he has no real changes since his last PT session  Objective: See treatment diary below      Assessment: Tolerated treatment well with no increase in pain and min to moderate fatigue  Pt continues  to be limited with active ER  Pt was given cane IR/ext stretch with a cane to preform at home  Pt had decreased pain post manual PT  Patient would benefit from continued PT  Plan: Progress treatment as tolerated         Precautions: none    Phase 1:  3/30 -   Phase 2:   -   Phase 3:   -   Phase 4:   -      Pertinent Findings:                                                                                                                                                     Test / Measure  2022   FOTO 62 68 64 67 68   R shoulder global MMT 3+/5 to 4+/5       R shoulder flexion  AROM  165 PROM       R shoulder abduction  AROM  170 PROM           Manuals 8/1 8/8 8/12 8/15 8/22 8/24 8/29 9/2    R Sh PROM MM 4' MM 4'  MM 4' MM 4' KS 3' MM 4' CF   R Sh mobs MM 4' MM 4'  MM 4' MM 4' KS 3' MM 4' np    R Sh MREs           End Range PNF CR      KS 2'     Neuro Re-Ed           SRER           R Sh iso - 6 way           Wall slides 15x flex + abd 15x flex + abd 15x flex + abd 15x flex + abd 15x flex + abd      OHP wall w/ foam roller      3x10     Sh flex, abd iso           Horiz abd           Table pushups   10x        Table shoulder taps   10x        [place] Sh flex supinated   2x10 1 5#        Full DB abd  2x10 2# 2x10 2#        [place] D1 flex   2x10 1# 2x10 1# 2x10 3 5# 2x10 3 5# 2x10 4# 2x10 4#   [place] D2 flex   2x10 1# 2x10 1# 2x10 3 5# 2x10 3 5# 2x10 4# 2x10 4#   RC series IR   10x 0 5# 10x 0 5# 10x 1 5# 10x 1 5# 10x 2# 10x 2#   RC series ER   10x 0 5# 10x 0 5# 10x 1 5# 10x 1 5# 10x 2# 10x 2#   Halos   10x B 8# 10x B 12# 10x B 12# 2x10 15# KB     "[place]"            Ther Ex           UBE 4'/4' L2 4'/4' L5  4'/4' L6 4'/4' L7 4'/4' L7 4'/4' L7 4'/4' L7   Pulleys           Scaption   2x10 5# 10x 5#  5x 8#  10x2 5#  2x5 8# 3x6 8# 3x6 8#   Lateral raises           TB rows 2x15 14# 2x15 15#         TB sh ext 2x15 14# 2x15 15#         TB IR           TB ER 2x15 3 5# 2x15 4#         Facepulls 2x10 9# 2x10 10#   + press 2x10 9# + press 2x10 9# + press 2x10 10# + press 2x10 10#   TB ER walkout           Supine chest press           Supine chest flys           Supine LHB S   15x10" + curls 3#        Curl to OHP  2x10 5# 10x 5#  5x 8# 10x 8#  8x 10# 10x 10# 10x2 10# 10x 8#  10x 10#  3x 12# 10x 8#  10x 10#  3x 12#   Stand IR S     3x30" 3x30" Sleeper S  3x30"    Cane ext and IR  stretch         10 x10" ea   Prone Ts      3x10 2# + I, Y  15x ea 2#  5x ea 3# + I, Y  15x ea 2#  5x ea 3#   Ther Activity           Bodyblade ER/IR @ 0 deg abd           Box lifts to overhead counter  15x 18# 10x 20#        Gait Training                                 Modalities           Cold pack Perf Perf     Perf

## 2022-09-09 ENCOUNTER — OFFICE VISIT (OUTPATIENT)
Dept: PHYSICAL THERAPY | Facility: REHABILITATION | Age: 62
End: 2022-09-09
Payer: COMMERCIAL

## 2022-09-09 DIAGNOSIS — Z98.890 S/P RIGHT ROTATOR CUFF REPAIR: Primary | ICD-10-CM

## 2022-09-09 DIAGNOSIS — M25.511 ACUTE PAIN OF RIGHT SHOULDER: ICD-10-CM

## 2022-09-09 PROCEDURE — 97112 NEUROMUSCULAR REEDUCATION: CPT | Performed by: PHYSICAL THERAPIST

## 2022-09-09 PROCEDURE — 97110 THERAPEUTIC EXERCISES: CPT | Performed by: PHYSICAL THERAPIST

## 2022-09-09 NOTE — PROGRESS NOTES
Daily Note     Today's date: 2022  Patient name: Desean Rain  : 1960  MRN: [id number]  Referring provider: Hawk Glover  Dx:   Encounter Diagnosis     ICD-10-CM    1  S/P right rotator cuff repair  Z98 890    2  Acute pain of right shoulder  M25 511        Start Time: 0700  Stop Time: 0745  Total time in clinic (min): 45 minutes    Subjective: Pain reaching behind occasional pain in shoulder  Objective: See treatment diary below    Assessment: Tolerated treatment well  Patient would benefit from continued PT 1:1 with Lyle Marr DPT for entirety of tx  Pt progressing well, added LHB targeted ex  Instructed to include IR strap stretch to HEP  Plan: Continue per plan of care        Precautions: none    Phase 1:  3/30 -   Phase 2:   -   Phase 3:   -   Phase 4:   -      Pertinent Findings:                                                                                                                                                     Test / Measure  2022   FOTO 62 68 64 67 68   R shoulder global MMT 3+/5 to 4+/5       R shoulder flexion  AROM  165 PROM       R shoulder abduction  AROM  170 PROM           Manuals 8/1 8/8 8/12 8/15 8/22 8/24 8/29 9/2  9/9   R Sh PROM MM 4' MM 4'  MM 4' MM 4' KS 3' MM 4' CF    R Sh mobs MM 4' MM 4'  MM 4' MM 4' KS 3' MM 4' np     R Sh MREs            End Range PNF CR      KS 2'      Neuro Re-Ed            SRER            R Sh iso - 6 way            Wall slides 15x flex + abd 15x flex + abd 15x flex + abd 15x flex + abd 15x flex + abd       OHP wall w/ foam roller      3x10   3x10   Sh flex, abd iso            Horiz abd            Table pushups   10x         Table shoulder taps   10x         [place] Sh flex supinated   2x10 1 5#         Full DB abd  2x10 2# 2x10 2#         [place] D1 flex   2x10 1# 2x10 1# 2x10 3 5# 2x10 3 5# 2x10 4# 2x10 4# 2x10 4#   [place] D2 flex   2x10 1# 2x10 1# 2x10 3 5# 2x10 3 5# 2x10 4# 2x10 4# 2x10 4#   RC series IR   10x 0 5# 10x 0 5# 10x 1 5# 10x 1 5# 10x 2# 10x 2# 10x 2#   RC series ER   10x 0 5# 10x 0 5# 10x 1 5# 10x 1 5# 10x 2# 10x 2# 10x 2#   Halos   10x B 8# 10x B 12# 10x B 12# 2x10 15# KB      "Kiah Meza"                                                 Ther Ex            UBE 4'/4' L2 4'/4' L5  4'/4' L6 4'/4' L7 4'/4' L7 4'/4' L7 4'/4' L7 4'/4' L7   Scaption   2x10 5# 10x 5#  5x 8#  10x2 5#  2x5 8# 3x6 8# 3x6 8# 3x6 8#   Lateral raises            TB rows 2x15 14# 2x15 15#          TB sh ext 2x15 14# 2x15 15#          TB IR            TB ER 2x15 3 5# 2x15 4#          Facepulls 2x10 9# 2x10 10#   + press 2x10 9# + press 2x10 9# + press 2x10 10# + press 2x10 10# + press 2x10 10#   TB ER walkout            Supine chest press            Supine chest flys            Supine LHB S   15x10" + curls 3#      15x10" + curls 3#    2x15 curls   Curl to OHP  2x10 5# 10x 5#  5x 8# 10x 8#  8x 10# 10x 10# 10x2 10# 10x 8#  10x 10#  3x 12# 10x 8#  10x 10#  3x 12# 10x 8#  10x 10#  3x 12#   Stand IR S     3x30" 3x30" Sleeper S  3x30"  Wall sleeper 3x30"   Cane ext and IR  stretch         10 x10" ea 10 x10" ea   Prone Ts      3x10 2# + I, Y  15x ea 2#  5x ea 3# + I, Y  15x ea 2#  5x ea 3# + I, Y  15x ea 2#  5x ea 3#   Ther Activity            Bodyblade ER/IR @ 0 deg abd            Box lifts to overhead counter  15x 18# 10x 20#         Gait Training                                    Modalities            Cold pack Perf Perf     Perf

## 2022-09-16 ENCOUNTER — APPOINTMENT (OUTPATIENT)
Dept: PHYSICAL THERAPY | Facility: REHABILITATION | Age: 62
End: 2022-09-16
Payer: COMMERCIAL

## 2022-09-23 ENCOUNTER — OFFICE VISIT (OUTPATIENT)
Dept: PHYSICAL THERAPY | Facility: REHABILITATION | Age: 62
End: 2022-09-23
Payer: COMMERCIAL

## 2022-09-23 DIAGNOSIS — M25.511 ACUTE PAIN OF RIGHT SHOULDER: Primary | ICD-10-CM

## 2022-09-23 DIAGNOSIS — Z98.890 S/P RIGHT ROTATOR CUFF REPAIR: ICD-10-CM

## 2022-09-23 PROCEDURE — 97110 THERAPEUTIC EXERCISES: CPT

## 2022-09-23 PROCEDURE — 97112 NEUROMUSCULAR REEDUCATION: CPT

## 2022-09-23 NOTE — PROGRESS NOTES
Daily Note     Today's date: 2022  Patient name: Corey Steel  : 1960  MRN: [id number]  Referring provider: Moses Ricci  Dx:   Encounter Diagnosis     ICD-10-CM    1  Acute pain of right shoulder  M25 511    2  S/P right rotator cuff repair  Z98 890        Start Time: 0700  Stop Time: 3234  Total time in clinic (min): 43 minutes    Subjective: Pt reports no pain at the start of his session  He verbalizes no medical changes since previous session  Objective: See treatment diary below      Assessment: Tolerated treatment well  He was given cues on reps/sets intermittently for exercises completed  He is additionally given cues on proper form with Biceps stretch and Ts  exercise to minimize compensations  He reports mild elevation in soreness with IR st and LHB stretch, however, pt is able to complete reps/hold time  Patient demonstrated fatigue post treatment, exhibited good technique with therapeutic exercises and would benefit from continued PT      Plan: Continue per plan of care        Precautions: none    Phase 1:  3/30 -   Phase 2:   -   Phase 3:   -   Phase 4:   -      Pertinent Findings:                                                                                                                                                     Test / Measure  2022   FOTO 62 68 64 67 68   R shoulder global MMT 3+/5 to 4+/5       R shoulder flexion  AROM  165 PROM       R shoulder abduction  AROM  170 PROM           Manuals 8/1 8/8 8/12 8/15 8/22 8/24 8/29 9/2  9/9 9/23    R Sh PROM MM 4' MM 4'  MM 4' MM 4' KS 3' MM 4' CF      R Sh mobs MM 4' MM 4'  MM 4' MM 4' KS 3' MM 4' np       R Sh MREs              End Range PNF CR      KS 2'        Neuro Re-Ed              SRER              R Sh iso - 6 way              Wall slides 15x flex + abd 15x flex + abd 15x flex + abd 15x flex + abd 15x flex + abd         OHP wall w/ foam roller      3x10   3x10 3x10    Sh flex, abd iso              Horiz abd              Table pushups   10x           Table shoulder taps   10x           [place] Sh flex supinated   2x10 1 5#           Full DB abd  2x10 2# 2x10 2#           [place] D1 flex   2x10 1# 2x10 1# 2x10 3 5# 2x10 3 5# 2x10 4# 2x10 4# 2x10 4# 2x10 4#    [place] D2 flex   2x10 1# 2x10 1# 2x10 3 5# 2x10 3 5# 2x10 4# 2x10 4# 2x10 4# 2x10 4#    RC series IR   10x 0 5# 10x 0 5# 10x 1 5# 10x 1 5# 10x 2# 10x 2# 10x 2# 10x2 2#    RC series ER   10x 0 5# 10x 0 5# 10x 1 5# 10x 1 5# 10x 2# 10x 2# 10x 2# 10x2 2#    Halos   10x B 8# 10x B 12# 10x B 12# 2x10 15# KB        "Keith Push"                                                         Ther Ex              UBE 4'/4' L2 4'/4' L5  4'/4' L6 4'/4' L7 4'/4' L7 4'/4' L7 4'/4' L7 4'/4' L7 Pt declined    Scaption   2x10 5# 10x 5#  5x 8#  10x2 5#  2x5 8# 3x6 8# 3x6 8# 3x6 8# 3x6 8#    Lateral raises              TB rows 2x15 14# 2x15 15#            TB sh ext 2x15 14# 2x15 15#            TB IR              TB ER 2x15 3 5# 2x15 4#            Facepulls 2x10 9# 2x10 10#   + press 2x10 9# + press 2x10 9# + press 2x10 10# + press 2x10 10# + press 2x10 10# + press 3x10 18 4#    TB ER walkout              Supine chest press              Supine chest flys              Supine LHB S   15x10" + curls 3#      15x10" + curls 3#    2x15 curls 15x10" + curls 3#     2x15 curls    Curl to OHP  2x10 5# 10x 5#  5x 8# 10x 8#  8x 10# 10x 10# 10x2 10# 10x 8#  10x 10#  3x 12# 10x 8#  10x 10#  3x 12# 10x 8#  10x 10#  3x 12# 10x3 10#    Stand IR S     3x30" 3x30" Sleeper S  3x30"  Wall sleeper 3x30" Wall sleeper 3x30"    Cane ext and IR  stretch         10 x10" ea 10 x10" ea 10x10''ea    Prone Ts      3x10 2# + I, Y  15x ea 2#  5x ea 3# + I, Y  15x ea 2#  5x ea 3# + I, Y  15x ea 2#  5x ea 3# + I, Y  20x ea 3#  15x ea 2#    Ther Activity              Bodyblade ER/IR @ 0 deg abd              Box lifts to overhead counter  15x 18# 10x 20# Gait Training                                          Modalities              Cold pack Perf Perf     Perf

## 2022-09-27 NOTE — TELEPHONE ENCOUNTER
Dr Ivett Johnson, patient's Eliquis needs to go to [facility], not [facility] please  It was sent yesterday incorrectly  Thanks  [facility]      Patient Name: Jonathan Chavez Record Number:  [id number]  Primary Care Physician:  Mortimer Smiling, DO  Date of Consultation: 2022    Chief Complaint: Cervical lymphadenopathy, syncope      HISTORY OF PRESENT ILLNESS  Berta Sanchez is a(n) 28 y.o. female who presented to the ER yesterday after syncopal episode. She was recently seen by my partner, Dr. Dennise Marvin, for cervical adenopathy which has been present for approximately 4 to 5 months per the patient. She obtained a CT neck with contrast yesterday as part of work-up for this. She had the CT performed in [place] and drove back to [facility] without issue. Approximate 1 hour after CT scan she noted an intense migraine, blurry vision, as well as extreme fatigue. When she was parking her car she states she passed out and woke up after an episode of urinary incontinence. This prompted her to go to the emergency room. She has had contrast in the past without issue. No recent antibiotics. Denies otalgia, sore throat, odynophagia, dysphagia, shortness of breath. Patient Active Problem List   Diagnosis    Syncope and collapse    Urine incontinence    Dehydration    Headache    Neck pain    Cervical adenopathy    Diarrhea    Obesity (BMI 30-39. 9)     Past Surgical History:   Procedure Laterality Date     SECTION      LIPOSUCTION       Family History   Problem Relation Age of Onset    Hypertension Mother     Hypertension Father     Breast Cancer Maternal Aunt     Ovarian Cancer Maternal Aunt     Ovarian Cancer Maternal Aunt      Social History     Socioeconomic History    Marital status: Single     Spouse name: Not on file    Number of children: Not on file    Years of education: Not on file    Highest education level: Not on file   Occupational History    Not on file   Tobacco Use    Smoking status: Never    Smokeless tobacco: Never   Vaping Use    Vaping Use: Never used   Substance and Sexual Activity    Alcohol use: Never    Drug use: Never    Sexual activity: Yes     Partners: Female   Other Topics Concern    Not on file   Social History Narrative    Not on file     Social Determinants of Health     Financial Resource Strain: Low Risk     Difficulty of Paying Living Expenses: Not hard at all   Food Insecurity: No Food Insecurity    Worried About Running Out of Food in the Last Year: Never true    Ran Out of Food in the Last Year: Never true   Transportation Needs: Not on file   Physical Activity: Not on file   Stress: Not on file   Social Connections: Not on file   Intimate Partner Violence: Not on file   Housing Stability: Not on file       DRUG/FOOD ALLERGIES: Diphenhydramine    CURRENT MEDICATIONS  Prior to Admission medications    Medication Sig Start Date End Date Taking?  Authorizing Provider   Norelgestromin-Eth Estradiol Salvatore Clark TD) Place onto the skin    Historical Provider, MD       REVIEW OF SYSTEMS  The following systems were reviewed and revealed the following in addition to any already discussed in the HPI:    CONSTITUTIONAL: no weight loss, no fever, no night sweats, no chills; + fatigue  EYES: no vision changes, + blurry vision  EARS: no changes in hearing, no otalgia  NOSE: no epistaxis, no rhinorrhea  RESPIRATORY: no difficulty breathing, no shortness of breath  CV: no chest pain, no lower extremity swelling  HEME: no coagulation disorder, no known bleeding disorders  NEURO: + Headache, no TIA or stroke-like symptoms  SKIN: no new rashes in the head and neck, no recently diagnosed skin cancers  MOUTH: no new oral ulcers, no recent tooth infections  GASTROINTESTINAL: no diarrhea, no stomach pain  PSYCH: no anxiety, no depression    PHYSICAL EXAM  /73   Pulse 77   Temp 98.5 °F (36.9 °C) (Oral)   Resp 14   Ht 5' 6\" (1.676 m)   Wt 203 lb (92.1 kg)   LMP 09/19/2022   SpO2 98%   BMI 32.77 kg/m²     GENERAL: No Acute Distress, Alert and Oriented, no hoarseness  EYES: EOMI, Anti-icteric  NOSE: No epistaxis, nasal mucosa within normal limits, no purulent drainage. Nasal septum deviated to the right. EARS: Normal external canal appearance, EAC patent bilaterally, bilateral tympanic membranes intact without evidence of effusion, retraction, otorrhea, perforation. FACE: HB 1/6 bilaterally, symmetric appearing, sensation equal bilaterally  ORAL CAVITY: No masses or lesions, no evidence of inflammation. Uvula midline, moist mucous membranes, dentition without evidence of major decay  NECK: Normal range of motion, no thyromegaly, trachea is midline, approximately 1.5 cm left supraclavicular and right posterior occipital palpable prominent lymph nodes, no crepitus  CHEST: Normal respiratory effort, breathing comfortably, no retractions  SKIN: No rashes, normal appearing skin, no evidence of skin lesions/tumors  NEURO: Cranial Nerves 2, 3, 4, 5, 6, 7, 11, 12 intact bilaterally     I have performed a head and neck physical exam personally or was physically present during the key or critical portions of the service. LABS  Lab Results   Component Value Date    WBC 5.6 09/27/2022    HGB 11.1 (L) 09/27/2022    HCT 33.5 (L) 09/27/2022    MCV 87.7 09/27/2022     09/27/2022     Lab Results   Component Value Date     09/27/2022    K 4.3 09/27/2022     09/27/2022    CO2 25 09/27/2022    BUN 9 09/27/2022    CREATININE 0.7 09/27/2022    GLUCOSE 101 (H) 09/27/2022    CALCIUM 8.8 09/27/2022    PROT 7.6 09/26/2022    LABALBU 4.0 09/26/2022    BILITOT 0.5 09/26/2022    ALKPHOS 108 09/26/2022    AST 19 09/26/2022    ALT 17 09/26/2022    LABGLOM >60 09/27/2022    GFRAA >60 09/27/2022    AGRATIO 1.1 09/26/2022         RADIOLOGY  EXAMINATION:   CT OF THE NECK SOFT TISSUE WITH CONTRAST  9/26/2022       TECHNIQUE:   CT of the neck was performed with the administration of intravenous contrast.   Multiplanar reformatted images are provided for review.  Automated exposure control, iterative reconstruction, and/or weight based adjustment of the   mA/kV was utilized to reduce the radiation dose to as low as reasonably   achievable. COMPARISON:   None. HISTORY:   ORDERING SYSTEM PROVIDED HISTORY: Posterior cervical adenopathy   TECHNOLOGIST PROVIDED HISTORY:   STAT Creatinine as needed:->No   Reason for Exam: swollen lymphnodes       Initial evaluation. FINDINGS:   PHARYNX/LARYNX:  The visualized upper aerodigestive tract appears symmetric. No discrete mass identified. The epiglottis appears normal.       SALIVARY GLANDS/THYROID:  The parotid, submandibular and thyroid glands   demonstrate no acute abnormality. LYMPH NODES:  Borderline prominent posterior triangle lymph nodes are seen   bilaterally, right more than left. These measure up to 9 mm on the right and   6 mm on the left. There is a borderline prominent left supraclavicular lymph   node measuring 7 mm. SOFT TISSUES:  No appreciable soft tissue swelling is seen. BRAIN/ORBITS/SINUSES:  The visualized portion of the intracranial contents   appear unremarkable. The visualized portion of the orbits, paranasal sinuses   and mastoid air cells demonstrate no acute abnormality. LUNG APICES/SUPERIOR MEDIASTINUM:  No focal consolidation within the   visualized lung apices. No acute abnormality within the visualized superior   mediastinum. BONES:  No aggressive appearing lytic or blastic bony lesion. Impression   1. Nonspecific borderline prominent posterior triangle lymph nodes   bilaterally, right more than left as well as a borderline prominent left   supraclavicular lymph node. 2. Otherwise, no acute abnormality seen of the soft tissue structures of the   neck. ASSESSMENT/PLAN  Ivana Pinto is a very pleasant 28 y.o. female with syncopal episode after obtaining CT scan neck with contrast for work-up of cervical adenopathy.     Cervical adenopathy  -CT scan demonstrating borderline enlarged bilateral cervical lymph nodes. May benefit from course of oral antibiotics to see if this decreases or has an effect on lymph node burden. Recommend continued close outpatient follow-up with Dr. Gilda Vazquez, will likely eventually need sampling via FNA or surgical excision. Syncope  -Temporally related to CT scan with contrast.  Currently undergoing extensive cardiac and neurologic work-up. No vertigo, no evidence of otitis on examination, very low likelihood of peripheral vestibulopathy. Sebastian Sims   Department of Otolaryngology/Head and Neck Surgery      Medical Decision Making: The following items were considered in medical decision making:  Independent review of images  Review / order clinical lab tests  Review / order radiology tests  Decision to obtain old records      This note was generated completely or in part utilizing Dragon dictation speech recognition software. Occasionally, words are mistranscribed and despite editing, the text may contain inaccuracies due to incorrect word recognition. If further clarification is needed please contact the office at [phone].

## 2022-09-30 ENCOUNTER — OFFICE VISIT (OUTPATIENT)
Dept: PHYSICAL THERAPY | Facility: REHABILITATION | Age: 62
End: 2022-09-30
Payer: COMMERCIAL

## 2022-09-30 DIAGNOSIS — M25.511 ACUTE PAIN OF RIGHT SHOULDER: Primary | ICD-10-CM

## 2022-09-30 DIAGNOSIS — Z98.890 S/P RIGHT ROTATOR CUFF REPAIR: ICD-10-CM

## 2022-09-30 PROCEDURE — 97112 NEUROMUSCULAR REEDUCATION: CPT

## 2022-09-30 PROCEDURE — 97110 THERAPEUTIC EXERCISES: CPT

## 2022-09-30 NOTE — PROGRESS NOTES
Daily Note     Today's date: 2022  Patient name: Donna Nieto  : 1960  MRN: [id number]  Referring provider: Ashlie Santoyo  Dx:   Encounter Diagnosis     ICD-10-CM    1  Acute pain of right shoulder  M25 511    2  S/P right rotator cuff repair  Z98 890        Start Time: 0700  Stop Time: 9150  Total time in clinic (min): 47 minutes    Subjective: Pt denies pain in shoulder at the start of his session  He reports no sig medical changes since previous session  Objective: See treatment diary below      Assessment:  Asher tolerated treatment well with minimal cuing  TE's were performed with decreased resistance  No new TE's were performed today  Following treatment, the patient exhibited good technique with therapeutic exercises and would benefit from continued PT  Plan: Continue per plan of care        Precautions: none    Phase 1:  3/30 -   Phase 2:   -   Phase 3:   -   Phase 4:   -      Pertinent Findings:                                                                                                                                                     Test / Measure  2022   FOTO 62 68 64 67 68   R shoulder global MMT 3+/5 to 4+/5       R shoulder flexion  AROM  165 PROM       R shoulder abduction  AROM  170 PROM           Manuals 9/30 8/1 8/8 8/12 8/15 8/22 8/24 8/29 9/2  9/9 9/23   R Sh PROM  MM 4' MM 4'  MM 4' MM 4' KS 3' MM 4' CF     R Sh mobs  MM 4' MM 4'  MM 4' MM 4' KS 3' MM 4' np      R Sh MREs              End Range PNF CR       KS 2'       Neuro Re-Ed              SRER              R Sh iso - 6 way              Wall slides  15x flex + abd 15x flex + abd 15x flex + abd 15x flex + abd 15x flex + abd        OHP wall w/ foam roller 3x10      3x10   3x10 3x10   Sh flex, abd iso              Horiz abd              Table pushups    10x          Table shoulder taps    10x          [place] Sh flex supinated 2x10 1 5#          Full DB abd   2x10 2# 2x10 2#          [place] D1 flex 2x10 4#    2x10 1# 2x10 1# 2x10 3 5# 2x10 3 5# 2x10 4# 2x10 4# 2x10 4# 2x10 4#   [place] D2 flex 2x10 4#   2x10 1# 2x10 1# 2x10 3 5# 2x10 3 5# 2x10 4# 2x10 4# 2x10 4# 2x10 4#   RC series IR 10x2 2#   10x 0 5# 10x 0 5# 10x 1 5# 10x 1 5# 10x 2# 10x 2# 10x 2# 10x2 2#   RC series ER 10x2 2#   10x 0 5# 10x 0 5# 10x 1 5# 10x 1 5# 10x 2# 10x 2# 10x 2# 10x2 2#   [place]    10x B 8# 10x B 12# 10x B 12# 2x10 15# KB       "Norberto Guevara"                                                         Ther Ex              UBE 4'/4' L5 4'/4' L2 4'/4' L5  4'/4' L6 4'/4' L7 4'/4' L7 4'/4' L7 4'/4' L7 4'/4' L7 Pt declined   [place] 3x6 8#   2x10 5# 10x 5#  5x 8#  10x2 5#  2x5 8# 3x6 8# 3x6 8# 3x6 8# 3x6 8#   Lateral raises 2x10 5#             TB rows  2x15 14# 2x15 15#           TB sh ext  2x15 14# 2x15 15#           TB IR              TB ER  2x15 3 5# 2x15 4#           Facepulls + press 3x10 18 0# 2x10 9# 2x10 10#   + press 2x10 9# + press 2x10 9# + press 2x10 10# + press 2x10 10# + press 2x10 10# + press 3x10 18 4#   TB ER walkout              Supine chest press              Supine chest flys              Supine LHB S 15x10" + curls 3#     2x15 curls 3#   15x10" + curls 3#      15x10" + curls 3#    2x15 curls 15x10" + curls 3#     2x15 curls   Curl to OHP 3x10 8#  2x10 5# 10x 5#  5x 8# 10x 8#  8x 10# 10x 10# 10x2 10# 10x 8#  10x 10#  3x 12# 10x 8#  10x 10#  3x 12# 10x 8#  10x 10#  3x 12# 10x3 10#   Stand IR S Wall sleeper 3x30"     3x30" 3x30" Sleeper S  3x30"  Wall sleeper 3x30" Wall sleeper 3x30"   Cane ext and IR  stretch  10x10'' ea        10 x10" ea 10 x10" ea 10x10''ea   Prone Ts +I, Ts   2x10 2# ea      3x10 2# + I, Y  15x ea 2#  5x ea 3# + I, Y  15x ea 2#  5x ea 3# + I, Y  15x ea 2#  5x ea 3# + I, Y  20x ea 3#  15x ea 2#   Ther Activity              Bodyblade ER/IR @ 0 deg abd              Box lifts to overhead counter   15x 18# 10x 20#          Gait Training Modalities              Cold pack  Perf Perf     Perf

## 2022-10-04 DIAGNOSIS — I26.99 PULMONARY EMBOLISM (HCC): ICD-10-CM

## 2022-11-23 ENCOUNTER — APPOINTMENT (OUTPATIENT)
Dept: LAB | Age: 62
End: 2022-11-23

## 2022-11-23 ENCOUNTER — HOSPITAL ENCOUNTER (OUTPATIENT)
Dept: NON INVASIVE DIAGNOSTICS | Facility: HOSPITAL | Age: 62
Discharge: HOME | End: 2022-11-23

## 2022-11-23 ENCOUNTER — OFFICE VISIT (OUTPATIENT)
Dept: FAMILY MEDICINE CLINIC | Facility: CLINIC | Age: 62
End: 2022-11-23

## 2022-11-23 VITALS
DIASTOLIC BLOOD PRESSURE: 62 MMHG | HEART RATE: 74 BPM | SYSTOLIC BLOOD PRESSURE: 116 MMHG | WEIGHT: 211.8 LBS | BODY MASS INDEX: 27.18 KG/M2 | OXYGEN SATURATION: 97 % | HEIGHT: 74 IN

## 2022-11-23 DIAGNOSIS — M79.605 PAIN IN BOTH LOWER EXTREMITIES: ICD-10-CM

## 2022-11-23 DIAGNOSIS — M79.10 MYALGIA: ICD-10-CM

## 2022-11-23 DIAGNOSIS — M25.50 ARTHRALGIA OF MULTIPLE JOINTS: ICD-10-CM

## 2022-11-23 DIAGNOSIS — D68.59 HYPERCOAGULABLE STATE (HCC): ICD-10-CM

## 2022-11-23 DIAGNOSIS — M79.604 PAIN IN BOTH LOWER EXTREMITIES: ICD-10-CM

## 2022-11-23 DIAGNOSIS — I82.441 ACUTE DEEP VEIN THROMBOSIS (DVT) OF TIBIAL VEIN OF RIGHT LOWER EXTREMITY (HCC): ICD-10-CM

## 2022-11-23 DIAGNOSIS — Z12.5 SCREENING FOR MALIGNANT NEOPLASM OF PROSTATE: ICD-10-CM

## 2022-11-23 DIAGNOSIS — M79.10 MYALGIA: Primary | ICD-10-CM

## 2022-11-23 LAB
ALBUMIN SERPL BCP-MCNC: 3.8 G/DL (ref 3.5–5)
ALP SERPL-CCNC: 68 U/L (ref 46–116)
ALT SERPL W P-5'-P-CCNC: 34 U/L (ref 12–78)
ANION GAP SERPL CALCULATED.3IONS-SCNC: 4 MMOL/L (ref 4–13)
AST SERPL W P-5'-P-CCNC: 20 U/L (ref 5–45)
BASOPHILS # BLD AUTO: 0.04 THOUSANDS/ÂΜL (ref 0–0.1)
BASOPHILS NFR BLD AUTO: 1 % (ref 0–1)
BILIRUB SERPL-MCNC: 0.66 MG/DL (ref 0.2–1)
BUN SERPL-MCNC: 19 MG/DL (ref 5–25)
CALCIUM SERPL-MCNC: 9.2 MG/DL (ref 8.3–10.1)
CHLORIDE SERPL-SCNC: 107 MMOL/L (ref 96–108)
CK MB SERPL-MCNC: <1 % (ref 0–2.5)
CK MB SERPL-MCNC: <1 NG/ML (ref 0–5)
CK SERPL-CCNC: 203 U/L (ref 39–308)
CO2 SERPL-SCNC: 27 MMOL/L (ref 21–32)
CREAT SERPL-MCNC: 1.47 MG/DL (ref 0.6–1.3)
EOSINOPHIL # BLD AUTO: 0.09 THOUSAND/ÂΜL (ref 0–0.61)
EOSINOPHIL NFR BLD AUTO: 1 % (ref 0–6)
ERYTHROCYTE [DISTWIDTH] IN BLOOD BY AUTOMATED COUNT: 12.2 % (ref 11.6–15.1)
ERYTHROCYTE [SEDIMENTATION RATE] IN BLOOD: 8 MM/HOUR (ref 0–19)
GFR SERPL CREATININE-BSD FRML MDRD: 50 ML/MIN/1.73SQ M
GLUCOSE P FAST SERPL-MCNC: 97 MG/DL (ref 65–99)
HCT VFR BLD AUTO: 45.1 % (ref 36.5–49.3)
HGB BLD-MCNC: 14.9 G/DL (ref 12–17)
IMM GRANULOCYTES # BLD AUTO: 0.05 THOUSAND/UL (ref 0–0.2)
IMM GRANULOCYTES NFR BLD AUTO: 1 % (ref 0–2)
LYMPHOCYTES # BLD AUTO: 3.44 THOUSANDS/ÂΜL (ref 0.6–4.47)
LYMPHOCYTES NFR BLD AUTO: 41 % (ref 14–44)
MAGNESIUM SERPL-MCNC: 2.3 MG/DL (ref 1.6–2.6)
MCH RBC QN AUTO: 30.5 PG (ref 26.8–34.3)
MCHC RBC AUTO-ENTMCNC: 33 G/DL (ref 31.4–37.4)
MCV RBC AUTO: 92 FL (ref 82–98)
MONOCYTES # BLD AUTO: 0.77 THOUSAND/ÂΜL (ref 0.17–1.22)
MONOCYTES NFR BLD AUTO: 9 % (ref 4–12)
NEUTROPHILS # BLD AUTO: 4 THOUSANDS/ÂΜL (ref 1.85–7.62)
NEUTS SEG NFR BLD AUTO: 47 % (ref 43–75)
NRBC BLD AUTO-RTO: 0 /100 WBCS
PLATELET # BLD AUTO: 232 THOUSANDS/UL (ref 149–390)
PMV BLD AUTO: 12 FL (ref 8.9–12.7)
POTASSIUM SERPL-SCNC: 4 MMOL/L (ref 3.5–5.3)
PROT SERPL-MCNC: 7.5 G/DL (ref 6.4–8.4)
PSA SERPL-MCNC: 1.7 NG/ML (ref 0–4)
RBC # BLD AUTO: 4.88 MILLION/UL (ref 3.88–5.62)
SODIUM SERPL-SCNC: 138 MMOL/L (ref 135–147)
T4 FREE SERPL-MCNC: 0.78 NG/DL (ref 0.76–1.46)
TSH SERPL DL<=0.05 MIU/L-ACNC: 5.36 UIU/ML (ref 0.45–4.5)
URATE SERPL-MCNC: 9.1 MG/DL (ref 3.5–8.5)
WBC # BLD AUTO: 8.39 THOUSAND/UL (ref 4.31–10.16)

## 2022-11-23 NOTE — PATIENT INSTRUCTIONS
Assessment/plan:  Myalgia- patient with myalgias and arthralgias of multiple sites  Recommend  comprehensive blood test initially  Consider possibly statin related, will rule out Lyme, gout etc  Pending results consider 2-4 week holiday from statin  Consider further evaluation by rheumatology if inconclusive labs and persistent symptoms  2  History of DVT- patient has been stable on Eliquis although he does have some discomfort in the upper right thigh at the area of deep veins  Would recommend venous duplex ultrasound to rule out  3   History of pulmonary emboli - stable on Eliquis therapy, no medication changes

## 2022-11-23 NOTE — PROGRESS NOTES
Name: Cindy Sanchez      : 1960      MRN: [id number]  Encounter Provider: Sarina Echevarria PA-C  Encounter Date: 2022   Encounter department: [address] [place] [address] PRIMARY CARE    Assessment & Plan     Patient Instructions     Assessment/plan:  Myalgia- patient with myalgias and arthralgias of multiple sites  Recommend  comprehensive blood test initially  Consider possibly statin related, will rule out Lyme, gout etc  Pending results consider 2-4 week holiday from statin  Consider further evaluation by rheumatology if inconclusive labs and persistent symptoms  2  History of DVT- patient has been stable on Eliquis although he does have some discomfort in the upper right thigh at the area of deep veins  Would recommend venous duplex ultrasound to rule out  3   History of pulmonary emboli - stable on Eliquis therapy, no medication changes  1  Myalgia  -     Lyme Total Antibody Profile with reflex to WB  -     RF Screen w/ Reflex to Titer  -     Sedimentation rate, automated  -     Uric acid  -     CK (with reflex to MB); Future  -     Magnesium; Future  -     CBC and differential  -     Comprehensive metabolic panel  -     TSH, 3rd generation with Free T4 reflex    2  Arthralgia of multiple joints  -     Lyme Total Antibody Profile with reflex to WB  -     RF Screen w/ Reflex to Titer  -     Sedimentation rate, automated  -     Uric acid  -     CK (with reflex to MB); Future  -     Magnesium; Future  -     CBC and differential  -     Comprehensive metabolic panel  -     TSH, 3rd generation with Free T4 reflex    3  Pain in both lower extremities  -     Lyme Total Antibody Profile with reflex to WB  -     RF Screen w/ Reflex to Titer  -     Sedimentation rate, automated  -     Uric acid  -     CK (with reflex to MB); Future  -     Magnesium;  Future  -     CBC and differential  -     Comprehensive metabolic panel  -     TSH, 3rd generation with Free T4 reflex  -     VAS lower limb venous duplex study, unilateral/limited; Future; Expected date: 11/23/2022    4  Screening for malignant neoplasm of prostate  -     PSA, Total Screen; Future    5  Acute deep vein thrombosis (DVT) of tibial vein of right lower extremity (HCC)    6  Hypercoagulable state ([address] )         Subjective        HPI:  This is a 72-year-old gentleman that presents to the office with concerns over ongoing muscle aches and joint pains which have been persistent for several months but seems to be getting worse gradually  He is wondering to some extent if it could be related to his statin which she has been on for while in the dosage has been increased in the past few months  He also has some history in the [employer] and 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 [place] that was closed down for chemical contamination previously  He does also have a history of DVT and pulmonary emboli  He does continue lifelong Eliquis therapy twice daily  He is concerned because he has had some episodes where the upper inner thigh starts to feel hard and tender  This is more superficial and palpable on the surface when it occurs  He is concerned about possible clot but he has not had any swelling or pain further down the extremity  He does not have any symptoms of claudication  Review of Systems   Constitutional: Negative for chills, fatigue and fever  HENT: Negative for congestion, ear pain and sinus pressure  Eyes: Negative for visual disturbance  Respiratory: Negative for cough, chest tightness and shortness of breath  Cardiovascular: Negative for chest pain and palpitations  Gastrointestinal: Negative for diarrhea, nausea and vomiting  Endocrine: Negative for polyuria  Genitourinary: Negative for dysuria and frequency  Musculoskeletal: Positive for arthralgias and myalgias  Skin: Negative for pallor and rash  Neurological: Negative for dizziness, weakness, light-headedness, numbness and headaches     Psychiatric/Behavioral: Negative for agitation, behavioral problems and sleep disturbance  All other systems reviewed and are negative  Current Outpatient Medications on File Prior to Visit   Medication Sig   • apixaban (Eliquis) 5 mg Take 1 tablet (5 mg total) by mouth 2 (two) times a day   • aspirin (ECOTRIN LOW STRENGTH) 81 mg EC tablet Take 81 mg by mouth daily    • atorvastatin (LIPITOR) 20 mg tablet Take 1 tablet (20 mg total) by mouth daily   • Cholecalciferol (VITAMIN D) 50 MCG (2000 UT) CAPS Take by mouth   • VITAMIN D PO Take by mouth       Objective     /62 (BP Location: Left arm, Patient Position: Sitting)   Pulse 74   Ht 6' 2" (1 88 m)   Wt 96 1 kg (211 lb 12 8 oz)   SpO2 97%   BMI 27 19 kg/m²     Physical Exam  Vitals and nursing note reviewed  Constitutional:       General: He is not in acute distress  Appearance: He is well-developed and well-nourished  HENT:      Head: Normocephalic and atraumatic  Right Ear: External ear normal       Left Ear: External ear normal       Nose: Nose normal       Mouth/Throat:      Mouth: Oropharynx is clear and moist       Pharynx: No oropharyngeal exudate  Eyes:      Extraocular Movements: EOM normal       Conjunctiva/sclera: Conjunctivae normal       Pupils: Pupils are equal, round, and reactive to light  Neck:      Thyroid: No thyromegaly  Trachea: No tracheal deviation  Cardiovascular:      Rate and Rhythm: Normal rate and regular rhythm  Heart sounds: Normal heart sounds  No murmur heard  No friction rub  Pulmonary:      Effort: Pulmonary effort is normal  No respiratory distress  Breath sounds: Normal breath sounds  No wheezing or rales  Abdominal:      General: Bowel sounds are normal  There is no distension  Palpations: Abdomen is soft  Tenderness: There is no abdominal tenderness  There is no guarding or rebound  Musculoskeletal:         General: No tenderness or edema  Normal range of motion        Cervical back: Normal range of motion and neck supple  Lymphadenopathy:      Cervical: No cervical adenopathy  Skin:     General: Skin is warm and dry  Findings: No erythema or rash  Neurological:      Mental Status: He is alert and oriented to person, place, and time  Cranial Nerves: No cranial nerve deficit  Coordination: Coordination normal    Psychiatric:         Mood and Affect: Mood and affect normal          Behavior: Behavior normal          Thought Content:  Thought content normal        Fina Vallejo PA-C

## 2022-11-25 LAB — RHEUMATOID FACT SER QL LA: NEGATIVE

## 2022-11-26 LAB — B BURGDOR IGG+IGM SER-ACNC: 0.2 AI

## 2022-12-19 ENCOUNTER — OFFICE VISIT (OUTPATIENT)
Dept: FAMILY MEDICINE CLINIC | Facility: CLINIC | Age: 62
End: 2022-12-19

## 2022-12-19 ENCOUNTER — APPOINTMENT (OUTPATIENT)
Dept: LAB | Age: 62
End: 2022-12-19

## 2022-12-19 VITALS
DIASTOLIC BLOOD PRESSURE: 70 MMHG | HEART RATE: 88 BPM | HEIGHT: 74 IN | SYSTOLIC BLOOD PRESSURE: 132 MMHG | WEIGHT: 215 LBS | BODY MASS INDEX: 27.59 KG/M2

## 2022-12-19 DIAGNOSIS — M25.50 ARTHRALGIA OF MULTIPLE JOINTS: ICD-10-CM

## 2022-12-19 DIAGNOSIS — E79.0 HYPERURICEMIA: ICD-10-CM

## 2022-12-19 DIAGNOSIS — E78.5 HYPERLIPIDEMIA, UNSPECIFIED HYPERLIPIDEMIA TYPE: ICD-10-CM

## 2022-12-19 DIAGNOSIS — E55.9 VITAMIN D DEFICIENCY: ICD-10-CM

## 2022-12-19 DIAGNOSIS — I26.99 OTHER ACUTE PULMONARY EMBOLISM WITHOUT ACUTE COR PULMONALE (HCC): ICD-10-CM

## 2022-12-19 DIAGNOSIS — E78.5 HYPERLIPIDEMIA, UNSPECIFIED HYPERLIPIDEMIA TYPE: Primary | ICD-10-CM

## 2022-12-19 DIAGNOSIS — D68.59 HYPERCOAGULABLE STATE (HCC): ICD-10-CM

## 2022-12-19 DIAGNOSIS — I82.441 ACUTE DEEP VEIN THROMBOSIS (DVT) OF TIBIAL VEIN OF RIGHT LOWER EXTREMITY (HCC): ICD-10-CM

## 2022-12-19 DIAGNOSIS — E03.8 SUBCLINICAL HYPOTHYROIDISM: ICD-10-CM

## 2022-12-19 PROBLEM — N28.9 LOW KIDNEY FUNCTION: Status: RESOLVED | Noted: 2020-01-03 | Resolved: 2022-12-19

## 2022-12-19 LAB
25(OH)D3 SERPL-MCNC: 54.4 NG/ML (ref 30–100)
ALBUMIN SERPL BCP-MCNC: 4.2 G/DL (ref 3.5–5)
ALP SERPL-CCNC: 72 U/L (ref 46–116)
ALT SERPL W P-5'-P-CCNC: 40 U/L (ref 12–78)
ANION GAP SERPL CALCULATED.3IONS-SCNC: 5 MMOL/L (ref 4–13)
AST SERPL W P-5'-P-CCNC: 23 U/L (ref 5–45)
BILIRUB SERPL-MCNC: 0.84 MG/DL (ref 0.2–1)
BUN SERPL-MCNC: 21 MG/DL (ref 5–25)
CALCIUM SERPL-MCNC: 10 MG/DL (ref 8.3–10.1)
CHLORIDE SERPL-SCNC: 104 MMOL/L (ref 96–108)
CHOLEST SERPL-MCNC: 172 MG/DL
CO2 SERPL-SCNC: 29 MMOL/L (ref 21–32)
CREAT SERPL-MCNC: 1.48 MG/DL (ref 0.6–1.3)
GFR SERPL CREATININE-BSD FRML MDRD: 50 ML/MIN/1.73SQ M
GLUCOSE P FAST SERPL-MCNC: 130 MG/DL (ref 65–99)
HDLC SERPL-MCNC: 44 MG/DL
LDLC SERPL CALC-MCNC: 79 MG/DL (ref 0–100)
NONHDLC SERPL-MCNC: 128 MG/DL
POTASSIUM SERPL-SCNC: 4.5 MMOL/L (ref 3.5–5.3)
PROT SERPL-MCNC: 8 G/DL (ref 6.4–8.4)
SODIUM SERPL-SCNC: 138 MMOL/L (ref 135–147)
TRIGL SERPL-MCNC: 243 MG/DL
TSH SERPL DL<=0.05 MIU/L-ACNC: 5.92 UIU/ML (ref 0.45–4.5)

## 2022-12-19 RX ORDER — ALLOPURINOL 100 MG/1
100 TABLET ORAL DAILY
Qty: 90 TABLET | Refills: 1 | Status: SHIPPED | OUTPATIENT
Start: 2022-12-19

## 2022-12-19 RX ORDER — ATORVASTATIN CALCIUM 20 MG/1
20 TABLET, FILM COATED ORAL DAILY
Qty: 90 TABLET | Refills: 1 | Status: SHIPPED | OUTPATIENT
Start: 2022-12-19

## 2022-12-19 NOTE — PROGRESS NOTES
Name: Oziel Gaines      : 1960      MRN: [id number]  Encounter Provider: Jeovanny Garcia MD  Encounter Date: 2022   Encounter department: [facility] PRIMARY CARE    Assessment & Plan     1  Hyperlipidemia, unspecified hyperlipidemia type  Assessment & Plan:  Much improved    Orders:  -     Lipid panel; Future; Expected date: 2023  -     Comprehensive metabolic panel; Future; Expected date: 2023  -     atorvastatin (LIPITOR) 20 mg tablet; Take 1 tablet (20 mg total) by mouth daily    2  Arthralgia of multiple joints  -     allopurinol (ZYLOPRIM) 100 mg tablet; Take 1 tablet (100 mg total) by mouth daily    3  Hyperuricemia  -     allopurinol (ZYLOPRIM) 100 mg tablet; Take 1 tablet (100 mg total) by mouth daily    4  Subclinical hypothyroidism  -     TSH, 3rd generation; Future; Expected date: 2023  -     T4, free; Future; Expected date: 2023    5  Hypercoagulable state ([facility] [address] )  Assessment & Plan:  On Eliquis      6  Other acute pulmonary embolism without acute cor pulmonale (HCC)  Assessment & Plan:  ON eliquis      7  Acute deep vein thrombosis (DVT) of tibial vein of right lower extremity (HCC)  Assessment & Plan:  On Eliquis             Subjective      F/u lipids, much improved, no cp, no sob, still with diffuse  Aches-only  That showed anything was elevated uric acid    Review of Systems   Constitutional: Negative for activity change, appetite change and fatigue  Respiratory: Negative for shortness of breath  Cardiovascular: Negative for chest pain  Musculoskeletal: Positive for arthralgias and myalgias  Neurological: Negative for dizziness and headaches  Hematological: Negative for adenopathy         Current Outpatient Medications on File Prior to Visit   Medication Sig   • apixaban (Eliquis) 5 mg Take 1 tablet (5 mg total) by mouth 2 (two) times a day   • aspirin (ECOTRIN LOW STRENGTH) 81 mg EC tablet Take 81 mg by mouth daily    • Cholecalciferol (VITAMIN D) 50 MCG (2000 UT) CAPS Take by mouth   • [DISCONTINUED] atorvastatin (LIPITOR) 20 mg tablet Take 1 tablet (20 mg total) by mouth daily   • VITAMIN D PO Take by mouth (Patient not taking: Reported on 12/19/2022)       Objective     /70 (BP Location: Right arm, Patient Position: Sitting, Cuff Size: Standard)   Pulse 88   Ht 6' 2" (1 88 m)   Wt 97 5 kg (215 lb)   BMI 27 60 kg/m²     Physical Exam  Vitals reviewed  Constitutional:       Appearance: Normal appearance  Neck:      Vascular: No carotid bruit  Cardiovascular:      Rate and Rhythm: Normal rate and regular rhythm  Pulses: Normal pulses  Heart sounds: Normal heart sounds  Pulmonary:      Effort: Pulmonary effort is normal       Breath sounds: Normal breath sounds  Musculoskeletal:      Right lower leg: No edema  Left lower leg: No edema  Lymphadenopathy:      Cervical: No cervical adenopathy  Neurological:      Mental Status: He is alert     Psychiatric:         Mood and Affect: Mood normal        Pat Kidd MD

## 2023-02-14 ENCOUNTER — TELEPHONE (OUTPATIENT)
Dept: FAMILY MEDICINE CLINIC | Facility: CLINIC | Age: 63
End: 2023-02-14

## 2023-02-14 DIAGNOSIS — E78.5 HYPERLIPIDEMIA, UNSPECIFIED HYPERLIPIDEMIA TYPE: ICD-10-CM

## 2023-02-14 RX ORDER — ATORVASTATIN CALCIUM 20 MG/1
20 TABLET, FILM COATED ORAL DAILY
Qty: 90 TABLET | Refills: 1 | Status: SHIPPED | OUTPATIENT
Start: 2023-02-14

## 2023-02-14 NOTE — TELEPHONE ENCOUNTER
Patient wife called stated patient Lipitor was cancelled they called pharmacy for refill please advise

## 2023-03-30 DIAGNOSIS — I26.99 PULMONARY EMBOLISM (HCC): ICD-10-CM

## 2023-03-31 DIAGNOSIS — I26.99 PULMONARY EMBOLISM (HCC): ICD-10-CM

## 2023-03-31 RX ORDER — APIXABAN 5 MG/1
TABLET, FILM COATED ORAL
Qty: 180 TABLET | Refills: 0 | Status: SHIPPED | OUTPATIENT
Start: 2023-03-31 | End: 2023-03-31 | Stop reason: SDUPTHER

## 2023-04-03 ENCOUNTER — OFFICE VISIT (OUTPATIENT)
Dept: CARDIOLOGY CLINIC | Facility: CLINIC | Age: 63
End: 2023-04-03

## 2023-04-03 VITALS
HEIGHT: 74 IN | HEART RATE: 84 BPM | WEIGHT: 213.6 LBS | SYSTOLIC BLOOD PRESSURE: 118 MMHG | DIASTOLIC BLOOD PRESSURE: 78 MMHG | BODY MASS INDEX: 27.41 KG/M2

## 2023-04-03 DIAGNOSIS — E78.00 PURE HYPERCHOLESTEROLEMIA: ICD-10-CM

## 2023-04-03 DIAGNOSIS — D68.59 HYPERCOAGULABLE STATE (HCC): ICD-10-CM

## 2023-04-03 DIAGNOSIS — R07.9 CHEST PAIN, UNSPECIFIED TYPE: Primary | ICD-10-CM
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
## 2023-04-18 PROBLEM — D68.59 HYPERCOAGULABLE STATE (HCC): Status: RESOLVED | Noted: 2020-02-07 | Resolved: 2023-04-18

## 2023-04-18 PROBLEM — Z79.01 CHRONIC ANTICOAGULATION: Status: ACTIVE | Noted: 2023-04-18

## 2023-04-18 PROBLEM — I26.99 ACUTE PULMONARY EMBOLISM WITHOUT ACUTE COR PULMONALE (HCC): Status: RESOLVED | Noted: 2018-06-08 | Resolved: 2023-04-18

## 2023-04-18 PROBLEM — I82.441 ACUTE DEEP VEIN THROMBOSIS (DVT) OF TIBIAL VEIN OF RIGHT LOWER EXTREMITY (HCC): Status: RESOLVED | Noted: 2022-01-03 | Resolved: 2023-04-18

## 2023-05-12 ENCOUNTER — CONSULT (OUTPATIENT)
Dept: PAIN MEDICINE | Facility: CLINIC | Age: 63
End: 2023-05-12

## 2023-05-12 VITALS
DIASTOLIC BLOOD PRESSURE: 70 MMHG | SYSTOLIC BLOOD PRESSURE: 131 MMHG | HEART RATE: 80 BPM | WEIGHT: 214 LBS | BODY MASS INDEX: 27.48 KG/M2

## 2023-05-12 DIAGNOSIS — M46.1 SACROILIITIS (HCC): Primary | ICD-10-CM

## 2023-05-12 DIAGNOSIS — G58.8 INTERCOSTAL NEURALGIA: ICD-10-CM

## 2023-05-12 DIAGNOSIS — R07.9 CHEST PAIN, UNSPECIFIED TYPE: ICD-10-CM

## 2023-05-12 NOTE — PATIENT INSTRUCTIONS
Sacroiliac Joint Injection   WHAT YOU NEED TO KNOW:   What do I need to know about a sacroiliac joint injection? A sacroiliac (SI) joint injection is done to diagnose or treat pain from sacroiliac joint syndrome  The pain caused by this syndrome may be felt in your lower back, buttocks, groin, and your thigh  How do I prepare for an SI injection? Your healthcare provider will ask you to not take any pain medicine the day of the injection  Ask him or her if there are any other medicines you should not take  You will need to find someone to drive you home after your procedure  What will happen during the SI injection? You will be awake for your injection  You may be given calming medicine if you are anxious  You will lie on your stomach with a pillow under your abdomen  Your healthcare provider will give you an injection of medicine to numb the area  He or she may use an x-ray, ultrasound, or CT scan to find the area to inject  You may also be given an injection of contrast material to help your SI joint show up better  Then, your healthcare provider will inject local anesthesia, antiinflammatory medicine, or both into your SI joint  Healthcare providers will watch you closely for any problems for up to 30 minutes after your injection  Your healthcare provider will check to see if your pain decreases after the injection  What are the risks of an SI injection? You may have some weakness for a short time after your injection  The SI injection can cause bleeding, infection, and pain  It can also cause temporary weakness in your leg and problems urinating  You may have an allergic reaction to the medicine that is injected into your SI joint  Your pain may return and you may need more treatment  CARE AGREEMENT:   You have the right to help plan your care  Learn about your health condition and how it may be treated   Discuss treatment options with your healthcare providers to decide what care you want to receive  You always have the right to refuse treatment  The above information is an  only  It is not intended as medical advice for individual conditions or treatments  Talk to your doctor, nurse or pharmacist before following any medical regimen to see if it is safe and effective for you  © Copyright [employer]lie Coad 2022 Information is for End User's use only and may not be sold, redistributed or otherwise used for commercial purposes

## 2023-05-12 NOTE — PROGRESS NOTES
Assessment  1  Sacroiliitis (HCC)    2  Chest pain, unspecified type    3  Intercostal neuralgia        Plan  The patient symptoms, history/physical are consistent with right-sided sacroiliac mediated pain as well as left chest wall pain secondary to intercostal neuralgia  At this time, discussed performing a right-sided sacroiliac joint junction in conjunction with a left intercostal block  He would like to proceed and will be scheduled under fluoroscopic guidance for next week  Complete risks and benefits including bleeding, infection, tissue reaction, nerve injury and allergic reaction were discussed  The approach was demonstrated using models and literature was provided  Verbal and written consent was obtained  My impressions and treatment recommendations were discussed in detail with the patient who verbalized understanding and had no further questions  Discharge instructions were provided  I personally saw and examined the patient and I agree with the above discussed plan of care  Orders Placed This Encounter   Procedures   • FL spine and pain procedure     Standing Status:   Future     Standing Expiration Date:   5/12/2027     Order Specific Question:   Reason for Exam:     Answer:   Right SIJ Injection/Rigth Intercostal Nerve Block     Order Specific Question:   Anticoagulant hold needed? Answer:   No     No orders of the defined types were placed in this encounter  History of Present Illness    Jose Alfredo Segura is a 65 y o  male referred by Dr Srikanth Sharp for left-sided chest wall pain and right lower back pain has been present chronically for years  Pain symptoms are moderate rated 5/10 on a numeric rating scale and felt constantly  Symptoms are sharp shooting and throbbing at times  Pain symptoms are aggravated with positioning and exercise  Treatment history is included prior injections in the back which have been moderately helpful  Physical therapy and exercise has not been helpful  In regards to the chest pain, he does not report any inciting event but does report that he has tenderness when pressing on his left chest wall  Cardiac work-up has been negative  I have personally reviewed and/or updated the patient's past medical history, past surgical history, family history, social history, current medications, allergies, and vital signs today  Review of Systems   Constitutional: Negative for fever and unexpected weight change  HENT: Negative for trouble swallowing  Eyes: Negative for visual disturbance  Respiratory: Negative for shortness of breath and wheezing  Cardiovascular: Positive for chest pain  Negative for palpitations  Gastrointestinal: Negative for constipation, diarrhea, nausea and vomiting  Endocrine: Negative for cold intolerance, heat intolerance and polydipsia  Genitourinary: Negative for difficulty urinating and frequency  Musculoskeletal: Positive for back pain, gait problem and joint swelling  Negative for arthralgias and myalgias  Skin: Negative for rash  Neurological: Negative for dizziness, seizures, syncope, weakness and headaches  Hematological: Does not bruise/bleed easily  Psychiatric/Behavioral: Negative for dysphoric mood  All other systems reviewed and are negative        Patient Active Problem List   Diagnosis   • Chronic pain of left knee   • Erectile dysfunction   • Hyperlipidemia   • Nephrolithiasis   • Vitamin D deficiency   • Acute medial meniscal tear, left, initial encounter   • Colon polyp   • Inguinal hernia   • Chest pain   • Chronic pain of right groin   • Intervertebral disc disorder with radiculopathy of lumbar region   • Traumatic tear of right rotator cuff   • Labral tear of shoulder, right, initial encounter   • Chronic anticoagulation       Past Medical History:   Diagnosis Date   • Arthritis    • Blood in stool    • Disease of thyroid gland    • Hyperlipidemia    • Kidney stone    • Pulmonary embolism (HCC) Past Surgical History:   Procedure Laterality Date   • COLONOSCOPY     • HERNIA REPAIR Right 2009    With mesh   • KNEE ARTHROSCOPY     • IN ARTHROSCOPY KNEE DIAGNOSTIC W/WO SYNOVIAL BX SPX Left 06/14/2019    Procedure: ARTHROSCOPY KNEE;  Surgeon: Abraham Jarquin MD;  [facility];  Service: Orthopedics   • IN SURGICAL ARTHROSCOPY SHOULDER W/ROTATOR CUFF RPR Right 03/30/2022    Procedure: SHOULDER ARTHROSCOPIC ROTATOR CUFF REPAIR; EXTENSIVE DEBRIDEMENT;  Surgeon: Shira Roper MD;  [facility];  Service: Orthopedics   • SHOULDER SURGERY     • WISDOM TOOTH EXTRACTION         Family History   Problem Relation Age of Onset   • Stroke Mother         CVA   • Breast cancer Mother    • Diabetes Father         DM   • Cancer Father    • Melanoma Brother        Social History     Occupational History     Comment: full-time employment   Tobacco Use   • Smoking status: Never   • Smokeless tobacco: Never   Vaping Use   • Vaping Use: Never used   Substance and Sexual Activity   • Alcohol use: Yes     Comment: beer, socially   • Drug use: No   • Sexual activity: Not on file       Current Outpatient Medications on File Prior to Visit   Medication Sig   • apixaban (Eliquis) 5 mg Take 1 tablet (5 mg total) by mouth 2 (two) times a day   • atorvastatin (LIPITOR) 40 mg tablet Take 1 tablet (40 mg total) by mouth daily   • Cholecalciferol (VITAMIN D) 50 MCG (2000 UT) CAPS Take by mouth   • levothyroxine (Synthroid) 25 mcg tablet Take 1 tablet (25 mcg total) by mouth daily in the early morning     No current facility-administered medications on file prior to visit         Allergies   Allergen Reactions   • Amoxicillin Vomiting   • Codeine Vomiting   • Percocet [Oxycodone-Acetaminophen] GI Intolerance   • Codeine GI Intolerance     vomitting       Physical Exam    /70   Pulse 80   Wt 97 1 kg (214 lb)   BMI 27 48 kg/m²     Constitutional: normal, well developed, well nourished, alert, in no distress and non-toxic and no overt pain behavior  Eyes: anicteric  HEENT: grossly intact  Neck: supple, symmetric, trachea midline and no masses   Pulmonary:even and unlabored  Cardiovascular:No edema or pitting edema present  Skin:Normal without rashes or lesions and well hydrated  Psychiatric:Mood and affect appropriate  Neurologic:Cranial Nerves II-XII grossly intact  Musculoskeletal:Left chest wall tenderness over palpation of the third/fourth ribs     Lumbar Spine Exam  Appearance:  Normal lordosis  Palpation/Tenderness:  right sacroiliac joint tenderness  Range of Motion:  Flexion:  Minimally limited  with pain  Extension:  Minimally limited  with pain  Motor Strength:  Left hip flexion:  5/5  Left hip extension:  5/5  Right hip flexion:  5/5  Right hip extension:  5/5  Left knee flexion:  5/5  Left knee extension:  5/5  Right knee flexion:  5/5  Right knee extension:  5/5  Left foot dorsiflexion:  5/5  Left foot plantar flexion:  5/5  Right foot dorsiflexion:  5/5  Right foot plantar flexion:  5/5  Reflexes:  Left Patellar:  1+   Right Patellar:  1+   Left Achilles:  1+   Right Achilles:  1+   Special Tests:  Left Straight Leg Test:  negative  Right Straight Leg Test:  negative  Left Handy's Maneuver:  negative  Right Handy's Maneuver:  positive  Left Gaenslen's Test:  negative  Right Gaenslen's Test:  positive    Imaging    MRI LUMBAR SPINE WITHOUT CONTRAST (10/9/2020)     INDICATION: M51 16: Intervertebral disc disorders with radiculopathy, lumbar region  Worsening low back pain      COMPARISON:  11/17/2016     TECHNIQUE:  Sagittal T1, sagittal T2, sagittal inversion recovery, axial T1 and axial T2, coronal T2     IMAGE QUALITY:  Diagnostic     FINDINGS:     VERTEBRAL BODIES:  There are 5 lumbar type vertebral bodies  Minimal levoscoliosis mid lumbar spine  Normal lordosis  Scattered degenerative endplate changes  No focally suspicious marrow lesions    No bone marrow edema or compression abnormality      SACRUM:  Normal signal within the sacrum  No evidence of insufficiency or stress fracture      DISTAL CORD AND CONUS:  Normal size and signal within the distal cord and conus  The conus medullaris terminates at the superior endplate of L2      PARASPINAL SOFT TISSUES:  Paraspinal soft tissues are unremarkable      LOWER THORACIC DISC SPACES:  Normal disc height and signal   No disc herniation, canal stenosis or foraminal narrowing      LUMBAR DISC SPACES:     L1-L2:  Normal      L2-L3:  Normal      L3-L4:  There is a small left neural foraminal disc protrusion  Mild left neural foraminal narrowing  Central canal right neural foramen patent  This level is similar to the prior study      L4-L5:  Tiny right neural foraminal disc herniation, protrusion type  Mild right neural foraminal narrowing  Central canal left neural foramen patent  This level is similar to the prior study      L5-S1:  There is a diffuse disk bulge  No significant central canal or neural foraminal narrowing  Bilateral facet hypertrophy noted  This level is similar to the prior study

## 2023-05-18 ENCOUNTER — HOSPITAL ENCOUNTER (OUTPATIENT)
Dept: RADIOLOGY | Facility: CLINIC | Age: 63
Discharge: HOME | End: 2023-05-18

## 2023-05-18 VITALS
DIASTOLIC BLOOD PRESSURE: 82 MMHG | SYSTOLIC BLOOD PRESSURE: 136 MMHG | OXYGEN SATURATION: 93 % | TEMPERATURE: 98.4 F | HEART RATE: 69 BPM | RESPIRATION RATE: 18 BRPM

## 2023-05-18 DIAGNOSIS — M46.1 SACROILIITIS (HCC): ICD-10-CM

## 2023-05-18 DIAGNOSIS — G58.8 INTERCOSTAL NEURALGIA: ICD-10-CM

## 2023-05-18 RX ORDER — BUPIVACAINE HCL/PF 2.5 MG/ML
7 VIAL (ML) INJECTION ONCE
Status: COMPLETED | OUTPATIENT
Start: 2023-05-18 | End: 2023-05-18

## 2023-05-18 RX ORDER — 0.9 % SODIUM CHLORIDE 0.9 %
4 VIAL (ML) INJECTION ONCE
Status: COMPLETED | OUTPATIENT
Start: 2023-05-18 | End: 2023-05-18

## 2023-05-18 RX ORDER — METHYLPREDNISOLONE ACETATE 80 MG/ML
80 INJECTION, SUSPENSION INTRA-ARTICULAR; INTRALESIONAL; INTRAMUSCULAR; PARENTERAL; SOFT TISSUE ONCE
Status: COMPLETED | OUTPATIENT
Start: 2023-05-18 | End: 2023-05-18

## 2023-05-18 RX ADMIN — Medication 4 ML: at 09:18

## 2023-05-18 RX ADMIN — BUPIVACAINE HYDROCHLORIDE 7 ML: 2.5 INJECTION, SOLUTION EPIDURAL; INFILTRATION; INTRACAUDAL at 09:17

## 2023-05-18 RX ADMIN — Medication 4 ML: at 09:17

## 2023-05-18 RX ADMIN — IOHEXOL 2 ML: 300 INJECTION, SOLUTION INTRAVENOUS at 09:17

## 2023-05-18 RX ADMIN — METHYLPREDNISOLONE ACETATE 80 MG: 80 INJECTION, SUSPENSION INTRA-ARTICULAR; INTRALESIONAL; INTRAMUSCULAR; PARENTERAL; SOFT TISSUE at 09:18

## 2023-05-18 NOTE — H&P
History of Present Illness:  The patient is a 65 y o  male who presents with complaints of right lower back and right chest wall pain is here today for right-sided sacral joint right-sided intercostal nerve block    Past Medical History:   Diagnosis Date   • Arthritis    • Blood in stool    • Disease of thyroid gland    • Hyperlipidemia    • Kidney stone    • Pulmonary embolism [facility])        Past Surgical History:   Procedure Laterality Date   • COLONOSCOPY     • HERNIA REPAIR Right 2009    With mesh   • KNEE ARTHROSCOPY     • NE ARTHROSCOPY KNEE DIAGNOSTIC W/WO SYNOVIAL BX SPX Left 06/14/2019    Procedure: ARTHROSCOPY KNEE;  Surgeon: Barrie Castellanos MD;  [facility];  Service: Orthopedics   • NE SURGICAL ARTHROSCOPY SHOULDER W/ROTATOR CUFF RPR Right 03/30/2022    Procedure: SHOULDER ARTHROSCOPIC ROTATOR CUFF REPAIR; EXTENSIVE DEBRIDEMENT;  Surgeon: Miley Ren MD;  [facility];  Service: Orthopedics   • SHOULDER SURGERY     • WISDOM TOOTH EXTRACTION           Current Outpatient Medications:   •  apixaban (Eliquis) 5 mg, Take 1 tablet (5 mg total) by mouth 2 (two) times a day, Disp: 180 tablet, Rfl: 0  •  atorvastatin (LIPITOR) 40 mg tablet, Take 1 tablet (40 mg total) by mouth daily, Disp: 90 tablet, Rfl: 1  •  Cholecalciferol (VITAMIN D) 50 MCG (2000 UT) CAPS, Take by mouth, Disp: , Rfl:   •  levothyroxine (Synthroid) 25 mcg tablet, Take 1 tablet (25 mcg total) by mouth daily in the early morning, Disp: 90 tablet, Rfl: 1    Current Facility-Administered Medications:   •  bupivacaine (PF) (MARCAINE) 0 25 % injection 7 mL, 7 mL, Intra-articular, Once, Fanny Singleton MD  •  iohexol (OMNIPAQUE) 300 mg/mL injection 2 mL, 2 mL, Intra-articular, Once, Fanny Singleton MD  •  lidocaine (PF) (XYLOCAINE-MPF) 2 % injection 4 mL, 4 mL, Infiltration, Once, Fanny Singleton MD  •  methylPREDNISolone acetate (DEPO-MEDROL) injection 80 mg, 80 mg, Intra-articular, Once, Fanny Singleton MD  • sodium chloride (PF) 0 9 % injection 4 mL, 4 mL, Infiltration, Once, Pola Quintanilla MD    Allergies   Allergen Reactions   • Amoxicillin Vomiting   • Codeine Vomiting   • Percocet [Oxycodone-Acetaminophen] GI Intolerance   • Codeine GI Intolerance     vomitting       Physical Exam:   Vitals:    05/18/23 0855   BP: 136/84   Pulse: 74   Resp: 18   Temp: 98 4 °F (36 9 °C)   SpO2: 93%     General: Awake, Alert, Oriented x 3, Mood and affect appropriate  Respiratory: Respirations even and unlabored  Cardiovascular: Peripheral pulses intact; no edema  Musculoskeletal Exam: Right lower back and chest pain    ASA Score: 2    Patient/Chart Verification  Patient ID Verified: Verbal  ID Band Applied: No  Consents Confirmed: Procedural, To be obtained in the Pre-Procedure area  H&P( within 30 days) Verified: To be obtained in the Pre-Procedure area  Allergies Reviewed: Yes  Anticoag/NSAID held?: No (eliquis not held - FQ informed)  Currently on antibiotics?: No    Assessment:   1  Sacroiliitis ([facility] [address] )    2   Intercostal neuralgia        Plan: Right SIJ Injection/Rigth Intercostal Nerve Block

## 2023-05-18 NOTE — DISCHARGE INSTR - LAB
Do not apply heat to any area that is numb  If you have discomfort or soreness at the injection site, you may apply ice today, 20 minutes on and 20 minutes off  Tomorrow you may use ice or warm, moist heat  Do not apply ice or heat directly to the skin  If you experience severe shortness of breath, go to the Emergency Room  You may have numbness for several hours from the local anesthetic  Please use caution and common sense, especially with weight-bearing activities  You may have an increase or change in the discomfort for 36-48 hours after your treatment  Apply ice and continue with any pain medicine you have been prescribed  Do not do anything strenuous today  You may shower, but no tub baths or hot tubs today  You may resume your normal activities tomorrow, but do not “overdo it”  Resume normal activities slowly when you are feeling better  If you experience redness, drainage or swelling at the injection site, or if you develop a fever above 100 degrees, please call [facility] at [phone] or go to the Emergency Room  Continue to take all routine medicines prescribed by your primary care physician unless otherwise instructed by our staff  Most blood thinners should be started again according to your regularly scheduled dosing  If you have any questions, please give our office a call  As no general anesthesia was used in today's procedure, you should not experience any side effects related to anesthesia  If you have a problem specifically related to your procedure, please call our office at [phone]  Problems not related to your procedure should be directed to your primary care physician

## 2023-05-25 ENCOUNTER — TELEPHONE (OUTPATIENT)
Dept: PAIN MEDICINE | Facility: CLINIC | Age: 63
End: 2023-05-25

## 2023-08-21 ENCOUNTER — OFFICE VISIT (OUTPATIENT)
Dept: FAMILY MEDICINE CLINIC | Facility: CLINIC | Age: 63
End: 2023-08-21
Payer: COMMERCIAL

## 2023-08-21 VITALS
HEART RATE: 95 BPM | DIASTOLIC BLOOD PRESSURE: 88 MMHG | SYSTOLIC BLOOD PRESSURE: 130 MMHG | BODY MASS INDEX: 27.34 KG/M2 | RESPIRATION RATE: 20 BRPM | OXYGEN SATURATION: 95 % | WEIGHT: 213 LBS | HEIGHT: 74 IN

## 2023-08-21 DIAGNOSIS — R73.02 IMPAIRED GLUCOSE TOLERANCE: ICD-10-CM

## 2023-08-21 DIAGNOSIS — E03.9 HYPOTHYROIDISM, UNSPECIFIED TYPE: ICD-10-CM

## 2023-08-21 DIAGNOSIS — Z11.59 ENCOUNTER FOR HEPATITIS C SCREENING TEST FOR LOW RISK PATIENT: ICD-10-CM

## 2023-08-21 DIAGNOSIS — E78.2 MIXED HYPERLIPIDEMIA: Primary | ICD-10-CM

## 2023-08-21 DIAGNOSIS — I26.99 PULMONARY EMBOLISM (HCC): ICD-10-CM

## 2023-08-21 PROCEDURE — 99214 OFFICE O/P EST MOD 30 MIN: CPT | Performed by: FAMILY MEDICINE

## 2023-08-21 NOTE — PATIENT INSTRUCTIONS
Await lab,refill meds  Weight Management   AMBULATORY CARE:   Why it is important to manage your weight:  Being overweight increases your risk of health conditions such as heart disease, high blood pressure, type 2 diabetes, and certain types of cancer. It can also increase your risk for osteoarthritis, sleep apnea, and other respiratory problems. Aim for a slow, steady weight loss. Even a small amount of weight loss can lower your risk of health problems. Risks of being overweight:  Extra weight can cause many health problems, including the following:  Diabetes (high blood sugar level)    High blood pressure or high cholesterol    Heart disease    Stroke    Gallbladder or liver disease    Cancer of the colon, breast, prostate, liver, or kidney    Sleep apnea    Arthritis or gout    Screening  is done to check for health conditions before you have signs or symptoms. If you are 28to 79years old, your blood sugar level may be checked every 3 years for signs of prediabetes or diabetes. Your healthcare provider will check your blood pressure at each visit. High blood pressure can lead to a stroke or other problems. Your provider may check for signs of heart disease, cancer, or other health problems. How to lose weight safely:  A safe and healthy way to lose weight is to eat fewer calories and get regular exercise. You can lose up about 1 pound a week by decreasing the number of calories you eat by 500 calories each day. You can decrease calories by eating smaller portion sizes or by cutting out high-calorie foods. Read labels to find out how many calories are in the foods you eat. You can also burn calories with exercise such as walking, swimming, or biking. You will be more likely to keep weight off if you make these changes part of your lifestyle. Exercise at least 30 minutes per day on most days of the week.  You can also fit in more physical activity by taking the stairs instead of the elevator or parking farther away from stores. Ask your healthcare provider about the best exercise plan for you. Healthy meal plan for weight management:  A healthy meal plan includes a variety of foods, contains fewer calories, and helps you stay healthy. A healthy meal plan includes the following:     Eat whole-grain foods more often. A healthy meal plan should contain fiber. Fiber is the part of grains, fruits, and vegetables that is not broken down by your body. Whole-grain foods are healthy and provide extra fiber in your diet. Some examples of whole-grain foods are whole-wheat breads and pastas, oatmeal, brown rice, and bulgur. Eat a variety of vegetables every day. Include dark, leafy greens such as spinach, kale, marcio greens, and mustard greens. Eat yellow and orange vegetables such as carrots, sweet potatoes, and winter squash. Eat a variety of fruits every day. Choose fresh or canned fruit (canned in its own juice or light syrup) instead of juice. Fruit juice has very little or no fiber. Eat low-fat dairy foods. Drink fat-free (skim) milk or 1% milk. Eat fat-free yogurt and low-fat cottage cheese. Try low-fat cheeses such as mozzarella and other reduced-fat cheeses. Choose meat and other protein foods that are low in fat. Choose beans or other legumes such as split peas or lentils. Choose fish, skinless poultry (chicken or turkey), or lean cuts of red meat (beef or pork). Before you cook meat or poultry, cut off any visible fat. Use less fat and oil. Try baking foods instead of frying them. Add less fat, such as margarine, sour cream, regular salad dressing and mayonnaise to foods. Eat fewer high-fat foods. Some examples of high-fat foods include french fries, doughnuts, ice cream, and cakes. Eat fewer sweets. Limit foods and drinks that are high in sugar. This includes candy, cookies, regular soda, and sweetened drinks. Ways to decrease calories:   Eat smaller portions.      Use a small plate with smaller servings. Do not eat second helpings. When you eat at a restaurant, ask for a box and place half of your meal in the box before you eat. Share an entrée with someone else. Replace high-calorie snacks with healthy, low-calorie snacks. Choose fresh fruit, vegetables, fat-free rice cakes, or air-popped popcorn instead of potato chips, nuts, or chocolate. Choose water or calorie-free drinks instead of soda or sweetened drinks. Do not shop for groceries when you are hungry. You may be more likely to make unhealthy food choices. Take a grocery list of healthy foods and shop after you have eaten. Eat regular meals. Do not skip meals. Skipping meals can lead to overeating later in the day. This can make it harder for you to lose weight. Eat a healthy snack in place of a meal if you do not have time to eat a regular meal. Talk with a dietitian to help you create a meal plan and schedule that is right for you. Other things to consider as you try to lose weight:   Be aware of situations that may give you the urge to overeat, such as eating while watching television. Find ways to avoid these situations. For example, read a book, go for a walk, or do crafts. Meet with a weight loss support group or friends who are also trying to lose weight. This may help you stay motivated to continue working on your weight loss goals. © Copyright [employer] 2022 Information is for End User's use only and may not be sold, redistributed or otherwise used for commercial purposes. The above information is an  only. It is not intended as medical advice for individual conditions or treatments. Talk to your doctor, nurse or pharmacist before following any medical regimen to see if it is safe and effective for you.

## 2023-08-21 NOTE — PROGRESS NOTES
Name: Camila Bishop      : 1960      MRN: [id number]  Encounter Provider: Marlin Nicolas MD  Encounter Date: 2023   Encounter department: [facility] PRIMARY CARE    Assessment & Plan     1. Mixed hyperlipidemia  Assessment & Plan:  Await lab      2. Hypothyroidism, unspecified type  Assessment & Plan:  Await lab      3. Impaired glucose tolerance  Assessment & Plan:  Await lab    Orders:  -     Hemoglobin A1C; Future    4. Encounter for hepatitis C screening test for low risk patient  -     Hepatitis C antibody; Future    5. BMI 27.0-27.9,adult    6. Pulmonary embolism (HCC)  -     apixaban (Eliquis) 5 mg; Take 1 tablet (5 mg total) by mouth 2 (two) times a day         Subjective      Here for  F/u  Lipids,thyroid, feels well, no cp, no sob, had r  Sided  Headache  Yesterday-resolved  spontaneously    Review of Systems   Constitutional: Negative for activity change, appetite change and fatigue. Respiratory: Negative for shortness of breath. Cardiovascular: Negative for chest pain. Musculoskeletal: Positive for arthralgias and myalgias. Neurological: Negative for dizziness, light-headedness and headaches. Current Outpatient Medications on File Prior to Visit   Medication Sig   • atorvastatin (LIPITOR) 40 mg tablet Take 1 tablet (40 mg total) by mouth daily   • Cholecalciferol (VITAMIN D) 50 MCG (2000 UT) CAPS Take by mouth   • levothyroxine (Synthroid) 25 mcg tablet Take 1 tablet (25 mcg total) by mouth daily in the early morning   • [DISCONTINUED] apixaban (Eliquis) 5 mg Take 1 tablet (5 mg total) by mouth 2 (two) times a day       Objective     /88 (BP Location: Right arm, Patient Position: Sitting, Cuff Size: Large)   Pulse 95   Resp 20   Ht 6' 2" (1.88 m)   Wt 96.6 kg (213 lb)   SpO2 95%   BMI 27.35 kg/m²     Physical Exam  Vitals reviewed. Constitutional:       Appearance: Normal appearance. Neck:      Vascular: No carotid bruit.       Comments: Thyroid nl  Cardiovascular:      Rate and Rhythm: Normal rate and regular rhythm. Pulses: Normal pulses. Heart sounds: Normal heart sounds. Pulmonary:      Effort: Pulmonary effort is normal.      Breath sounds: Normal breath sounds. Lymphadenopathy:      Cervical: No cervical adenopathy. Neurological:      Mental Status: He is alert. Psychiatric:         Mood and Affect: Mood normal.       Rosa Jarquin MD  BMI Counseling: Body mass index is 27.35 kg/m². The BMI is above normal. Nutrition recommendations include reducing portion sizes, decreasing overall calorie intake, 3-5 servings of fruits/vegetables daily, reducing fast food intake and consuming healthier snacks. Exercise recommendations include exercising 3-5 times per week.

## 2023-08-22 ENCOUNTER — TELEPHONE (OUTPATIENT)
Dept: GASTROENTEROLOGY | Facility: CLINIC | Age: 63
End: 2023-08-22

## 2023-08-22 ENCOUNTER — CONSULT (OUTPATIENT)
Dept: GASTROENTEROLOGY | Facility: CLINIC | Age: 63
End: 2023-08-22
Payer: COMMERCIAL

## 2023-08-22 VITALS
WEIGHT: 209.8 LBS | HEIGHT: 74 IN | BODY MASS INDEX: 26.92 KG/M2 | DIASTOLIC BLOOD PRESSURE: 76 MMHG | SYSTOLIC BLOOD PRESSURE: 122 MMHG | TEMPERATURE: 98.8 F

## 2023-08-22 DIAGNOSIS — K21.9 GASTROESOPHAGEAL REFLUX DISEASE, UNSPECIFIED WHETHER ESOPHAGITIS PRESENT: Primary | ICD-10-CM

## 2023-08-22 DIAGNOSIS — K22.70 BARRETT'S ESOPHAGUS DETERMINED BY BIOPSY: ICD-10-CM

## 2023-08-22 DIAGNOSIS — K63.5 POLYP OF COLON, UNSPECIFIED PART OF COLON, UNSPECIFIED TYPE: ICD-10-CM

## 2023-08-22 PROCEDURE — 99244 OFF/OP CNSLTJ NEW/EST MOD 40: CPT | Performed by: INTERNAL MEDICINE

## 2023-08-22 RX ORDER — BISACODYL 5 MG/1
5 TABLET, DELAYED RELEASE ORAL ONCE
Qty: 2 TABLET | Refills: 0 | Status: SHIPPED | OUTPATIENT
Start: 2023-08-22 | End: 2023-08-22

## 2023-08-22 NOTE — TELEPHONE ENCOUNTER
[address] [facility],[address] Assessment    Name: Milagros Flores  YOB: 1960  Last Height: 6' 2" (1.88 m)  Last weight: 95.2 kg (209 lb 12.8 oz)  BMI: 26.94 kg/m²  Procedure: Colon and Egd  Diagnosis: polyp of colon & gerd  Date of procedure: 9/20/23  Prep: golytely/dulcolax   Responsible : yes  Phone#: n/a  Name completing form: Jaz Adam  Date form completed: 08/22/23      If the patient answers yes to any of these questions, schedule in a hospital  Are you pregnant: No  Do you rely on a wheelchair for mobility: No  Have you been diagnosed with End Stage Renal Disease (ESRD): No  Do you need oxygen during the day: No  Have you had a heart attack or stroke within the past three months: No  Have you had a seizure within the past three months: No  Have you ever been informed by anesthesia that you have a difficult airway: No  Additional Questions  Have you had any cardiac testing or are under the care of a Cardiologist (see cardiac list): No  Cardiac list:   Do you have an implanted cardiac defibrillator: No (Comment:  This patient should be scheduled in the hospital)    Have any bleeding problems, such as anemia or hemophilia (If patient has H&H result below 8, schedule in hospital.  H&H must be within 30 days of procedure): No    Had an organ transplant within the past 3 months: No    Do you have any present infections: No  Do you get short of breath when walking a few blocks: No  Have you been diagnosed with diabetes: No  Comments (provide cardiac provider information if applicable):

## 2023-08-22 NOTE — PATIENT INSTRUCTIONS
Scheduled date of colonoscopy/egd  (as of today):9/20/23  Physician performing colonoscopy: Dr. Cas Mathis  Location of colonoscopy: [address] [facility],[address]  Bowel prep reviewed with patient: milly/dulcolax   Instructions reviewed with patient by: Corey Mohan  Clearances:  Deo Henderson

## 2023-08-22 NOTE — TELEPHONE ENCOUNTER
Our mutual patient is scheduled for procedure:  Colonoscopy & EGD    On: 9/20/23      With: Dr. Mely Gurrola is taking the following blood thinner: Eliquis     Can this be stopped 2 days prior to the procedure?       Physician Approving clearance: ________________________
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
## 2023-08-30 ENCOUNTER — APPOINTMENT (OUTPATIENT)
Dept: LAB | Age: 63
End: 2023-08-30
Payer: COMMERCIAL

## 2023-08-30 DIAGNOSIS — E78.2 MIXED HYPERLIPIDEMIA: ICD-10-CM

## 2023-08-30 DIAGNOSIS — E03.9 HYPOTHYROIDISM, UNSPECIFIED TYPE: ICD-10-CM

## 2023-08-30 DIAGNOSIS — Z11.59 ENCOUNTER FOR HEPATITIS C SCREENING TEST FOR LOW RISK PATIENT: ICD-10-CM

## 2023-08-30 DIAGNOSIS — R73.02 IMPAIRED GLUCOSE TOLERANCE: ICD-10-CM

## 2023-08-30 DIAGNOSIS — E55.9 VITAMIN D DEFICIENCY: ICD-10-CM

## 2023-08-30 LAB
25(OH)D3 SERPL-MCNC: 44.5 NG/ML (ref 30–100)
ALBUMIN SERPL BCP-MCNC: 4.4 G/DL (ref 3.5–5)
ALP SERPL-CCNC: 69 U/L (ref 34–104)
ALT SERPL W P-5'-P-CCNC: 22 U/L (ref 7–52)
ANION GAP SERPL CALCULATED.3IONS-SCNC: 9 MMOL/L
AST SERPL W P-5'-P-CCNC: 18 U/L (ref 13–39)
BILIRUB SERPL-MCNC: 0.81 MG/DL (ref 0.2–1)
BUN SERPL-MCNC: 22 MG/DL (ref 5–25)
CALCIUM SERPL-MCNC: 9.4 MG/DL (ref 8.4–10.2)
CHLORIDE SERPL-SCNC: 103 MMOL/L (ref 96–108)
CHOLEST SERPL-MCNC: 152 MG/DL
CO2 SERPL-SCNC: 26 MMOL/L (ref 21–32)
CREAT SERPL-MCNC: 1.37 MG/DL (ref 0.6–1.3)
EST. AVERAGE GLUCOSE BLD GHB EST-MCNC: 140 MG/DL
GFR SERPL CREATININE-BSD FRML MDRD: 55 ML/MIN/1.73SQ M
GLUCOSE P FAST SERPL-MCNC: 116 MG/DL (ref 65–99)
HBA1C MFR BLD: 6.5 %
HCV AB SER QL: NORMAL
HDLC SERPL-MCNC: 39 MG/DL
LDLC SERPL CALC-MCNC: 67 MG/DL (ref 0–100)
NONHDLC SERPL-MCNC: 113 MG/DL
POTASSIUM SERPL-SCNC: 4.3 MMOL/L (ref 3.5–5.3)
PROT SERPL-MCNC: 7.3 G/DL (ref 6.4–8.4)
SODIUM SERPL-SCNC: 138 MMOL/L (ref 135–147)
TRIGL SERPL-MCNC: 230 MG/DL
TSH SERPL DL<=0.05 MIU/L-ACNC: 3.43 UIU/ML (ref 0.45–4.5)

## 2023-08-30 PROCEDURE — 84443 ASSAY THYROID STIM HORMONE: CPT

## 2023-08-30 PROCEDURE — 86803 HEPATITIS C AB TEST: CPT

## 2023-08-30 PROCEDURE — 80053 COMPREHEN METABOLIC PANEL: CPT

## 2023-08-30 PROCEDURE — 36415 COLL VENOUS BLD VENIPUNCTURE: CPT

## 2023-08-30 PROCEDURE — 83036 HEMOGLOBIN GLYCOSYLATED A1C: CPT

## 2023-08-30 PROCEDURE — 80061 LIPID PANEL: CPT

## 2023-08-30 PROCEDURE — 82306 VITAMIN D 25 HYDROXY: CPT

## 2023-09-12 ENCOUNTER — APPOINTMENT (EMERGENCY)
Dept: RADIOLOGY | Facility: HOSPITAL | Age: 63
End: 2023-09-12
Payer: COMMERCIAL

## 2023-09-12 ENCOUNTER — APPOINTMENT (EMERGENCY)
Dept: CT IMAGING | Facility: HOSPITAL | Age: 63
End: 2023-09-12
Payer: COMMERCIAL

## 2023-09-12 ENCOUNTER — HOSPITAL ENCOUNTER (EMERGENCY)
Facility: HOSPITAL | Age: 63
Discharge: HOME | End: 2023-09-12
Attending: EMERGENCY MEDICINE | Admitting: EMERGENCY MEDICINE
Payer: COMMERCIAL

## 2023-09-12 VITALS
OXYGEN SATURATION: 93 % | BODY MASS INDEX: 27.29 KG/M2 | RESPIRATION RATE: 20 BRPM | WEIGHT: 212.52 LBS | HEART RATE: 85 BPM | TEMPERATURE: 98.4 F | SYSTOLIC BLOOD PRESSURE: 127 MMHG | DIASTOLIC BLOOD PRESSURE: 75 MMHG

## 2023-09-12 DIAGNOSIS — S42.102A LEFT SCAPULA FRACTURE: ICD-10-CM
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
## 2023-09-12 LAB
2HR DELTA HS TROPONIN: 1 NG/L
ABO GROUP BLD: NORMAL
ALBUMIN SERPL BCP-MCNC: 4.4 G/DL (ref 3.5–5)
ALP SERPL-CCNC: 56 U/L (ref 34–104)
ALT SERPL W P-5'-P-CCNC: 24 U/L (ref 7–52)
ANION GAP SERPL CALCULATED.3IONS-SCNC: 10 MMOL/L
APTT PPP: 27 SECONDS (ref 23–37)
AST SERPL W P-5'-P-CCNC: 27 U/L (ref 13–39)
ATRIAL RATE: 103 BPM
ATRIAL RATE: 88 BPM
BASOPHILS # BLD AUTO: 0.02 THOUSANDS/ÂΜL (ref 0–0.1)
BASOPHILS NFR BLD AUTO: 0 % (ref 0–1)
BILIRUB DIRECT SERPL-MCNC: 0.09 MG/DL (ref 0–0.2)
BILIRUB SERPL-MCNC: 0.72 MG/DL (ref 0.2–1)
BLD GP AB SCN SERPL QL: NEGATIVE
BUN SERPL-MCNC: 21 MG/DL (ref 5–25)
CALCIUM SERPL-MCNC: 9.8 MG/DL (ref 8.4–10.2)
CARDIAC TROPONIN I PNL SERPL HS: 5 NG/L
CARDIAC TROPONIN I PNL SERPL HS: 6 NG/L
CHLORIDE SERPL-SCNC: 102 MMOL/L (ref 96–108)
CO2 SERPL-SCNC: 24 MMOL/L (ref 21–32)
CREAT SERPL-MCNC: 1.39 MG/DL (ref 0.6–1.3)
EOSINOPHIL # BLD AUTO: 0.16 THOUSAND/ÂΜL (ref 0–0.61)
EOSINOPHIL NFR BLD AUTO: 2 % (ref 0–6)
ERYTHROCYTE [DISTWIDTH] IN BLOOD BY AUTOMATED COUNT: 12.5 % (ref 11.6–15.1)
GFR SERPL CREATININE-BSD FRML MDRD: 54 ML/MIN/1.73SQ M
GLUCOSE SERPL-MCNC: 196 MG/DL (ref 65–140)
HCT VFR BLD AUTO: 43.9 % (ref 36.5–49.3)
HGB BLD-MCNC: 14.6 G/DL (ref 12–17)
IMM GRANULOCYTES # BLD AUTO: 0.06 THOUSAND/UL (ref 0–0.2)
IMM GRANULOCYTES NFR BLD AUTO: 1 % (ref 0–2)
INR PPP: 1.01 (ref 0.84–1.19)
LYMPHOCYTES # BLD AUTO: 2.74 THOUSANDS/ÂΜL (ref 0.6–4.47)
LYMPHOCYTES NFR BLD AUTO: 30 % (ref 14–44)
MCH RBC QN AUTO: 30.9 PG (ref 26.8–34.3)
MCHC RBC AUTO-ENTMCNC: 33.3 G/DL (ref 31.4–37.4)
MCV RBC AUTO: 93 FL (ref 82–98)
MONOCYTES # BLD AUTO: 0.87 THOUSAND/ÂΜL (ref 0.17–1.22)
MONOCYTES NFR BLD AUTO: 10 % (ref 4–12)
NEUTROPHILS # BLD AUTO: 5.32 THOUSANDS/ÂΜL (ref 1.85–7.62)
NEUTS SEG NFR BLD AUTO: 57 % (ref 43–75)
NRBC BLD AUTO-RTO: 0 /100 WBCS
P AXIS: 60 DEGREES
P AXIS: 68 DEGREES
PLATELET # BLD AUTO: 191 THOUSANDS/UL (ref 149–390)
PMV BLD AUTO: 11.7 FL (ref 8.9–12.7)
POTASSIUM SERPL-SCNC: 4.2 MMOL/L (ref 3.5–5.3)
PR INTERVAL: 192 MS
PR INTERVAL: 196 MS
PROT SERPL-MCNC: 7 G/DL (ref 6.4–8.4)
PROTHROMBIN TIME: 13.3 SECONDS (ref 11.6–14.5)
QRS AXIS: 29 DEGREES
QRS AXIS: 29 DEGREES
QRSD INTERVAL: 82 MS
QRSD INTERVAL: 84 MS
QT INTERVAL: 336 MS
QT INTERVAL: 356 MS
QTC INTERVAL: 430 MS
QTC INTERVAL: 440 MS
RBC # BLD AUTO: 4.73 MILLION/UL (ref 3.88–5.62)
RH BLD: POSITIVE
SODIUM SERPL-SCNC: 136 MMOL/L (ref 135–147)
SPECIMEN EXPIRATION DATE: NORMAL
T WAVE AXIS: 48 DEGREES
T WAVE AXIS: 66 DEGREES
VENTRICULAR RATE: 103 BPM
VENTRICULAR RATE: 88 BPM
WBC # BLD AUTO: 9.17 THOUSAND/UL (ref 4.31–10.16)

## 2023-09-12 PROCEDURE — 85610 PROTHROMBIN TIME: CPT | Performed by: EMERGENCY MEDICINE

## 2023-09-12 PROCEDURE — 70450 CT HEAD/BRAIN W/O DYE: CPT

## 2023-09-12 PROCEDURE — 84484 ASSAY OF TROPONIN QUANT: CPT | Performed by: EMERGENCY MEDICINE

## 2023-09-12 PROCEDURE — 72125 CT NECK SPINE W/O DYE: CPT

## 2023-09-12 PROCEDURE — 80048 BASIC METABOLIC PNL TOTAL CA: CPT | Performed by: EMERGENCY MEDICINE

## 2023-09-12 PROCEDURE — 96375 TX/PRO/DX INJ NEW DRUG ADDON: CPT

## 2023-09-12 PROCEDURE — 93010 ELECTROCARDIOGRAM REPORT: CPT

## 2023-09-12 PROCEDURE — 86901 BLOOD TYPING SEROLOGIC RH(D): CPT | Performed by: EMERGENCY MEDICINE

## 2023-09-12 PROCEDURE — 86850 RBC ANTIBODY SCREEN: CPT | Performed by: EMERGENCY MEDICINE

## 2023-09-12 PROCEDURE — 74177 CT ABD & PELVIS W/CONTRAST: CPT

## 2023-09-12 PROCEDURE — 85025 COMPLETE CBC W/AUTO DIFF WBC: CPT | Performed by: EMERGENCY MEDICINE

## 2023-09-12 PROCEDURE — 96376 TX/PRO/DX INJ SAME DRUG ADON: CPT

## 2023-09-12 PROCEDURE — 96374 THER/PROPH/DIAG INJ IV PUSH: CPT

## 2023-09-12 PROCEDURE — 96361 HYDRATE IV INFUSION ADD-ON: CPT

## 2023-09-12 PROCEDURE — 99285 EMERGENCY DEPT VISIT HI MDM: CPT

## 2023-09-12 PROCEDURE — 99285 EMERGENCY DEPT VISIT HI MDM: CPT | Performed by: EMERGENCY MEDICINE

## 2023-09-12 PROCEDURE — 73080 X-RAY EXAM OF ELBOW: CPT

## 2023-09-12 PROCEDURE — 86900 BLOOD TYPING SEROLOGIC ABO: CPT | Performed by: EMERGENCY MEDICINE

## 2023-09-12 PROCEDURE — 71260 CT THORAX DX C+: CPT

## 2023-09-12 PROCEDURE — 85730 THROMBOPLASTIN TIME PARTIAL: CPT | Performed by: EMERGENCY MEDICINE

## 2023-09-12 PROCEDURE — 36415 COLL VENOUS BLD VENIPUNCTURE: CPT | Performed by: EMERGENCY MEDICINE

## 2023-09-12 PROCEDURE — G1004 CDSM NDSC: HCPCS

## 2023-09-12 PROCEDURE — 80076 HEPATIC FUNCTION PANEL: CPT | Performed by: EMERGENCY MEDICINE

## 2023-09-12 PROCEDURE — 93005 ELECTROCARDIOGRAM TRACING: CPT

## 2023-09-12 RX ORDER — HYDROCODONE BITARTRATE AND ACETAMINOPHEN 5; 325 MG/1; MG/1
1 TABLET ORAL EVERY 6 HOURS PRN
Qty: 12 TABLET | Refills: 0 | Status: SHIPPED | OUTPATIENT
Start: 2023-09-12 | End: 2023-09-22

## 2023-09-12 RX ORDER — ONDANSETRON 4 MG/1
4 TABLET, ORALLY DISINTEGRATING ORAL EVERY 6 HOURS PRN
Qty: 20 TABLET | Refills: 0 | Status: SHIPPED | OUTPATIENT
Start: 2023-09-12

## 2023-09-12 RX ORDER — HYDROMORPHONE HCL/PF 1 MG/ML
0.5 SYRINGE (ML) INJECTION ONCE
Status: COMPLETED | OUTPATIENT
Start: 2023-09-12 | End: 2023-09-12

## 2023-09-12 RX ORDER — ONDANSETRON 2 MG/ML
4 INJECTION INTRAMUSCULAR; INTRAVENOUS ONCE
Status: COMPLETED | OUTPATIENT
Start: 2023-09-12 | End: 2023-09-12

## 2023-09-12 RX ADMIN — HYDROMORPHONE HYDROCHLORIDE 0.5 MG: 1 INJECTION, SOLUTION INTRAMUSCULAR; INTRAVENOUS; SUBCUTANEOUS at 17:12

## 2023-09-12 RX ADMIN — ONDANSETRON 4 MG: 2 INJECTION INTRAMUSCULAR; INTRAVENOUS at 15:40

## 2023-09-12 RX ADMIN — SODIUM CHLORIDE 1000 ML: 0.9 INJECTION, SOLUTION INTRAVENOUS at 15:42

## 2023-09-12 RX ADMIN — HYDROMORPHONE HYDROCHLORIDE 0.5 MG: 1 INJECTION, SOLUTION INTRAMUSCULAR; INTRAVENOUS; SUBCUTANEOUS at 15:37

## 2023-09-12 RX ADMIN — IOHEXOL 100 ML: 350 INJECTION, SOLUTION INTRAVENOUS at 15:29

## 2023-09-12 NOTE — DISCHARGE INSTRUCTIONS
Take acetaminophen 650mg every 4-6 hours for pain. If this is not helping you can use the Norco. Continue to use ice over the area. Take the zofran as needed for nausea, this can be placed under the tongue to dissolve if you are vomiting. The number for Dr. Ivelisse Hassan is included in these discharge instructions, call to be seen in the office for further evaluation and management.

## 2023-09-12 NOTE — ED PROVIDER NOTES
History  Chief Complaint   Patient presents with   • Fall     Pt reports falling of a deck about 5 feet, + head strike, +LOC, chest pain, and L-shoulder pain, dizziness. + thinner     64 YO male presents after a fall. Patient was painting, 5 feet above ground on a deck when he fell, landing on the ground, striking his Left side and head. Patient takes Eliquis, he did have a loss of consciousness. He has a mild headache but complains primarily of shoulder pain in the Left, worse with movement. Patient has a history of issues with the rotator cuff. Movement at the Left shoulder is limited by pain, he has no numbness or weakness. Pt denies CP/SOB/F/C/N/V/D/C, no dysuria, burning on urination or blood in urine. History provided by:  Patient   used: No        Prior to Admission Medications   Prescriptions Last Dose Informant Patient Reported? Taking?    Cholecalciferol (VITAMIN D) 50 MCG (2000 UT) CAPS  Self Yes Yes   Sig: Take by mouth   apixaban (Eliquis) 5 mg  Self No Yes   Sig: Take 1 tablet (5 mg total) by mouth 2 (two) times a day   atorvastatin (LIPITOR) 40 mg tablet  Self No Yes   Sig: Take 1 tablet (40 mg total) by mouth daily   bisacodyl (DULCOLAX) 5 mg EC tablet   No No   Sig: Take 1 tablet (5 mg total) by mouth once for 1 dose   levothyroxine (Synthroid) 25 mcg tablet  Self No Yes   Sig: Take 1 tablet (25 mcg total) by mouth daily in the early morning   polyethylene glycol (GOLYTELY) 4000 mL solution   No No   Sig: Take 4,000 mL by mouth once for 1 dose      Facility-Administered Medications: None       Past Medical History:   Diagnosis Date   • Arthritis    • Blood in stool    • Disease of thyroid gland    • Hyperlipidemia    • Kidney stone    • Pulmonary embolism [facility])        Past Surgical History:   Procedure Laterality Date   • COLONOSCOPY     • HERNIA REPAIR Right 2009    With mesh   • KNEE ARTHROSCOPY     • [place] ARTHROSCOPY KNEE DIAGNOSTIC W/WO SYNOVIAL BX SPX Left 06/14/2019 Procedure: ARTHROSCOPY KNEE;  Surgeon: Stanislav Kumar MD;  Location: [facility];  Service: Orthopedics   • [place] SURGICAL ARTHROSCOPY SHOULDER W/ROTATOR CUFF RPR Right 03/30/2022    Procedure: SHOULDER ARTHROSCOPIC ROTATOR CUFF REPAIR; EXTENSIVE DEBRIDEMENT;  Surgeon: Lesley Ludwig MD;  [facility];  Service: Orthopedics   • SHOULDER SURGERY     • WISDOM TOOTH EXTRACTION         Family History   Problem Relation Age of Onset   • Stroke Mother         CVA   • Breast cancer Mother    • Diabetes Father         DM   • Cancer Father    • Melanoma Brother      I have reviewed and agree with the history as documented. E-Cigarette/Vaping   • E-Cigarette Use Never User      E-Cigarette/Vaping Substances   • Nicotine No    • THC No    • CBD No    • Flavoring No    • Other No    • Unknown No      Social History     Tobacco Use   • Smoking status: Never   • Smokeless tobacco: Never   Vaping Use   • Vaping Use: Never used   Substance Use Topics   • Alcohol use: Yes     Comment: beer, socially   • Drug use: No       Review of Systems   Constitutional: Negative for fever. HENT: Negative for dental problem. Eyes: Negative for visual disturbance. Respiratory: Negative for shortness of breath. Cardiovascular: Negative for chest pain. Gastrointestinal: Negative for abdominal pain, nausea and vomiting. Genitourinary: Negative for dysuria and frequency. Musculoskeletal: Positive for arthralgias. Negative for neck pain and neck stiffness. Skin: Negative for rash. Neurological: Negative for dizziness, weakness and light-headedness. Psychiatric/Behavioral: Negative for agitation, behavioral problems and confusion. All other systems reviewed and are negative. Physical Exam  Physical Exam  Vitals and nursing note reviewed. Constitutional:       Appearance: He is well-developed. HENT:      Head: Normocephalic and atraumatic.    Eyes:      Extraocular Movements: Extraocular movements intact. Cardiovascular:      Rate and Rhythm: Normal rate. Pulmonary:      Effort: Pulmonary effort is normal.   Abdominal:      General: There is no distension. Musculoskeletal:         General: Normal range of motion. Cervical back: Normal range of motion. Comments: Tender to palpation in the Left shoulder, no deformity; NV intact. Skin:     Findings: No rash. Neurological:      Mental Status: He is alert and oriented to person, place, and time.    Psychiatric:         Behavior: Behavior normal.         Vital Signs  ED Triage Vitals   Temperature Pulse Respirations Blood Pressure SpO2   09/12/23 1505 09/12/23 1505 09/12/23 1505 09/12/23 1505 09/12/23 1505   98.4 °F (36.9 °C) 96 20 158/85 94 %      Temp Source Heart Rate Source Patient Position - Orthostatic VS BP Location FiO2 (%)   09/12/23 1505 09/12/23 1505 09/12/23 1505 09/12/23 1505 --   Oral Monitor Lying Right arm       Pain Score       09/12/23 1537       8           Vitals:    09/12/23 1505 09/12/23 1629 09/12/23 1730   BP: 158/85 155/75 127/75   Pulse: 96 92 85   Patient Position - Orthostatic VS: Lying Sitting Sitting         Visual Acuity      ED Medications  Medications   sodium chloride 0.9 % bolus 1,000 mL (0 mL Intravenous Stopped 9/12/23 1819)   HYDROmorphone (DILAUDID) injection 0.5 mg (0.5 mg Intravenous Given 9/12/23 1537)   ondansetron (ZOFRAN) injection 4 mg (4 mg Intravenous Given 9/12/23 1540)   iohexol (OMNIPAQUE) 350 MG/ML injection (SINGLE-DOSE) 100 mL (100 mL Intravenous Given 9/12/23 1529)   HYDROmorphone (DILAUDID) injection 0.5 mg (0.5 mg Intravenous Given 9/12/23 1712)       Diagnostic Studies  Results Reviewed     Procedure Component Value Units Date/Time    HS Troponin I 2hr [[id number]]  (Normal) Collected: 09/12/23 1714    Lab Status: Final result Specimen: Blood from Arm, Right Updated: 09/12/23 1810     hs TnI 2hr 6 ng/L      Delta 2hr hsTnI 1 ng/L     HS Troponin 0hr (reflex protocol) [[id number]]  (Normal) Collected: 09/12/23 1514    Lab Status: Final result Specimen: Blood from Arm, Right Updated: 09/12/23 1603     hs TnI 0hr 5 ng/L     Protime-INR [[id number]]  (Normal) Collected: 09/12/23 1514    Lab Status: Final result Specimen: Blood from Arm, Right Updated: 09/12/23 1558     Protime 13.3 seconds      INR 1.01    APTT [[id number]]  (Normal) Collected: 09/12/23 1514    Lab Status: Final result Specimen: Blood from Arm, Right Updated: 09/12/23 1558     PTT 27 seconds     Basic metabolic panel [[id number]]  (Abnormal) Collected: 09/12/23 1514    Lab Status: Final result Specimen: Blood from Arm, Right Updated: 09/12/23 1557     Sodium 136 mmol/L      Potassium 4.2 mmol/L      Chloride 102 mmol/L      CO2 24 mmol/L      ANION GAP 10 mmol/L      BUN 21 mg/dL      Creatinine 1.39 mg/dL      Glucose 196 mg/dL      Calcium 9.8 mg/dL      eGFR 54 ml/min/1.73sq m     Narrative:      [place] guidelines for Chronic Kidney Disease (CKD):   •  Stage 1 with normal or high GFR (GFR > 90 mL/min/1.73 square meters)  •  Stage 2 Mild CKD (GFR = 60-89 mL/min/1.73 square meters)  •  Stage 3A Moderate CKD (GFR = 45-59 mL/min/1.73 square meters)  •  Stage 3B Moderate CKD (GFR = 30-44 mL/min/1.73 square meters)  •  Stage 4 Severe CKD (GFR = 15-29 mL/min/1.73 square meters)  •  Stage 5 End Stage CKD (GFR <15 mL/min/1.73 square meters)  Note: GFR calculation is accurate only with a steady state creatinine    Hepatic function panel [[id number]]  (Normal) Collected: 09/12/23 1514    Lab Status: Final result Specimen: Blood from Arm, Right Updated: 09/12/23 1557     Total Bilirubin 0.72 mg/dL      Bilirubin, Direct 0.09 mg/dL      Alkaline Phosphatase 56 U/L      AST 27 U/L      ALT 24 U/L      Total Protein 7.0 g/dL      Albumin 4.4 g/dL     CBC and differential [[id number]] Collected: 09/12/23 1514    Lab Status: Final result Specimen: Blood from Arm, Right Updated: 09/12/23 1539     WBC 9.17 Thousand/uL      RBC 4.73 Million/uL      Hemoglobin 14.6 g/dL      Hematocrit 43.9 %      MCV 93 fL      MCH 30.9 pg      MCHC 33.3 g/dL      RDW 12.5 %      MPV 11.7 fL      Platelets 551 Thousands/uL      nRBC 0 /100 WBCs      Neutrophils Relative 57 %      Immat GRANS % 1 %      Lymphocytes Relative 30 %      Monocytes Relative 10 %      Eosinophils Relative 2 %      Basophils Relative 0 %      Neutrophils Absolute 5.32 Thousands/µL      Immature Grans Absolute 0.06 Thousand/uL      Lymphocytes Absolute 2.74 Thousands/µL      Monocytes Absolute 0.87 Thousand/µL      Eosinophils Absolute 0.16 Thousand/µL      Basophils Absolute 0.02 Thousands/µL                  XR elbow 3+ views LEFT   ED Interpretation by Catalino Madera MD (09/12 1634)   No fracture      Final Result by Kaitlin Shaw MD (09/12 1649)      No acute osseous abnormality. Workstation performed: [id number]         CT head without contrast   Final Result by Paul Daniels MD (09/12 1541)      No acute intracranial abnormality. Workstation performed: [id number]         CT cervical spine without contrast   Final Result by Paul Daniels MD (09/12 1546)      No cervical spine fracture or traumatic malalignment. Mild degenerative changes. Workstation performed: [id number]         CT chest abdomen pelvis w contrast   Final Result by Bianka Andres MD (09/12 1600)      Nondisplaced fracture through the body of the left scapula. Remaining osseous structures appear intact. Nonobstructing bilateral nephrolithiasis. Bibasilar lower lobe atelectatic changes. No acute intrathoracic or abdominal pelvic trauma evident. The study was marked in [facility] for immediate notification.       Workstation performed: [id number]                    Procedures  Procedures         ED Course  ED Course as of 09/13/23 1812 Tue Sep 12, 2023   1634 X-ray(s) personally evaluated, interpretation: No fracture noted on Left elbow film. SBIRT 22yo+    Flowsheet Row Most Recent Value   Initial Alcohol Screen: US AUDIT-C     1. How often do you have a drink containing alcohol? 2 Filed at: 09/12/2023 1753   2. How many drinks containing alcohol do you have on a typical day you are drinking? 0 Filed at: 09/12/2023 1753   3a. Male UNDER 65: How often do you have five or more drinks on one occasion? 0 Filed at: 09/12/2023 1753   3b. FEMALE Any Age, or MALE 65+: How often do you have 4 or more drinks on one occassion? 0 Filed at: 09/12/2023 1753   Audit-C Score 2 Filed at: 09/12/2023 1753   ÁLVARO: How many times in the past year have you. .. Used an illegal drug or used a prescription medication for non-medical reasons? Never Filed at: 09/12/2023 1753                    Medical Decision Making  1. Fall - Patient had a fall from height, takes Eliquis, complains of Left shoulder pain. Will CT head, C-spine, C/A/P. Will give fluids and analgesia. Fall, initial encounter: acute illness or injury  Left scapula fracture: acute illness or injury  Amount and/or Complexity of Data Reviewed  Labs: ordered. Radiology: ordered and independent interpretation performed. Decision-making details documented in ED Course. Discussion of management or test interpretation with external provider(s): Discussed case with on call orthopedics who recommends sling and follow up in office. Risk  Prescription drug management. Disposition  Final diagnoses:   Fall, initial encounter   Left scapula fracture     Time reflects when diagnosis was documented in both MDM as applicable and the Disposition within this note     Time User Action Codes Description Comment    9/12/2023  5:17 PM Luc Norwood [I70. JCSW] Fall, initial encounter     9/12/2023  5:17 PM Luc Norwood [S42.102A] Left scapula fracture       ED Disposition     ED Disposition   Discharge    Condition   Stable    Date/Time   Tue Sep 12, 2023  5:25 PM    Comment   Yang Recinos [facility] discharge to home/self care. Follow-up Information     Follow up With Specialties Details Why David Rildey MD Orthopedic Surgery   [address] [place] [address]  [phone]            Discharge Medication List as of 9/12/2023  5:27 PM      START taking these medications    Details   HYDROcodone-acetaminophen (Norco) 5-325 mg per tablet Take 1 tablet by mouth every 6 (six) hours as needed for pain for up to 10 days Max Daily Amount: 4 tablets, Starting Tue 9/12/2023, Until Fri 9/22/2023 at 2359, Normal      ondansetron (ZOFRAN-ODT) 4 mg disintegrating tablet Take 1 tablet (4 mg total) by mouth every 6 (six) hours as needed for nausea, Starting Tue 9/12/2023, Normal         CONTINUE these medications which have NOT CHANGED    Details   apixaban (Eliquis) 5 mg Take 1 tablet (5 mg total) by mouth 2 (two) times a day, Starting Mon 8/21/2023, Normal      atorvastatin (LIPITOR) 40 mg tablet Take 1 tablet (40 mg total) by mouth daily, Starting Tue 4/18/2023, Normal      bisacodyl (DULCOLAX) 5 mg EC tablet Take 1 tablet (5 mg total) by mouth once for 1 dose, Starting Tue 8/22/2023, Normal      Cholecalciferol (VITAMIN D) 50 MCG (2000 UT) CAPS Take by mouth, Historical Med      levothyroxine (Synthroid) 25 mcg tablet Take 1 tablet (25 mcg total) by mouth daily in the early morning, Starting Tue 4/18/2023, Normal      polyethylene glycol (GOLYTELY) 4000 mL solution Take 4,000 mL by mouth once for 1 dose, Starting Tue 8/22/2023, Normal             No discharge procedures on file.     PDMP Review       Value Time User    PDMP Reviewed  Yes 8/21/2023  4:07 PM Fan Diego MD          ED Provider  Electronically Signed by           Annalisa Hall MD  09/13/23 0022

## 2023-09-13 ENCOUNTER — TELEPHONE (OUTPATIENT)
Age: 63
End: 2023-09-13

## 2023-09-13 NOTE — TELEPHONE ENCOUNTER
Hello,  Please advise if the following patient can be forced onto the schedule:    Patient: Bertha Weiss    : 1960    MRN: [id number]    Call back #: [phone]    Insurance: Francesco Velázquez     Reason for appointment: Frx Lt scapula    Requested doctor/location: [place] or his PA       Thank you.

## 2023-09-14 ENCOUNTER — OFFICE VISIT (OUTPATIENT)
Dept: OBGYN CLINIC | Facility: OTHER | Age: 63
End: 2023-09-14
Payer: COMMERCIAL

## 2023-09-14 VITALS
DIASTOLIC BLOOD PRESSURE: 74 MMHG | WEIGHT: 212 LBS | SYSTOLIC BLOOD PRESSURE: 145 MMHG | HEART RATE: 81 BPM | HEIGHT: 74 IN | BODY MASS INDEX: 27.21 KG/M2

## 2023-09-14 DIAGNOSIS — S42.115A CLOSED NONDISPLACED FRACTURE OF BODY OF LEFT SCAPULA, INITIAL ENCOUNTER: Primary | ICD-10-CM

## 2023-09-14 DIAGNOSIS — J98.11 ATELECTASIS OF LEFT LUNG: ICD-10-CM

## 2023-09-14 PROCEDURE — 23570 CLTX SCAPULAR FX W/O MNPJ: CPT | Performed by: ORTHOPAEDIC SURGERY

## 2023-09-14 PROCEDURE — 99214 OFFICE O/P EST MOD 30 MIN: CPT | Performed by: ORTHOPAEDIC SURGERY

## 2023-09-14 NOTE — TELEPHONE ENCOUNTER
Pt stopped by office to see about getting an appt with Dr Vianca Obrien. Was able to put pt on the schedule for this afternoon.

## 2023-09-14 NOTE — PROGRESS NOTES
Assessment  Diagnoses and all orders for this visit:    Closed nondisplaced fracture of body of left scapula, initial encounter    Atelectasis      Discussion and Plan:  · Discussed the fracture does not meet the clinical criteria for surgical fixation. · Recommend patient continue the sling for comfort  · Remove sling for elbow, wrist and hand ROM, start pendulums as tolerated  · Recommend patient obtain and utilize an inspiratory spirometer given the findings of atelectasis on his CT scan and the high chance of worsening atelectasis secondary to inspiratory splinting from the scapular body fracture. Of course if he were to develop any respiratory symptoms he should reach out to his primary care physician for further advice. · Follow up 4 weeks with x-rays     Subjective:   Patient ID: Lizeth Del Cid is a 65 y.o. male      HPI  The patient presents with a chief complaint of left scapula pain. The pain began 2 day(s) ago and is associated with an acute injury. Patient reports he fell 5 feet off of a deck. He was seen in the ED where chest CT showed a scapula body fracture. He was placed in a sling. The patient describes the pain as aching, dull and sharp in intensity,  intermittent in timing, and localizes the pain to the  left scapula. The pain is worse with movement and relieved by rest, the use of sling. The pain is not associated with numbness and tingling. The pain is not associated with constitutional symptoms. The patient is awoken at night by the pain. Right shoulder RTC repair 3/30/22.      The following portions of the patient's history were reviewed and updated as appropriate: allergies, current medications, past family history, past medical history, past social history, past surgical history and problem list.      Objective:  /74   Pulse 81   Ht 6' 2" (1.88 m)   Wt 96.2 kg (212 lb)   BMI 27.22 kg/m²       Exam limited due to acute fracture  Left Shoulder Exam     Tenderness   Left shoulder tenderness location: scapula. Other   Erythema: absent  Sensation: normal  Pulse: present     Comments:    ROM and strengthen not tested secondary to fracture             Physical Exam  Vitals reviewed. Constitutional:       Appearance: He is well-developed. HENT:      Head: Normocephalic. Eyes:      Pupils: Pupils are equal, round, and reactive to light. Pulmonary:      Effort: Pulmonary effort is normal.   Abdominal:      General: Abdomen is flat. There is no distension. Skin:     General: Skin is warm and dry. I have personally reviewed pertinent films in PACS and my interpretation is as follows.   CT scan chest, abdomen and pelvis-Nondisplaced fracture through the body of the left scapula, Bibasilar lower lobe atelectatic changes      Fracture / Dislocation Treatment    Date/Time: 9/14/2023 2:15 PM    Performed by: Margareth Lee MD  Authorized by: Margareth Lee MD    Injury location:  Shoulder  Location details:  Left shoulder  Injury type:  Fracture  Fracture type: scapular    Manipulation performed?: No            Scribe Attestation    I,:  Merrillitor am acting as a scribe while in the presence of the attending physician.:       I,:  Margareth Lee MD personally performed the services described in this documentation    as scribed in my presence.:

## 2023-10-02 ENCOUNTER — TELEPHONE (OUTPATIENT)
Dept: FAMILY MEDICINE CLINIC | Facility: CLINIC | Age: 63
End: 2023-10-02

## 2023-10-02 DIAGNOSIS — E03.9 HYPOTHYROIDISM, UNSPECIFIED TYPE: ICD-10-CM

## 2023-10-02 RX ORDER — LEVOTHYROXINE SODIUM 0.03 MG/1
25 TABLET ORAL
Qty: 90 TABLET | Refills: 1 | Status: SHIPPED | OUTPATIENT
Start: 2023-10-02

## 2023-10-02 NOTE — TELEPHONE ENCOUNTER
Patient called into the office requesting a refill on the medication levothyroxine (Synthroid) 25 mcg 90 tablets. Patient would like it to be sent to [facility] pharmacy [address] [facility]. Please advise. Thank You.

## 2023-10-12 ENCOUNTER — APPOINTMENT (OUTPATIENT)
Dept: RADIOLOGY | Facility: OTHER | Age: 63
End: 2023-10-12
Payer: COMMERCIAL

## 2023-10-12 ENCOUNTER — ANESTHESIA (OUTPATIENT)
Dept: ANESTHESIOLOGY | Facility: HOSPITAL | Age: 63
End: 2023-10-12

## 2023-10-12 ENCOUNTER — OFFICE VISIT (OUTPATIENT)
Dept: OBGYN CLINIC | Facility: OTHER | Age: 63
End: 2023-10-12
Payer: COMMERCIAL

## 2023-10-12 ENCOUNTER — ANESTHESIA EVENT (OUTPATIENT)
Dept: ANESTHESIOLOGY | Facility: HOSPITAL | Age: 63
End: 2023-10-12

## 2023-10-12 VITALS
WEIGHT: 212 LBS | HEIGHT: 74 IN | SYSTOLIC BLOOD PRESSURE: 126 MMHG | DIASTOLIC BLOOD PRESSURE: 80 MMHG | BODY MASS INDEX: 27.21 KG/M2

## 2023-10-12 DIAGNOSIS — M67.912 DYSFUNCTION OF LEFT ROTATOR CUFF: ICD-10-CM

## 2023-10-12 DIAGNOSIS — S42.115A CLOSED NONDISPLACED FRACTURE OF BODY OF LEFT SCAPULA, INITIAL ENCOUNTER: ICD-10-CM

## 2023-10-12 DIAGNOSIS — S42.115D CLOSED NONDISPLACED FRACTURE OF BODY OF LEFT SCAPULA WITH ROUTINE HEALING, SUBSEQUENT ENCOUNTER: Primary | ICD-10-CM

## 2023-10-12 PROCEDURE — 73030 X-RAY EXAM OF SHOULDER: CPT

## 2023-10-12 PROCEDURE — 99214 OFFICE O/P EST MOD 30 MIN: CPT | Performed by: ORTHOPAEDIC SURGERY

## 2023-10-12 PROCEDURE — 99024 POSTOP FOLLOW-UP VISIT: CPT | Performed by: ORTHOPAEDIC SURGERY

## 2023-10-12 NOTE — PROGRESS NOTES
Assessment    Closed nondisplaced fracture of body of left scapula    Rotator cuff dysfunction left shoulder      Discussion and Plan:    X rays today reveal a healing scapular fracture. On examination today, he is able to perform more active ROM with his scapula held down which means his scapula fracture is still healing and the scapular dyskinesis may be why he has such functional limitations, but he does have significant weakness of his rotator cuff when tested. We will order an MRI of his left shoulder to further evaluate the rotator cuff for an acute tear. He understood and all questions were answered  Continue home exercise program as tolerated  He will follow up after MRI for discussion of results and further treatment options based on these results    Subjective:   Patient ID: Rubio Molina is a 65 y.o. male who presents for a follow up visit today for his left scapula fracture. Patient was injured on 9/12/23 after a fall of about 5 feet off of a deck. He reports that he scapula is improving but he is having lateral aspect shoulder pain with inability to raise arm against gravity. He has been performing at home physical therapy twice a day directed by a physical therapist in the family. He denies numbness and tingling. No fevers or chills. The following portions of the patient's history were reviewed and updated as appropriate: allergies, current medications, past family history, past medical history, past social history, past surgical history and problem list.    Objective:  /80   Ht 6' 2" (1.88 m)   Wt 96.2 kg (212 lb)   BMI 27.22 kg/m²       Left Shoulder Exam     Range of Motion   Left shoulder forward flexion: AROM: 130. Full PROM. Muscle Strength   Abduction: 3/5   External rotation: 4/5     Tests   Drop arm: positive    Other   Erythema: absent  Sensation: normal  Pulse: present             Physical Exam  Constitutional:       General: He is not in acute distress. Appearance: He is well-developed. Eyes:      Conjunctiva/sclera: Conjunctivae normal.      Pupils: Pupils are equal, round, and reactive to light. Cardiovascular:      Rate and Rhythm: Normal rate and regular rhythm. Pulmonary:      Effort: Pulmonary effort is normal.      Breath sounds: Normal breath sounds. Abdominal:      General: Bowel sounds are normal.      Palpations: Abdomen is soft. Musculoskeletal:      Cervical back: Normal range of motion and neck supple. Skin:     General: Skin is warm and dry. Findings: No erythema or rash. Neurological:      Mental Status: He is alert and oriented to person, place, and time. Deep Tendon Reflexes: Reflexes are normal and symmetric. Psychiatric:         Behavior: Behavior normal.       I have personally reviewed pertinent films in PACS and my interpretation is as follows. X Ray Left Shoulder 10/12/23: Well healing scapula body fracture.  No other acute osseous abnormalities    Scribe Attestation      I,:  Mayda Morgan am acting as a scribe while in the presence of the attending physician.:       I,:  Kortney Arteaga MD personally performed the services described in this documentation    as scribed in my presence.:

## 2023-10-23 ENCOUNTER — HOSPITAL ENCOUNTER (OUTPATIENT)
Dept: RADIOLOGY | Age: 63
Discharge: HOME | End: 2023-10-23
Payer: COMMERCIAL

## 2023-10-23 DIAGNOSIS — M67.912 DYSFUNCTION OF LEFT ROTATOR CUFF: ICD-10-CM

## 2023-10-23 DIAGNOSIS — S42.115D CLOSED NONDISPLACED FRACTURE OF BODY OF LEFT SCAPULA WITH ROUTINE HEALING, SUBSEQUENT ENCOUNTER: ICD-10-CM

## 2023-10-23 PROCEDURE — G1004 CDSM NDSC: HCPCS

## 2023-10-23 PROCEDURE — 73221 MRI JOINT UPR EXTREM W/O DYE: CPT

## 2023-10-26 ENCOUNTER — ANESTHESIA (OUTPATIENT)
Dept: GASTROENTEROLOGY | Facility: AMBULARY SURGERY CENTER | Age: 63
End: 2023-10-26

## 2023-10-26 ENCOUNTER — HOSPITAL ENCOUNTER (OUTPATIENT)
Dept: GASTROENTEROLOGY | Facility: AMBULARY SURGERY CENTER | Age: 63
Setting detail: OUTPATIENT SURGERY
Discharge: HOME | End: 2023-10-26
Attending: INTERNAL MEDICINE | Admitting: INTERNAL MEDICINE
Payer: COMMERCIAL

## 2023-10-26 ENCOUNTER — ANESTHESIA EVENT (OUTPATIENT)
Dept: GASTROENTEROLOGY | Facility: AMBULARY SURGERY CENTER | Age: 63
End: 2023-10-26

## 2023-10-26 VITALS
HEART RATE: 65 BPM | HEIGHT: 74 IN | SYSTOLIC BLOOD PRESSURE: 126 MMHG | OXYGEN SATURATION: 96 % | RESPIRATION RATE: 16 BRPM | WEIGHT: 210 LBS | TEMPERATURE: 96.8 F | BODY MASS INDEX: 26.95 KG/M2 | DIASTOLIC BLOOD PRESSURE: 71 MMHG

## 2023-10-26 DIAGNOSIS — K21.9 GASTROESOPHAGEAL REFLUX DISEASE, UNSPECIFIED WHETHER ESOPHAGITIS PRESENT: ICD-10-CM

## 2023-10-26 DIAGNOSIS — K63.5 POLYP OF COLON, UNSPECIFIED PART OF COLON, UNSPECIFIED TYPE: ICD-10-CM

## 2023-10-26 DIAGNOSIS — K22.70 BARRETT'S ESOPHAGUS DETERMINED BY BIOPSY: ICD-10-CM

## 2023-10-26 PROCEDURE — 45385 COLONOSCOPY W/LESION REMOVAL: CPT | Performed by: INTERNAL MEDICINE

## 2023-10-26 PROCEDURE — 43239 EGD BIOPSY SINGLE/MULTIPLE: CPT | Performed by: INTERNAL MEDICINE

## 2023-10-26 PROCEDURE — 88305 TISSUE EXAM BY PATHOLOGIST: CPT | Performed by: PATHOLOGY

## 2023-10-26 RX ORDER — PROPOFOL 10 MG/ML
INJECTION, EMULSION INTRAVENOUS AS NEEDED
Status: DISCONTINUED | OUTPATIENT
Start: 2023-10-26 | End: 2023-10-26

## 2023-10-26 RX ORDER — SODIUM CHLORIDE, SODIUM LACTATE, POTASSIUM CHLORIDE, CALCIUM CHLORIDE 600; 310; 30; 20 MG/100ML; MG/100ML; MG/100ML; MG/100ML
INJECTION, SOLUTION INTRAVENOUS CONTINUOUS PRN
Status: DISCONTINUED | OUTPATIENT
Start: 2023-10-26 | End: 2023-10-26

## 2023-10-26 RX ADMIN — PROPOFOL 50 MG: 10 INJECTION, EMULSION INTRAVENOUS at 13:43

## 2023-10-26 RX ADMIN — PROPOFOL 50 MG: 10 INJECTION, EMULSION INTRAVENOUS at 13:41

## 2023-10-26 RX ADMIN — PROPOFOL 40 MG: 10 INJECTION, EMULSION INTRAVENOUS at 13:46

## 2023-10-26 RX ADMIN — PROPOFOL 40 MG: 10 INJECTION, EMULSION INTRAVENOUS at 13:54

## 2023-10-26 RX ADMIN — PROPOFOL 40 MG: 10 INJECTION, EMULSION INTRAVENOUS at 14:00

## 2023-10-26 RX ADMIN — PROPOFOL 40 MG: 10 INJECTION, EMULSION INTRAVENOUS at 13:49

## 2023-10-26 RX ADMIN — PROPOFOL 40 MG: 10 INJECTION, EMULSION INTRAVENOUS at 13:57

## 2023-10-26 RX ADMIN — SODIUM CHLORIDE, SODIUM LACTATE, POTASSIUM CHLORIDE, AND CALCIUM CHLORIDE: .6; .31; .03; .02 INJECTION, SOLUTION INTRAVENOUS at 13:28

## 2023-10-26 RX ADMIN — PROPOFOL 40 MG: 10 INJECTION, EMULSION INTRAVENOUS at 13:52

## 2023-10-26 RX ADMIN — PROPOFOL 100 MG: 10 INJECTION, EMULSION INTRAVENOUS at 13:39

## 2023-10-26 NOTE — H&P
History and Physical - [facility]  Ingris Asencio 65 y.o. male MRN: [id number]                  HPI: Ingris Asencio is a 65y.o. year old male who presents for egd/colon      REVIEW OF SYSTEMS: Per the HPI, and otherwise unremarkable.     Historical Information   Past Medical History:   Diagnosis Date    Arthritis     Blood in stool     Disease of thyroid gland     Hyperlipidemia     Kidney stone     Pulmonary embolism [facility])      Past Surgical History:   Procedure Laterality Date    COLONOSCOPY      HERNIA REPAIR Right 2009    With mesh    KNEE ARTHROSCOPY      [place] ARTHROSCOPY KNEE DIAGNOSTIC W/WO SYNOVIAL BX SPX Left 06/14/2019    Procedure: ARTHROSCOPY KNEE;  Surgeon: Kelle De Souza MD;  [facility];  Service: Orthopedics    [place] SURGICAL ARTHROSCOPY SHOULDER W/ROTATOR CUFF RPR Right 03/30/2022    Procedure: SHOULDER ARTHROSCOPIC ROTATOR CUFF REPAIR; EXTENSIVE DEBRIDEMENT;  Surgeon: Yusuf Feng MD;  [facility];  Service: Orthopedics    SHOULDER SURGERY      WISDOM TOOTH EXTRACTION       Social History   Social History     Substance and Sexual Activity   Alcohol Use Yes    Comment: beer, socially     Social History     Substance and Sexual Activity   Drug Use No     Social History     Tobacco Use   Smoking Status Never   Smokeless Tobacco Never     Family History   Problem Relation Age of Onset    Stroke Mother         CVA    Breast cancer Mother     Diabetes Father         DM    Cancer Father     Melanoma Brother        Meds/Allergies       Current Outpatient Medications:     apixaban (Eliquis) 5 mg    atorvastatin (LIPITOR) 40 mg tablet    Cholecalciferol (VITAMIN D) 50 MCG (2000 UT) CAPS    levothyroxine (Synthroid) 25 mcg tablet    ondansetron (ZOFRAN-ODT) 4 mg disintegrating tablet    Allergies   Allergen Reactions    Amoxicillin Vomiting    Codeine Vomiting    Percocet [Oxycodone-Acetaminophen] GI Intolerance    Codeine GI Intolerance     vomitting Objective     /76   Pulse 73   Temp (!) 97.3 °F (36.3 °C) (Temporal)   Resp 18   Ht 6' 2" (1.88 m)   Wt 95.3 kg (210 lb)   SpO2 96%   BMI 26.96 kg/m²       PHYSICAL EXAM    Gen: NAD  Head: NCAT  CV: RRR  CHEST: Clear  ABD: soft, NT/ND  EXT: no edema      ASSESSMENT/PLAN:  This is a 65y.o. year old male here for egd/colon, and he is stable and optimized for his procedure.

## 2023-10-26 NOTE — ANESTHESIA POSTPROCEDURE EVALUATION
Post-Op Assessment Note    CV Status:  Stable  Pain Score: 0    Pain management: adequate     Mental Status:  Alert and awake   Hydration Status:  Euvolemic   PONV Controlled:  Controlled   Airway Patency:  Patent      Post Op Vitals Reviewed: Yes      Staff: Anesthesiologist, CRNA         No notable events documented.     /61 (10/26/23 1409)    Temp (!) 96.8 °F (36 °C) (10/26/23 1409)    Pulse 72 (10/26/23 1409)   Resp 16 (10/26/23 1409)    SpO2 98 % (10/26/23 1409)

## 2023-10-26 NOTE — ANESTHESIA PREPROCEDURE EVALUATION
Procedure:  EGD  COLONOSCOPY    Relevant Problems   CARDIO   (+) Chest pain   (+) Hyperlipidemia      ENDO   (+) Hypothyroidism      /RENAL   (+) Nephrolithiasis      MUSCULOSKELETAL   (+) Sacroiliitis (HCC)        Physical Exam    Airway    Mallampati score: II  TM Distance: >3 FB  Neck ROM: full     Dental       Cardiovascular  Cardiovascular exam normal    Pulmonary  Pulmonary exam normal     Other Findings        Anesthesia Plan  ASA Score- 2     Anesthesia Type- IV sedation with anesthesia with ASA Monitors. Additional Monitors:     Airway Plan:            Plan Factors-    Chart reviewed. EKG reviewed. Existing labs reviewed. Patient summary reviewed. Induction- intravenous. Postoperative Plan-     Informed Consent- Anesthetic plan and risks discussed with patient. I personally reviewed this patient with the CRNA. Discussed and agreed on the Anesthesia Plan with the CRNA. Wilian Patton

## 2023-10-30 ENCOUNTER — OFFICE VISIT (OUTPATIENT)
Dept: OBGYN CLINIC | Facility: OTHER | Age: 63
End: 2023-10-30

## 2023-10-30 VITALS
BODY MASS INDEX: 27.57 KG/M2 | SYSTOLIC BLOOD PRESSURE: 126 MMHG | DIASTOLIC BLOOD PRESSURE: 80 MMHG | WEIGHT: 214.8 LBS | HEIGHT: 74 IN | HEART RATE: 96 BPM

## 2023-10-30 DIAGNOSIS — M75.102 TEAR OF LEFT SUPRASPINATUS TENDON: Primary | ICD-10-CM

## 2023-10-30 DIAGNOSIS — S42.115D CLOSED NONDISPLACED FRACTURE OF BODY OF LEFT SCAPULA WITH ROUTINE HEALING, SUBSEQUENT ENCOUNTER: ICD-10-CM

## 2023-10-30 PROCEDURE — 99024 POSTOP FOLLOW-UP VISIT: CPT | Performed by: ORTHOPAEDIC SURGERY

## 2023-10-30 RX ORDER — CHLORHEXIDINE GLUCONATE ORAL RINSE 1.2 MG/ML
15 SOLUTION DENTAL ONCE
OUTPATIENT
Start: 2023-10-30 | End: 2023-10-30

## 2023-10-30 NOTE — PROGRESS NOTES
Assessment  Diagnoses and all orders for this visit:    Tear of left supraspinatus tendon - acute traumatic    Closed nondisplaced fracture of body of left scapula with routine healing, subsequent encounter    Discussion and Plan:    Explained to the patient that his MRI reveals a likely full thickness tear of his supraspinatus tendon of the left shoulder but does appear to be acute in nature as there is no retraction and no muscle atrophy. He does continue to have intermittent pain about his nondisplaced scapula fracture as well which is still healing. He has options of to continue treatment non operatively or perform an arthroscopic procedure. He wishes to perform an arthroscopic procedure but we will wait until he heals his scapula fracture a little more. He understood and all questions were answered. A thorough discussion was performed with the patient regarding the risks and benefit of operative and nonoperative treatment of their rotator cuff tear. Risks discussed include but were not limited to infection, neurovascular injury, recurrent tear, nonhealing of the repair, need for further surgery, need for biceps tenodesis or tenotomy, stiffness, need for prolonged rehabilitation, as well as the risk of anesthesia. After this discussion all questions were answered and informed consent was obtained in the office for arthroscopic rotator cuff repair of the left shoulder. The patient will be scheduled for this procedure accordingly. Follow up a few days prior to the planned arthroscopic rotator cuff repair for re evaluation of symptoms from his scapular injury to ensure that is fully healed before we surgically address his shoulder. He is in agreement with this plan    Subjective:   Patient ID: Rosamaria Momin is a 65 y.o. male presents for a follow up visit today for his left scapula fracture and left shoulder pain. Patient was injured on 9/12/23 after a fall of about 5 feet off of a deck.  He reports that he scapula is improving but he is having lateral aspect shoulder pain with inability to raise arm against gravity. He has been performing at home physical therapy twice a day directed by a physical therapist in the family. He denies numbness and tingling. No fevers or chills. The following portions of the patient's history were reviewed and updated as appropriate: allergies, current medications, past family history, past medical history, past social history, past surgical history and problem list.    Objective:  /80 (BP Location: Left arm, Patient Position: Sitting, Cuff Size: Large)   Pulse 96   Ht 6' 2" (1.88 m)   Wt 97.4 kg (214 lb 12.8 oz)   BMI 27.58 kg/m²       Left Shoulder Exam     Range of Motion   Left shoulder forward flexion: AROM: 130. Full PROM. Muscle Strength   Abduction: 4/5     Tests   Drop arm: positive    Other   Erythema: absent  Sensation: normal  Pulse: present             Physical Exam  Vitals and nursing note reviewed. Constitutional:       General: He is not in acute distress. Appearance: He is well-developed. HENT:      Head: Normocephalic and atraumatic. Eyes:      Conjunctiva/sclera: Conjunctivae normal.      Pupils: Pupils are equal, round, and reactive to light. Cardiovascular:      Rate and Rhythm: Normal rate and regular rhythm. Pulses: Normal pulses. Heart sounds: Normal heart sounds. Pulmonary:      Effort: Pulmonary effort is normal. No respiratory distress. Breath sounds: Normal breath sounds. Abdominal:      General: Bowel sounds are normal. There is no distension. Palpations: Abdomen is soft. Musculoskeletal:      Cervical back: Normal range of motion and neck supple. Skin:     General: Skin is warm and dry. Findings: No erythema or rash. Neurological:      Mental Status: He is alert and oriented to person, place, and time. Deep Tendon Reflexes: Reflexes are normal and symmetric.    Psychiatric: Mood and Affect: Mood normal.         Behavior: Behavior normal.           I have personally reviewed pertinent films in PACS and my interpretation is as follows. MRI Left Shoulder 10/23/23: Likely full thickness tear of the supraspinatus tendon.  Continued healing of nondisplaced scapula body fracture    Scribe Attestation      I,:  Royal Burgess am acting as a scribe while in the presence of the attending physician.:       I,:  Jacky Bass MD personally performed the services described in this documentation    as scribed in my presence.:

## 2023-10-30 NOTE — PATIENT INSTRUCTIONS
You are being scheduled for a shoulder arthroscopy to treat your symptoms. Below are some instructions and information on what to expect before and after your surgery. Pre-Surgical Preparation for Arthroscopic Shoulder Surgery: You will be contacted the evening prior to your surgery to confirm the scheduled time of the procedure and when to arrive at the hospital.   Do not eat or drink anything after midnight the night before your surgery. Since you are having out-patient surgery, make sure that you have someone who can drive you home later in the day. Also, prepare that person for a long day, as the process of safely preparing for and recovering from the procedure is more time consuming than the actual procedure! As you will be in a sling after surgery, please wear or bring a loose fitting button-down shirt so that you can easily place this over the sling when you leave the surgical suite. This avoids having to place the operative arm in a sleeve. Most patients find that this is the easiest outfit to wear for the first week or so after surgery so you may want to plan accordingly. Most patients find that lying down in bed after shoulder surgery accentuates their discomfort. This is likely related to the effect of gravity on the swelling in the shoulder. As a result, most patients sleep better in a recliner or in bed with pillows propped up behind their back for the first few days or weeks after surgery. It is a good idea to plan for this ahead of time so there will be less hassle getting things set up the night after surgery. What to Expect After Arthroscopic Shoulder Surgery: It is normal to have swelling and discomfort in the shoulder for several days or a week after surgery. It is also normal to have a small amount of drainage from the surgical wounds (especially the first few days after surgery), as we put fluid into the shoulder to visualize the structures during surgery. It is NOT normal to have foul smelling, purulent drainage and if this is noted, please contact the office immediately or proceed to the emergency room for evaluation as this may indicate an infection. Applying ice bags to the shoulder may help with pain that is not controlled by the regional block. Ice should be applied 20-30 minutes at a time, every hour or two. Make sure to put a thin towel or T-shirt next to your skin to avoid direct contact of the ice with the skin. Icing is most helpful in the first 48 hours, although many people find that continuing past this time frame lessens their postoperative pain. Please note that your post-operative dressing is not conductive to ice, so if you need to, it is okay to remove that dressing even the night after surgery and place band-aids over the wounds in order for the ice to take effect. Pain Control    Most patients will receive a nerve block, the local anesthetic may keep your whole arm numb for up to 4 days. You will be given a prescription for narcotic pain medication when you are discharged from the hospital.  With the newer nerve block that is being utilized, patients are rarely requiring the use of this narcotic pain medication. If you find you do not tolerate that type of pain medicine well, call our office and we will try another one. In addition to the narcotic pain medication, it is safe to use an anti-inflammatory (unless the patient has a medical condition that would not allow safe use of this mediation). This includes the Advil, Motrin, Ibuprofen and Alleve category of medications. Simply follow the over the counter dosing on the package and use as indicated as another adjunct. Importantly since these medications are all very similar, use only one of them. Tylenol is a separate medication that can be utilized as well and can be taken at the same time as the other medication or given in a "staggered" manner.   Just make sure that you follow the dosing on the over the counter bottle instructions. Also make sure that the pain medication prescribed by Dr Wilmer Ko team does not contain acetaminophen (this is found in Percocet and Vicodin). Typically we do not prescribe those types of pain medications but if for some reason that has been prescribed DO NOT add more Tylenol (acetaminophen) as you could end up taking too much of that medication. As mentioned above, most patients find that lying down accentuates their discomfort. You might sleep better in a recliner, or propped up in bed. Dr. Janet Douglas encourages patients to safely ambulate around the house as much as possible in the first few days after the procedure as this can help with blood circulation in the legs. While the incidence of blood clots is very rare following shoulder surgery, early ambulation is a great way to help decrease the already low rate. 24 hours after the surgery you may remove the bandage and cover incisions with Band-Aids if needed. At that time you may shower, the wounds will have a surgical glue that will protect them from shower water but do not submerge your incisions directly (bathing or swimming) until at least 2 weeks post-operatively. It is safe to let the arm hang at the side and take a shower and put on a shirt without the sling on. Just make sure that you do not use the operative are to reach out and grab anything as that may damage the repair. When you are done showering and getting dressed please return the operative arm to the sling. Unless noted otherwise in your discharge paperwork, Dr. Janet Douglas uses absorbable sutures so they do not need to be removed. Dr. Musa Landry physician assistant (PA) will see you in the office a few days after the procedure to review the intra-operative findings and to initiate physical therapy if appropriate.   A post-operative appointment should have been scheduled for you already, but if for some reason this did not happen, please call the office to make one. Physical therapy is important after nearly all shoulder surgeries and a detailed rehabilitation plan based on the specific intra-operative findings and procedures will be provided to your therapist at the first post-operative office visit. Most patients have post-operative therapy appointments scheduled pre-operatively, but if you do not, that will be handled at the first post-operative office visit. Unless expressly directed otherwise it is safe to remove the sling even the first day after the surgery and let the arm hang by the side. This allows patients to shower and even put a shirt on (bad arm in the sleeve first). It is also safe to flex and extend their wrist, hand and fingers as much as possible when the block wears off. These simple motions can serve to pump fluid out of the forearm and decrease swelling in the arm.

## 2023-10-31 PROCEDURE — 88305 TISSUE EXAM BY PATHOLOGIST: CPT | Performed by: PATHOLOGY

## 2023-11-07 DIAGNOSIS — E78.2 MIXED HYPERLIPIDEMIA: ICD-10-CM

## 2023-11-07 RX ORDER — ATORVASTATIN CALCIUM 40 MG/1
40 TABLET, FILM COATED ORAL DAILY
Qty: 90 TABLET | Refills: 1 | Status: SHIPPED | OUTPATIENT
Start: 2023-11-07

## 2023-11-08 ENCOUNTER — EVALUATION (OUTPATIENT)
Dept: PHYSICAL THERAPY | Facility: REHABILITATION | Age: 63
End: 2023-11-08
Payer: COMMERCIAL

## 2023-11-08 DIAGNOSIS — M75.102 TEAR OF LEFT SUPRASPINATUS TENDON: ICD-10-CM

## 2023-11-08 DIAGNOSIS — M25.512 ACUTE PAIN OF LEFT SHOULDER: Primary | ICD-10-CM

## 2023-11-08 PROCEDURE — 97161 PT EVAL LOW COMPLEX 20 MIN: CPT

## 2023-11-08 PROCEDURE — 97110 THERAPEUTIC EXERCISES: CPT

## 2023-11-08 NOTE — PROGRESS NOTES
PT Evaluation     Today's date: 2023  Patient name: Rey Perez  : 1960  MRN: [id number]  Referring provider: Colin Ochoa  Dx:   Encounter Diagnosis     ICD-10-CM    1. Acute pain of left shoulder  M25.512       2. Tear of left supraspinatus tendon  M75.102 Ambulatory Referral to Physical Therapy          Start Time: 0435  Stop Time: 6916  Total time in clinic (min): 43 minutes    Assessment  Assessment details: Rey Perez is a 65 y.o. male presenting with L Sh pain following a fall off a deck. Pt scheduled for surgery on 2023 for RTC repair and potential biceps tenodesis. Pt also suffered scapular fracture from the fall which is healing well. Pt would benefit from skilled outpatient physical therapy leading up to surgery to promote motot control improvement and mobility improvement. Primary impairments include L Sh pain with functional activities, L Sh elevation and ER weakness, L Sh elevation AROM dysfunction, scapular motor control dysfunction. Educated pt on anatomy and physiology of diagnosis. Will benefit from skilled PT interventions for community reintegration, ADL management/independence, return to work/hobbies. Provided pt with written home exercise program to be completed daily. Impairments: abnormal coordination, abnormal muscle firing, abnormal muscle tone, abnormal or restricted ROM, activity intolerance, impaired physical strength, lacks appropriate home exercise program, pain with function, scapular dyskinesis, poor posture  and poor body mechanics  Functional limitations: pushing, pulling, reaching, overhead tasks, lifting, work duties, ADLs  Symptom irritability: highUnderstanding of Dx/Px/POC: good   Prognosis: good    Goals    Short Term Goals: In 3 weeks, the patient will:  1. Improve L Sh flexion AROM to 155 degrees  2. Improve L Sh abduction AROM to 145 degrees  3.  Supervision with [facility] for self-care    Plan  Patient would benefit from: skilled physical therapy  Planned modality interventions: manual electrical stimulation  Planned therapy interventions: abdominal trunk stabilization, activity modification, balance, balance/weight bearing training, behavior modification, body mechanics training, community reintegration, coordination, fine motor coordination training, flexibility, functional ROM exercises, gait training, graded activity, graded exercise, graded motor, home exercise program, work reintegration, therapeutic training, therapeutic exercise, therapeutic activities, stretching, strengthening, self care, postural training, patient education, neuromuscular re-education, motor coordination training, massage, manual therapy, joint mobilization and ADL training  Frequency: 1x week  Duration in weeks: 3  Plan of Care beginning date: 11/8/2023  Plan of Care expiration date: 11/29/2023  Treatment plan discussed with: patient      Subjective    Pain Location: L Sh - "inside"  Pain Intensity: 5-6/10 current; end of day 8/10 - sharp, shooting  ASAD: fell off deck - scapular fracture and RTC tear  DOI: Sept. 2023  Aggravating Factors: moving L Sh - reaching, elevation, activities away from body  Alleviating Factors: rest, Tylenol prn  Living Situation: independent ADLs - only using RUE - difficulty getting dressed (shirt, belt, etc.)  Constitutional S/S: denies paresthesias   Dominant Hand: R  Goals: "strengthen before surgery"  PLOF: L RTC repair 12/13/2023    From MRI:  1. Moderate subacromial spur with supraspinatus/infraspinatus insertional tendinosis including an undersurface supraspinatus tendon tear with near full-thickness component best seen on series 6 image 12. Mild associated subacromial-subdeltoid bursitis. 2. Mild biceps tendinosis without tear. 3. Healing nondisplaced fracture left mid scapular body including the scapular spine posteriorly, previously characterized.     Objective        Standing         Head Position x Protracted  Neutral Retracted   Scapular Position x Protracted  Neutral  Retracted   Thoracic Spine  Inc Kyphosis x Neutral     Lumbar Spine  Inc Lordosis x Neutral  Dec Lordosis   Pelvis  Anterior Tilt x Neutral  Posterior Tilt   Iliac Crest  L elevated x Neutral  R elevated   Scoliotic Curvature  "C" Curve  "S" Curve     Lateral Shift  Right  Left x None     Strength and ROM evaluated B from a regional biomechanical perspective and values relevant to this episode recorded in table below    ROM: Goniometric measurement revealed the following findings. Shoulder ROM Right Left   Flexion 170 145   Abduction 165 135   ER T3 T2   IR T9 T10          Strength: MMT revealed the following findings.   Joint Motion Right Left   Sh. Flexion 5/5 3-/5   Sh. Abduction 5/5 3-/5   Sh. ER 5/5 3/5   Sh. IR 5/5 4/5          Additional Assessments:  Palpation: no tenderness noted throughout L Sh  Joint mobility: WFL - pain at end range PROM                                                                                                                                                                Test / Measure  Left 11/8/2023   Michael +   Painful Arc +   Infraspinatus Strength Test +   Lift off test +   Supraspinatus (empty can) +            Precautions: L RTC repair 12/13/2023 scheduled    POC expires Unit limit PT/OT + Visit Limit   11/29/23 BOMN BOMN       Pertinent Findings:                                                                                            Test / Measure  9/15/2022   FOTO (Predicted 66) 41   L Sh flex AROM 145 deg   L Sh abd AROM 135 deg   L Sh ER MMT 3/5         VISIT 1            Manuals 11/8            L Sh PROM MM 5'                                                   Neuro Re-Ed             Flexion AAROM 10x hands clasped            Abduction AAROM             ER AAROM             Flex, abd, ER isometrics             Table slides             Wall slides             Serratus punches 10x3"            Ther Ex HEP review 5'            REBECA Vergara             DB elevation 3 way             S/L ER                                                    Ther Activity                                       Gait Training                                       Modalities

## 2023-11-13 ENCOUNTER — OFFICE VISIT (OUTPATIENT)
Dept: PHYSICAL THERAPY | Facility: REHABILITATION | Age: 63
End: 2023-11-13
Payer: COMMERCIAL

## 2023-11-13 DIAGNOSIS — M75.102 TEAR OF LEFT SUPRASPINATUS TENDON: Primary | ICD-10-CM

## 2023-11-13 DIAGNOSIS — M25.512 ACUTE PAIN OF LEFT SHOULDER: ICD-10-CM

## 2023-11-13 PROCEDURE — 97140 MANUAL THERAPY 1/> REGIONS: CPT

## 2023-11-13 PROCEDURE — 97112 NEUROMUSCULAR REEDUCATION: CPT

## 2023-11-13 PROCEDURE — 97110 THERAPEUTIC EXERCISES: CPT

## 2023-11-13 NOTE — PROGRESS NOTES
Daily Note     Today's date: 2023  Patient name: Camila Bishop  : 1960  MRN: [id number]  Referring provider: Jigar Corral  Dx:   Encounter Diagnosis     ICD-10-CM    1. Tear of left supraspinatus tendon  M75.102       2. Acute pain of left shoulder  M25.512           Start Time: 1121  Stop Time: 0736  Total time in clinic (min): 38 minutes    Subjective: Pt reports no significant changes in L Sh status since initial evaluation. Objective: See treatment diary below      Assessment: Tolerated treatment well. Patient would benefit from continued PT. Unable to complete Sh elevation with light DB resistance. Focused on L Sh and scapular motor control. Discomfort with eccentric lowering with flexion AAROM. 1:1 with Hakeem Taylor DPT entirety of tx. Plan: Continue per plan of care.       Precautions: L RTC repair 2023 scheduled    POC expires Unit limit PT/OT + Visit Limit   23 BOMN BOMN       Pertinent Findings:                                                                                            Test / Measure  9/15/2022   FOTO (Predicted 66) 41   L Sh flex AROM 145 deg   L Sh abd AROM 135 deg   L Sh ER MMT 3         VISIT 1 2   Manuals '3   L Sh PROM MM 5' MM                   Neuro Re-Ed     Flexion AAROM 10x hands clasped 2x10 cane supine   Abduction AAROM     ER AAROM     Flex, abd, ER isometrics     Table slides     Wall slides  10x   Serratus punches 10x3" 10x3"   Supine ABCs  2x   Ther Ex     HEP review 5'    UBE  3'/3'   Pulleys  4' flex   DB elevation 3 way  Flex, abd only 2x10 ea   S/L ER  2x10   TB rows  2x10 btb   TB Sh ext  2x10 gtb        Ther Activity               Gait Training               Modalities

## 2023-11-20 ENCOUNTER — APPOINTMENT (OUTPATIENT)
Dept: PHYSICAL THERAPY | Facility: REHABILITATION | Age: 63
End: 2023-11-20
Payer: COMMERCIAL

## 2023-11-27 ENCOUNTER — OFFICE VISIT (OUTPATIENT)
Dept: PHYSICAL THERAPY | Facility: REHABILITATION | Age: 63
End: 2023-11-27
Payer: COMMERCIAL

## 2023-11-27 DIAGNOSIS — M25.512 ACUTE PAIN OF LEFT SHOULDER: ICD-10-CM

## 2023-11-27 DIAGNOSIS — M75.102 TEAR OF LEFT SUPRASPINATUS TENDON: Primary | ICD-10-CM

## 2023-11-27 PROCEDURE — 97112 NEUROMUSCULAR REEDUCATION: CPT

## 2023-11-27 PROCEDURE — 97110 THERAPEUTIC EXERCISES: CPT

## 2023-11-27 NOTE — PROGRESS NOTES
Daily Note     Today's date: 2023  Patient name: Tina Friday  : 1960  MRN: [id number]  Referring provider: Mane Mercedes  Dx:   Encounter Diagnosis     ICD-10-CM    1. Tear of left supraspinatus tendon  M75.102       2. Acute pain of left shoulder  M25.512           Start Time: 0700  Stop Time: 0738  Total time in clinic (min): 38 minutes    Subjective: Pt reports no change in L Sh status - continues to have pain. Is ready to have the surgery. Objective: See treatment diary below      Assessment: Tolerated treatment well. Patient would benefit from continued PT. Pt tolerated continuation of POC this visit. Most difficulty with overhead/elevation tasks as well as ER. Will see pt following surgery. Discussed continuation of HEP at this time. 1:1 with Andres Cristina DPT entirety of tx. Plan: Discharge for 3 weeks until RTC repair.        Precautions: L RTC repair 2023 scheduled    POC expires Unit limit PT/OT + Visit Limit   23 BOMN BOMN       Pertinent Findings:                                                                                            Test / Measure  9/15/2022   FOTO (Predicted 66) 41   L Sh flex AROM 145 deg   L Sh abd AROM 135 deg   L Sh ER MMT 3/5         VISIT 1 2 3   Manuals    L Sh PROM MM 5' MM 8'                      Neuro Re-Ed      Flexion AAROM 10x hands clasped 2x10 cane supine 2x10 cane stand   Abduction AAROM   2x10 cane stand   ER AAROM      Flex, abd, ER isometrics      Table slides      Wall slides  10x 10x flex  10x scap   Serratus punches 10x3" 10x3"    Supine ABCs  2x    Vulcan press w/ cane   2x10   Ther Ex      HEP review 5'     UBE  3'/3' 3'/3'   Pulleys  4' flex    DB elevation 3 way  Flex, abd only 2x10 ea    S/L ER  2x10    TB rows  2x10 btb 2x10 mtb   TB Sh ext  2x10 gtb 2x10 btb   TB ER/IR walkouts   10x otb ea   Ther Activity                  Gait Training                  Modalities

## 2023-11-29 ENCOUNTER — ANESTHESIA EVENT (OUTPATIENT)
Dept: PERIOP | Facility: AMBULARY SURGERY CENTER | Age: 63
End: 2023-11-29
Payer: COMMERCIAL

## 2023-12-04 NOTE — PRE-PROCEDURE INSTRUCTIONS
Pre-Surgery Instructions:   Medication Instructions    apixaban (Eliquis) 5 mg Stop taking 2 days prior to surgery. Per patient per surgeon    atorvastatin (LIPITOR) 40 mg tablet Take day of surgery. Cholecalciferol (VITAMIN D) 50 MCG (2000 UT) CAPS Stop taking 7 days prior to surgery. levothyroxine (Synthroid) 25 mcg tablet Take day of surgery. Medication instructions for day surgery reviewed. Please use only a sip of water to take your instructed medications. Avoid all over the counter vitamins, supplements and NSAIDS for one week prior to surgery per anesthesia guidelines. Tylenol is ok to take as needed. You will receive a call one business day prior to surgery with an arrival time and hospital directions. If your surgery is scheduled on a Monday, the hospital will be calling you on the Friday prior to your surgery. If you have not heard from anyone by 8pm, please call the hospital supervisor through the hospital  at [phone]. Coretta Holland [phone]). Do not eat or drink anything after midnight the night before your surgery, including candy, mints, lifesavers, or chewing gum. Do not drink alcohol 24hrs before your surgery. Try not to smoke at least 24hrs before your surgery. Follow the pre surgery showering instructions as listed in the [facility] Surgical Experience Booklet” or otherwise provided by your surgeon's office. Do not use a blade to shave the surgical area 1 week before surgery. It is okay to use a clean electric clippers up to 24 hours before surgery. Do not apply any lotions, creams, including makeup, cologne, deodorant, or perfumes after showering on the day of your surgery. Do not use dry shampoo, hair spray, hair gel, or any type of hair products. No contact lenses, eye make-up, or artificial eyelashes. Remove nail polish, including gel polish, and any artificial, gel, or acrylic nails if possible. Remove all jewelry including rings and body piercing jewelry. Wear causal clothing that is easy to take on and off. Consider your type of surgery. Keep any valuables, jewelry, piercings at home. Please bring any specially ordered equipment (sling, braces) if indicated. Arrange for a responsible person to drive you to and from the hospital on the day of your surgery. Visitor Guidelines discussed. Call the surgeon's office with any new illnesses, exposures, or additional questions prior to surgery. Please reference your [facility] Surgical Experience Booklet” for additional information to prepare for your upcoming surgery.

## 2023-12-07 ENCOUNTER — OFFICE VISIT (OUTPATIENT)
Dept: OBGYN CLINIC | Facility: OTHER | Age: 63
End: 2023-12-07
Payer: COMMERCIAL

## 2023-12-07 VITALS
HEART RATE: 76 BPM | WEIGHT: 214 LBS | HEIGHT: 74 IN | DIASTOLIC BLOOD PRESSURE: 76 MMHG | SYSTOLIC BLOOD PRESSURE: 130 MMHG | BODY MASS INDEX: 27.46 KG/M2

## 2023-12-07 DIAGNOSIS — M75.102 TEAR OF LEFT SUPRASPINATUS TENDON: Primary | ICD-10-CM

## 2023-12-07 DIAGNOSIS — S42.115D CLOSED NONDISPLACED FRACTURE OF BODY OF LEFT SCAPULA WITH ROUTINE HEALING, SUBSEQUENT ENCOUNTER: ICD-10-CM

## 2023-12-07 PROCEDURE — 99213 OFFICE O/P EST LOW 20 MIN: CPT | Performed by: ORTHOPAEDIC SURGERY

## 2023-12-07 NOTE — PROGRESS NOTES
Assessment  Diagnoses and all orders for this visit:    Tear of left supraspinatus tendon    Closed nondisplaced fracture of body of left scapula with routine healing, subsequent encounter    Discussion and Plan:    MRI reveals a likely full thickness tear of his supraspinatus tendon of the left shoulder but does appear to be acute in nature as there is no retraction and no muscle atrophy. He denies pain about his nondisplaced scapula fracture. He wishes to continue forward with the arthroscopic procedure to address his left rotator cuff. He understood and all questions were answered. He will follow up post operatively with Reena Primrose, PA-C    Subjective:   Patient ID: Ysabel Mahan is a 59 y.o. male presents for a follow up visit today for his left scapula fracture and left shoulder pain. Patient was injured on 9/12/23 after a fall of about 5 feet off of a deck. He reports that he scapula is improving but he is having lateral aspect shoulder pain with inability to raise arm against gravity. He has been performing at home physical therapy twice a day directed by a physical therapist in the family. He denies numbness and tingling. No fevers or chills. The following portions of the patient's history were reviewed and updated as appropriate: allergies, current medications, past family history, past medical history, past social history, past surgical history and problem list.    Objective:  /76 (BP Location: Right arm, Patient Position: Sitting, Cuff Size: Adult)   Pulse 76   Ht 6' 2" (1.88 m)   Wt 97.1 kg (214 lb)   BMI 27.48 kg/m²       Left Shoulder Exam     Range of Motion   Left shoulder forward flexion: AROM: 130. Full PROM. Muscle Strength   Abduction: 4/5     Tests   Drop arm: positive    Other   Erythema: absent  Sensation: normal  Pulse: present             Physical Exam  Constitutional:       General: He is not in acute distress. Appearance: He is well-developed.    Eyes: Conjunctiva/sclera: Conjunctivae normal.      Pupils: Pupils are equal, round, and reactive to light. Cardiovascular:      Rate and Rhythm: Normal rate and regular rhythm. Pulmonary:      Effort: Pulmonary effort is normal.      Breath sounds: Normal breath sounds. Abdominal:      General: Bowel sounds are normal.      Palpations: Abdomen is soft. Musculoskeletal:      Cervical back: Normal range of motion and neck supple. Skin:     General: Skin is warm and dry. Findings: No erythema or rash. Neurological:      Mental Status: He is alert and oriented to person, place, and time. Deep Tendon Reflexes: Reflexes are normal and symmetric. Psychiatric:         Behavior: Behavior normal.           I have personally reviewed pertinent films in PACS and my interpretation is as follows. MRI Left Shoulder 10/23/23: Likely full thickness tear of the supraspinatus tendon.  Continued healing of nondisplaced scapula body fracture      Scribe Attestation      I,:  Royal Burgess am acting as a scribe while in the presence of the attending physician.:       I,:  Jacky Bass MD personally performed the services described in this documentation    as scribed in my presence.:

## 2023-12-13 ENCOUNTER — ANESTHESIA (OUTPATIENT)
Dept: PERIOP | Facility: AMBULARY SURGERY CENTER | Age: 63
End: 2023-12-13
Payer: COMMERCIAL

## 2023-12-13 ENCOUNTER — HOSPITAL ENCOUNTER (OUTPATIENT)
Facility: AMBULARY SURGERY CENTER | Age: 63
Setting detail: OUTPATIENT SURGERY
Discharge: HOME | End: 2023-12-13
Attending: ORTHOPAEDIC SURGERY | Admitting: ORTHOPAEDIC SURGERY
Payer: COMMERCIAL

## 2023-12-13 VITALS
SYSTOLIC BLOOD PRESSURE: 118 MMHG | HEART RATE: 98 BPM | WEIGHT: 210 LBS | RESPIRATION RATE: 16 BRPM | HEIGHT: 74 IN | OXYGEN SATURATION: 93 % | DIASTOLIC BLOOD PRESSURE: 70 MMHG | BODY MASS INDEX: 26.95 KG/M2 | TEMPERATURE: 97.5 F

## 2023-12-13 DIAGNOSIS — M75.102 TEAR OF LEFT SUPRASPINATUS TENDON: Primary | ICD-10-CM

## 2023-12-13 PROCEDURE — C1713 ANCHOR/SCREW BN/BN,TIS/BN: HCPCS | Performed by: ORTHOPAEDIC SURGERY

## 2023-12-13 PROCEDURE — 29826 SHO ARTHRS SRG DECOMPRESSION: CPT | Performed by: ORTHOPAEDIC SURGERY

## 2023-12-13 PROCEDURE — NC001 PR NO CHARGE: Performed by: ORTHOPAEDIC SURGERY

## 2023-12-13 PROCEDURE — C9290 INJ, BUPIVACAINE LIPOSOME: HCPCS | Performed by: ANESTHESIOLOGY

## 2023-12-13 PROCEDURE — 29826 SHO ARTHRS SRG DECOMPRESSION: CPT | Performed by: PHYSICIAN ASSISTANT

## 2023-12-13 PROCEDURE — 29827 SHO ARTHRS SRG RT8TR CUF RPR: CPT | Performed by: ORTHOPAEDIC SURGERY

## 2023-12-13 PROCEDURE — 29827 SHO ARTHRS SRG RT8TR CUF RPR: CPT | Performed by: PHYSICIAN ASSISTANT

## 2023-12-13 DEVICE — BIO-COMP SWVLK C, CLD 4.75X19.1MM
Type: IMPLANTABLE DEVICE | Site: SHOULDER | Status: FUNCTIONAL
Brand: ARTHREX®

## 2023-12-13 DEVICE — SP FIBERTAK RC, DBLOAD TAPE BL/W, BLK/W
Type: IMPLANTABLE DEVICE | Site: SHOULDER | Status: FUNCTIONAL
Brand: ARTHREX®

## 2023-12-13 RX ORDER — MIDAZOLAM HYDROCHLORIDE 2 MG/2ML
INJECTION, SOLUTION INTRAMUSCULAR; INTRAVENOUS AS NEEDED
Status: DISCONTINUED | OUTPATIENT
Start: 2023-12-13 | End: 2023-12-13

## 2023-12-13 RX ORDER — ONDANSETRON 2 MG/ML
4 INJECTION INTRAMUSCULAR; INTRAVENOUS EVERY 6 HOURS PRN
Status: DISCONTINUED | OUTPATIENT
Start: 2023-12-13 | End: 2023-12-13 | Stop reason: HOSPADM

## 2023-12-13 RX ORDER — PHENYLEPHRINE HCL IN 0.9% NACL 1 MG/10 ML
SYRINGE (ML) INTRAVENOUS AS NEEDED
Status: DISCONTINUED | OUTPATIENT
Start: 2023-12-13 | End: 2023-12-13

## 2023-12-13 RX ORDER — ONDANSETRON 2 MG/ML
INJECTION INTRAMUSCULAR; INTRAVENOUS AS NEEDED
Status: DISCONTINUED | OUTPATIENT
Start: 2023-12-13 | End: 2023-12-13

## 2023-12-13 RX ORDER — GLYCOPYRROLATE 0.2 MG/ML
INJECTION INTRAMUSCULAR; INTRAVENOUS AS NEEDED
Status: DISCONTINUED | OUTPATIENT
Start: 2023-12-13 | End: 2023-12-13

## 2023-12-13 RX ORDER — CEFAZOLIN SODIUM 2 G/50ML
2000 SOLUTION INTRAVENOUS ONCE
Status: COMPLETED | OUTPATIENT
Start: 2023-12-13 | End: 2023-12-13

## 2023-12-13 RX ORDER — BUPIVACAINE HYDROCHLORIDE 5 MG/ML
INJECTION, SOLUTION EPIDURAL; INTRACAUDAL
Status: COMPLETED | OUTPATIENT
Start: 2023-12-13 | End: 2023-12-13

## 2023-12-13 RX ORDER — PROPOFOL 10 MG/ML
INJECTION, EMULSION INTRAVENOUS AS NEEDED
Status: DISCONTINUED | OUTPATIENT
Start: 2023-12-13 | End: 2023-12-13

## 2023-12-13 RX ORDER — SODIUM CHLORIDE, SODIUM LACTATE, POTASSIUM CHLORIDE, CALCIUM CHLORIDE 600; 310; 30; 20 MG/100ML; MG/100ML; MG/100ML; MG/100ML
INJECTION, SOLUTION INTRAVENOUS CONTINUOUS PRN
Status: DISCONTINUED | OUTPATIENT
Start: 2023-12-13 | End: 2023-12-13

## 2023-12-13 RX ORDER — HYDROCODONE BITARTRATE AND ACETAMINOPHEN 5; 325 MG/1; MG/1
1 TABLET ORAL EVERY 8 HOURS PRN
Qty: 12 TABLET | Refills: 0 | Status: SHIPPED | OUTPATIENT
Start: 2023-12-13

## 2023-12-13 RX ORDER — FENTANYL CITRATE 50 UG/ML
INJECTION, SOLUTION INTRAMUSCULAR; INTRAVENOUS AS NEEDED
Status: DISCONTINUED | OUTPATIENT
Start: 2023-12-13 | End: 2023-12-13

## 2023-12-13 RX ORDER — ONDANSETRON 2 MG/ML
4 INJECTION INTRAMUSCULAR; INTRAVENOUS ONCE AS NEEDED
Status: DISCONTINUED | OUTPATIENT
Start: 2023-12-13 | End: 2023-12-13 | Stop reason: HOSPADM

## 2023-12-13 RX ORDER — ACETAMINOPHEN 325 MG/1
650 TABLET ORAL EVERY 6 HOURS PRN
Status: DISCONTINUED | OUTPATIENT
Start: 2023-12-13 | End: 2023-12-13 | Stop reason: HOSPADM

## 2023-12-13 RX ORDER — LIDOCAINE HYDROCHLORIDE 20 MG/ML
INJECTION, SOLUTION EPIDURAL; INFILTRATION; INTRACAUDAL; PERINEURAL AS NEEDED
Status: DISCONTINUED | OUTPATIENT
Start: 2023-12-13 | End: 2023-12-13

## 2023-12-13 RX ORDER — DEXAMETHASONE SODIUM PHOSPHATE 10 MG/ML
INJECTION, SOLUTION INTRAMUSCULAR; INTRAVENOUS AS NEEDED
Status: DISCONTINUED | OUTPATIENT
Start: 2023-12-13 | End: 2023-12-13

## 2023-12-13 RX ORDER — CHLORHEXIDINE GLUCONATE ORAL RINSE 1.2 MG/ML
15 SOLUTION DENTAL ONCE
Status: DISCONTINUED | OUTPATIENT
Start: 2023-12-13 | End: 2023-12-13 | Stop reason: HOSPADM

## 2023-12-13 RX ORDER — EPHEDRINE SULFATE 50 MG/ML
INJECTION INTRAVENOUS AS NEEDED
Status: DISCONTINUED | OUTPATIENT
Start: 2023-12-13 | End: 2023-12-13

## 2023-12-13 RX ORDER — FENTANYL CITRATE 50 UG/ML
25 INJECTION, SOLUTION INTRAMUSCULAR; INTRAVENOUS
Status: DISCONTINUED | OUTPATIENT
Start: 2023-12-13 | End: 2023-12-13 | Stop reason: HOSPADM

## 2023-12-13 RX ADMIN — CEFAZOLIN SODIUM 2000 MG: 2 SOLUTION INTRAVENOUS at 10:57

## 2023-12-13 RX ADMIN — SODIUM CHLORIDE, SODIUM LACTATE, POTASSIUM CHLORIDE, AND CALCIUM CHLORIDE: .6; .31; .03; .02 INJECTION, SOLUTION INTRAVENOUS at 10:29

## 2023-12-13 RX ADMIN — BUPIVACAINE 20 ML: 13.3 INJECTION, SUSPENSION, LIPOSOMAL INFILTRATION at 10:37

## 2023-12-13 RX ADMIN — MIDAZOLAM 2 MG: 1 INJECTION INTRAMUSCULAR; INTRAVENOUS at 10:35

## 2023-12-13 RX ADMIN — EPHEDRINE SULFATE 10 MG: 50 INJECTION, SOLUTION INTRAVENOUS at 11:13

## 2023-12-13 RX ADMIN — FENTANYL CITRATE 50 MCG: 50 INJECTION INTRAMUSCULAR; INTRAVENOUS at 11:08

## 2023-12-13 RX ADMIN — DEXAMETHASONE SODIUM PHOSPHATE 10 MG: 10 INJECTION INTRAMUSCULAR; INTRAVENOUS at 11:03

## 2023-12-13 RX ADMIN — SODIUM CHLORIDE, SODIUM LACTATE, POTASSIUM CHLORIDE, AND CALCIUM CHLORIDE: .6; .31; .03; .02 INJECTION, SOLUTION INTRAVENOUS at 11:13

## 2023-12-13 RX ADMIN — GLYCOPYRROLATE 0.2 MG: 0.2 INJECTION INTRAMUSCULAR; INTRAVENOUS at 11:18

## 2023-12-13 RX ADMIN — ONDANSETRON 4 MG: 2 INJECTION INTRAMUSCULAR; INTRAVENOUS at 11:03

## 2023-12-13 RX ADMIN — LIDOCAINE HYDROCHLORIDE 100 MG: 20 INJECTION, SOLUTION EPIDURAL; INFILTRATION; INTRACAUDAL; PERINEURAL at 11:03

## 2023-12-13 RX ADMIN — BUPIVACAINE HYDROCHLORIDE 5 ML: 5 INJECTION, SOLUTION EPIDURAL; INTRACAUDAL at 10:37

## 2023-12-13 RX ADMIN — Medication 100 MCG: at 11:45

## 2023-12-13 RX ADMIN — PROPOFOL 300 MG: 10 INJECTION, EMULSION INTRAVENOUS at 11:03

## 2023-12-13 NOTE — ANESTHESIA PROCEDURE NOTES
Peripheral Block    Patient location during procedure: holding area  Start time: 12/13/2023 10:37 AM  Reason for block: at surgeon's request and post-op pain management  Staffing  Performed by: Alejandro Gama MD  Authorized by: Alejandro Gama MD    Preanesthetic Checklist  Completed: patient identified, IV checked, site marked, risks and benefits discussed, surgical consent, monitors and equipment checked, pre-op evaluation and timeout performed  Peripheral Block  Patient position: supine  Prep: ChloraPrep  Patient monitoring: frequent blood pressure checks, continuous pulse oximetry and heart rate  Block type: Interscalene  Laterality: left  Injection technique: single-shot  Procedures: ultrasound guided, Ultrasound guidance required for the procedure to increase accuracy and safety of medication placement and decrease risk of complications. Ultrasound permanent image savedbupivacaine (PF) (MARCAINE) 0.5 % injection 20 mL - Perineural   5 mL - 12/13/2023 10:37:00 AM  bupivacaine liposomal (EXPAREL) 1.3 % injection 20 mL - Perineural   20 mL - 12/13/2023 10:37:00 AM  Needle  Needle type: Stimuplex   Needle length: 4 in  Needle localization: anatomical landmarks and ultrasound guidance  Assessment  Injection assessment: incremental injection, frequent aspiration, injected with ease, negative aspiration, negative for heart rate change, no paresthesia on injection, no symptoms of intraneural/intravenous injection and needle tip visualized at all times  Paresthesia pain: none  Post-procedure:  site cleaned  patient tolerated the procedure well with no immediate complications  Additional Notes  Initial aspiration negative.  Blood aspirated after first 5mL injected, needle removed and flushed, second attempt no blood aspirated

## 2023-12-13 NOTE — DISCHARGE INSTR - AVS FIRST PAGE
You are being scheduled for a shoulder arthroscopy to treat your symptoms. Below are some instructions and information on what to expect before and after your surgery. Pre-Surgical Preparation for Arthroscopic Shoulder Surgery: You will be contacted the evening prior to your surgery to confirm the scheduled time of the procedure and when to arrive at the hospital.   Do not eat or drink anything after midnight the night before your surgery. Since you are having out-patient surgery, make sure that you have someone who can drive you home later in the day. Also, prepare that person for a long day, as the process of safely preparing for and recovering from the procedure is more time consuming than the actual procedure! As you will be in a sling after surgery, please wear or bring a loose fitting button-down shirt so that you can easily place this over the sling when you leave the surgical suite. This avoids having to place the operative arm in a sleeve. Most patients find that this is the easiest outfit to wear for the first week or so after surgery so you may want to plan accordingly. Most patients find that lying down in bed after shoulder surgery accentuates their discomfort. This is likely related to the effect of gravity on the swelling in the shoulder. As a result, most patients sleep better in a recliner or in bed with pillows propped up behind their back for the first few days or weeks after surgery. It is a good idea to plan for this ahead of time so there will be less hassle getting things set up the night after surgery. What to Expect After Arthroscopic Shoulder Surgery: It is normal to have swelling and discomfort in the shoulder for several days or a week after surgery. It is also normal to have a small amount of drainage from the surgical wounds (especially the first few days after surgery), as we put fluid into the shoulder to visualize the structures during surgery. It is NOT normal to have foul smelling, purulent drainage and if this is noted, please contact the office immediately or proceed to the emergency room for evaluation as this may indicate an infection. Applying ice bags to the shoulder may help with pain that is not controlled by the regional block. Ice should be applied 20-30 minutes at a time, every hour or two. Make sure to put a thin towel or T-shirt next to your skin to avoid direct contact of the ice with the skin. Icing is most helpful in the first 48 hours, although many people find that continuing past this time frame lessens their postoperative pain. Please note that your post-operative dressing is not conductive to ice, so if you need to, it is okay to remove that dressing even the night after surgery and place band-aids over the wounds in order for the ice to take effect. Pain Control    Most patients will receive a nerve block, the local anesthetic may keep your whole arm numb for up to 4 days. You will be given a prescription for narcotic pain medication when you are discharged from the hospital.  With the newer nerve block that is being utilized, patients are rarely requiring the use of this narcotic pain medication. If you find you do not tolerate that type of pain medicine well, call our office and we will try another one. In addition to the narcotic pain medication, it is safe to use an anti-inflammatory (unless the patient has a medical condition that would not allow safe use of this mediation). This includes the Advil, Motrin, Ibuprofen and Alleve category of medications. Simply follow the over the counter dosing on the package and use as indicated as another adjunct. Importantly since these medications are all very similar, use only one of them. Tylenol is a separate medication that can be utilized as well and can be taken at the same time as the other medication or given in a "staggered" manner.   Just make sure that you follow the dosing on the over the counter bottle instructions. Also make sure that the pain medication prescribed by Dr Carmelo Bowie team does not contain acetaminophen (this is found in Percocet and Vicodin). Typically we do not prescribe those types of pain medications but if for some reason that has been prescribed DO NOT add more Tylenol (acetaminophen) as you could end up taking too much of that medication. As mentioned above, most patients find that lying down accentuates their discomfort. You might sleep better in a recliner, or propped up in bed. Dr. Elidia Mathew encourages patients to safely ambulate around the house as much as possible in the first few days after the procedure as this can help with blood circulation in the legs. While the incidence of blood clots is very rare following shoulder surgery, early ambulation is a great way to help decrease the already low rate. 24 hours after the surgery you may remove the bandage and cover incisions with Band-Aids if needed. At that time you may shower, the wounds will have a surgical glue that will protect them from shower water but do not submerge your incisions directly (bathing or swimming) until at least 2 weeks post-operatively. It is safe to let the arm hang at the side and take a shower and put on a shirt without the sling on. Just make sure that you do not use the operative are to reach out and grab anything as that may damage the repair. When you are done showering and getting dressed please return the operative arm to the sling. Unless noted otherwise in your discharge paperwork, Dr. Elidia Mathew uses absorbable sutures so they do not need to be removed. Dr. Linnea Mckeon physician assistant (PA) will see you in the office a few days after the procedure to review the intra-operative findings and to initiate physical therapy if appropriate.   A post-operative appointment should have been scheduled for you already, but if for some reason this did not happen, please call the office to make one. Physical therapy is important after nearly all shoulder surgeries and a detailed rehabilitation plan based on the specific intra-operative findings and procedures will be provided to your therapist at the first post-operative office visit. Most patients have post-operative therapy appointments scheduled pre-operatively, but if you do not, that will be handled at the first post-operative office visit. Unless expressly directed otherwise it is safe to remove the sling even the first day after the surgery and let the arm hang by the side. This allows patients to shower and even put a shirt on (bad arm in the sleeve first). It is also safe to flex and extend their wrist, hand and fingers as much as possible when the block wears off. These simple motions can serve to pump fluid out of the forearm and decrease swelling in the arm.

## 2023-12-13 NOTE — OP NOTE
OPERATIVE REPORT  PATIENT NAME: Scottie Galvan    :  1960  MRN: [id number]  Pt Location: [facility] OR [facility]OM 06    SURGERY DATE: 2023     SURGEON: Vinh Boudreaux MD     ASSISTANT: Heriberto Gray PA-C     NOTE: Heriberto Gray PA-C was present throughout the entire procedure and performed essential assistance with patient prepping, draping, positioning, suture management, wound closure, sterile dressing application and sling application, all under my direct supervision. NOTE: No qualified resident physician was available for assistance    PREOPERATIVE DIAGNOSIS:  Left Shoulder Supraspinatus Tear    POSTOPERATIVE DIAGNOSIS: Same    PROCEDURES: Surgical Arthroscopy Left Shoulder with Rotator Cuff Repair and Subacromial Decompression    ANESTHESIA STAFF: Tammy Bautista MD     ANESTHESIA TYPE: General LMA with ultrasound guided interscalene block (Exparel). The interscalene block was provided by the anesthesia staff per my request for postoperative pain control and to decrease the use of postoperative narcotic medication for pain control. COMPLICATIONS: None    FINDINGS: Supraspinatus Tear    SPECIMEN(S):  None    ESTIMATED BLOOD LOSS: Minimal    INDICATION:  Briefly, the patient is a 59 y.o.  male with left shoulder pain following a traumatic fracture of his shoulder. Plain radiographs and an MRI scan confirmed a healed scapular fracture with a near full thickness supraspinatus tear. The patient elected for arthroscopic treatment. Informed consent was obtained after a thorough discussion of the risks and benefits of the procedure, as well as alternatives to the procedure. OPERATIVE TECHNIQUE:  On the day of surgery, I identified the patient’s left shoulder and marked it with my initials. The patient was taken to the operating room where anesthesia was induced and 2 grams of IV Cefazolin were given.  The patient was examined in the supine position and was found to have full range of motion of the left shoulder with no instability . The patient was then positioned in the [address] [place] [address] chair position. All bony prominences were padded. The shoulder was prepped and draped in normal sterile fashion. After a time-out for safety, a standard posterolateral arthroscopic portal was made. Glenohumeral evaluation revealed intact glenohumeral articular cartilage with no loose bodies. There was a near full thickness supraspinatus tear involving already of the insertion of the supraspinatus with intact bursal sided fibers. The infraspinatus and subscapularis were intact as was the long head of biceps which was seen without partial tear or instability. After the intra-articular evaluation was completed, the scope was then placed in the subacromial space through the same portal where a thorough bursectomy was performed. The bursal sided fibers were released converting this to a full thickness supraspinatus tear was found to measure 1.2 cm from anterior to posterior and was able to be easily reduced to the tuberosity. The tuberosity was prepared in routine fashion and a double row suture bridge configuration repair with one medial 2.6 mm double loaded Arthrex All-Suture anchor and one lateral 4.75 mm Arthrex BioComposite SwiveLock anchor using four stiches through the tendon in horizontal mattress fashion was performed achieving anatomic reduction of the rotator cuff tendon to the tuberosity. The long head of biceps was not indicated for tenodesis given intra-operative appearance. The CA ligament was frayed so it was released off the anterolateral edge of acromion and a gentle acromioplasty was performed. The area was then irrigated. Scope was withdrawn. Wounds were closed with 4-0 Monocryl and Histoacryl. Sterile dressings and a sling with an abduction pillow was placed. The patient was awoken without complication and returned to the recovery room in good condition.  We will see the patient back in the office next week to initiate therapy following accelerated rotator cuff repair rehabilitation (conversion of near full-thickness to full-thickness tear) protocol. At the end of procedure, the counts were correct.      PATIENT DISPOSITION:  Stable to PACU      SIGNATURE: Kortney Arteaga MD  DATE: December 13, 2023  TIME: 11:56 AM

## 2023-12-13 NOTE — ANESTHESIA PREPROCEDURE EVALUATION
Procedure:  SHOULDER ARTHROSCOPIC ROTATOR CUFF REPAIR (Left: Shoulder)    Relevant Problems   CARDIO   (+) Chest pain   (+) Hyperlipidemia      ENDO   (+) Hypothyroidism      /RENAL   (+) Nephrolithiasis      MUSCULOSKELETAL   (+) Sacroiliitis (HCC)        Physical Exam    Airway    Mallampati score: II  TM Distance: >3 FB  Neck ROM: full     Dental   No notable dental hx     Cardiovascular      Pulmonary      Other Findings        Anesthesia Plan  ASA Score- 2     Anesthesia Type- general with ASA Monitors. Additional Monitors:     Airway Plan: LMA. Comment: With pre-op interscalene nerve block with exparel per surgeon request for post-op pain. Plan Factors-    Chart reviewed. Patient summary reviewed. Induction- intravenous. Postoperative Plan- Plan for postoperative opioid use. Planned trial extubation    Informed Consent- Anesthetic plan and risks discussed with patient. I personally reviewed this patient with the CRNA. Discussed and agreed on the Anesthesia Plan with the CRNA. Amber Mason

## 2023-12-13 NOTE — ANESTHESIA POSTPROCEDURE EVALUATION
Post-Op Assessment Note    CV Status:  Stable  Pain Score: 0    Pain management: adequate       Mental Status:  Awake and alert   Hydration Status:  Stable   PONV Controlled:  None   Airway Patency:  Patent and adequate     Post Op Vitals Reviewed: Yes      Staff: CRNA, Anesthesiologist               BP   106/67   Temp   97.1   Pulse  83   Resp   16   SpO2   96%

## 2023-12-13 NOTE — H&P
I identified and marked the patient in the pre-op holding area after confirming the surgical consent. No changes to medical health since the H&P was preformed. The patient's prescription history was queried in the [address] [facility] to ensure compliance with applicable state laws. Assessment  Diagnoses and all orders for this visit:     Tear of left supraspinatus tendon     Closed nondisplaced fracture of body of left scapula with routine healing, subsequent encounter     Discussion and Plan:     MRI reveals a likely full thickness tear of his supraspinatus tendon of the left shoulder but does appear to be acute in nature as there is no retraction and no muscle atrophy. He denies pain about his nondisplaced scapula fracture. He wishes to continue forward with the arthroscopic procedure to address his left rotator cuff. He understood and all questions were answered. He will follow up post operatively with Benja Singleton PA-C     Subjective:   Patient ID: Edenilson Higgins is a 59 y.o. male presents for a follow up visit today for his left scapula fracture and left shoulder pain. Patient was injured on 9/12/23 after a fall of about 5 feet off of a deck. He reports that he scapula is improving but he is having lateral aspect shoulder pain with inability to raise arm against gravity. He has been performing at home physical therapy twice a day directed by a physical therapist in the family. He denies numbness and tingling. No fevers or chills.                     The following portions of the patient's history were reviewed and updated as appropriate: allergies, current medications, past family history, past medical history, past social history, past surgical history and problem list.     Objective:  /76 (BP Location: Right arm, Patient Position: Sitting, Cuff Size: Adult)   Pulse 76   Ht 6' 2" (1.88 m)   Wt 97.1 kg (214 lb)   BMI 27.48 kg/m²         Left Shoulder Exam      Range of Motion Left shoulder forward flexion: AROM: 130. Full PROM. Muscle Strength   Abduction: 4/5      Tests   Drop arm: positive     Other   Erythema: absent  Sensation: normal  Pulse: present                  Physical Exam  Constitutional:       General: He is not in acute distress. Appearance: He is well-developed. Eyes:      Conjunctiva/sclera: Conjunctivae normal.      Pupils: Pupils are equal, round, and reactive to light. Cardiovascular:      Rate and Rhythm: Normal rate and regular rhythm. Sounds:  Normal   Pulmonary:      Effort: Pulmonary effort is normal.      Breath sounds: Normal breath sounds. Abdominal:      General: Bowel sounds are normal.      Palpations: Abdomen is soft. Musculoskeletal:      Cervical back: Normal range of motion and neck supple. Skin:     General: Skin is warm and dry. Findings: No erythema or rash. Neurological:      Mental Status: He is alert and oriented to person, place, and time. Deep Tendon Reflexes: Reflexes are normal and symmetric. Psychiatric:         Behavior: Behavior normal.               I have personally reviewed pertinent films in PACS and my interpretation is as follows. MRI Left Shoulder 10/23/23: Likely full thickness tear of the supraspinatus tendon.  Continued healing of nondisplaced scapula body fracture

## 2023-12-13 NOTE — ANESTHESIA PROCEDURE NOTES
Anesthesia Notable Event  No anethesia notable event occurred.     Date/Time: 12/13/2023 11:45 PM    Performed by: Jasmina Espinal CRNA  Authorized by: Margaux Smith MD

## 2023-12-18 ENCOUNTER — OFFICE VISIT (OUTPATIENT)
Dept: OBGYN CLINIC | Facility: OTHER | Age: 63
End: 2023-12-18

## 2023-12-18 ENCOUNTER — OFFICE VISIT (OUTPATIENT)
Dept: PHYSICAL THERAPY | Facility: REHABILITATION | Age: 63
End: 2023-12-18
Payer: COMMERCIAL

## 2023-12-18 VITALS
SYSTOLIC BLOOD PRESSURE: 129 MMHG | BODY MASS INDEX: 27.21 KG/M2 | HEART RATE: 102 BPM | HEIGHT: 74 IN | WEIGHT: 212 LBS | DIASTOLIC BLOOD PRESSURE: 90 MMHG

## 2023-12-18 DIAGNOSIS — M25.512 ACUTE PAIN OF LEFT SHOULDER: ICD-10-CM

## 2023-12-18 DIAGNOSIS — Z98.890 S/P LEFT ROTATOR CUFF REPAIR: Primary | ICD-10-CM

## 2023-12-18 DIAGNOSIS — M75.102 TEAR OF LEFT SUPRASPINATUS TENDON: Primary | ICD-10-CM

## 2023-12-18 PROCEDURE — 99024 POSTOP FOLLOW-UP VISIT: CPT | Performed by: ORTHOPAEDIC SURGERY

## 2023-12-18 PROCEDURE — 97164 PT RE-EVAL EST PLAN CARE: CPT

## 2023-12-18 PROCEDURE — 97112 NEUROMUSCULAR REEDUCATION: CPT

## 2023-12-18 PROCEDURE — 97140 MANUAL THERAPY 1/> REGIONS: CPT

## 2023-12-18 NOTE — PROGRESS NOTES
Assessment:  1. Tear of left supraspinatus tendon              Surgery: Shoulder Arthroscopic Rotator Cuff Repair, Sad - Left  Date of Surgery: 12/13/2023        Discussion and Plan:   Continue sling as instructed.  Keep therapy appointment as scheduled.  Patient may remove the pillow from the sling.  Patient provided with copies of the PT protocol     Follow Up:  Return for 6-8 weeks, with Keyonna Garcia PA-C.        CHIEF COMPLAINT:  Chief Complaint   Patient presents with    Left Shoulder - Post-op         SUBJECTIVE:  Asher Martin is a 63 y.o. year old male who presents for follow up after Shoulder Arthroscopic Rotator Cuff Repair, Sad - Left. Today patient reports the block didn't last as long as last time.       PHYSICAL EXAMINATION:  General: well developed and well nourished, alert, oriented times 3, and appears comfortable  Psychiatric: Normal    MUSCULOSKELETAL EXAMINATION:  Incision: Clean, dry, intact  Surgery Site: normal, no evidence of infection   Range of Motion: As expected  Neurovascular status: Neuro intact, good cap refill  Activity Restrictions:  sling           Scribe Attestation      I,:  Michelle Marcial am acting as a scribe while in the presence of the attending physician.:       I,:  Alfonzo Mccracken MD personally performed the services described in this documentation    as scribed in my presence.:

## 2023-12-18 NOTE — PROGRESS NOTES
PT Re-Evaluation - Post-Op     Today's date: 2023  Patient name: Asher Martin  : 1960  MRN: [id number]  Referring provider: Alfonzo Mccracken*  Dx:   Encounter Diagnosis     ICD-10-CM    1. S/P left rotator cuff repair  Z98.890 PT plan of care cert/re-cert      2. Acute pain of left shoulder  M25.512 PT plan of care cert/re-cert          Start Time: 0900  Stop Time: 945  Total time in clinic (min): 45 minutes    Subjective:  S/p Surgical Arthroscopy Left Shoulder with Rotator Cuff Repair and Subacromial Decompression, repair supraspinatus on 2023. Block did not last as long as contralateral repair.  Had LUE paresthesias initially after surgery - no n/t this morning.  Uses ice, Tylenol, oxy.  Pain deep and anterior, throbbing constantly, 5/10 current.  Pain at worst 7-8/10.  States that incisions look good.  Noted significant swelling but ice has helped.        Objective: See treatment diary below    ROM: Goniometric measurement revealed the following findings.  Shoulder ROM Right pre op Left pre op Left  post-op   Flexion 170 145 AROM 80 PROM   Abduction 165 135 AROM 60 PROM   ER T3 T2 AROM 15 PROM   IR T9 T10 AROM 40 PROM           Strength: MMT revealed the following findings.  Joint Motion Right Left Left  post-op   Sh. Flexion 5/5 3-/5 1/5   Sh. Abduction 5/5 3-/5 1/5   Sh. ER 5/5 3/5 1/5   Sh. IR 5/5 4/5 1/5           Additional Assessments:  Palpation: no tenderness noted throughout L Sh  Joint mobility: WFL - pain at end range PROM    Assessment: Tolerated treatment well. Patient would benefit from continued PT.  1:1 with Edson Light DPT entirety of tx.    Assessment details: Asher Martin is a 62 y.o. male presenting with L Sh pain s/p Surgical Arthroscopy Left Shoulder with Rotator Cuff Repair and Subacromial Decompression, repair supraspinatus on 2023.  Scapular fracture from the fall is healing well.  Pt would benefit from skilled outpatient physical therapy  leading up to surgery to promote motot control improvement and mobility improvement.  Primary impairments include L Sh pain with functional activities, L Sh elevation and ER weakness, L Sh elevation AROM dysfunction, scapular motor control dysfunction.  Educated pt on anatomy and physiology of diagnosis.  Will benefit from skilled PT interventions for community reintegration, ADL management/independence, return to work/hobbies.  Provided pt with written home exercise program to be completed daily.    Impairments: abnormal coordination, abnormal muscle firing, abnormal muscle tone, abnormal or restricted ROM, activity intolerance, impaired physical strength, lacks appropriate home exercise program, pain with function, scapular dyskinesis, poor posture  and poor body mechanics  Functional limitations: pushing, pulling, reaching, overhead tasks, lifting, work duties, ADLs  Symptom irritability: highUnderstanding of Dx/Px/POC: good   Prognosis: good    Goals    Short Term Goals:  In 8 weeks, the patient will:  1. Improve L Sh flexion AROM to 170+ degrees  2. Improve L Sh abduction AROM to 165+ degrees  3. Supervision with HEP for self-care    Short Term Goals:  In 16 weeks, the patient will:  1. Improve L Sh flexion strength to at least 4/5 MMT  2. Improve L Sh abduction strength to at least 4/5 MMT  3. Independent with HEP for self-care  4. FOTO above predictive value    Plan: Continue per plan of care.     Patient would benefit from: skilled physical therapy  Planned modality interventions: manual electrical stimulation  Planned therapy interventions: abdominal trunk stabilization, activity modification, balance, balance/weight bearing training, behavior modification, body mechanics training, community reintegration, coordination, fine motor coordination training, flexibility, functional ROM exercises, gait training, graded activity, graded exercise, graded motor, home exercise program, work reintegration, therapeutic  "training, therapeutic exercise, therapeutic activities, stretching, strengthening, self care, postural training, patient education, neuromuscular re-education, motor coordination training, massage, manual therapy, joint mobilization and ADL training  Frequency: 2x week  Duration in weeks: 16  Plan of Care beginning date: 12/18/2023  Plan of Care expiration date: 4/8/2024  Treatment plan discussed with: patient     Precautions: L RTC repair 12/13/2023    POC expires Unit limit PT/OT + Visit Limit   4/8/24 BOMN BOMN       Pertinent Findings:                                                                                            Test / Measure  11/8/2023  Pre-Op 12/18/2023  Post-Op   FOTO 41 (Pred 66) 4 (Pred 52)   L Sh flex AROM 145 deg 80 deg PROM   L Sh abd AROM 135 deg 60 deg PROM   L Sh ER MMT 3/5 1/5   L Sh flex/abd MMT 3-/5 1/5         Manuals 12/18   L Sh PROM MM 8'               Neuro Re-Ed    Scap retr 15x5\"   Pendulum 4 way 1' ea                               Ther Ex    HEP review 5'   Pulleys    Elbow flex/ext    FA pro/sup    Wrist flex/ext    Gripper            Ther Activity            Gait Training            Modalities                     "

## 2023-12-22 ENCOUNTER — OFFICE VISIT (OUTPATIENT)
Dept: FAMILY MEDICINE CLINIC | Facility: CLINIC | Age: 63
End: 2023-12-22
Payer: COMMERCIAL

## 2023-12-22 ENCOUNTER — OFFICE VISIT (OUTPATIENT)
Dept: PHYSICAL THERAPY | Facility: REHABILITATION | Age: 63
End: 2023-12-22
Payer: COMMERCIAL

## 2023-12-22 VITALS
HEART RATE: 66 BPM | BODY MASS INDEX: 27.59 KG/M2 | HEIGHT: 74 IN | OXYGEN SATURATION: 96 % | DIASTOLIC BLOOD PRESSURE: 78 MMHG | WEIGHT: 215 LBS | SYSTOLIC BLOOD PRESSURE: 116 MMHG

## 2023-12-22 DIAGNOSIS — M75.102 TEAR OF LEFT SUPRASPINATUS TENDON: ICD-10-CM

## 2023-12-22 DIAGNOSIS — R73.02 IMPAIRED GLUCOSE TOLERANCE: Primary | ICD-10-CM

## 2023-12-22 DIAGNOSIS — M25.512 ACUTE PAIN OF LEFT SHOULDER: ICD-10-CM

## 2023-12-22 DIAGNOSIS — E55.9 VITAMIN D DEFICIENCY: ICD-10-CM

## 2023-12-22 DIAGNOSIS — Z98.890 S/P LEFT ROTATOR CUFF REPAIR: Primary | ICD-10-CM

## 2023-12-22 DIAGNOSIS — E03.9 HYPOTHYROIDISM, UNSPECIFIED TYPE: ICD-10-CM

## 2023-12-22 DIAGNOSIS — E78.2 MIXED HYPERLIPIDEMIA: ICD-10-CM

## 2023-12-22 PROBLEM — J98.11 ATELECTASIS OF LEFT LUNG: Status: RESOLVED | Noted: 2023-09-14 | Resolved: 2023-12-22

## 2023-12-22 PROCEDURE — 97110 THERAPEUTIC EXERCISES: CPT

## 2023-12-22 PROCEDURE — 97112 NEUROMUSCULAR REEDUCATION: CPT

## 2023-12-22 PROCEDURE — 97140 MANUAL THERAPY 1/> REGIONS: CPT

## 2023-12-22 PROCEDURE — 99214 OFFICE O/P EST MOD 30 MIN: CPT | Performed by: FAMILY MEDICINE

## 2023-12-22 NOTE — PROGRESS NOTES
"Daily Note     Today's date: 2023  Patient name: Asher Martin  : 1960  MRN: [id number]  Referring provider: Alfonzo Mccracken*  Dx:   Encounter Diagnosis     ICD-10-CM    1. S/P left rotator cuff repair  Z98.890       2. Acute pain of left shoulder  M25.512       3. Tear of left supraspinatus tendon  M75.102           Start Time: 0700  Stop Time: 07  Total time in clinic (min): 38 minutes    Subjective: Pt reports that he has been working but has been careful regarding L Sh.  States L biceps and L lateral Sh pain persists, but he has not been taking pain medication.        Objective: See treatment diary below      Assessment: Tolerated treatment well. Patient would benefit from continued PT.  Continues to have L Sh tightness, but mobility is gradually improving.  Good tolerance with PROM overpressure.  1:1 with Edson Light DPT entirety of tx.        Plan: Continue per plan of care.      Precautions: L RTC repair 2023    POC expires Unit limit PT/OT + Visit Limit   24 BOMN BOMN       Pertinent Findings:                                                                                            Test / Measure  2023  Pre-Op 2023  Post-Op   FOTO 41 (Pred 66) 4 (Pred 52)   L Sh flex AROM 145 deg 80 deg PROM   L Sh abd AROM 135 deg 60 deg PROM   L Sh ER MMT 3/5 1/5   L Sh flex/abd MMT 3-/5 1/5         Manuals    L Sh PROM MM 8' MM 10'                  Neuro Re-Ed     Scap retr 15x5\" 30x5\"   Pendulum 4 way 1' ea 1' ea                                      Ther Ex     HEP review 5'    Pulleys     Elbow flex/ext  30x ea   FA pro/sup  30x ea   Wrist flex/ext  30x ea   Gripper  2x1' black             Ther Activity               Gait Training               Modalities                          "

## 2023-12-22 NOTE — ASSESSMENT & PLAN NOTE
Patient is on 2000 units of vitamin D a day last vitamin D was normal we will recheck after the holidays

## 2023-12-22 NOTE — ASSESSMENT & PLAN NOTE
HISTORY AND PHYSICAL  Date of Service: 3/10/2022    CHIEF COMPLAINT:  Arm Pain (Pt is still having pain on his right arm and shoulder. Went to urgent care 1 week ago was given muscle relaxers but are not helping.) and Office Visit       HISTORY OF PRESENT ILLNESS:  José Luis Rich is a 34 year old male who presents with one week of right sided neck pain and trap pain, numbness and tingling into the arm and shooting pain and mobility issues in right hand  Has been seen for this, is taking flexeril and medrol dosepak  No known injury, just woke up like this  Did have this happen once before   Brother with HNPP  Patient states he is mostly right handed     MEDICAL HISTORY:  Pertinent positives and negatives noted above in HPI.  Patient-reported past medical history, past surgical history, medications, allergies, social history, and family history from new-patient questionnaire reviewed.  Data recorded previously in the electronic medical record reviewed and noted below:    Past Medical History:   Diagnosis Date   • No known problems        Past Surgical History:   Procedure Laterality Date   • Mole removal     • [place] tooth extraction         Current Outpatient Medications   Medication Sig Dispense Refill   • methylPREDNISolone (MEDROL DOSEPAK) 4 MG tablet follow package directions 21 tablet 0   • cyclobenzaprine (FLEXERIL) 10 MG tablet Take 1 tablet by mouth 3 times daily as needed for Muscle spasms. 30 tablet 1   • meloxicam (Mobic) 15 MG tablet Take 1 tablet by mouth daily. 30 tablet 0     No current facility-administered medications for this visit.       Allergies:  ALLERGIES:  No Known Allergies    Social History     Socioeconomic History   • Marital status: /Civil Union     Spouse name: Not on file   • Number of children: Not on file   • Years of education: Not on file   • Highest education level: Not on file   Occupational History   • Not on file   Tobacco Use   • Smoking status: Never Smoker   •  Will await blood work in the new year continue to try to follow a low carb low sugar diet over the holidays   Smokeless tobacco: Never Used   Vaping Use   • Vaping Use: never used   Substance and Sexual Activity   • Alcohol use: Yes     Comment: once a week   • Drug use: Never   • Sexual activity: Not on file   Other Topics Concern   • Not on file   Social History Narrative   • Not on file     Social Determinants of Health     Financial Resource Strain: Not on file   Food Insecurity: Not on file   Transportation Needs: Not on file   Physical Activity: Not on file   Stress: Not on file   Social Connections: Not on file   Intimate Partner Violence: Not At Risk   • Social Determinants: Intimate Partner Violence Past Fear: No   • Social Determinants: Intimate Partner Violence Current Fear: No       Family History   Family history unknown: Yes       REVIEW OF SYSTEMS:  Complete ten-system review of systems from new-patient questionnaire reviewed with patient.  Pertinent positives and negatives noted in HPI above.    PHYSICAL EXAM:  Vitals:   Ht Readings from Last 1 Encounters:   03/10/22 5' 9\" (1.753 m)      Wt Readings from Last 1 Encounters:   03/07/22 74.8 kg (165 lb)      General:  No acute distress; conversant.    HEENT:  Atraumatic, normocephalic.  Moist mucous membranes.  Lungs:  Non-labored respirations.  Cardiovascular:  Regular rate and rhythm by peripheral pulses.  Psych:  Alert and oriented.  Appropriate affect.  Skin:  Normal temperature.  No rash or ulcers noted.    Detailed Musculoskeletal Exam:  No midline tenderness cervical spine  +TTP right trap   + spurlings right  FROM shoulder, elbow, wrist  Neg jobs, speeds, yergeson, chairez, crossover, lift off  AIN/PIN/M/U/R intact  Compartments soft/compressible  Pulses 2+    ASSESSMENT:  José Luis Rich is a 34 year old male with right sided neck pain, associated RUE numbness/tingling and weakness, and right trap pain    PLAN:  Finish medrol dosepak  Start mobic  Will get x-ray c-spine, consider MRI  Start PT and home exercises  Strict ED precautions  Recheck in 4  weeks or sooner if needed      Nicki Sanford, DO

## 2023-12-22 NOTE — PROGRESS NOTES
Name: Asher Martin      : 1960      MRN: [id number]  Encounter Provider: Nathaly Richards MD  Encounter Date: 2023   Encounter department: [facility]    Assessment & Plan     1. Impaired glucose tolerance  Assessment & Plan:  Will await blood work in the new year continue to try to follow a low carb low sugar diet over the holidays    Orders:  -     Comprehensive metabolic panel; Future; Expected date: 2024  -     Hemoglobin A1C; Future    2. Hypothyroidism, unspecified type  Assessment & Plan:  Patient is on levothyroxine 25 mg will assess thyroid at the new year    Orders:  -     TSH, 3rd generation; Future    3. Mixed hyperlipidemia  Assessment & Plan:  Patient is on atorvastatin 40 mg we will recheck lab after the new year    Orders:  -     Lipid panel; Future    4. Vitamin D deficiency  Assessment & Plan:  Patient is on 2000 units of vitamin D a day last vitamin D was normal we will recheck after the     Orders:  -     Vitamin D 25 hydroxy; Future        Depression Screening and Follow-up Plan: Patient was screened for depression during today's encounter. They screened negative with a PHQ-2 score of 0.        Subjective      Patient presents with:  Follow-up: 4 months follow up  Patient has had his shoulder surgery since I saw him last is doing well he has been trying to stick with the diet but it is the holidays he would like to wait till after the holidays to repeat his blood work which I agree with      Review of Systems   Constitutional:  Negative for activity change, appetite change and fatigue.   Respiratory:  Negative for shortness of breath.    Cardiovascular:  Negative for chest pain.   Musculoskeletal:  Positive for arthralgias, back pain and neck pain.        Shoulder pain and l knee   Neurological:  Negative for dizziness, light-headedness and headaches.       Current Outpatient Medications on File Prior to Visit   Medication Sig    apixaban (Eliquis) 5  "mg Take 1 tablet (5 mg total) by mouth 2 (two) times a day    atorvastatin (LIPITOR) 40 mg tablet Take 1 tablet (40 mg total) by mouth daily    Cholecalciferol (VITAMIN D) 50 MCG (2000 UT) CAPS Take by mouth    HYDROcodone-acetaminophen (Norco) 5-325 mg per tablet Take 1 tablet by mouth every 8 (eight) hours as needed for pain for up to 12 doses Max Daily Amount: 3 tablets    levothyroxine (Synthroid) 25 mcg tablet Take 1 tablet (25 mcg total) by mouth daily in the early morning       Objective     /78 (BP Location: Right arm, Patient Position: Sitting, Cuff Size: Large)   Pulse 66   Ht 6' 2\" (1.88 m)   Wt 97.5 kg (215 lb)   SpO2 96%   BMI 27.60 kg/m²     Physical Exam  Vitals reviewed.   Constitutional:       Appearance: Normal appearance.   Neck:      Vascular: No carotid bruit.   Cardiovascular:      Rate and Rhythm: Normal rate and regular rhythm.      Pulses: Normal pulses.      Heart sounds: Normal heart sounds.   Pulmonary:      Effort: Pulmonary effort is normal.      Breath sounds: Normal breath sounds.   Musculoskeletal:      Right lower leg: No edema.      Left lower leg: No edema.   Lymphadenopathy:      Cervical: No cervical adenopathy.   Neurological:      Mental Status: He is alert.   Psychiatric:         Mood and Affect: Mood normal.       Nathaly Richards MD    "

## 2023-12-26 ENCOUNTER — OFFICE VISIT (OUTPATIENT)
Dept: PHYSICAL THERAPY | Facility: REHABILITATION | Age: 63
End: 2023-12-26
Payer: COMMERCIAL

## 2023-12-26 DIAGNOSIS — M25.512 ACUTE PAIN OF LEFT SHOULDER: ICD-10-CM

## 2023-12-26 DIAGNOSIS — Z98.890 S/P LEFT ROTATOR CUFF REPAIR: Primary | ICD-10-CM

## 2023-12-26 DIAGNOSIS — M75.102 TEAR OF LEFT SUPRASPINATUS TENDON: ICD-10-CM

## 2023-12-26 PROCEDURE — 97112 NEUROMUSCULAR REEDUCATION: CPT

## 2023-12-26 PROCEDURE — 97110 THERAPEUTIC EXERCISES: CPT

## 2023-12-26 NOTE — PROGRESS NOTES
"Daily Note     Today's date: 2023  Patient name: Asher Martin  : 1960  MRN: [id number]  Referring provider: Alfonzo Mccracken*  Dx:   Encounter Diagnosis     ICD-10-CM    1. S/P left rotator cuff repair  Z98.890       2. Acute pain of left shoulder  M25.512       3. Tear of left supraspinatus tendon  M75.102           Start Time: 0700  Stop Time: 07  Total time in clinic (min): 30 minutes    Subjective: Pt reports overdoing it this weekend wrapping presents.  States that he took Tylenol prior to start of tx session.      Objective: See treatment diary below      Assessment: Tolerated treatment well. Patient would benefit from continued PT.  L Sh status is improving, but continues to be stiff and pain at end range.  PROM flexion slightly past 90 degrees.  Discussed protocol limitations at this time.  1:1 with Edson Light DPT entirety of tx.        Plan: Continue per plan of care.      Precautions: L RTC repair 2023    POC expires Unit limit PT/OT + Visit Limit   24 BOMN BOMN       Pertinent Findings:                                                                                            Test / Measure  2023  Pre-Op 2023  Post-Op   FOTO 41 (Pred 66) 4 (Pred 52)   L Sh flex AROM 145 deg 80 deg PROM   L Sh abd AROM 135 deg 60 deg PROM   L Sh ER MMT 3/5 1/5   L Sh flex/abd MMT 3-/5 1/5         Manuals    L Sh PROM MM 8' MM 10' MM 10'                     Neuro Re-Ed      Scap retr 15x5\" 30x5\" 30x5\"   Pendulum 4 way 1' ea 1' ea 1' ea                                             Ther Ex      HEP review 5'     Pulleys      Elbow flex/ext  30x ea 30x ea   FA pro/sup  30x ea 30x ea   Wrist flex/ext  30x ea 30x ea   Gripper  2x1' black 2x1' black   L UT/LS S   3x30\" ea         Ther Activity                  Gait Training                  Modalities                               "

## 2023-12-28 ENCOUNTER — APPOINTMENT (OUTPATIENT)
Dept: PHYSICAL THERAPY | Facility: REHABILITATION | Age: 63
End: 2023-12-28
Payer: COMMERCIAL

## 2024-01-02 ENCOUNTER — OFFICE VISIT (OUTPATIENT)
Dept: PHYSICAL THERAPY | Facility: REHABILITATION | Age: 64
End: 2024-01-02
Payer: COMMERCIAL

## 2024-01-02 DIAGNOSIS — Z98.890 S/P LEFT ROTATOR CUFF REPAIR: Primary | ICD-10-CM

## 2024-01-02 DIAGNOSIS — M25.512 ACUTE PAIN OF LEFT SHOULDER: ICD-10-CM

## 2024-01-02 DIAGNOSIS — M75.102 TEAR OF LEFT SUPRASPINATUS TENDON: ICD-10-CM

## 2024-01-02 PROCEDURE — 97010 HOT OR COLD PACKS THERAPY: CPT

## 2024-01-02 PROCEDURE — 97110 THERAPEUTIC EXERCISES: CPT

## 2024-01-02 PROCEDURE — 97140 MANUAL THERAPY 1/> REGIONS: CPT

## 2024-01-02 PROCEDURE — 97112 NEUROMUSCULAR REEDUCATION: CPT

## 2024-01-02 NOTE — PROGRESS NOTES
"Daily Note     Today's date: 2024  Patient name: Asher Martin  : 1960  MRN: [id number]  Referring provider: Alfonzo Mccracken*  Dx:   Encounter Diagnosis     ICD-10-CM    1. S/P left rotator cuff repair  Z98.890       2. Acute pain of left shoulder  M25.512       3. Tear of left supraspinatus tendon  M75.102           Start Time: 0700  Stop Time: 0745  Total time in clinic (min): 45 minutes    Subjective: Pt reports L Sh pain prior to start of tx session.  Took pain medication this morning.        Objective: See treatment diary below      Assessment: Tolerated treatment well. Patient would benefit from continued PT.  Initiated AAROM interventions since protocol is at week 3.  L Sh ROM is improving but remains limited at this time.  At about 110 degrees L Sh flexion.  1:1 with Edson Ligth DPT entirety of tx.        Plan: Continue per plan of care.      Precautions: L RTC repair 2023    POC expires Unit limit PT/OT + Visit Limit   24 BOMN BOMN       Pertinent Findings:                                                                                            Test / Measure  2023  Pre-Op 2023  Post-Op   FOTO 41 (Pred 66) 4 (Pred 52)   L Sh flex AROM 145 deg 80 deg PROM   L Sh abd AROM 135 deg 60 deg PROM   L Sh ER MMT 3/5 1/5   L Sh flex/abd MMT 3-/5 1/5         Manuals  1/2   L Sh PROM MM 8' MM 10' MM 10' MM 10'                        Neuro Re-Ed       Scap retr 15x5\" 30x5\" 30x5\" 30x5\"   Pendulum 4 way 1' ea 1' ea 1' ea 1' ea   Supine flex AAROM    10x hands clasped   Supine ER AAROM    NV -->   Table slides    15x flex                                      Ther Ex       HEP review 5'   5'   Pulleys    5' flex   Elbow flex/ext  30x ea 30x ea    FA pro/sup  30x ea 30x ea    Wrist flex/ext  30x ea 30x ea    Gripper  2x1' black 2x1' black    L UT/LS S   3x30\" ea           Ther Activity                     Gait Training                     Modalities       Cold pack  "   8'

## 2024-01-05 ENCOUNTER — OFFICE VISIT (OUTPATIENT)
Dept: PHYSICAL THERAPY | Facility: REHABILITATION | Age: 64
End: 2024-01-05
Payer: COMMERCIAL

## 2024-01-05 DIAGNOSIS — M25.512 ACUTE PAIN OF LEFT SHOULDER: Primary | ICD-10-CM

## 2024-01-05 DIAGNOSIS — M75.102 TEAR OF LEFT SUPRASPINATUS TENDON: ICD-10-CM

## 2024-01-05 DIAGNOSIS — Z98.890 S/P LEFT ROTATOR CUFF REPAIR: ICD-10-CM

## 2024-01-05 PROCEDURE — 97112 NEUROMUSCULAR REEDUCATION: CPT | Performed by: PHYSICAL THERAPIST

## 2024-01-05 PROCEDURE — 97110 THERAPEUTIC EXERCISES: CPT | Performed by: PHYSICAL THERAPIST

## 2024-01-05 PROCEDURE — 97140 MANUAL THERAPY 1/> REGIONS: CPT | Performed by: PHYSICAL THERAPIST

## 2024-01-05 NOTE — PROGRESS NOTES
"Daily Note     Today's date: 2024  Patient name: Asher Martin  : 1960  MRN: [id number]  Referring provider: Alfonzo Mccracken*  Dx:   Encounter Diagnosis     ICD-10-CM    1. Acute pain of left shoulder  M25.512       2. S/P left rotator cuff repair  Z98.890       3. Tear of left supraspinatus tendon  M75.102                      Subjective: General post operative soreness.     Objective: See treatment diary below    Assessment: Tolerated treatment well. Patient would benefit from continued PT 1:1 with Khai Snow DPT for entirety of tx. Decreased guarding and tactile and verbal cues. Reduced pain post intro ER ROM.     Plan: Continue per plan of care.      Precautions: L RTC repair 2023    POC expires Unit limit PT/OT + Visit Limit   24 BOMN BOMN       Pertinent Findings:                                                                                            Test / Measure  2023  Pre-Op 2023  Post-Op   FOTO 41 (Pred 66) 4 (Pred 52)   L Sh flex AROM 145 deg 80 deg PROM   L Sh abd AROM 135 deg 60 deg PROM   L Sh ER MMT 3/5 1/5   L Sh flex/abd MMT 3-/5 1/5         Manuals / 1/5   L Sh PROM MM 8' MM 10' MM 10' MM 10' KS 10'                           Neuro Re-Ed        Scap retr 15x5\" 30x5\" 30x5\" 30x5\" 30x5\"   Pendulum 4 way 1' ea 1' ea 1' ea 1' ea    Supine flex AAROM    10x hands clasped 10x3 hands clasped   Supine ER AAROM    NV --> x20   Table slides    15x flex 2x20                                           Ther Ex        HEP review 5'   5'    Pulleys    5' flex 5' flex   Elbow flex/ext  30x ea 30x ea     FA pro/sup  30x ea 30x ea     Wrist flex/ext  30x ea 30x ea     Gripper  2x1' black 2x1' black     L UT/LS S   3x30\" ea             Ther Activity                        Gait Training                        Modalities        Cold pack    8'                             "

## 2024-01-09 ENCOUNTER — OFFICE VISIT (OUTPATIENT)
Dept: PHYSICAL THERAPY | Facility: REHABILITATION | Age: 64
End: 2024-01-09
Payer: COMMERCIAL

## 2024-01-09 DIAGNOSIS — Z98.890 S/P LEFT ROTATOR CUFF REPAIR: ICD-10-CM

## 2024-01-09 DIAGNOSIS — M25.512 ACUTE PAIN OF LEFT SHOULDER: Primary | ICD-10-CM

## 2024-01-09 DIAGNOSIS — M75.102 TEAR OF LEFT SUPRASPINATUS TENDON: ICD-10-CM

## 2024-01-09 PROCEDURE — 97140 MANUAL THERAPY 1/> REGIONS: CPT | Performed by: PHYSICAL THERAPIST

## 2024-01-09 PROCEDURE — 97112 NEUROMUSCULAR REEDUCATION: CPT | Performed by: PHYSICAL THERAPIST

## 2024-01-09 PROCEDURE — 97110 THERAPEUTIC EXERCISES: CPT | Performed by: PHYSICAL THERAPIST

## 2024-01-09 NOTE — PROGRESS NOTES
"Daily Note     Today's date: 2024  Patient name: Asher Martin  : 1960  MRN: [id number]  Referring provider: Alfonzo Mccracken*  Dx:   Encounter Diagnosis     ICD-10-CM    1. Acute pain of left shoulder  M25.512       2. S/P left rotator cuff repair  Z98.890       3. Tear of left supraspinatus tendon  M75.102                      Subjective: Generally sore, pushed  over weekend.     Objective: See treatment diary below    Assessment: Tolerated treatment well. Patient demonstrated fatigue post treatment, exhibited good technique with therapeutic exercises, and would benefit from continued PT 1:1 with Khai Snow DPT for entirety of tx. Progressing well, generally stiff following snow removal. Reviewed precautions/contraindication. Does not appear to have damaged surgical procedure at present. Progress to next phase next visit.     Plan: Progress treament per protocol.  Progress to next phase next visit.      Precautions: L RTC repair 2023    POC expires Unit limit PT/OT + Visit Limit   24 BOMN BOMN       Pertinent Findings:                                                                                            Test / Measure  2023  Pre-Op 2023  Post-Op   FOTO 41 (Pred 66) 4 (Pred 52)   L Sh flex AROM 145 deg 80 deg PROM   L Sh abd AROM 135 deg 60 deg PROM   L Sh ER MMT 3/5 1/5   L Sh flex/abd MMT 3-/5 1/5         Manuals //9   L Sh PROM MM 8' MM 10' MM 10' MM 10' KS 10' KS 10'                              Neuro Re-Ed         Scap retr 15x5\" 30x5\" 30x5\" 30x5\" 30x5\" 30x5\"   Pendulum 4 way 1' ea 1' ea 1' ea 1' ea     Supine flex AAROM    10x hands clasped 10x3 hands clasped 10x3 hands clasped   Supine ER AAROM    NV --> x20 2x20   Table slides    15x flex 2x20 2x20   Wall slides      X12 UE A                                       Ther Ex         HEP review 5'   5'     Pulleys    5' flex 5' flex 8' flex   Elbow flex/ext  30x ea 30x ea    " "  FA pro/sup  30x ea 30x ea      Wrist flex/ext  30x ea 30x ea      Gripper  2x1' black 2x1' black      L UT/LS S   3x30\" ea               Ther Activity                           Gait Training                           Modalities         Cold pack    8'                                 "

## 2024-01-12 ENCOUNTER — OFFICE VISIT (OUTPATIENT)
Dept: PHYSICAL THERAPY | Facility: REHABILITATION | Age: 64
End: 2024-01-12
Payer: COMMERCIAL

## 2024-01-12 DIAGNOSIS — Z98.890 S/P LEFT ROTATOR CUFF REPAIR: ICD-10-CM

## 2024-01-12 DIAGNOSIS — M75.102 TEAR OF LEFT SUPRASPINATUS TENDON: ICD-10-CM

## 2024-01-12 DIAGNOSIS — M25.512 ACUTE PAIN OF LEFT SHOULDER: Primary | ICD-10-CM

## 2024-01-12 PROCEDURE — 97112 NEUROMUSCULAR REEDUCATION: CPT | Performed by: PHYSICAL THERAPIST

## 2024-01-12 PROCEDURE — 97110 THERAPEUTIC EXERCISES: CPT | Performed by: PHYSICAL THERAPIST

## 2024-01-12 PROCEDURE — 97140 MANUAL THERAPY 1/> REGIONS: CPT | Performed by: PHYSICAL THERAPIST

## 2024-01-12 NOTE — PROGRESS NOTES
"Daily Note     Today's date: 2024  Patient name: Asher Martin  : 1960  MRN: [id number]  Referring provider: Alfonzo Mccracken*  Dx:   Encounter Diagnosis     ICD-10-CM    1. Acute pain of left shoulder  M25.512       2. S/P left rotator cuff repair  Z98.890       3. Tear of left supraspinatus tendon  M75.102                      Subjective: Reports falling on contralateral side at work yesterday.    Objective: See treatment diary below    Assessment: Tolerated treatment well. Patient demonstrated fatigue post treatment, exhibited good technique with therapeutic exercises, and would benefit from continued PT 1:1 with Khai Snow DPT for entirety of tx.     Plan: Continue per plan of care.      Precautions: L RTC repair 2023  PHASE II as of 1/10/24    POC expires Unit limit PT/OT + Visit Limit   24 BOMN BOMN       Pertinent Findings:                                                                                            Test / Measure  2023  Pre-Op 2023  Post-Op   FOTO 41 (Pred 66) 4 (Pred 52)   L Sh flex AROM 145 deg 80 deg PROM   L Sh abd AROM 135 deg 60 deg PROM   L Sh ER MMT 3/5 1/5   L Sh flex/abd MMT 3-/5          Manuals    L Sh PROM MM 8' MM 10' MM 10' MM 10' KS 10' KS 10' KS 10'                                 Neuro Re-Ed          Scap retr 15x5\" 30x5\" 30x5\" 30x5\" 30x5\" 30x5\" 30x5\"   Pendulum 4 way 1' ea 1' ea 1' ea 1' ea      Supine flex AAROM    10x hands clasped 10x3 hands clasped 10x3 hands clasped 3x10 cane   Supine ER AAROM    NV --> x20 2x20 2x20   Table slides    15x flex 2x20 2x20 2x20   Wall slides      X12 UE A X12 UE A   Hinge cane AAROM press       3x10 window                                 Ther Ex          HEP review 5'   5'      Pulleys    5' flex 5' flex 8' flex 8' flex   Standing AROM flexion pain free ROM       2x15   Elbow flex/ext  30x ea 30x ea       FA pro/sup  30x ea 30x ea       Wrist flex/ext  30x " "ea 30x ea       Gripper  2x1' black 2x1' black       L UT/LS S   3x30\" ea                 Ther Activity                              Gait Training                              Modalities          Cold pack    8'                                     "

## 2024-01-16 ENCOUNTER — APPOINTMENT (OUTPATIENT)
Dept: PHYSICAL THERAPY | Facility: REHABILITATION | Age: 64
End: 2024-01-16
Payer: COMMERCIAL

## 2024-01-17 ENCOUNTER — OFFICE VISIT (OUTPATIENT)
Dept: PHYSICAL THERAPY | Facility: REHABILITATION | Age: 64
End: 2024-01-17
Payer: COMMERCIAL

## 2024-01-17 DIAGNOSIS — M25.512 ACUTE PAIN OF LEFT SHOULDER: Primary | ICD-10-CM

## 2024-01-17 DIAGNOSIS — Z98.890 S/P LEFT ROTATOR CUFF REPAIR: ICD-10-CM

## 2024-01-17 PROCEDURE — 97140 MANUAL THERAPY 1/> REGIONS: CPT | Performed by: PHYSICAL THERAPIST

## 2024-01-17 NOTE — PROGRESS NOTES
"Daily Note     Today's date: 2024  Patient name: Asher Martin  : 1960  MRN: [id number]  Referring provider: Alfonzo Mccracken*  Dx:   Encounter Diagnosis     ICD-10-CM    1. Acute pain of left shoulder  M25.512       2. S/P left rotator cuff repair  Z98.890                      Subjective: Reports feeling much better compared to last week.     Objective: See treatment diary below    Assessment: Tolerated treatment well. Patient would benefit from continued PT 1:1 with Khai Snow DPT for 10' of tx. Gen supervision with Edson Light for 35' of tx.     Plan: Continue per plan of care.      Precautions: L RTC repair 2023  PHASE II as of 1/10/24    POC expires Unit limit PT/OT + Visit Limit   24 BOMN BOMN       Pertinent Findings:                                                                                            Test / Measure  2023  Pre-Op 2023  Post-Op   FOTO 41 (Pred 66) 4 (Pred 52)   L Sh flex AROM 145 deg 80 deg PROM   L Sh abd AROM 135 deg 60 deg PROM   L Sh ER MMT 3/5 1   L Sh flex/abd MMT 3-/5          Manuals    L Sh PROM MM 8' MM 10' MM 10' MM 10' KS 10' KS 10' KS 10' KS 10'                                    Neuro Re-Ed           Scap retr 15x5\" 30x5\" 30x5\" 30x5\" 30x5\" 30x5\" 30x5\" 30x5\"   Pendulum 4 way 1' ea 1' ea 1' ea 1' ea       Supine flex AAROM    10x hands clasped 10x3 hands clasped 10x3 hands clasped 3x10 cane 3x10 cane   Supine ER AAROM    NV --> x20 2x20 2x20 2x20   Table slides    15x flex 2x20 2x20 2x20 2x20   Wall slides      X12 UE A X12 UE A X12 UE A   Hinge cane AAROM press       3x10 window 3x10 window   Prone Row        2x10   Prone I        2x10   6way isometric        5\"x10ea   Ther Ex           HEP review 5'   5'       Pulleys    5' flex 5' flex 8' flex 8' flex 8' flex   Standing AROM 3 way elevation        X10 ea                                    Ther Activity                               "   Gait Training                                 Modalities           Cold pack    8'

## 2024-01-19 ENCOUNTER — APPOINTMENT (OUTPATIENT)
Dept: PHYSICAL THERAPY | Facility: REHABILITATION | Age: 64
End: 2024-01-19
Payer: COMMERCIAL

## 2024-01-22 ENCOUNTER — OFFICE VISIT (OUTPATIENT)
Dept: PHYSICAL THERAPY | Facility: REHABILITATION | Age: 64
End: 2024-01-22
Payer: COMMERCIAL

## 2024-01-22 DIAGNOSIS — M75.102 TEAR OF LEFT SUPRASPINATUS TENDON: ICD-10-CM

## 2024-01-22 DIAGNOSIS — M25.512 ACUTE PAIN OF LEFT SHOULDER: Primary | ICD-10-CM

## 2024-01-22 DIAGNOSIS — Z98.890 S/P LEFT ROTATOR CUFF REPAIR: ICD-10-CM

## 2024-01-22 PROCEDURE — 97140 MANUAL THERAPY 1/> REGIONS: CPT | Performed by: PHYSICAL THERAPIST

## 2024-01-22 PROCEDURE — 97112 NEUROMUSCULAR REEDUCATION: CPT | Performed by: PHYSICAL THERAPIST

## 2024-01-22 PROCEDURE — 97110 THERAPEUTIC EXERCISES: CPT | Performed by: PHYSICAL THERAPIST

## 2024-01-22 NOTE — PROGRESS NOTES
"Daily Note     Today's date: 2024  Patient name: Asher Martin  : 1960  MRN: [id number]  Referring provider: Alfonzo Mccracken*  Dx:   Encounter Diagnosis     ICD-10-CM    1. Acute pain of left shoulder  M25.512       2. S/P left rotator cuff repair  Z98.890       3. Tear of left supraspinatus tendon  M75.102                      Subjective: Pt tolerated tx well,     Objective: See treatment diary below    Assessment: Tolerated treatment well. Patient would benefit from continued PT 1:1 with Khai Snow DPT for entirety of tx. Improving PROM and AROM per protocol. Continue per POC.    Plan: Continue per plan of care.      Precautions: L RTC repair 2023  PHASE II as of 1/10/24    POC expires Unit limit PT/OT + Visit Limit   24 BOMN BOMN       Pertinent Findings:                                                                                            Test / Measure  2023  Pre-Op 2023  Post-Op   FOTO 41 (Pred 66) 4 (Pred 52)   L Sh flex AROM 145 deg 80 deg PROM   L Sh abd AROM 135 deg 60 deg PROM   L Sh ER MMT 3/5 1/5   L Sh flex/abd MMT 3-/5 15         Manuals    L Sh PROM KS 10' KS 10' KS 10' KS 10' KS 15'                           Neuro Re-Ed        Scap retr 30x5\" 30x5\" 30x5\" 30x5\" 30x5\"   Pendulum 4 way        Supine flex AAROM 10x3 hands clasped 10x3 hands clasped 3x10 cane 3x10 cane 3x10 cane   Supine ER AAROM x20 2x20 2x20 2x20 2x20   Table slides 2x20 2x20 2x20 2x20 2x20   Wall slides  X12 UE A X12 UE A X12 UE A X12 UE A   Hinge cane AAROM press   3x10 window 3x10 window 3x10 window   Prone Row    2x10 3x10    Prone I    2x10 3x10   6way isometric    5\"x10ea 5\"x10ea   Ther Ex        HEP review        Pulleys 5' flex 8' flex 8' flex 8' flex 8' flex   Standing AROM 3 way elevation    X10 ea X10 ea                           Ther Activity                        Gait Training                        Modalities        Cold pack              "

## 2024-01-23 ENCOUNTER — APPOINTMENT (OUTPATIENT)
Dept: PHYSICAL THERAPY | Facility: REHABILITATION | Age: 64
End: 2024-01-23
Payer: COMMERCIAL

## 2024-01-25 ENCOUNTER — OFFICE VISIT (OUTPATIENT)
Dept: PHYSICAL THERAPY | Facility: REHABILITATION | Age: 64
End: 2024-01-25
Payer: COMMERCIAL

## 2024-01-25 DIAGNOSIS — M75.102 TEAR OF LEFT SUPRASPINATUS TENDON: ICD-10-CM

## 2024-01-25 DIAGNOSIS — Z98.890 S/P LEFT ROTATOR CUFF REPAIR: ICD-10-CM

## 2024-01-25 DIAGNOSIS — M25.512 ACUTE PAIN OF LEFT SHOULDER: Primary | ICD-10-CM

## 2024-01-25 PROCEDURE — 97112 NEUROMUSCULAR REEDUCATION: CPT

## 2024-01-25 PROCEDURE — 97140 MANUAL THERAPY 1/> REGIONS: CPT

## 2024-01-25 NOTE — PROGRESS NOTES
"Daily Note     Today's date: 2024  Patient name: Asher Martin  : 1960  MRN: [id number]  Referring provider: Alfonzo Mccracken*  Dx:   Encounter Diagnosis     ICD-10-CM    1. Acute pain of left shoulder  M25.512       2. S/P left rotator cuff repair  Z98.890       3. Tear of left supraspinatus tendon  M75.102           Start Time: 1708  Stop Time: 1731  Total time in clinic (min): 23 minutes    Subjective: Pt reports L Sh status is improving.  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  Soreness at the end of the day but no increase in pain.        Objective: See treatment diary below      Assessment: Tolerated treatment well. Patient would benefit from continued PT.  L Sh mobility is improving with both AROM and PROM.  Discomfort with overpressure in all directions.  Mild UT compensatory pattern persists with Sh elevation.  1:1 with Edson Light DPT entirety of tx.        Plan: Continue per plan of care.      Precautions: L RTC repair 2023  PHASE II as of 1/10/24    POC expires Unit limit PT/OT + Visit Limit   24 BOMN BOMN       Pertinent Findings:                                                                                            Test / Measure  2023  Pre-Op 2023  Post-Op   FOTO 41 (Pred 66) 4 (Pred 52)   L Sh flex AROM 145 deg 80 deg PROM   L Sh abd AROM 135 deg 60 deg PROM   L Sh ER MMT 3/5 1/5   L Sh flex/abd MMT 3-/5 1/5         Manuals    L Sh PROM KS 10' KS 10' KS 10' KS 10' KS 15' MM 10'                              Neuro Re-Ed         Scap retr 30x5\" 30x5\" 30x5\" 30x5\" 30x5\" SRER 30x5\"   Pendulum 4 way         Supine flex AAROM 10x3 hands clasped 10x3 hands clasped 3x10 cane 3x10 cane 3x10 cane 3x10 cane   Supine ER AAROM x20 2x20 2x20 2x20 2x20 2x20   Table slides 2x20 2x20 2x20 2x20 2x20 2x20   Wall slides  X12 UE A X12 UE A X12 UE A X12 UE A X12 UE A   Hinge cane AAROM press   3x10 window 3x10 window " "3x10 window 3x10 window   Prone Row    2x10 3x10  3x10    Prone I    2x10 3x10 3x10   6way isometric    5\"x10ea 5\"x10ea 5\"x10ea   Ther Ex         HEP review         Pulleys 5' flex 8' flex 8' flex 8' flex 8' flex 8' flex   Standing AROM 3 way elevation    X10 ea X10 ea X10 ea                              Ther Activity                           Gait Training                           Modalities         Cold pack                                             "

## 2024-01-26 ENCOUNTER — APPOINTMENT (OUTPATIENT)
Dept: PHYSICAL THERAPY | Facility: REHABILITATION | Age: 64
End: 2024-01-26
Payer: COMMERCIAL

## 2024-01-30 ENCOUNTER — OFFICE VISIT (OUTPATIENT)
Dept: PHYSICAL THERAPY | Facility: REHABILITATION | Age: 64
End: 2024-01-30
Payer: COMMERCIAL

## 2024-01-30 DIAGNOSIS — M75.102 TEAR OF LEFT SUPRASPINATUS TENDON: ICD-10-CM

## 2024-01-30 DIAGNOSIS — Z98.890 S/P LEFT ROTATOR CUFF REPAIR: ICD-10-CM

## 2024-01-30 DIAGNOSIS — M25.512 ACUTE PAIN OF LEFT SHOULDER: Primary | ICD-10-CM

## 2024-01-30 PROCEDURE — 97112 NEUROMUSCULAR REEDUCATION: CPT

## 2024-01-30 PROCEDURE — 97110 THERAPEUTIC EXERCISES: CPT

## 2024-01-30 PROCEDURE — 97140 MANUAL THERAPY 1/> REGIONS: CPT

## 2024-01-30 NOTE — PROGRESS NOTES
"Daily Note     Today's date: 2024  Patient name: Asher Martin  : 1960  MRN: [id number]  Referring provider: Alfonzo Mccracken*  Dx:   Encounter Diagnosis     ICD-10-CM    1. Acute pain of left shoulder  M25.512       2. S/P left rotator cuff repair  Z98.890       3. Tear of left supraspinatus tendon  M75.102           Start Time: 0700  Stop Time: 0745  Total time in clinic (min): 45 minutes    Subjective: Pt reports slightly overdoing it this weekend - slight discomfort prior to start of tx session.  States that he is careful at work to not use LUE for any heavy lifting.        Objective: See treatment diary below      Assessment: Tolerated treatment well. Patient would benefit from continued PT.  L Sh mobility deficit persists - most limited in abduction but continues to have limitation in flexion as well.  Scapular motor control is improving but remains limited as well.  1:1 with Edson Light DPT entirety of tx.        Plan: Continue per plan of care.      Precautions: L RTC repair 2023  PHASE II as of 1/10/24    POC expires Unit limit PT/OT + Visit Limit   24 BOMN BOMN       Pertinent Findings:                                                                                            Test / Measure  2023  Pre-Op 2023  Post-Op 2023   FOTO 41 (Pred 66) 4 (Pred 52) 59   L Sh flex AROM 145 deg 80 deg PROM    L Sh abd AROM 135 deg 60 deg PROM    L Sh ER MMT 3/5 1/5    L Sh flex/abd MMT 3-/5 15          Manuals    L Sh PROM KS 10' KS 10' KS 10' KS 15' MM 10' MM 10'                              Neuro Re-Ed         Scap retr 30x5\" 30x5\" 30x5\" 30x5\" SRER 30x5\" SRER 30x5\"   Pendulum 4 way         Supine flex AAROM 10x3 hands clasped 3x10 cane 3x10 cane 3x10 cane 3x10 cane 3x10 cane   Supine ER AAROM 2x20 2x20 2x20 2x20 2x20 2x20   Table slides 2x20 2x20 2x20 2x20 2x20 15x flex  15x scap   Wall slides X12 UE A X12 UE A X12 UE A X12 UE A X12 UE A 15x " "  Hinge cane AAROM press  3x10 window 3x10 window 3x10 window 3x10 window 3x10 window   Prone Row   2x10 3x10  3x10  3x10   Prone I   2x10 3x10 3x10 3x10   6way isometric   5\"x10ea 5\"x10ea 5\"x10ea 10x5\" ea   Ther Ex         HEP review         Pulleys 8' flex 8' flex 8' flex 8' flex 8' flex 4' flex  4' abd   Standing AROM 3 way elevation   X10 ea X10 ea X10 ea 10x ea                              Ther Activity                           Gait Training                           Modalities         Cold pack                                               "

## 2024-02-02 ENCOUNTER — OFFICE VISIT (OUTPATIENT)
Dept: PHYSICAL THERAPY | Facility: REHABILITATION | Age: 64
End: 2024-02-02
Payer: COMMERCIAL

## 2024-02-02 DIAGNOSIS — Z98.890 S/P LEFT ROTATOR CUFF REPAIR: ICD-10-CM

## 2024-02-02 DIAGNOSIS — M75.102 TEAR OF LEFT SUPRASPINATUS TENDON: ICD-10-CM

## 2024-02-02 DIAGNOSIS — M25.512 ACUTE PAIN OF LEFT SHOULDER: Primary | ICD-10-CM

## 2024-02-02 PROCEDURE — 97140 MANUAL THERAPY 1/> REGIONS: CPT | Performed by: PHYSICAL THERAPIST

## 2024-02-02 PROCEDURE — 97110 THERAPEUTIC EXERCISES: CPT | Performed by: PHYSICAL THERAPIST

## 2024-02-02 PROCEDURE — 97112 NEUROMUSCULAR REEDUCATION: CPT | Performed by: PHYSICAL THERAPIST

## 2024-02-02 NOTE — PROGRESS NOTES
"Daily Note     Today's date: 2024  Patient name: Asher Martin  : 1960  MRN: [id number]  Referring provider: Alfonzo Mccracken*  Dx:   Encounter Diagnosis     ICD-10-CM    1. Acute pain of left shoulder  M25.512       2. Tear of left supraspinatus tendon  M75.102       3. S/P left rotator cuff repair  Z98.890                      Subjective: Pt progressing well, general soreness with prolonged.     Objective: See treatment diary below    Assessment: Tolerated treatment well. Patient demonstrated fatigue post treatment, exhibited good technique with therapeutic exercises, and would benefit from continued PT 1:1 with Khai Snow DPT for entirety of tx.     Plan: Continue per plan of care.      Precautions: L RTC repair 2023  PHASE II as of 1/10/24    POC expires Unit limit PT/OT + Visit Limit   24 BOMN BOMN       Pertinent Findings:                                                                                            Test / Measure  2023  Pre-Op 2023  Post-Op 2023   FOTO 41 (Pred 66) 4 (Pred 52) 59   L Sh flex AROM 145 deg 80 deg PROM    L Sh abd AROM 135 deg 60 deg PROM    L Sh ER MMT 3/5 1/5    L Sh flex/abd MMT 3-/5 15          Manuals  2/2   L Sh PROM KS 15' MM 10' MM 10' KS 8'                        Neuro Re-Ed       Scap retr 30x5\" SRER 30x5\" SRER 30x5\" SRER 30x5\"   Pendulum 4 way       Supine flex AAROM 3x10 cane 3x10 cane 3x10 cane 3x10 cane   Supine ER AAROM 2x20 2x20 2x20 2x20   Table slides 2x20 2x20 15x flex  15x scap 15x flex  15x scap   Wall slides X12 UE A X12 UE A 15x 5x   Hinge cane AAROM press 3x10 window 3x10 window 3x10 window 3x10 window   Prone Row 3x10  3x10  3x10 3x10   Prone I 3x10 3x10 3x10 3x10   6way isometric 5\"x10ea 5\"x10ea 10x5\" ea 10x5\" ea   Ther Ex       HEP review       Pulleys 8' flex 8' flex 4' flex  4' abd 4' flex  4' abd   Standing AROM 3 way elevation X10 ea X10 ea 10x ea 10x ea                        Ther " Activity                     Gait Training                     Modalities       Cold pack

## 2024-02-05 ENCOUNTER — OFFICE VISIT (OUTPATIENT)
Dept: OBGYN CLINIC | Facility: OTHER | Age: 64
End: 2024-02-05

## 2024-02-05 VITALS
SYSTOLIC BLOOD PRESSURE: 126 MMHG | WEIGHT: 215 LBS | DIASTOLIC BLOOD PRESSURE: 87 MMHG | BODY MASS INDEX: 27.59 KG/M2 | HEIGHT: 74 IN | HEART RATE: 79 BPM

## 2024-02-05 DIAGNOSIS — Z98.890 S/P LEFT ROTATOR CUFF REPAIR: Primary | ICD-10-CM

## 2024-02-05 PROCEDURE — 99024 POSTOP FOLLOW-UP VISIT: CPT | Performed by: PHYSICIAN ASSISTANT

## 2024-02-05 NOTE — PROGRESS NOTES
"Surgery: SALS w/RCR and SAD on 12/13/23    S: Patient is doing well.  They have been compliant with formal PT and the HEP.  Denies new injury or trauma.  Asher admits he is doing more with the shoulder than he should and is likely attributing to his soreness    /87 (BP Location: Left arm, Patient Position: Sitting, Cuff Size: Standard)   Pulse 79   Ht 6' 2\" (1.88 m)   Wt 97.5 kg (215 lb)   BMI 27.60 kg/m²     O: LEFT shoulder  FF: 145  Abd: 90  ER: 50  IR: LLS  ER strength: Good Resistance  Good elbow, wrist and hand range of motion  Skin - warm and dry  SI  NVI    A/P: 7 weeks following arthroscopic left shoulder rotator cuff repair and subacromial decompression  Continue with formal physical therapy - following accelerated rotator cuff rehab protocol  HEP - per therapy.    Pain control - Tylenol 1000 mg every 8 hours  Ice as needed - 20 minutes on and 20 minutes off  Follow up in 6-8 weeks       Asher had left knee PMM in 2019 with reoccurrence of pain shortly after along with blood effusion.  Was told it likely retore, but no imaging was obtained.  Asher will make an appointment for his knee at his convenience.  "

## 2024-02-06 ENCOUNTER — OFFICE VISIT (OUTPATIENT)
Dept: PHYSICAL THERAPY | Facility: REHABILITATION | Age: 64
End: 2024-02-06
Payer: COMMERCIAL

## 2024-02-06 DIAGNOSIS — Z98.890 S/P LEFT ROTATOR CUFF REPAIR: ICD-10-CM

## 2024-02-06 DIAGNOSIS — M75.102 TEAR OF LEFT SUPRASPINATUS TENDON: ICD-10-CM

## 2024-02-06 DIAGNOSIS — M25.512 ACUTE PAIN OF LEFT SHOULDER: Primary | ICD-10-CM

## 2024-02-06 PROCEDURE — 97140 MANUAL THERAPY 1/> REGIONS: CPT

## 2024-02-06 PROCEDURE — 97112 NEUROMUSCULAR REEDUCATION: CPT

## 2024-02-06 PROCEDURE — 97110 THERAPEUTIC EXERCISES: CPT

## 2024-02-06 NOTE — PROGRESS NOTES
"Daily Note     Today's date: 2024  Patient name: Asher Martin  : 1960  MRN: [id number]  Referring provider: Alfonzo Mccracken*  Dx:   Encounter Diagnosis     ICD-10-CM    1. Acute pain of left shoulder  M25.512       2. Tear of left supraspinatus tendon  M75.102       3. S/P left rotator cuff repair  Z98.890           Start Time: 0700  Stop Time: 07  Total time in clinic (min): 38 minutes    Subjective: Patient reports his shoulder is feeling tight this AM and denies pain/soreness pre-tx.      Objective: See treatment diary below      Assessment: Patient tolerated treatment session well. Patient continues to be challenged by overhead interventions as R shoulder mobility remains limited. Patient exhibited improved flexibility post manuals however, exhibited hard end feel with stretches beyond available range. Patient would benefit from continued stretching/strengthening per post-op time line to restore functional deficits. Patient 1:1 with Nyla Loo PTA for entirety of tx.        Plan: Continue per POC. Increase reps/resistance as tolerated.     Precautions: L RTC repair 2023  PHASE II as of 1/10/24    POC expires Unit limit PT/OT + Visit Limit   24 BOMN BOMN       Pertinent Findings:                                                                                            Test / Measure  2023  Pre-Op 2023  Post-Op 2023   FOTO 41 (Pred 66) 4 (Pred 52) 59   L Sh flex AROM 145 deg 80 deg PROM    L Sh abd AROM 135 deg 60 deg PROM    L Sh ER MMT 3/5 1/5    L Sh flex/abd MMT 3-/5 1/5          Manuals  2/2 2/6   L Sh PROM KS 15' MM 10' MM 10' KS 8' MS 8'                           Neuro Re-Ed        Scap retr 30x5\" SRER 30x5\" SRER 30x5\" SRER 30x5\" SRER 30x5\"   Pendulum 4 way        Supine flex AAROM 3x10 cane 3x10 cane 3x10 cane 3x10 cane 3x10 cane   Supine ER AAROM 2x20 2x20 2x20 2x20 2x20   Table slides 2x20 2x20 15x flex  15x scap 15x flex  15x scap 15x " "flex  15x scap   Wall slides X12 UE A X12 UE A 15x 5x 5x   Hinge cane AAROM press 3x10 window 3x10 window 3x10 window 3x10 window 3x10 window   Prone Row 3x10  3x10  3x10 3x10 3x10   Prone I 3x10 3x10 3x10 3x10 3x10   6way isometric 5\"x10ea 5\"x10ea 10x5\" ea 10x5\" ea 10x5\" ea   Ther Ex        HEP review        Pulleys 8' flex 8' flex 4' flex  4' abd 4' flex  4' abd 4' flex  4' abd   Standing AROM 3 way elevation X10 ea X10 ea 10x ea 10x ea 10x ea                           Ther Activity                        Gait Training                        Modalities        Cold pack                                                 "

## 2024-02-09 ENCOUNTER — OFFICE VISIT (OUTPATIENT)
Dept: PHYSICAL THERAPY | Facility: REHABILITATION | Age: 64
End: 2024-02-09
Payer: COMMERCIAL

## 2024-02-09 DIAGNOSIS — M25.512 ACUTE PAIN OF LEFT SHOULDER: Primary | ICD-10-CM

## 2024-02-09 DIAGNOSIS — Z98.890 S/P LEFT ROTATOR CUFF REPAIR: ICD-10-CM

## 2024-02-09 DIAGNOSIS — M75.102 TEAR OF LEFT SUPRASPINATUS TENDON: ICD-10-CM

## 2024-02-09 PROCEDURE — 97110 THERAPEUTIC EXERCISES: CPT | Performed by: PHYSICAL THERAPIST

## 2024-02-09 PROCEDURE — 97112 NEUROMUSCULAR REEDUCATION: CPT | Performed by: PHYSICAL THERAPIST

## 2024-02-09 PROCEDURE — 97140 MANUAL THERAPY 1/> REGIONS: CPT | Performed by: PHYSICAL THERAPIST

## 2024-02-09 NOTE — PROGRESS NOTES
"Daily Note     Today's date: 2024  Patient name: sAher Martin  : 1960  MRN: [id number]  Referring provider: Alfonzo Mccracken*  Dx:   Encounter Diagnosis     ICD-10-CM    1. Acute pain of left shoulder  M25.512       2. Tear of left supraspinatus tendon  M75.102       3. S/P left rotator cuff repair  Z98.890           Start Time: 0700  Stop Time: 07  Total time in clinic (min): 38 minutes    Subjective: Reports general soreness post workday.     Objective: See treatment diary below    Assessment: Tolerated treatment well. Patient demonstrated fatigue post treatment 1:1 with Khai Snow DPT for entirety of tx. Pt tolerated tx well. Tolerated introductory strengthening well. Continue per protocol.     Plan: Continue per plan of care.      Precautions: L RTC repair 2023  PHASE III as of 23 Early Strengthening.     POC expires Unit limit PT/OT + Visit Limit   24 BOMN BOMN       Pertinent Findings:                                                                                            Test / Measure  2023  Pre-Op 2023  Post-Op 2023   FOTO 41 (Pred 66) 4 (Pred 52) 59   L Sh flex AROM 145 deg 80 deg PROM    L Sh abd AROM 135 deg 60 deg PROM    L Sh ER MMT 3/5 1/5    L Sh flex/abd MMT 3-/5 1/5          Manuals  2/2 2/6 2/9   L Sh PROM KS 15' MM 10' MM 10' KS 8' MS 8' KS 8'                              Neuro Re-Ed         Scap retr 30x5\" SRER 30x5\" SRER 30x5\" SRER 30x5\" SRER 30x5\" SRER 30x5\"   Pendulum 4 way         Supine flex AAROM 3x10 cane 3x10 cane 3x10 cane 3x10 cane 3x10 cane 3x10 cane   Supine ER AAROM 2x20 2x20 2x20 2x20 2x20 2x20   Table slides 2x20 2x20 15x flex  15x scap 15x flex  15x scap 15x flex  15x scap 15x flex  15x scap   Wall slides X12 UE A X12 UE A 15x 5x 5x 5x   Hinge cane AAROM press 3x10 window 3x10 window 3x10 window 3x10 window 3x10 window 3x10 window   Prone Row 3x10  3x10  3x10 3x10 3x10 3x10   Prone I 3x10 3x10 3x10 3x10 " "3x10 3x10   TB Row      3x10 mtb   TB LPD      3x10 btb            6way isometric 5\"x10ea 5\"x10ea 10x5\" ea 10x5\" ea 10x5\" ea 10x5\" ea   Ther Ex         HEP review         Pulleys 8' flex 8' flex 4' flex  4' abd 4' flex  4' abd 4' flex  4' abd 4' flex  4' abd   Standing AROM 3 way elevation X10 ea X10 ea 10x ea 10x ea 10x ea 10x ea                              Ther Activity                           Gait Training                           Modalities         Cold pack                                                     "

## 2024-02-13 ENCOUNTER — APPOINTMENT (OUTPATIENT)
Dept: PHYSICAL THERAPY | Facility: REHABILITATION | Age: 64
End: 2024-02-13
Payer: COMMERCIAL

## 2024-02-14 ENCOUNTER — APPOINTMENT (OUTPATIENT)
Dept: PHYSICAL THERAPY | Facility: REHABILITATION | Age: 64
End: 2024-02-14
Payer: COMMERCIAL

## 2024-02-16 ENCOUNTER — APPOINTMENT (OUTPATIENT)
Dept: PHYSICAL THERAPY | Facility: REHABILITATION | Age: 64
End: 2024-02-16
Payer: COMMERCIAL

## 2024-02-20 ENCOUNTER — APPOINTMENT (OUTPATIENT)
Dept: PHYSICAL THERAPY | Facility: REHABILITATION | Age: 64
End: 2024-02-20
Payer: COMMERCIAL

## 2024-02-21 ENCOUNTER — APPOINTMENT (OUTPATIENT)
Dept: URGENT CARE | Facility: MEDICAL CENTER | Age: 64
End: 2024-02-21
Payer: COMMERCIAL

## 2024-02-21 ENCOUNTER — OFFICE VISIT (OUTPATIENT)
Dept: URGENT CARE | Facility: MEDICAL CENTER | Age: 64
End: 2024-02-21
Payer: COMMERCIAL

## 2024-02-21 VITALS
BODY MASS INDEX: 27.28 KG/M2 | WEIGHT: 212.6 LBS | SYSTOLIC BLOOD PRESSURE: 138 MMHG | DIASTOLIC BLOOD PRESSURE: 60 MMHG | HEART RATE: 95 BPM | TEMPERATURE: 99.9 F | OXYGEN SATURATION: 94 % | HEIGHT: 74 IN

## 2024-02-21 DIAGNOSIS — R05.1 ACUTE COUGH: Primary | ICD-10-CM

## 2024-02-21 PROCEDURE — 99213 OFFICE O/P EST LOW 20 MIN: CPT

## 2024-02-21 RX ORDER — BENZONATATE 200 MG/1
200 CAPSULE ORAL 3 TIMES DAILY PRN
Qty: 20 CAPSULE | Refills: 0 | Status: SHIPPED | OUTPATIENT
Start: 2024-02-21

## 2024-02-21 NOTE — PROGRESS NOTES
[facility]        NAME: Asher Martin is a 63 y.o. male  : 1960    MRN: [id number]  DATE: 2024  TIME: 6:16 PM    Assessment and Plan   Acute cough [R05.1]  1. Acute cough  benzonatate (TESSALON) 200 MG capsule        No signs of respiratory distress, vitals WNL, lung sounds clear. Prescribed course of Tessalon, advised symptomatic treatment, PCP follow up.    Patient Instructions     Your symptoms are likely due to a viral illness. The mainstay of treatment is for symptom relief, see below for recommended medications.  Prescribed Tessalon, take as directed.  Fever/Body Aches: We recommend you take 600mg ibuprofen every 6 hours or tylenol 650mg every 6 hours as needed for fever. If needed, you can alternate these medications so that you take one medication every 3 hours. For instance, at noon take ibuprofen, then at 3pm take tylenol, then at 6pm take ibuprofen.   Cough: Mucinex XR (guafenisen) 600 mg tablets may be used to thin out the mucous to make it easier to cough up.  You may take 1-2 tablets twice per day as needed. Utilizing a vaporizer/humidifier may help, as well as increasing fluids.  Sore Throat: Salt water gargles with 1 teaspoon of salt dissolved in 6-8 oz of water as needed can help with a sore throat, as can honey, drinking plenty of liquids, eating soft foods. If severe, can utilize OTC chloraseptic spray.  Nasal Congestion: Over the counter allergy medication like Claritin, Allegra or Zyrtec can help with nasal congestion and post nasal drip.  Over the counter saline or steroid nasal sprays like Flonase can help with nasal congestion and post nasal drip as well. Over the counter decongestants such as Sudafed may also help.  Upset Stomach: Drink plenty of fluids. May utilize Gatorade, Pedialyte, or other electrolyte solutions to supplement. Eat bland foods (ex: BRAT - bananas, rice, applesauce, toast) and slowly advance to other foods as tolerated.  Please note, if you  have heart issues or high blood pressure, the cough/cold medication of choice is Coricidin. Talk to your doctor before trying any new medications.    Follow up with PCP in 3-5 days.  Proceed to  ER if symptoms worsen.    Chief Complaint     Chief Complaint   Patient presents with    Cough     Patient states he has a persistent cough that is worse at night. Patient reports a negative at home  COVID test that he took last week.          History of Present Illness       Cough  This is a new problem. Episode onset: about 2 weeks ago. The problem has been unchanged. The cough is Productive of sputum. Associated symptoms include postnasal drip and wheezing. Pertinent negatives include no chills, ear pain, eye redness, fever, myalgias, rash, rhinorrhea, sore throat (had one in beginning of illness, since resolved) or shortness of breath. Treatments tried: Nyquil, Dayquil, Benadryl. The treatment provided no relief. Denies PMH of respiratory or lung issues.     Home COVID test negative last week.    Review of Systems   Review of Systems   Constitutional:  Negative for appetite change, chills and fever.   HENT:  Positive for congestion and postnasal drip. Negative for ear discharge, ear pain, rhinorrhea, sinus pressure, sinus pain and sore throat (had one in beginning of illness, since resolved).    Eyes:  Negative for pain, discharge, redness and itching.   Respiratory:  Positive for cough and wheezing. Negative for chest tightness and shortness of breath.    Gastrointestinal:  Negative for abdominal pain, diarrhea, nausea and vomiting.   Musculoskeletal:  Negative for myalgias.   Skin:  Negative for rash.         Current Medications       Current Outpatient Medications:     apixaban (Eliquis) 5 mg, Take 1 tablet (5 mg total) by mouth 2 (two) times a day, Disp: 180 tablet, Rfl: 1    atorvastatin (LIPITOR) 40 mg tablet, Take 1 tablet (40 mg total) by mouth daily, Disp: 90 tablet, Rfl: 1    benzonatate (TESSALON) 200 MG  capsule, Take 1 capsule (200 mg total) by mouth 3 (three) times a day as needed for cough, Disp: 20 capsule, Rfl: 0    Cholecalciferol (VITAMIN D) 50 MCG (2000 UT) CAPS, Take by mouth, Disp: , Rfl:     levothyroxine (Synthroid) 25 mcg tablet, Take 1 tablet (25 mcg total) by mouth daily in the early morning, Disp: 90 tablet, Rfl: 1    Current Allergies     Allergies as of 02/21/2024 - Reviewed 02/21/2024   Allergen Reaction Noted    Amoxicillin Vomiting 02/21/2020    Codeine Vomiting 06/06/2021    Percocet [oxycodone-acetaminophen] GI Intolerance 06/06/2021    Codeine GI Intolerance 06/08/2018            The following portions of the patient's history were reviewed and updated as appropriate: allergies, current medications, past family history, past medical history, past social history, past surgical history and problem list.     Past Medical History:   Diagnosis Date    Arthritis     Blood in stool     Disease of thyroid gland     Hyperlipidemia     Kidney stone     Pulmonary embolism (HCC)        Past Surgical History:   Procedure Laterality Date    COLONOSCOPY      HERNIA REPAIR Right 2009    With mesh    KNEE ARTHROSCOPY      CT ARTHROSCOPY KNEE DIAGNOSTIC W/WO SYNOVIAL BX SPX Left 06/14/2019    Procedure: ARTHROSCOPY KNEE;  Surgeon: Ana María Tucker MD;  Location: [facility];  Service: Orthopedics    CT SURGICAL ARTHROSCOPY SHOULDER W/ROTATOR CUFF RPR Right 03/30/2022    Procedure: SHOULDER ARTHROSCOPIC ROTATOR CUFF REPAIR; EXTENSIVE DEBRIDEMENT;  Surgeon: Alfonzo Mccracken MD;  Location: [facility];  Service: Orthopedics    CT SURGICAL ARTHROSCOPY SHOULDER W/ROTATOR CUFF RPR Left 12/13/2023    Procedure: SHOULDER ARTHROSCOPIC ROTATOR CUFF REPAIR, SAD;  Surgeon: Alfonzo Mccracken MD;  Location: [facility];  Service: Orthopedics    SHOULDER SURGERY      TONSILLECTOMY      WISDOM TOOTH EXTRACTION         Family History   Problem Relation Age of Onset    Stroke Mother         CVA    Breast  "cancer Mother     Arthritis Mother     Diabetes Father         DM    Cancer Father     Melanoma Brother          Medications have been verified.        Objective   /60   Pulse 95   Temp 99.9 °F (37.7 °C)   Ht 6' 2\" (1.88 m)   Wt 96.4 kg (212 lb 9.6 oz)   SpO2 94%   BMI 27.30 kg/m²        Physical Exam     Physical Exam  Vitals and nursing note reviewed.   Constitutional:       General: He is not in acute distress.     Appearance: Normal appearance. He is not ill-appearing.   HENT:      Right Ear: Tympanic membrane, ear canal and external ear normal.      Left Ear: Tympanic membrane, ear canal and external ear normal.      Nose: Congestion present. No rhinorrhea.      Mouth/Throat:      Mouth: Mucous membranes are moist.      Pharynx: No oropharyngeal exudate or posterior oropharyngeal erythema.   Eyes:      General:         Right eye: No discharge.         Left eye: No discharge.      Extraocular Movements: Extraocular movements intact.   Cardiovascular:      Rate and Rhythm: Normal rate and regular rhythm.      Pulses: Normal pulses.      Heart sounds: Normal heart sounds.   Pulmonary:      Effort: Pulmonary effort is normal. No respiratory distress.      Breath sounds: Normal breath sounds. No wheezing, rhonchi or rales.   Abdominal:      General: Abdomen is flat. Bowel sounds are normal. There is no distension.      Palpations: Abdomen is soft.      Tenderness: There is no abdominal tenderness. There is no guarding.   Musculoskeletal:      Cervical back: Neck supple. No tenderness.   Lymphadenopathy:      Cervical: No cervical adenopathy.   Skin:     General: Skin is warm and dry.   Neurological:      Mental Status: He is alert.                   "

## 2024-02-21 NOTE — PATIENT INSTRUCTIONS
Your symptoms are likely due to a viral illness. The mainstay of treatment is for symptom relief, see below for recommended medications.  Prescribed Tessalon, take as directed.  Fever/Body Aches: We recommend you take 600mg ibuprofen every 6 hours or tylenol 650mg every 6 hours as needed for fever. If needed, you can alternate these medications so that you take one medication every 3 hours. For instance, at noon take ibuprofen, then at 3pm take tylenol, then at 6pm take ibuprofen.   Cough: Mucinex XR (guafenisen) 600 mg tablets may be used to thin out the mucous to make it easier to cough up.  You may take 1-2 tablets twice per day as needed. Utilizing a vaporizer/humidifier may help, as well as increasing fluids.  Sore Throat: Salt water gargles with 1 teaspoon of salt dissolved in 6-8 oz of water as needed can help with a sore throat, as can honey, drinking plenty of liquids, eating soft foods. If severe, can utilize OTC chloraseptic spray.  Nasal Congestion: Over the counter allergy medication like Claritin, Allegra or Zyrtec can help with nasal congestion and post nasal drip.  Over the counter saline or steroid nasal sprays like Flonase can help with nasal congestion and post nasal drip as well. Over the counter decongestants such as Sudafed may also help.  Upset Stomach: Drink plenty of fluids. May utilize Gatorade, Pedialyte, or other electrolyte solutions to supplement. Eat bland foods (ex: BRAT - bananas, rice, applesauce, toast) and slowly advance to other foods as tolerated.  Please note, if you have heart issues or high blood pressure, the cough/cold medication of choice is Coricidin. Talk to your doctor before trying any new medications.    Follow up with PCP in 3-5 days.  Proceed to  ER if symptoms worsen.    Acute Cough   AMBULATORY CARE:   An acute cough  can last up to 3 weeks. Common causes of an acute cough include a cold, allergies, or a lung infection.  Seek care immediately if:   You have  trouble breathing or feel short of breath.    You cough up blood, or you see blood in your mucus.    You faint or feel weak or dizzy.    You have chest pain when you cough or take a deep breath.    You have new wheezing.    Contact your healthcare provider if:   You have a fever.    Your cough lasts longer than 4 weeks.    Your symptoms do not improve with treatment.    You have questions or concerns about your condition or care.    Treatment:  An acute cough usually goes away on its own. Ask your healthcare provider about medicines you can take to decrease your cough. You may need medicine to stop the cough, decrease swelling in your airways, or help open your airways. Medicine may also be given to help you cough up mucus. If you have an infection caused by bacteria, you may need antibiotics.  Manage your symptoms:   Do not smoke and stay away from others who smoke.  Nicotine and other chemicals in cigarettes and cigars can cause lung damage and make your cough worse. Ask your healthcare provider for information if you currently smoke and need help to quit. E-cigarettes or smokeless tobacco still contain nicotine. Talk to your healthcare provider before you use these products.    Drink extra liquids as directed.  Liquids will help thin and loosen mucus so you can cough it up. Liquids will also help prevent dehydration. Examples of good liquids to drink include water, fruit juice, and broth. Do not drink liquids that contain caffeine. Caffeine can increase your risk for dehydration. Ask your healthcare provider how much liquid to drink each day.    Rest as directed.  Do not do activities that make your cough worse, such as exercise.    Use a humidifier or vaporizer.  Use a cool mist humidifier or a vaporizer to increase air moisture in your home. This may make it easier for you to breathe and help decrease your cough.    Eat 2 to 5 mL of honey 2 times each day.  Honey can help thin mucus and decrease your  cough.    Use cough drops or lozenges.  These can help decrease throat irritation and your cough.    Follow up with your healthcare provider as directed:  Write down your questions so you remember to ask them during your visits.  © Copyright [employer] 2023 Information is for End User's use only and may not be sold, redistributed or otherwise used for commercial purposes.  The above information is an  only. It is not intended as medical advice for individual conditions or treatments. Talk to your doctor, nurse or pharmacist before following any medical regimen to see if it is safe and effective for you.

## 2024-02-23 ENCOUNTER — APPOINTMENT (OUTPATIENT)
Dept: PHYSICAL THERAPY | Facility: REHABILITATION | Age: 64
End: 2024-02-23
Payer: COMMERCIAL

## 2024-02-27 ENCOUNTER — OFFICE VISIT (OUTPATIENT)
Dept: PHYSICAL THERAPY | Facility: REHABILITATION | Age: 64
End: 2024-02-27
Payer: COMMERCIAL

## 2024-02-27 DIAGNOSIS — M25.512 ACUTE PAIN OF LEFT SHOULDER: Primary | ICD-10-CM

## 2024-02-27 DIAGNOSIS — Z98.890 S/P LEFT ROTATOR CUFF REPAIR: ICD-10-CM

## 2024-02-27 DIAGNOSIS — M75.102 TEAR OF LEFT SUPRASPINATUS TENDON: ICD-10-CM

## 2024-02-27 PROCEDURE — 97140 MANUAL THERAPY 1/> REGIONS: CPT

## 2024-02-27 PROCEDURE — 97112 NEUROMUSCULAR REEDUCATION: CPT

## 2024-02-27 PROCEDURE — 97110 THERAPEUTIC EXERCISES: CPT

## 2024-02-27 NOTE — PROGRESS NOTES
"Daily Note     Today's date: 2024  Patient name: Asher Martin  : 1960  MRN: [id number]  Referring provider: Alfonzo Mccracken*  Dx:   Encounter Diagnosis     ICD-10-CM    1. Acute pain of left shoulder  M25.512       2. Tear of left supraspinatus tendon  M75.102       3. S/P left rotator cuff repair  Z98.890           Start Time: 0700  Stop Time: 0745  Total time in clinic (min): 45 minutes    Subjective: Pt reports being sick for 2 weeks.  States that he has been doing well at work without much restriction.  Global tightness and anterior discomfort persists.        Objective: See treatment diary below      Assessment: Tolerated treatment well. Patient would benefit from continued PT.  Progressed pt further into the early strengthening phase of the surgical protocol.  Mild fatigue noted post-session.  Good tolerance to progression.  Tightness persists - added stretching to facilitate improvement.  1:1 with Edson Light DPT entirety of tx.        Plan: Continue per plan of care.      Precautions: L RTC repair 2023  PHASE III as of 24 Early Strengthening  PHASE IV as of 3/6/24 Aggressive Rehab    POC expires Unit limit PT/OT + Visit Limit   24 BOMN BOMN       Pertinent Findings:                                                                                            Test / Measure  2023  Pre-Op 2023  Post-Op 2024   FOTO 41 (Pred 66) 4 (Pred 52) 59   L Sh flex AROM 145 deg 80 deg PROM    L Sh abd AROM 135 deg 60 deg PROM    L Sh ER MMT 3/5 1/    L Sh flex/abd MMT 3-/5           Manuals    L Sh PROM KS 15' MM 10' MM 10' KS 8' MS 8' KS 8' MM 8'                                 Neuro Re-Ed          Scap retr 30x5\" SRER 30x5\" SRER 30x5\" SRER 30x5\" SRER 30x5\" SRER 30x5\"    Supine flex AAROM 3x10 cane 3x10 cane 3x10 cane 3x10 cane 3x10 cane 3x10 cane    Supine ER AAROM 2x20 2x20 2x20 2x20 2x20 2x20    Table slides 2x20 2x20 15x flex  15x " "scap 15x flex  15x scap 15x flex  15x scap 15x flex  15x scap    Wall slides X12 UE A X12 UE A 15x 5x 5x 5x 10x flex  10x abd   Hinge cane AAROM press 3x10 window 3x10 window 3x10 window 3x10 window 3x10 window 3x10 window    Prone Row 3x10  3x10  3x10 3x10 3x10 3x10    Prone I 3x10 3x10 3x10 3x10 3x10 3x10    6way isometric 5\"x10ea 5\"x10ea 10x5\" ea 10x5\" ea 10x5\" ea 10x5\" ea    TB Row      3x10 mtb 3x10 mtb   TB LPD      3x10 btb 3x10 mtb   TB IR       3x10 btb   TB ER       3x10 btb   Ther Ex          HEP review          Pulleys 8' flex 8' flex 4' flex  4' abd 4' flex  4' abd 4' flex  4' abd 4' flex  4' abd 3' flex   Standing AROM 3 way elevation X10 ea X10 ea 10x ea 10x ea 10x ea 10x ea 10x ea 3#   Biceps curls       3x10 8#   Triceps ext       3x10 mtb   Stand pec S       10x10\"   IR strap S       10x10\"                       Ther Activity                              Gait Training                              Modalities          Cold pack                                                         "

## 2024-03-01 ENCOUNTER — OFFICE VISIT (OUTPATIENT)
Dept: PHYSICAL THERAPY | Facility: REHABILITATION | Age: 64
End: 2024-03-01
Payer: COMMERCIAL

## 2024-03-01 DIAGNOSIS — M25.512 ACUTE PAIN OF LEFT SHOULDER: Primary | ICD-10-CM

## 2024-03-01 DIAGNOSIS — M75.102 TEAR OF LEFT SUPRASPINATUS TENDON: ICD-10-CM

## 2024-03-01 DIAGNOSIS — Z98.890 S/P LEFT ROTATOR CUFF REPAIR: ICD-10-CM

## 2024-03-01 PROCEDURE — 97140 MANUAL THERAPY 1/> REGIONS: CPT

## 2024-03-01 PROCEDURE — 97110 THERAPEUTIC EXERCISES: CPT

## 2024-03-01 PROCEDURE — 97112 NEUROMUSCULAR REEDUCATION: CPT

## 2024-03-01 NOTE — PROGRESS NOTES
"Daily Note     Today's date: 3/1/2024  Patient name: Asher Martin  : 1960  MRN: [id number]  Referring provider: Alfonzo Mccracken*  Dx:   Encounter Diagnosis     ICD-10-CM    1. Acute pain of left shoulder  M25.512       2. Tear of left supraspinatus tendon  M75.102       3. S/P left rotator cuff repair  Z98.890           Start Time: 658  Stop Time: 736  Total time in clinic (min): 38 minutes    Subjective: Pt reports \"overdoing\" yesterday at work.  He states that he had to carry a heavier weight bag of material during the day which caused an increase in soreness.  Had to take Tylenol for the first time in several weeks.      Objective: See treatment diary below      Assessment: Tolerated treatment well. Patient would benefit from continued PT.  L Sh tightness persists - focused on stretching and PROM with overpressure.  Flexion and abduction are most limited ROM planes of movement.  Strength limitation in abduction and ER.  Scapular motor control is gradually improving.  1:1 with Edson Light DPT entirety of tx.        Plan: Continue per plan of care.      Precautions: L RTC repair 2023  PHASE III as of 24 Early Strengthening  PHASE IV as of 3/6/24 Aggressive Rehab    POC expires Unit limit PT/OT + Visit Limit   24 BOMN BOMN       Pertinent Findings:                                                                                            Test / Measure  2023  Pre-Op 2023  Post-Op 2024   FOTO 41 (Pred 66) 4 (Pred 52) 59   L Sh flex AROM 145 deg 80 deg PROM    L Sh abd AROM 135 deg 60 deg PROM    L Sh ER MMT 3/5     L Sh flex/abd MMT 3-/          Manuals  2/2 2/6 2/9 2/27 3/1   L Sh PROM MM 10' MM 10' KS 8' MS 8' KS 8' MM 8' MM 8'                                 Neuro Re-Ed          Scap retr SRER 30x5\" SRER 30x5\" SRER 30x5\" SRER 30x5\" SRER 30x5\"     Supine flex AAROM 3x10 cane 3x10 cane 3x10 cane 3x10 cane 3x10 cane     Supine ER AAROM 2x20 2x20 2x20 " "2x20 2x20     Table slides 2x20 15x flex  15x scap 15x flex  15x scap 15x flex  15x scap 15x flex  15x scap     Wall slides X12 UE A 15x 5x 5x 5x 10x flex  10x abd 10x10\" ea flex, abd   Hinge cane AAROM press 3x10 window 3x10 window 3x10 window 3x10 window 3x10 window     Prone Row 3x10  3x10 3x10 3x10 3x10     Prone I 3x10 3x10 3x10 3x10 3x10     6way isometric 5\"x10ea 10x5\" ea 10x5\" ea 10x5\" ea 10x5\" ea     TB Row     3x10 mtb 3x10 mtb 3x10 mtb   TB LPD     3x10 btb 3x10 mtb 3x10 mtb   TB IR      3x10 btb 3x10 mtb   TB ER      3x10 btb 3x10 mtb   Ther Ex          HEP review          Pulleys 8' flex 4' flex  4' abd 4' flex  4' abd 4' flex  4' abd 4' flex  4' abd 3' flex 3' flex   Standing AROM 3 way elevation X10 ea 10x ea 10x ea 10x ea 10x ea 10x ea 3# 10x ea 3#   Biceps curls      3x10 8# 3x10 8#   Triceps ext      3x10 mtb    Stand pec S      10x10\" 10x10\"   IR strap S      10x10\" 10x10\"                       Ther Activity                              Gait Training                              Modalities          Cold pack                                                           "

## 2024-03-08 ENCOUNTER — OFFICE VISIT (OUTPATIENT)
Dept: URGENT CARE | Facility: MEDICAL CENTER | Age: 64
End: 2024-03-08
Payer: COMMERCIAL

## 2024-03-08 VITALS
HEART RATE: 76 BPM | TEMPERATURE: 97.8 F | RESPIRATION RATE: 18 BRPM | DIASTOLIC BLOOD PRESSURE: 86 MMHG | SYSTOLIC BLOOD PRESSURE: 115 MMHG | OXYGEN SATURATION: 95 %

## 2024-03-08 DIAGNOSIS — R05.2 SUBACUTE COUGH: Primary | ICD-10-CM

## 2024-03-08 PROCEDURE — 99213 OFFICE O/P EST LOW 20 MIN: CPT

## 2024-03-08 RX ORDER — AZITHROMYCIN 250 MG/1
TABLET, FILM COATED ORAL
Qty: 6 TABLET | Refills: 0 | Status: SHIPPED | OUTPATIENT
Start: 2024-03-08 | End: 2024-03-12

## 2024-03-08 RX ORDER — METHYLPREDNISOLONE 4 MG/1
TABLET ORAL
Qty: 1 EACH | Refills: 0 | Status: SHIPPED | OUTPATIENT
Start: 2024-03-08

## 2024-03-08 NOTE — PROGRESS NOTES
[facility]        NAME: Asher Martin is a 63 y.o. male  : 1960    MRN: [id number]  DATE: 2024  TIME: 11:40 AM    Assessment and Plan   Subacute cough [R05.2]  1. Subacute cough  azithromycin (ZITHROMAX) 250 mg tablet    methylPREDNISolone 4 MG tablet therapy pack        Prescribed course of azithromycin to cover for potential bacterial infection. Prescribed course of steroids. Advised symptomatic treatment.    Patient Instructions     Prescribed azithromycin, medrol dose pack - take medications as directed.  Symptomatic treatment recommended below.  Fever/Body Aches: We recommend you take 600mg ibuprofen every 6 hours or tylenol 650mg every 6 hours as needed for fever. If needed, you can alternate these medications so that you take one medication every 3 hours. For instance, at noon take ibuprofen, then at 3pm take tylenol, then at 6pm take ibuprofen.   Cough: Delsym, an over the counter cough medication may be used every 12 hours as needed.  Mucinex XR (guafenisen) 600 mg tablets may be used to thin out the mucous to make it easier to cough up.  You may take 1-2 tablets twice per day as needed. Utilizing a vaporizer/humidifier may help, as well as increasing fluids.  Sore Throat: Salt water gargles with 1 teaspoon of salt dissolved in 6-8 oz of water as needed can help with a sore throat, as can honey, drinking plenty of liquids, eating soft foods. If severe, can utilize OTC chloraseptic spray.  Nasal Congestion: Over the counter allergy medication like Claritin, Allegra or Zyrtec can help with nasal congestion and post nasal drip.  Over the counter saline or steroid nasal sprays like Flonase can help with nasal congestion and post nasal drip as well. Over the counter decongestants such as Sudafed may also help.  Upset Stomach: Drink plenty of fluids. May utilize Gatorade, Pedialyte, or other electrolyte solutions to supplement. Eat bland foods (ex: BRAT - bananas, rice, applesauce,  toast) and slowly advance to other foods as tolerated.  Please note, if you have heart issues or high blood pressure, the cough/cold medication of choice is Coricidin. Talk to your doctor before trying any new medications.    Follow up with PCP in 3-5 days.  Proceed to  ER if symptoms worsen.    If tests are performed, our office will contact you with results only if changes need to made to the care plan discussed with you at the visit. [facility]    Chief Complaint     Chief Complaint   Patient presents with    Cough     Pt states he was seen in the urgent care two weeks ago with cough and upper respiratory infection.  He still continues with the productive cough.  Cough tends to become more severe in the afternoon toward the evening.  COVID negative one week ago.  Still has nasal congestion with post nasal drip.           History of Present Illness       Patient presents with cough/cold symptoms for a few weeks. He was seen here on 2/21/24 and prescribed Tessalon Perles, he notes those seemed to be helpful. He notes the cough is productive, he has had occasional wheezing. Denies chest tightness or shortness of breath. He also admits to post nasal drip, runny nose (worse at night), nasal congestion, and sinus pain/pressure. He has not had any fever, chills, or muscle aches. His symptoms seemed to have improved slightly, but are still present. He notes 2 days ago he had some diarrhea and also felt nauseous the other day, otherwise had not had any consistent GI symptoms. He has been taking Mucinex and Nyquil since running out of the Tessalon Perles, he notes these medications seem to be helpful, but overall his symptoms are still present.        Review of Systems   Review of Systems   Constitutional:  Negative for appetite change, chills and fever.   HENT:  Positive for congestion, postnasal drip, rhinorrhea, sinus pressure and sinus pain. Negative for ear discharge, ear  pain and sore throat.    Eyes:  Negative for pain, discharge, redness and itching.   Respiratory:  Positive for cough and wheezing. Negative for chest tightness and shortness of breath.    Gastrointestinal:  Positive for diarrhea and nausea. Negative for abdominal pain and vomiting.   Musculoskeletal:  Negative for myalgias.         Current Medications       Current Outpatient Medications:     apixaban (Eliquis) 5 mg, Take 1 tablet (5 mg total) by mouth 2 (two) times a day, Disp: 180 tablet, Rfl: 1    atorvastatin (LIPITOR) 40 mg tablet, Take 1 tablet (40 mg total) by mouth daily, Disp: 90 tablet, Rfl: 1    azithromycin (ZITHROMAX) 250 mg tablet, Take 2 tablets today then 1 tablet daily x 4 days, Disp: 6 tablet, Rfl: 0    Cholecalciferol (VITAMIN D) 50 MCG (2000 UT) CAPS, Take by mouth, Disp: , Rfl:     levothyroxine (Synthroid) 25 mcg tablet, Take 1 tablet (25 mcg total) by mouth daily in the early morning, Disp: 90 tablet, Rfl: 1    methylPREDNISolone 4 MG tablet therapy pack, Use as directed on package, Disp: 1 each, Rfl: 0    benzonatate (TESSALON) 200 MG capsule, Take 1 capsule (200 mg total) by mouth 3 (three) times a day as needed for cough (Patient not taking: Reported on 3/8/2024), Disp: 20 capsule, Rfl: 0    Current Allergies     Allergies as of 03/08/2024 - Reviewed 03/08/2024   Allergen Reaction Noted    Amoxicillin Vomiting 02/21/2020    Codeine Vomiting 06/06/2021    Percocet [oxycodone-acetaminophen] GI Intolerance 06/06/2021    Codeine GI Intolerance 06/08/2018            The following portions of the patient's history were reviewed and updated as appropriate: allergies, current medications, past family history, past medical history, past social history, past surgical history and problem list.     Past Medical History:   Diagnosis Date    Arthritis     Blood in stool     Disease of thyroid gland     Hyperlipidemia     Kidney stone     Pulmonary embolism (HCC)        Past Surgical History:   Procedure  Laterality Date    COLONOSCOPY      HERNIA REPAIR Right 2009    With mesh    KNEE ARTHROSCOPY      TX ARTHROSCOPY KNEE DIAGNOSTIC W/WO SYNOVIAL BX SPX Left 06/14/2019    Procedure: ARTHROSCOPY KNEE;  Surgeon: Ana María Tucker MD;  Location: [facility];  Service: Orthopedics    TX SURGICAL ARTHROSCOPY SHOULDER W/ROTATOR CUFF RPR Right 03/30/2022    Procedure: SHOULDER ARTHROSCOPIC ROTATOR CUFF REPAIR; EXTENSIVE DEBRIDEMENT;  Surgeon: Alfonzo Mccracken MD;  Location[facility];  Service: Orthopedics    TX SURGICAL ARTHROSCOPY SHOULDER W/ROTATOR CUFF RPR Left 12/13/2023    Procedure: SHOULDER ARTHROSCOPIC ROTATOR CUFF REPAIR, SAD;  Surgeon: Alfonzo Mccracken MD;  Location[facility];  Service: Orthopedics    SHOULDER SURGERY      TONSILLECTOMY      WISDOM TOOTH EXTRACTION         Family History   Problem Relation Age of Onset    Stroke Mother         CVA    Breast cancer Mother     Arthritis Mother     Diabetes Father         DM    Cancer Father     Melanoma Brother          Medications have been verified.        Objective   /86   Pulse 76   Temp 97.8 °F (36.6 °C) (Tympanic)   Resp 18   SpO2 95%        Physical Exam     Physical Exam  Vitals and nursing note reviewed.   Constitutional:       General: He is not in acute distress.     Appearance: Normal appearance. He is not ill-appearing.   HENT:      Right Ear: Tympanic membrane, ear canal and external ear normal.      Left Ear: Tympanic membrane, ear canal and external ear normal.      Nose: Congestion present. No rhinorrhea.      Mouth/Throat:      Mouth: Mucous membranes are moist.      Pharynx: No oropharyngeal exudate or posterior oropharyngeal erythema.   Eyes:      General:         Right eye: No discharge.         Left eye: No discharge.      Extraocular Movements: Extraocular movements intact.   Cardiovascular:      Rate and Rhythm: Normal rate and regular rhythm.      Pulses: Normal pulses.      Heart sounds: Normal heart  sounds.   Pulmonary:      Effort: Pulmonary effort is normal. No respiratory distress.      Breath sounds: Normal breath sounds. No wheezing, rhonchi or rales.   Abdominal:      General: Abdomen is flat. Bowel sounds are normal. There is no distension.      Palpations: Abdomen is soft.      Tenderness: There is no abdominal tenderness. There is no guarding.   Musculoskeletal:      Cervical back: Neck supple. No tenderness.   Lymphadenopathy:      Cervical: No cervical adenopathy.   Skin:     General: Skin is warm and dry.   Neurological:      Mental Status: He is alert.

## 2024-03-08 NOTE — PATIENT INSTRUCTIONS
Prescribed azithromycin, medrol dose pack - take medications as directed.  Symptomatic treatment recommended below.  Fever/Body Aches: We recommend you take 600mg ibuprofen every 6 hours or tylenol 650mg every 6 hours as needed for fever. If needed, you can alternate these medications so that you take one medication every 3 hours. For instance, at noon take ibuprofen, then at 3pm take tylenol, then at 6pm take ibuprofen.   Cough: Delsym, an over the counter cough medication may be used every 12 hours as needed.  Mucinex XR (guafenisen) 600 mg tablets may be used to thin out the mucous to make it easier to cough up.  You may take 1-2 tablets twice per day as needed. Utilizing a vaporizer/humidifier may help, as well as increasing fluids.  Sore Throat: Salt water gargles with 1 teaspoon of salt dissolved in 6-8 oz of water as needed can help with a sore throat, as can honey, drinking plenty of liquids, eating soft foods. If severe, can utilize OTC chloraseptic spray.  Nasal Congestion: Over the counter allergy medication like Claritin, Allegra or Zyrtec can help with nasal congestion and post nasal drip.  Over the counter saline or steroid nasal sprays like Flonase can help with nasal congestion and post nasal drip as well. Over the counter decongestants such as Sudafed may also help.  Upset Stomach: Drink plenty of fluids. May utilize Gatorade, Pedialyte, or other electrolyte solutions to supplement. Eat bland foods (ex: BRAT - bananas, rice, applesauce, toast) and slowly advance to other foods as tolerated.  Please note, if you have heart issues or high blood pressure, the cough/cold medication of choice is Coricidin. Talk to your doctor before trying any new medications.    Follow up with PCP in 3-5 days.  Proceed to  ER if symptoms worsen.    If tests are performed, our office will contact you with results only if changes need to made to the care plan discussed with you at the visit. You can review your full  [facility] on [facility].    Acute Cough   AMBULATORY CARE:   An acute cough  can last up to 3 weeks. Common causes of an acute cough include a cold, allergies, or a lung infection.  Seek care immediately if:   You have trouble breathing or feel short of breath.    You cough up blood, or you see blood in your mucus.    You faint or feel weak or dizzy.    You have chest pain when you cough or take a deep breath.    You have new wheezing.    Contact your healthcare provider if:   You have a fever.    Your cough lasts longer than 4 weeks.    Your symptoms do not improve with treatment.    You have questions or concerns about your condition or care.    Treatment:  An acute cough usually goes away on its own. Ask your healthcare provider about medicines you can take to decrease your cough. You may need medicine to stop the cough, decrease swelling in your airways, or help open your airways. Medicine may also be given to help you cough up mucus. If you have an infection caused by bacteria, you may need antibiotics.  Manage your symptoms:   Do not smoke and stay away from others who smoke.  Nicotine and other chemicals in cigarettes and cigars can cause lung damage and make your cough worse. Ask your healthcare provider for information if you currently smoke and need help to quit. E-cigarettes or smokeless tobacco still contain nicotine. Talk to your healthcare provider before you use these products.    Drink extra liquids as directed.  Liquids will help thin and loosen mucus so you can cough it up. Liquids will also help prevent dehydration. Examples of good liquids to drink include water, fruit juice, and broth. Do not drink liquids that contain caffeine. Caffeine can increase your risk for dehydration. Ask your healthcare provider how much liquid to drink each day.    Rest as directed.  Do not do activities that make your cough worse, such as exercise.    Use a humidifier or vaporizer.  Use a cool mist humidifier  or a vaporizer to increase air moisture in your home. This may make it easier for you to breathe and help decrease your cough.    Eat 2 to 5 mL of honey 2 times each day.  Honey can help thin mucus and decrease your cough.    Use cough drops or lozenges.  These can help decrease throat irritation and your cough.    Follow up with your healthcare provider as directed:  Write down your questions so you remember to ask them during your visits.  © Copyright [employer] 2023 Information is for End User's use only and may not be sold, redistributed or otherwise used for commercial purposes.  The above information is an  only. It is not intended as medical advice for individual conditions or treatments. Talk to your doctor, nurse or pharmacist before following any medical regimen to see if it is safe and effective for you.

## 2024-03-11 ENCOUNTER — APPOINTMENT (OUTPATIENT)
Dept: PHYSICAL THERAPY | Facility: REHABILITATION | Age: 64
End: 2024-03-11
Payer: COMMERCIAL

## 2024-03-12 ENCOUNTER — OFFICE VISIT (OUTPATIENT)
Dept: PHYSICAL THERAPY | Facility: REHABILITATION | Age: 64
End: 2024-03-12
Payer: COMMERCIAL

## 2024-03-12 DIAGNOSIS — M25.512 ACUTE PAIN OF LEFT SHOULDER: Primary | ICD-10-CM

## 2024-03-12 DIAGNOSIS — Z98.890 S/P LEFT ROTATOR CUFF REPAIR: ICD-10-CM

## 2024-03-12 DIAGNOSIS — M75.102 TEAR OF LEFT SUPRASPINATUS TENDON: ICD-10-CM

## 2024-03-12 PROCEDURE — 97110 THERAPEUTIC EXERCISES: CPT

## 2024-03-12 PROCEDURE — 97112 NEUROMUSCULAR REEDUCATION: CPT

## 2024-03-12 NOTE — PROGRESS NOTES
"Daily Note     Today's date: 3/12/2024  Patient name: Asher Martin  : 1960  MRN: [id number]  Referring provider: Alfonzo Mccracken*  Dx:   Encounter Diagnosis     ICD-10-CM    1. Acute pain of left shoulder  M25.512       2. Tear of left supraspinatus tendon  M75.102       3. S/P left rotator cuff repair  Z98.890           Start Time: 723  Stop Time: 746  Total time in clinic (min): 23 minutes    Subjective: Pt reports that he thinks he overdid it over the weekend.  Had moderate anterior L Sh pain prior to start of tx session.       Objective: See treatment diary below      Assessment: Tolerated treatment well. Patient would benefit from continued PT.  Pt able to tolerate progression to next phase of protocol.  Pain intensity improved following tx session.  Continues to be limited with L Sh overhead ROM (flexion/abduction) as well as IR ROM.  Advised pt to perform stretching exercises frequently to address mobility deficits. 1:1 with Edson Light DPT entirety of tx.        Plan: Continue per plan of care.      Precautions: L RTC repair 2023  PHASE III as of 24 Early Strengthening  PHASE IV as of 3/6/24 Aggressive Rehab    POC expires Unit limit PT/OT + Visit Limit   24 BOMN BOMN       Pertinent Findings:                                                                                            Test / Measure  2023  Pre-Op 2023  Post-Op 2024   FOTO 41 (Pred 66) 4 (Pred 52) 59   L Sh flex AROM 145 deg 80 deg PROM    L Sh abd AROM 135 deg 60 deg PROM    L Sh ER MMT 3/5 1/    L Sh flex/abd MMT 3-/5           Manuals 2/2 2/6 2/9 2/27 3/1 3/12    L Sh PROM KS 8' MS 8' KS 8' MM 8' MM 8' MM 8'                                  Neuro Re-Ed          Scap retr SRER 30x5\" SRER 30x5\" SRER 30x5\"       Supine flex AAROM 3x10 cane 3x10 cane 3x10 cane       Supine ER AAROM 2x20 2x20 2x20       Table slides 15x flex  15x scap 15x flex  15x scap 15x flex  15x scap       Wall slides 5x " "5x 5x 10x flex  10x abd 10x10\" ea flex, abd     Hinge cane AAROM press 3x10 window 3x10 window 3x10 window       Prone Row 3x10 3x10 3x10       Prone I 3x10 3x10 3x10       6way isometric 10x5\" ea 10x5\" ea 10x5\" ea       TB Row   3x10 mtb 3x10 mtb 3x10 mtb 20x mtb robert   TB LPD   3x10 btb 3x10 mtb 3x10 mtb 20x mtb robert   TB IR    3x10 btb 3x10 mtb 20x mtb robert   TB ER    3x10 btb 3x10 mtb 20x mtb robert   TB ER/IR soldiers      20x ea gtb    Plank variation      NV --> ?    Pushups      NV --> ?    TB wall clocks      NV --> ?    90/90 carry      NV --> ?                        Ther Ex          HEP review          Pulleys 4' flex  4' abd 4' flex  4' abd 4' flex  4' abd 3' flex 3' flex 3' flex    UBE      3'/3'    Standing AROM 3 way elevation 10x ea 10x ea 10x ea 10x ea 3# 10x ea 3# 15x ea  3# abd  5# flex/scap    Biceps curls    3x10 8# 3x10 8# + OHP  10x 5#  10x 8#    Triceps ext    3x10 mtb      Stand pec S    10x10\" 10x10\"     IR strap S    10x10\" 10x10\"     Bench press      NV --> ?    Incline bench press      NV --> ?    Shoulder press      NV --> ?    Pec fly      NV --> ?              Ther Activity                              Gait Training                              Modalities          Cold pack                                                             "

## 2024-03-15 ENCOUNTER — OFFICE VISIT (OUTPATIENT)
Dept: PHYSICAL THERAPY | Facility: REHABILITATION | Age: 64
End: 2024-03-15
Payer: COMMERCIAL

## 2024-03-15 DIAGNOSIS — Z98.890 S/P LEFT ROTATOR CUFF REPAIR: ICD-10-CM

## 2024-03-15 DIAGNOSIS — M25.512 ACUTE PAIN OF LEFT SHOULDER: Primary | ICD-10-CM

## 2024-03-15 DIAGNOSIS — M75.102 TEAR OF LEFT SUPRASPINATUS TENDON: ICD-10-CM

## 2024-03-15 PROCEDURE — 97110 THERAPEUTIC EXERCISES: CPT

## 2024-03-15 PROCEDURE — 97112 NEUROMUSCULAR REEDUCATION: CPT

## 2024-03-15 NOTE — PROGRESS NOTES
"Daily Note     Today's date: 3/15/2024  Patient name: Asher Martin  : 1960  MRN: [id number]  Referring provider: Alfonzo Mccracken*  Dx:   Encounter Diagnosis     ICD-10-CM    1. Acute pain of left shoulder  M25.512       2. Tear of left supraspinatus tendon  M75.102       3. S/P left rotator cuff repair  Z98.890           Start Time: 0700  Stop Time: 07  Total time in clinic (min): 35 minutes    Subjective: Pt reports overall doing well, feels his strength is improving.        Objective: See treatment diary below      Assessment: Tolerated treatment well. Improving strength, progressed per primary PT's recommendations. Continues to be limited at end-range PROM flex, abd, and IR. Patient would benefit from continued PT. 1:1 with Aime Spence DPT for entirety of tx.       Plan: Continue per plan of care.      Precautions: L RTC repair 2023  PHASE III as of 24 Early Strengthening  PHASE IV as of 3/6/24 Aggressive Rehab    POC expires Unit limit PT/OT + Visit Limit   24 BOMN BOMN       Pertinent Findings:                                                                                            Test / Measure  2023  Pre-Op 2023  Post-Op 2024   FOTO 41 (Pred 66) 4 (Pred 52) 59   L Sh flex AROM 145 deg 80 deg PROM    L Sh abd AROM 135 deg 60 deg PROM    L Sh ER MMT 3/5 1/5    L Sh flex/abd MMT 3-/5 1/          Manuals 2/2 2/6 2/9 2/27 3/1 3/12 3/15   L Sh PROM KS 8' MS 8' KS 8' MM 8' MM 8' MM 8' CM 8'                                  Neuro Re-Ed          Scap retr SRER 30x5\" SRER 30x5\" SRER 30x5\"       Supine flex AAROM 3x10 cane 3x10 cane 3x10 cane       Supine ER AAROM 2x20 2x20 2x20       Table slides 15x flex  15x scap 15x flex  15x scap 15x flex  15x scap       Wall slides 5x 5x 5x 10x flex  10x abd 10x10\" ea flex, abd     Hinge cane AAROM press 3x10 window 3x10 window 3x10 window       Prone Row 3x10 3x10 3x10       Prone I 3x10 3x10 3x10       6way isometric " "10x5\" ea 10x5\" ea 10x5\" ea       TB Row   3x10 mtb 3x10 mtb 3x10 mtb 20x mtb 3x10 20#   TB LPD   3x10 btb 3x10 mtb 3x10 mtb 20x mtb 2x10 17#   TB IR    3x10 btb 3x10 mtb 20x mtb 2x10 4#   TB ER    3x10 btb 3x10 mtb 20x mtb 2x10 4#   TB ER/IR soldiers      20x ea gtb 20x ea btb   Plank variation      NV --> ?    Pushups      NV --> ? 2x10 table   TB wall clocks      NV --> ? 2x5 otb    90/90 carry      NV --> ? 100ft x3 laps 5#                       Ther Ex          HEP review          Pulleys 4' flex  4' abd 4' flex  4' abd 4' flex  4' abd 3' flex 3' flex 3' flex    UBE      3'/3' 3'/3'   Standing AROM 3 way elevation 10x ea 10x ea 10x ea 10x ea 3# 10x ea 3# 15x ea  3# abd  5# flex/scap 15x ea  3# abd  5# flex/scap   Biceps curls    3x10 8# 3x10 8# + OHP  10x 5#  10x 8# + OHP  10x 5#  10x 8#   Triceps ext    3x10 mtb      Stand pec S    10x10\" 10x10\"     IR strap S    10x10\" 10x10\"     Bench press      NV --> ?    Incline bench press      NV --> ?    Shoulder press      NV --> ? 3x10 8#   Pec fly      NV --> ?              Ther Activity                              Gait Training                              Modalities          Cold pack                                                             "

## 2024-03-18 ENCOUNTER — OFFICE VISIT (OUTPATIENT)
Dept: OBGYN CLINIC | Facility: OTHER | Age: 64
End: 2024-03-18
Payer: COMMERCIAL

## 2024-03-18 ENCOUNTER — OFFICE VISIT (OUTPATIENT)
Dept: PHYSICAL THERAPY | Facility: REHABILITATION | Age: 64
End: 2024-03-18
Payer: COMMERCIAL

## 2024-03-18 VITALS
DIASTOLIC BLOOD PRESSURE: 80 MMHG | SYSTOLIC BLOOD PRESSURE: 132 MMHG | HEIGHT: 74 IN | BODY MASS INDEX: 27.46 KG/M2 | WEIGHT: 214 LBS | HEART RATE: 103 BPM

## 2024-03-18 DIAGNOSIS — M75.102 TEAR OF LEFT SUPRASPINATUS TENDON: ICD-10-CM

## 2024-03-18 DIAGNOSIS — Z98.890 S/P LEFT ROTATOR CUFF REPAIR: Primary | ICD-10-CM

## 2024-03-18 DIAGNOSIS — Z98.890 S/P LEFT ROTATOR CUFF REPAIR: ICD-10-CM

## 2024-03-18 DIAGNOSIS — M25.512 ACUTE PAIN OF LEFT SHOULDER: Primary | ICD-10-CM

## 2024-03-18 PROCEDURE — 97110 THERAPEUTIC EXERCISES: CPT | Performed by: PHYSICAL THERAPIST

## 2024-03-18 PROCEDURE — 97112 NEUROMUSCULAR REEDUCATION: CPT | Performed by: PHYSICAL THERAPIST

## 2024-03-18 PROCEDURE — 99213 OFFICE O/P EST LOW 20 MIN: CPT | Performed by: ORTHOPAEDIC SURGERY

## 2024-03-18 NOTE — PROGRESS NOTES
"Daily Note     Today's date: 3/18/2024  Patient name: Asher Martin  : 1960  MRN: [id number]  Referring provider: Alfonzo Mccracken*  Dx:   Encounter Diagnosis     ICD-10-CM    1. Acute pain of left shoulder  M25.512       2. Tear of left supraspinatus tendon  M75.102       3. S/P left rotator cuff repair  Z98.890                      Subjective:  Reports no complaints, progressing well.     Objective: See treatment diary below    Assessment: Tolerated treatment well. Patient demonstrated fatigue post treatment, exhibited good technique with therapeutic exercises, and would benefit from continued PT Pt progressing very well, improved ROM and strength. Minimal pain reports. Improving terminal ROM and strength. 1:1 with Khai Snow DPT for 30 mins of tx.       Plan: Continue per plan of care.      Precautions: L RTC repair 2023  PHASE III as of 24 Early Strengthening  PHASE IV as of 3/6/24 Aggressive Rehab    POC expires Unit limit PT/OT + Visit Limit   24 BOMN BOMN       Pertinent Findings:                                                                                            Test / Measure  2023  Pre-Op 2023  Post-Op 2024   FOTO 41 (Pred 66) 4 (Pred 52) 59   L Sh flex AROM 145 deg 80 deg PROM    L Sh abd AROM 135 deg 60 deg PROM    L Sh ER MMT 3/5 1/5    L Sh flex/abd MMT 3-/5 1/5          Manuals 2/2 2/6 2/9 2/27 3/1 3/12 3/15 3/18   L Sh PROM KS 8' MS 8' KS 8' MM 8' MM 8' MM 8' CM 8'  KS 8'                                    Neuro Re-Ed           Scap retr SRER 30x5\" SRER 30x5\" SRER 30x5\"        Supine flex AAROM 3x10 cane 3x10 cane 3x10 cane        Supine ER AAROM 2x20 2x20 2x20        Table slides 15x flex  15x scap 15x flex  15x scap 15x flex  15x scap        Wall slides 5x 5x 5x 10x flex  10x abd 10x10\" ea flex, abd      Hinge cane AAROM press 3x10 window 3x10 window 3x10 window        Prone Row 3x10 3x10 3x10        Prone I 3x10 3x10 3x10        6way " "isometric 10x5\" ea 10x5\" ea 10x5\" ea        TB Row   3x10 mtb 3x10 mtb 3x10 mtb 20x mtb 3x10 20# 3x10 20#   TB LPD   3x10 btb 3x10 mtb 3x10 mtb 20x mtb 2x10 17# 2x10 17#   TB IR    3x10 btb 3x10 mtb 20x mtb 2x10 4# 2x10 4#   TB ER    3x10 btb 3x10 mtb 20x mtb 2x10 4# 2x10 4#   TB ER/IR soldiers      20x ea gtb 20x ea btb    Plank variation      NV --> ?     Pushups      NV --> ? 2x10 table 2x10 table   TB wall clocks      NV --> ? 2x5 otb  2x5 otb    90/90 carry      NV --> ? 100ft x3 laps 5# 100ft x3 laps 5#                         Ther Ex           HEP review           Pulleys 4' flex  4' abd 4' flex  4' abd 4' flex  4' abd 3' flex 3' flex 3' flex     UBE      3'/3' 3'/3' 3'/3'   Standing AROM 3 way elevation 10x ea 10x ea 10x ea 10x ea 3# 10x ea 3# 15x ea  3# abd  5# flex/scap 15x ea  3# abd  5# flex/scap 15x ea  3# abd  5# flex/scap   Biceps curls    3x10 8# 3x10 8# + OHP  10x 5#  10x 8# + OHP  10x 5#  10x 8# + OHP  10x 5#  10x 8#   Triceps ext    3x10 mtb       Stand pec S    10x10\" 10x10\"      IR strap S    10x10\" 10x10\"      Bench press      NV --> ?     Incline bench press      NV --> ?     Shoulder press      NV --> ? 3x10 8# 3x10 8#   Pec fly      NV --> ?                Ther Activity                                 Gait Training                                 Modalities           Cold pack                                                                 "

## 2024-03-18 NOTE — PROGRESS NOTES
"  Assessment  Diagnoses and all orders for this visit:    S/P left rotator cuff repair    Tear of left supraspinatus tendon    Discussion and Plan:    Patient is now 3 months s/p arthroscopic rotator cuff repair of the left shoulder performed on 12/13/23, doing well  He may transition into a home exercise program from formal physical therapy as tolerated.   Patient reports that he is doing more with the shoulder then is recommended but continues to do well without noted increase in pain.   Follow up on an as needed basis    Subjective:   Patient ID: Asher Martin is a 63 y.o. male presents today 3 months s/p above mentioned procedure for his left shoulder. Patient reports that he continues to do well post operatively. He reports only a mild \"soreness\" about the shoulder today but he states that he was just at physical therapy. He notes that he has performed overhead presses of 20 pounds at home without pain. Overall, he is sandra happy with his progress so far post operatively. No numbness or tingling. No fevers or chills.     The following portions of the patient's history were reviewed and updated as appropriate: allergies, current medications, past family history, past medical history, past social history, past surgical history and problem list.    Objective:  /80 (BP Location: Left arm, Patient Position: Sitting, Cuff Size: Standard)   Pulse 103   Ht 6' 2\" (1.88 m)   Wt 97.1 kg (214 lb)   BMI 27.48 kg/m²       Left Shoulder Exam     Range of Motion   The patient has normal left shoulder ROM.    Muscle Strength   Abduction: 5/5     Tests   Drop arm: negative    Other   Erythema: absent  Sensation: normal  Pulse: present             Physical Exam  Constitutional:       General: He is not in acute distress.     Appearance: He is well-developed.   Eyes:      Conjunctiva/sclera: Conjunctivae normal.      Pupils: Pupils are equal, round, and reactive to light.   Cardiovascular:      Rate and Rhythm: Normal rate " and regular rhythm.   Pulmonary:      Effort: Pulmonary effort is normal.      Breath sounds: Normal breath sounds.   Abdominal:      General: Bowel sounds are normal.      Palpations: Abdomen is soft.   Musculoskeletal:      Cervical back: Normal range of motion and neck supple.   Skin:     General: Skin is warm and dry.      Findings: No erythema or rash.   Neurological:      Mental Status: He is alert and oriented to person, place, and time.      Deep Tendon Reflexes: Reflexes are normal and symmetric.   Psychiatric:         Behavior: Behavior normal.           I have personally reviewed pertinent films in PACS and my interpretation is as follows.    External Records Reviewed: physical therapy notes    Scribe Attestation      I,:  Vick Viveros am acting as a scribe while in the presence of the attending physician.:       I,:  Alfonzo Mccracken MD personally performed the services described in this documentation    as scribed in my presence.:

## 2024-03-22 ENCOUNTER — APPOINTMENT (OUTPATIENT)
Dept: PHYSICAL THERAPY | Facility: REHABILITATION | Age: 64
End: 2024-03-22
Payer: COMMERCIAL

## 2024-03-22 ENCOUNTER — OFFICE VISIT (OUTPATIENT)
Dept: URGENT CARE | Age: 64
End: 2024-03-22
Payer: COMMERCIAL

## 2024-03-22 ENCOUNTER — APPOINTMENT (OUTPATIENT)
Dept: RADIOLOGY | Age: 64
End: 2024-03-22
Payer: COMMERCIAL

## 2024-03-22 VITALS
SYSTOLIC BLOOD PRESSURE: 130 MMHG | DIASTOLIC BLOOD PRESSURE: 80 MMHG | OXYGEN SATURATION: 98 % | HEART RATE: 82 BPM | TEMPERATURE: 98.2 F | RESPIRATION RATE: 18 BRPM

## 2024-03-22 DIAGNOSIS — R05.1 ACUTE COUGH: ICD-10-CM

## 2024-03-22 DIAGNOSIS — R05.1 ACUTE COUGH: Primary | ICD-10-CM

## 2024-03-22 PROCEDURE — 71046 X-RAY EXAM CHEST 2 VIEWS: CPT

## 2024-03-22 PROCEDURE — 99213 OFFICE O/P EST LOW 20 MIN: CPT

## 2024-03-22 RX ORDER — DOXYCYCLINE 100 MG/1
100 CAPSULE ORAL 2 TIMES DAILY
Qty: 10 CAPSULE | Refills: 0 | Status: SHIPPED | OUTPATIENT
Start: 2024-03-22 | End: 2024-03-27

## 2024-03-22 RX ORDER — ALBUTEROL SULFATE 90 UG/1
2 AEROSOL, METERED RESPIRATORY (INHALATION) EVERY 6 HOURS PRN
Qty: 8.5 G | Refills: 0 | Status: SHIPPED | OUTPATIENT
Start: 2024-03-22

## 2024-03-22 RX ORDER — GUAIFENESIN 600 MG/1
1200 TABLET, EXTENDED RELEASE ORAL EVERY 12 HOURS SCHEDULED
Qty: 30 TABLET | Refills: 0 | Status: SHIPPED | OUTPATIENT
Start: 2024-03-22

## 2024-03-22 NOTE — PATIENT INSTRUCTIONS
Take antibiotic- Doxycycline as directed.  Recommend daily probiotic while on antibiotic or eat yogurt with live cultures daily while on antibiotic.       You may take Tylenol or Motrin for pain and fever.    Albuterol inhaler for wheezing and shortness of breath.    Mucinex for cough during the day.    Tessalon Pearls for cough at night.    Flonase- one spray each nostril daily for nasal congestions.    Rest, increase fluids, good hand washing.  Please follow up with your family doctor if no improvement in 7-10 days.

## 2024-03-22 NOTE — PROGRESS NOTES
[facility]        NAME: Asher Martin is a 63 y.o. male  : 1960    MRN: [id number]  DATE: 2024  TIME: 5:17 PM    Assessment and Plan   Acute cough [R05.1]  1. Acute cough  XR chest pa & lateral    guaiFENesin (MUCINEX) 600 mg 12 hr tablet    albuterol (ProAir HFA) 90 mcg/act inhaler    doxycycline monohydrate (MONODOX) 100 mg capsule        Chest xray- negative for acute cardiopulmomary findings.    Take antibiotic- Doxycycline as directed.  Recommend daily probiotic while on antibiotic or eat yogurt with live cultures daily while on antibiotic.       You may take Tylenol or Motrin for pain and fever.    Albuterol inhaler for wheezing and shortness of breath.    Mucinex for cough during the day.    Tessalon Pearls for cough at night.    Flonase- one spray each nostril daily for nasal congestions.    Rest, increase fluids, good hand washing.  Please follow up with your family doctor if no improvement in 7-10 days.     Patient Instructions       Follow up with PCP in 3-5 days.  Proceed to  ER if symptoms worsen.    If tests have been performed at [facility], our office will contact you with results if changes need to be made to the care plan discussed with you at the visit.  You can review your full results on [facility].    Chief Complaint     Chief Complaint   Patient presents with   • Cough     Patient has had a cough for 1 month. Got slightly better during antibiotics but returned very shortly after. Back pain present from the cough.          History of Present Illness       Pt is a 63 year old male presenting with cough, congestion, runny nose for 6 weeks.  Pt reports he has had 3 urgent care visit for same symptoms, has taken methylprednisolone, tessalon, and finished a course of zithromax.  Pt reports cough is worse at night, and productive yellow phlegm, sinus pressure, and headaches.  Pt denies fever or chills, no sick contacts.      Cough        Review of Systems   Review of  Systems   Respiratory:  Positive for cough.          Current Medications       Current Outpatient Medications:   •  albuterol (ProAir HFA) 90 mcg/act inhaler, Inhale 2 puffs every 6 (six) hours as needed for shortness of breath, Disp: 8.5 g, Rfl: 0  •  apixaban (Eliquis) 5 mg, Take 1 tablet (5 mg total) by mouth 2 (two) times a day, Disp: 180 tablet, Rfl: 1  •  atorvastatin (LIPITOR) 40 mg tablet, Take 1 tablet (40 mg total) by mouth daily, Disp: 90 tablet, Rfl: 1  •  benzonatate (TESSALON) 200 MG capsule, Take 1 capsule (200 mg total) by mouth 3 (three) times a day as needed for cough, Disp: 20 capsule, Rfl: 0  •  Cholecalciferol (VITAMIN D) 50 MCG (2000 UT) CAPS, Take by mouth, Disp: , Rfl:   •  doxycycline monohydrate (MONODOX) 100 mg capsule, Take 1 capsule (100 mg total) by mouth 2 (two) times a day for 5 days, Disp: 10 capsule, Rfl: 0  •  guaiFENesin (MUCINEX) 600 mg 12 hr tablet, Take 2 tablets (1,200 mg total) by mouth every 12 (twelve) hours, Disp: 30 tablet, Rfl: 0  •  levothyroxine (Synthroid) 25 mcg tablet, Take 1 tablet (25 mcg total) by mouth daily in the early morning, Disp: 90 tablet, Rfl: 1  •  methylPREDNISolone 4 MG tablet therapy pack, Use as directed on package, Disp: 1 each, Rfl: 0    Current Allergies     Allergies as of 03/22/2024 - Reviewed 03/22/2024   Allergen Reaction Noted   • Amoxicillin Vomiting 02/21/2020   • Codeine Vomiting 06/06/2021   • Percocet [oxycodone-acetaminophen] GI Intolerance 06/06/2021   • Codeine GI Intolerance 06/08/2018            The following portions of the patient's history were reviewed and updated as appropriate: allergies, current medications, past family history, past medical history, past social history, past surgical history and problem list.     Past Medical History:   Diagnosis Date   • Arthritis    • Blood in stool    • Disease of thyroid gland    • Hyperlipidemia    • Kidney stone    • Pulmonary embolism (HCC)        Past Surgical History:   Procedure  Laterality Date   • COLONOSCOPY     • HERNIA REPAIR Right 2009    With mesh   • KNEE ARTHROSCOPY     • NJ ARTHROSCOPY KNEE DIAGNOSTIC W/WO SYNOVIAL BX SPX Left 06/14/2019    Procedure: ARTHROSCOPY KNEE;  Surgeon: Ana María Tucker MD;  Location: [facility];  Service: Orthopedics   • NJ SURGICAL ARTHROSCOPY SHOULDER W/ROTATOR CUFF RPR Right 03/30/2022    Procedure: SHOULDER ARTHROSCOPIC ROTATOR CUFF REPAIR; EXTENSIVE DEBRIDEMENT;  Surgeon: Alfonzo Mccracken MD;  [facility];  Service: Orthopedics   • NJ SURGICAL ARTHROSCOPY SHOULDER W/ROTATOR CUFF RPR Left 12/13/2023    Procedure: SHOULDER ARTHROSCOPIC ROTATOR CUFF REPAIR, SAD;  Surgeon: Alfonzo Mccracken MD;  [facility];  Service: Orthopedics   • SHOULDER SURGERY     • TONSILLECTOMY     • WISDOM TOOTH EXTRACTION         Family History   Problem Relation Age of Onset   • Stroke Mother         CVA   • Breast cancer Mother    • Arthritis Mother    • Diabetes Father         DM   • Cancer Father    • Melanoma Brother          Medications have been verified.        Objective   /80   Pulse 82   Temp 98.2 °F (36.8 °C)   Resp 18   SpO2 98%   No LMP for male patient.       Physical Exam     Physical Exam  Vitals and nursing note reviewed.   Constitutional:       General: He is not in acute distress.     Appearance: Normal appearance. He is normal weight. He is ill-appearing.   HENT:      Head: Normocephalic.      Right Ear: Tympanic membrane normal.      Left Ear: Tympanic membrane normal.      Nose: Congestion and rhinorrhea present.      Right Turbinates: Enlarged.      Left Turbinates: Enlarged.      Right Sinus: Maxillary sinus tenderness and frontal sinus tenderness present.      Left Sinus: Maxillary sinus tenderness and frontal sinus tenderness present.      Mouth/Throat:      Mouth: Mucous membranes are moist.   Eyes:      Extraocular Movements: Extraocular movements intact.      Conjunctiva/sclera: Conjunctivae  normal.   Cardiovascular:      Rate and Rhythm: Normal rate and regular rhythm.      Pulses: Normal pulses.      Heart sounds: Normal heart sounds.   Pulmonary:      Effort: Pulmonary effort is normal.      Breath sounds: Rhonchi (right lower lobe) present.   Abdominal:      General: Abdomen is flat.   Musculoskeletal:      Cervical back: Normal range of motion.   Lymphadenopathy:      Cervical: No cervical adenopathy.   Neurological:      Mental Status: He is alert.

## 2024-03-25 ENCOUNTER — APPOINTMENT (OUTPATIENT)
Dept: PHYSICAL THERAPY | Facility: REHABILITATION | Age: 64
End: 2024-03-25
Payer: COMMERCIAL

## 2024-03-25 ENCOUNTER — OFFICE VISIT (OUTPATIENT)
Dept: FAMILY MEDICINE CLINIC | Facility: CLINIC | Age: 64
End: 2024-03-25
Payer: COMMERCIAL

## 2024-03-25 VITALS
OXYGEN SATURATION: 96 % | BODY MASS INDEX: 26.82 KG/M2 | SYSTOLIC BLOOD PRESSURE: 130 MMHG | DIASTOLIC BLOOD PRESSURE: 72 MMHG | WEIGHT: 209 LBS | HEART RATE: 98 BPM | HEIGHT: 74 IN | TEMPERATURE: 99.4 F

## 2024-03-25 DIAGNOSIS — E78.2 MIXED HYPERLIPIDEMIA: ICD-10-CM

## 2024-03-25 DIAGNOSIS — J40 BRONCHITIS: Primary | ICD-10-CM

## 2024-03-25 PROCEDURE — 99214 OFFICE O/P EST MOD 30 MIN: CPT | Performed by: NURSE PRACTITIONER

## 2024-03-25 RX ORDER — FLUTICASONE PROPIONATE AND SALMETEROL 250; 50 UG/1; UG/1
1 POWDER RESPIRATORY (INHALATION) 2 TIMES DAILY
Qty: 60 BLISTER | Refills: 0 | Status: CANCELLED | OUTPATIENT
Start: 2024-03-25

## 2024-03-25 RX ORDER — PREDNISONE 10 MG/1
TABLET ORAL
Qty: 30 TABLET | Refills: 0 | Status: SHIPPED | OUTPATIENT
Start: 2024-03-25

## 2024-03-25 RX ORDER — DOXYCYCLINE HYCLATE 100 MG/1
100 CAPSULE ORAL EVERY 12 HOURS SCHEDULED
Qty: 10 CAPSULE | Refills: 0 | Status: SHIPPED | OUTPATIENT
Start: 2024-03-25 | End: 2024-03-30

## 2024-03-25 NOTE — PROGRESS NOTES
Name: Asher Martin      : 1960      MRN: [id number]  Encounter Provider: NALLELY Armando  Encounter Date: 3/25/2024   Encounter department: [facility]    Assessment & Plan     1. Bronchitis  Assessment & Plan:  Another 5 days of doxycycline 100 mg was ordered to be taken twice daily so that the patient can complete a 10-day course of the antibiotic.  A prednisone taper was also ordered to help with the patient's symptoms.  The patient was advised that if his symptoms persist after finishing doxycycline and prednisone a CT of the chest will be ordered to assess for any acute pulmonary abnormalities which could be causing his symptoms.    Orders:  -     doxycycline hyclate (VIBRAMYCIN) 100 mg capsule; Take 1 capsule (100 mg total) by mouth every 12 (twelve) hours for 5 days  -     predniSONE 10 mg tablet; Use 5 tablets for 2 days. Use 4 tablets for 2 days. Use 3 tablets for 2 days. Use 2 tablets for 2 days. Use 1 tablet for 2 days.    2. Mixed hyperlipidemia  Assessment & Plan:  Patient does have a repeat lipid panel ordered to be completed prior to his next office visit.  Patient was advised to continue to limit fatty and fried foods in his diet.        Depression Screening and Follow-up Plan: Patient was screened for depression during today's encounter. They screened negative with a PHQ-2 score of 0.        Subjective      URI symptoms: Patient was seen at urgent care on 3/8/2024 due to symptoms of a recurrent cough that he had been experiencing for a few weeks at that point and he was prescribed azithromycin and Medrol Dosepak for treatment.  Patient then returned to the urgent care on 3/22/2024 due to continued cough.  Patient did have a chest x-ray completed at that time which was noted to be normal.  It was felt that patient was suffering from bronchitis at that time so he was prescribed a 5-day course of doxycycline, Mucinex, and an albuterol inhaler.  Patient is reporting  continued symptoms of recurrent productive cough, headaches, rhinorrhea, and nasal congestion.  Patient denies any fever, chills, or shortness of breath.  Patient reports that he has trialed the albuterol inhaler but he does not notice any improvement in his symptoms.  He does feel that the doxycycline has somewhat improved his symptoms since he started it.    Hyperlipidemia: Patient's most recent lipid panel showed elevated triglycerides and low HDL but was normal otherwise.  Patient is not currently taking cholesterol medication.      Review of Systems   Constitutional:  Negative for chills and fever.   HENT:  Positive for congestion and rhinorrhea. Negative for ear pain, postnasal drip, sinus pressure, sinus pain and sore throat.    Eyes:  Negative for pain and visual disturbance.   Respiratory:  Positive for cough (recurrent-productive). Negative for chest tightness, shortness of breath and wheezing.    Cardiovascular:  Negative for chest pain, palpitations and leg swelling.   Gastrointestinal:  Negative for abdominal pain, constipation, diarrhea, nausea and vomiting.   Endocrine: Negative for cold intolerance and heat intolerance.   Genitourinary:  Negative for decreased urine volume, dysuria and hematuria.   Musculoskeletal:  Negative for arthralgias, back pain and myalgias.   Skin:  Negative for color change and rash.   Allergic/Immunologic: Negative for environmental allergies.   Neurological:  Positive for headaches. Negative for dizziness, seizures, syncope, weakness, light-headedness and numbness.   Hematological:  Negative for adenopathy.   Psychiatric/Behavioral:  Negative for confusion. The patient is not nervous/anxious.    All other systems reviewed and are negative.      Current Outpatient Medications on File Prior to Visit   Medication Sig   • albuterol (ProAir HFA) 90 mcg/act inhaler Inhale 2 puffs every 6 (six) hours as needed for shortness of breath   • apixaban (Eliquis) 5 mg Take 1 tablet (5  "mg total) by mouth 2 (two) times a day   • atorvastatin (LIPITOR) 40 mg tablet Take 1 tablet (40 mg total) by mouth daily   • Cholecalciferol (VITAMIN D) 50 MCG (2000 UT) CAPS Take by mouth   • doxycycline monohydrate (MONODOX) 100 mg capsule Take 1 capsule (100 mg total) by mouth 2 (two) times a day for 5 days   • guaiFENesin (MUCINEX) 600 mg 12 hr tablet Take 2 tablets (1,200 mg total) by mouth every 12 (twelve) hours   • levothyroxine (Synthroid) 25 mcg tablet Take 1 tablet (25 mcg total) by mouth daily in the early morning   • [DISCONTINUED] benzonatate (TESSALON) 200 MG capsule Take 1 capsule (200 mg total) by mouth 3 (three) times a day as needed for cough   • [DISCONTINUED] methylPREDNISolone 4 MG tablet therapy pack Use as directed on package       Objective     /72 (BP Location: Right arm, Patient Position: Sitting, Cuff Size: Adult)   Pulse 98   Temp 99.4 °F (37.4 °C) (Tympanic)   Ht 6' 2\" (1.88 m)   Wt 94.8 kg (209 lb)   SpO2 96%   BMI 26.83 kg/m²     Physical Exam  Vitals and nursing note reviewed.   Constitutional:       General: He is not in acute distress.     Appearance: Normal appearance. He is not ill-appearing.   HENT:      Head: Normocephalic.   Eyes:      Conjunctiva/sclera: Conjunctivae normal.   Cardiovascular:      Rate and Rhythm: Normal rate and regular rhythm.      Pulses: Normal pulses.           Carotid pulses are 2+ on the right side and 2+ on the left side.       Radial pulses are 2+ on the right side and 2+ on the left side.        Posterior tibial pulses are 2+ on the right side and 2+ on the left side.      Heart sounds: Normal heart sounds. No murmur heard.  Pulmonary:      Effort: Pulmonary effort is normal. No respiratory distress.      Breath sounds: Normal breath sounds. No decreased breath sounds, wheezing, rhonchi or rales.   Abdominal:      General: Abdomen is flat. Bowel sounds are normal. There is no distension.      Palpations: Abdomen is soft.      " Tenderness: There is no abdominal tenderness. There is no guarding.   Musculoskeletal:         General: Normal range of motion.      Cervical back: Normal range of motion.      Right lower leg: No edema.      Left lower leg: No edema.   Skin:     General: Skin is warm and dry.      Capillary Refill: Capillary refill takes less than 2 seconds.   Neurological:      General: No focal deficit present.      Mental Status: He is alert and oriented to person, place, and time.   Psychiatric:         Mood and Affect: Mood normal.         Behavior: Behavior normal.         Thought Content: Thought content normal.         Judgment: Judgment normal.       NALLELY Armando

## 2024-03-25 NOTE — ASSESSMENT & PLAN NOTE
Patient does have a repeat lipid panel ordered to be completed prior to his next office visit.  Patient was advised to continue to limit fatty and fried foods in his diet.

## 2024-03-25 NOTE — ASSESSMENT & PLAN NOTE
Another 5 days of doxycycline 100 mg was ordered to be taken twice daily so that the patient can complete a 10-day course of the antibiotic.  A prednisone taper was also ordered to help with the patient's symptoms.  The patient was advised that if his symptoms persist after finishing doxycycline and prednisone a CT of the chest will be ordered to assess for any acute pulmonary abnormalities which could be causing his symptoms.

## 2024-03-29 ENCOUNTER — OFFICE VISIT (OUTPATIENT)
Dept: PHYSICAL THERAPY | Facility: REHABILITATION | Age: 64
End: 2024-03-29
Payer: COMMERCIAL

## 2024-03-29 DIAGNOSIS — Z98.890 S/P LEFT ROTATOR CUFF REPAIR: ICD-10-CM

## 2024-03-29 DIAGNOSIS — M75.102 TEAR OF LEFT SUPRASPINATUS TENDON: ICD-10-CM

## 2024-03-29 DIAGNOSIS — M25.512 ACUTE PAIN OF LEFT SHOULDER: Primary | ICD-10-CM

## 2024-03-29 PROCEDURE — 97140 MANUAL THERAPY 1/> REGIONS: CPT

## 2024-03-29 PROCEDURE — 97112 NEUROMUSCULAR REEDUCATION: CPT

## 2024-03-29 PROCEDURE — 97110 THERAPEUTIC EXERCISES: CPT

## 2024-03-29 NOTE — PROGRESS NOTES
"Daily Note     Today's date: 3/29/2024  Patient name: Asher Martin  : 1960  MRN: [id number]  Referring provider: Alfonzo Mccracken*  Dx:   Encounter Diagnosis     ICD-10-CM    1. Acute pain of left shoulder  M25.512       2. Tear of left supraspinatus tendon  M75.102       3. S/P left rotator cuff repair  Z98.890           Start Time: 0700  Stop Time: 07  Total time in clinic (min): 38 minutes    Subjective: Pt reports mild L Sh soreness from increased activity.      Objective: See treatment diary below      Assessment: Tolerated treatment well. Patient would benefit from continued PT.  Functional status is improving.  L Sh tightness persists in overhead motions and functional IR.  Mild fatigue noted post-session.  1:1 with Edson Light DPT entirety of tx.        Plan: Continue per plan of care.      Precautions: L RTC repair 2023  PHASE III as of 24 Early Strengthening  PHASE IV as of 3/6/24 Aggressive Rehab    POC expires Unit limit PT/OT + Visit Limit   24 BOMN BOMN       Pertinent Findings:                                                                                            Test / Measure  2023  Pre-Op 2023  Post-Op 2024 3/29/2024   FOTO 41 (Pred 66) 4 (Pred 52) 59 87   L Sh flex AROM 145 deg 80 deg PROM     L Sh abd AROM 135 deg 60 deg PROM     L Sh ER MMT 3/ 1/     L Sh flex/abd MMT 3-/           Manuals 2/9 2/27 3/1 3/12 3/15 3/18 3/29   L Sh PROM KS 8' MM 8' MM 8' MM 8' CM 8'  KS 8' MM 8'                                 Neuro Re-Ed          Scap retr SRER 30x5\"         Supine flex AAROM 3x10 cane         Supine ER AAROM 2x20         Table slides 15x flex  15x scap         Wall slides 5x 10x flex  10x abd 10x10\" ea flex, abd       Hinge cane AAROM press 3x10 window         Prone Row 3x10         Prone I 3x10         6way isometric 10x5\" ea         TB Row 3x10 mtb 3x10 mtb 3x10 mtb 20x mtb 3x10 20# 3x10 20# 3x10 20#   TB LPD 3x10 btb 3x10 mtb 3x10 mtb " "20x mtb 2x10 17# 2x10 17# 2x10 18#   TB IR  3x10 btb 3x10 mtb 20x mtb 2x10 4# 2x10 4# 2x10 8#   TB ER  3x10 btb 3x10 mtb 20x mtb 2x10 4# 2x10 4# 2x10 4#   TB ER/IR soldiers    20x ea gtb 20x ea btb     Plank variation    NV --> ?      Pushups    NV --> ? 2x10 table 2x10 table 2x10 table   TB wall clocks    NV --> ? 2x5 otb  2x5 otb     90/90 carry    NV --> ? 100ft x3 laps 5# 100ft x3 laps 5#    Medball 4 way against wall       20x ea 2kg   TB soldiers       20x ea btb ER, mtb IR   Ther Ex          HEP review          Pulleys 4' flex  4' abd 3' flex 3' flex 3' flex      UBE    3'/3' 3'/3' 3'/3' 3'/3'   Standing AROM 3 way elevation 10x ea 10x ea 3# 10x ea 3# 15x ea  3# abd  5# flex/scap 15x ea  3# abd  5# flex/scap 15x ea  3# abd  5# flex/scap 15x ea 8#  5x flex/abd    Biceps curls  3x10 8# 3x10 8# + OHP  10x 5#  10x 8# + OHP  10x 5#  10x 8# + OHP  10x 5#  10x 8#    Triceps ext  3x10 mtb        Stand pec S  10x10\" 10x10\"       IR strap S  10x10\" 10x10\"       Bench press    NV --> ?      Incline bench press    NV --> ?      Shoulder press    NV --> ? 3x10 8# 3x10 8# Arnold press 3x10 8#   Pec fly    NV --> ?                Ther Activity                              Gait Training                              Modalities          Cold pack                                                                 "

## 2024-04-01 ENCOUNTER — OFFICE VISIT (OUTPATIENT)
Dept: PHYSICAL THERAPY | Facility: REHABILITATION | Age: 64
End: 2024-04-01
Payer: COMMERCIAL

## 2024-04-01 DIAGNOSIS — E03.9 HYPOTHYROIDISM, UNSPECIFIED TYPE: ICD-10-CM

## 2024-04-01 DIAGNOSIS — M25.512 ACUTE PAIN OF LEFT SHOULDER: Primary | ICD-10-CM

## 2024-04-01 DIAGNOSIS — I26.99 PULMONARY EMBOLISM (HCC): ICD-10-CM

## 2024-04-01 DIAGNOSIS — Z98.890 S/P LEFT ROTATOR CUFF REPAIR: ICD-10-CM

## 2024-04-01 DIAGNOSIS — M75.102 TEAR OF LEFT SUPRASPINATUS TENDON: ICD-10-CM

## 2024-04-01 PROCEDURE — 97110 THERAPEUTIC EXERCISES: CPT | Performed by: PHYSICAL THERAPIST

## 2024-04-01 PROCEDURE — 97112 NEUROMUSCULAR REEDUCATION: CPT | Performed by: PHYSICAL THERAPIST

## 2024-04-01 NOTE — PROGRESS NOTES
Daily Note     Today's date: 2024  Patient name: Asher Martin  : 1960  MRN: [id number]  Referring provider: Alfonzo Mccracken*  Dx:   Encounter Diagnosis     ICD-10-CM    1. Acute pain of left shoulder  M25.512       2. Tear of left supraspinatus tendon  M75.102       3. S/P left rotator cuff repair  Z98.890           Start Time: 0700  Stop Time: 07  Total time in clinic (min): 30 minutes    Subjective: Pt reports progressing well.     Objective: See treatment diary below    Assessment: Tolerated treatment well. Patient demonstrated fatigue post treatment, exhibited good technique with therapeutic exercises, and would benefit from continued PT 1:1 with Khai Snow DPT for entirety of tx. Pt tolerated tx well progressing well within protocol. Continue per protocol.     Plan: Continue per plan of care.      Precautions: L RTC repair 2023  PHASE III as of 24 Early Strengthening  PHASE IV as of 3/6/24 Aggressive Rehab    POC expires Unit limit PT/OT + Visit Limit   24 BOMN BOMN       Pertinent Findings:                                                                                            Test / Measure  2023  Pre-Op 2023  Post-Op 2024 3/29/2024   FOTO 41 (Pred 66) 4 (Pred 52) 59 87   L Sh flex AROM 145 deg 80 deg PROM     L Sh abd AROM 135 deg 60 deg PROM     L Sh ER MMT 3/ 1/5     L Sh flex/abd MMT 3-/ 1           Manuals 3/15 3/18 3/29 4/   L Sh PROM CM 8'  KS 8' MM 8' KS 8'                        Neuro Re-Ed       Scap retr       Prone I       6way isometric       TB Row 3x10 20# 3x10 20# 3x10 20# 3x10 20#   TB LPD 2x10 17# 2x10 17# 2x10 18# 2x10 18#   TB IR 2x10 4# 2x10 4# 2x10 8# 2x10 8#   TB ER 2x10 4# 2x10 4# 2x10 4# 2x10 4#   TB ER/IR soldiers 20x ea btb      Plank variation       Pushups 2x10 table 2x10 table 2x10 table 2x10 table   TB wall clocks 2x5 otb  2x5 otb      90/90 carry 100ft x3 laps 5# 100ft x3 laps 5#     Medball 4 way against wall    20x ea 2kg 20x ea 2kg   TB soldiers   20x ea btb ER, mtb IR 20x ea btb ER, mtb IR   Ther Ex       HEP review       Pulleys       UBE 3'/3' 3'/3' 3'/3' 3'/3'   Standing AROM 3 way elevation 15x ea  3# abd  5# flex/scap 15x ea  3# abd  5# flex/scap 15x ea 8#  5x flex/abd  15x ea 8#  5x flex/abd    Biceps curls + OHP  10x 5#  10x 8# + OHP  10x 5#  10x 8#     Shoulder press 3x10 8# 3x10 8# Arnold press 3x10 8# Arnold press 3x10 8#   Pec fly              Ther Activity                     Gait Training                     Modalities       Cold pack

## 2024-04-02 RX ORDER — LEVOTHYROXINE SODIUM 0.03 MG/1
25 TABLET ORAL
Qty: 90 TABLET | Refills: 1 | Status: SHIPPED | OUTPATIENT
Start: 2024-04-02

## 2024-04-02 RX ORDER — APIXABAN 5 MG/1
5 TABLET, FILM COATED ORAL 2 TIMES DAILY
Qty: 180 TABLET | Refills: 1 | Status: SHIPPED | OUTPATIENT
Start: 2024-04-02

## 2024-04-05 ENCOUNTER — OFFICE VISIT (OUTPATIENT)
Dept: PHYSICAL THERAPY | Facility: REHABILITATION | Age: 64
End: 2024-04-05
Payer: COMMERCIAL

## 2024-04-05 DIAGNOSIS — Z98.890 S/P LEFT ROTATOR CUFF REPAIR: ICD-10-CM

## 2024-04-05 DIAGNOSIS — M75.102 TEAR OF LEFT SUPRASPINATUS TENDON: ICD-10-CM

## 2024-04-05 DIAGNOSIS — M25.512 ACUTE PAIN OF LEFT SHOULDER: Primary | ICD-10-CM

## 2024-04-05 PROCEDURE — 97112 NEUROMUSCULAR REEDUCATION: CPT | Performed by: PHYSICAL THERAPIST

## 2024-04-05 PROCEDURE — 97110 THERAPEUTIC EXERCISES: CPT | Performed by: PHYSICAL THERAPIST

## 2024-04-05 NOTE — PROGRESS NOTES
Daily Note     Today's date: 2024  Patient name: Asher Martin  : 1960  MRN: [id number]  Referring provider: Alfonzo Mccracken*  Dx:   Encounter Diagnosis     ICD-10-CM    1. Acute pain of left shoulder  M25.512       2. Tear of left supraspinatus tendon  M75.102       3. S/P left rotator cuff repair  Z98.890           Start Time: 0700  Stop Time: 730  Total time in clinic (min): 30 minutes    Subjective: Pt reports improved ROM and reduced pain with improved function.     Objective: See treatment diary below    Assessment: Tolerated treatment well. Patient demonstrated fatigue post treatment, exhibited good technique with therapeutic exercises, and would benefit from continued PT 1:1 with Khai Snow DPT for entirety of tx.     Plan: Continue per plan of care.  RE NV     Precautions: L RTC repair 2023  PHASE III as of 24 Early Strengthening  PHASE IV as of 3/6/24 Aggressive Rehab    POC expires Unit limit PT/OT + Visit Limit   24 BOMN BOMN       Pertinent Findings:                                                                                            Test / Measure  2023  Pre-Op 2023  Post-Op 2024 3/29/2024   FOTO 41 (Pred 66) 4 (Pred 52) 59 87   L Sh flex AROM 145 deg 80 deg PROM     L Sh abd AROM 135 deg 60 deg PROM     L Sh ER MMT 3/5 1/5     L Sh flex/abd MMT 3-/5 1/5           Manuals 3/15 3/18 3/29 4/1 4/5   L Sh PROM CM 8'  KS 8' MM 8' KS 8' KS 8'                           Neuro Re-Ed        Scap retr        Prone I        6way isometric        TB Row 3x10 20# 3x10 20# 3x10 20# 3x10 20# 3x10 20#   TB LPD 2x10 17# 2x10 17# 2x10 18# 2x10 18# 2x10 18#   TB IR 2x10 4# 2x10 4# 2x10 8# 2x10 8# 2x10 8#   TB ER 2x10 4# 2x10 4# 2x10 4# 2x10 4# 2x10 4#   TB ER/IR soldiers 20x ea btb       Plank variation        Pushups 2x10 table 2x10 table 2x10 table 2x10 table 2x10 table   TB wall clocks 2x5 otb  2x5 otb       90/90 carry 100ft x3 laps 5# 100ft x3 laps 5#       Medball 4 way against wall   20x ea 2kg 20x ea 2kg 20x ea 2kg   TB soldiers   20x ea btb ER, mtb IR 20x ea btb ER, mtb IR 20x ea btb ER, mtb IR   Ther Ex        HEP review        Pulleys        UBE 3'/3' 3'/3' 3'/3' 3'/3' 3'/3'   Standing AROM 3 way elevation 15x ea  3# abd  5# flex/scap 15x ea  3# abd  5# flex/scap 15x ea 8#  5x flex/abd  15x ea 8#  5x flex/abd  15x ea 8#  5x flex/abd    Biceps curls + OHP  10x 5#  10x 8# + OHP  10x 5#  10x 8#      Shoulder press 3x10 8# 3x10 8# Arnold press 3x10 8# Arnold press 3x10 8# Arnold press 3x10 8#   Pec fly                Ther Activity                        Gait Training                        Modalities        Cold pack

## 2024-04-11 ENCOUNTER — APPOINTMENT (OUTPATIENT)
Dept: PHYSICAL THERAPY | Facility: REHABILITATION | Age: 64
End: 2024-04-11
Payer: COMMERCIAL

## 2024-05-07 DIAGNOSIS — E78.2 MIXED HYPERLIPIDEMIA: ICD-10-CM

## 2024-05-08 RX ORDER — ATORVASTATIN CALCIUM 40 MG/1
40 TABLET, FILM COATED ORAL DAILY
Qty: 90 TABLET | Refills: 1 | Status: SHIPPED | OUTPATIENT
Start: 2024-05-08

## 2024-05-23 ENCOUNTER — LAB (OUTPATIENT)
Dept: LAB | Age: 64
End: 2024-05-23
Payer: COMMERCIAL

## 2024-05-23 DIAGNOSIS — R73.02 IMPAIRED GLUCOSE TOLERANCE: Primary | ICD-10-CM

## 2024-05-23 DIAGNOSIS — R73.02 IMPAIRED GLUCOSE TOLERANCE: ICD-10-CM

## 2024-05-23 DIAGNOSIS — E55.9 VITAMIN D DEFICIENCY: ICD-10-CM

## 2024-05-23 DIAGNOSIS — E03.9 HYPOTHYROIDISM, UNSPECIFIED TYPE: ICD-10-CM

## 2024-05-23 DIAGNOSIS — E78.2 MIXED HYPERLIPIDEMIA: ICD-10-CM

## 2024-05-23 LAB
25(OH)D3 SERPL-MCNC: >120 NG/ML (ref 30–100)
ALBUMIN SERPL BCP-MCNC: 4.5 G/DL (ref 3.5–5)
ALP SERPL-CCNC: 65 U/L (ref 34–104)
ALT SERPL W P-5'-P-CCNC: 20 U/L (ref 7–52)
ANION GAP SERPL CALCULATED.3IONS-SCNC: 11 MMOL/L (ref 4–13)
AST SERPL W P-5'-P-CCNC: 18 U/L (ref 13–39)
BILIRUB SERPL-MCNC: 1.11 MG/DL (ref 0.2–1)
BUN SERPL-MCNC: 21 MG/DL (ref 5–25)
CALCIUM SERPL-MCNC: 9.6 MG/DL (ref 8.4–10.2)
CHLORIDE SERPL-SCNC: 104 MMOL/L (ref 96–108)
CHOLEST SERPL-MCNC: 149 MG/DL
CO2 SERPL-SCNC: 25 MMOL/L (ref 21–32)
CREAT SERPL-MCNC: 1.24 MG/DL (ref 0.6–1.3)
GFR SERPL CREATININE-BSD FRML MDRD: 61 ML/MIN/1.73SQ M
GLUCOSE P FAST SERPL-MCNC: 128 MG/DL (ref 65–99)
HDLC SERPL-MCNC: 36 MG/DL
LDLC SERPL CALC-MCNC: 62 MG/DL (ref 0–100)
NONHDLC SERPL-MCNC: 113 MG/DL
POTASSIUM SERPL-SCNC: 4 MMOL/L (ref 3.5–5.3)
PROT SERPL-MCNC: 7 G/DL (ref 6.4–8.4)
SODIUM SERPL-SCNC: 140 MMOL/L (ref 135–147)
TRIGL SERPL-MCNC: 254 MG/DL
TSH SERPL DL<=0.05 MIU/L-ACNC: 1.83 UIU/ML (ref 0.45–4.5)

## 2024-05-23 PROCEDURE — 80061 LIPID PANEL: CPT

## 2024-05-23 PROCEDURE — 80053 COMPREHEN METABOLIC PANEL: CPT

## 2024-05-23 PROCEDURE — 36415 COLL VENOUS BLD VENIPUNCTURE: CPT

## 2024-05-23 PROCEDURE — 82306 VITAMIN D 25 HYDROXY: CPT

## 2024-05-23 PROCEDURE — 84443 ASSAY THYROID STIM HORMONE: CPT

## 2024-05-26 ENCOUNTER — APPOINTMENT (OUTPATIENT)
Dept: RADIOLOGY | Age: 64
End: 2024-05-26
Payer: COMMERCIAL

## 2024-05-26 ENCOUNTER — OFFICE VISIT (OUTPATIENT)
Dept: URGENT CARE | Age: 64
End: 2024-05-26
Payer: COMMERCIAL

## 2024-05-26 VITALS
TEMPERATURE: 97.8 F | OXYGEN SATURATION: 97 % | DIASTOLIC BLOOD PRESSURE: 74 MMHG | BODY MASS INDEX: 26.83 KG/M2 | SYSTOLIC BLOOD PRESSURE: 132 MMHG | WEIGHT: 209 LBS | HEART RATE: 85 BPM | RESPIRATION RATE: 18 BRPM

## 2024-05-26 DIAGNOSIS — R22.41 LOCALIZED SWELLING OF RIGHT FOOT: ICD-10-CM

## 2024-05-26 DIAGNOSIS — S92.532A CLOSED DISPLACED FRACTURE OF DISTAL PHALANX OF LESSER TOE OF LEFT FOOT, INITIAL ENCOUNTER: Primary | ICD-10-CM

## 2024-05-26 PROCEDURE — 73630 X-RAY EXAM OF FOOT: CPT

## 2024-05-26 PROCEDURE — 99214 OFFICE O/P EST MOD 30 MIN: CPT

## 2024-05-26 NOTE — PATIENT INSTRUCTIONS
Xray right foot: concern for little toe fracture, pending final read.   Leo tape to adjacent toe.  Follow up within 1-2 weeks with podiatry  Elevation and ice is essential to reduce swelling.  Hard Sole Shoe or Post op Shoe    Referral to Podiatry has been placed.

## 2024-05-26 NOTE — PROGRESS NOTES
[facility]        NAME: Asher Martin is a 63 y.o. male  : 1960    MRN: [id number]  DATE: May 26, 2024  TIME: 9:58 AM    Assessment and Plan   Closed displaced fracture of distal phalanx of lesser toe of left foot, initial encounter [S92.532A]  1. Closed displaced fracture of distal phalanx of lesser toe of left foot, initial encounter        2. Localized swelling of right foot  XR foot 3+ vw right    Ambulatory Referral to Podiatry        Xray right foot: concern for little toe fracture, pending final read.   Buddy tape to adjacent toe.  Follow up within 1-2 weeks with podiatry  Elevation and ice is essential to reduce swelling.  Hard Sole Shoe or Post op Shoe    Referral to Podiatry has been placed.     Patient Instructions       Follow up with PCP in 3-5 days.  Proceed to  ER if symptoms worsen.    If tests have been performed at [facility], our office will contact you with results if changes need to be made to the care plan discussed with you at the visit.  [facility]    Chief Complaint     Chief Complaint   Patient presents with   • Toe Pain     Patient having right foot toe pain, bruising, and swelling due an injury of dropping something on his foot yesterday.          History of Present Illness       Pt is a 63 year old male presenting with right foot pain after dropping an oversized full bottle of champagne on his foot. He is full weight bearing, he took 2 tylenol and ice for moderate pain relief.  He denies previous right injury.      Toe Pain         Review of Systems   Review of Systems   Musculoskeletal:  Positive for gait problem and joint swelling.   Skin:  Negative for wound.         Current Medications       Current Outpatient Medications:   •  albuterol (ProAir HFA) 90 mcg/act inhaler, Inhale 2 puffs every 6 (six) hours as needed for shortness of breath, Disp: 8.5 g, Rfl: 0  •  apixaban (Eliquis) 5 mg, Take 1 tablet (5 mg total) by mouth 2  (two) times a day, Disp: 180 tablet, Rfl: 1  •  atorvastatin (LIPITOR) 40 mg tablet, Take 1 tablet (40 mg total) by mouth daily, Disp: 90 tablet, Rfl: 1  •  Cholecalciferol (VITAMIN D) 50 MCG (2000 UT) CAPS, Take by mouth, Disp: , Rfl:   •  guaiFENesin (MUCINEX) 600 mg 12 hr tablet, Take 2 tablets (1,200 mg total) by mouth every 12 (twelve) hours, Disp: 30 tablet, Rfl: 0  •  levothyroxine 25 mcg tablet, Take 1 tablet (25 mcg total) by mouth daily in the early morning, Disp: 90 tablet, Rfl: 1  •  predniSONE 10 mg tablet, Use 5 tablets for 2 days. Use 4 tablets for 2 days. Use 3 tablets for 2 days. Use 2 tablets for 2 days. Use 1 tablet for 2 days., Disp: 30 tablet, Rfl: 0    Current Allergies     Allergies as of 05/26/2024 - Reviewed 05/26/2024   Allergen Reaction Noted   • Amoxicillin Vomiting 02/21/2020   • Codeine Vomiting 06/06/2021   • Percocet [oxycodone-acetaminophen] GI Intolerance 06/06/2021   • Codeine GI Intolerance 06/08/2018            The following portions of the patient's history were reviewed and updated as appropriate: allergies, current medications, past family history, past medical history, past social history, past surgical history and problem list.     Past Medical History:   Diagnosis Date   • Arthritis    • Blood in stool    • Disease of thyroid gland    • Hyperlipidemia    • Kidney stone    • Pulmonary embolism (HCC)        Past Surgical History:   Procedure Laterality Date   • COLONOSCOPY     • HERNIA REPAIR Right 2009    With mesh   • KNEE ARTHROSCOPY     • [place] ARTHROSCOPY KNEE DIAGNOSTIC W/WO SYNOVIAL BX SPX Left 06/14/2019    Procedure: ARTHROSCOPY KNEE;  Surgeon: Ana María Tucker MD;  [facility];  Service: Orthopedics   • DC SURGICAL ARTHROSCOPY SHOULDER W/ROTATOR CUFF RPR Right 03/30/2022    Procedure: SHOULDER ARTHROSCOPIC ROTATOR CUFF REPAIR; EXTENSIVE DEBRIDEMENT;  Surgeon: Alfonzo Mccracken MD;  [facility]: [facility];  Service: Orthopedics   • DC SURGICAL  ARTHROSCOPY SHOULDER W/ROTATOR CUFF RPR Left 12/13/2023    Procedure: SHOULDER ARTHROSCOPIC ROTATOR CUFF REPAIR, SAD;  Surgeon: Alfonzo Mccracken MD;  Location: [facility] MAIN OR;  Service: Orthopedics   • SHOULDER SURGERY     • TONSILLECTOMY     • WISDOM TOOTH EXTRACTION         Family History   Problem Relation Age of Onset   • Stroke Mother         CVA   • Breast cancer Mother    • Arthritis Mother    • Diabetes Father         DM   • Cancer Father    • Melanoma Brother          Medications have been verified.        Objective   /74   Pulse 85   Temp 97.8 °F (36.6 °C)   Resp 18   Wt 94.8 kg (209 lb)   SpO2 97%   BMI 26.83 kg/m²   No LMP for male patient.       Physical Exam     Physical Exam  Vitals and nursing note reviewed.   Constitutional:       General: He is not in acute distress.     Appearance: Normal appearance. He is normal weight. He is not ill-appearing.   HENT:      Head: Normocephalic.   Eyes:      Extraocular Movements: Extraocular movements intact.   Pulmonary:      Effort: Pulmonary effort is normal.   Musculoskeletal:      Right foot: Normal capillary refill. Tenderness and bony tenderness present. No crepitus. Normal pulse.      Left foot: Normal.        Feet:    Feet:      Comments: Bilateral feet neurovascularly intact.   Neurological:      Mental Status: He is alert and oriented to person, place, and time.

## 2024-05-29 ENCOUNTER — OFFICE VISIT (OUTPATIENT)
Dept: PODIATRY | Facility: CLINIC | Age: 64
End: 2024-05-29
Payer: COMMERCIAL

## 2024-05-29 VITALS
BODY MASS INDEX: 26.82 KG/M2 | HEIGHT: 74 IN | HEART RATE: 92 BPM | SYSTOLIC BLOOD PRESSURE: 133 MMHG | DIASTOLIC BLOOD PRESSURE: 76 MMHG | WEIGHT: 209 LBS

## 2024-05-29 DIAGNOSIS — S92.534A CLOSED NONDISPLACED FRACTURE OF DISTAL PHALANX OF LESSER TOE OF RIGHT FOOT, INITIAL ENCOUNTER: Primary | ICD-10-CM

## 2024-05-29 DIAGNOSIS — R22.41 LOCALIZED SWELLING OF RIGHT FOOT: ICD-10-CM

## 2024-05-29 PROCEDURE — 99242 OFF/OP CONSLTJ NEW/EST SF 20: CPT | Performed by: PODIATRIST

## 2024-05-29 NOTE — PROGRESS NOTES
PATIENT:  Asher Martin  1960       ASSESSMENT:     1. Closed nondisplaced fracture of distal phalanx of lesser toe of right foot, initial encounter        2. Localized swelling of right foot  Ambulatory Referral to Podiatry                PLAN:  1. Reviewed medical records.  Patient was counseled and educated on the condition and the diagnosis.    2. X-ray was personally reviewed.  The radiological findings were discussed with the patient.    3. The diagnosis, treatment options and prognosis were discussed with the patient.    4. He has stable fracture on right 5th toe.  Leo splint applied and instructed.    5. Resting, icing, and elevation.    6. Pt to call the office if pain does not resolve in the next 2-3 weeks.        Imaging: I have personally reviewed pertinent films in PACS  Labs, pathology, and Other Studies: I have personally reviewed pertinent reports.        Subjective:       HPI  The patient was referred to my office for fracture on right 5th toe.  [place] bottle fell on his toe last Saturday with pain and swelling.  He went urgent center on Sunday and X-ray showed fracture.  Pain has been better.  Swelling noted.  No associated numbness or paresthesia.  No significant weakness or dysfunction.         The following portions of the patient's history were reviewed and updated as appropriate: allergies, current medications, past family history, past medical history, past social history, past surgical history and problem list.  All pertinent labs and images were reviewed.      Past Medical History  Past Medical History:   Diagnosis Date   • Arthritis    • Blood in stool    • Disease of thyroid gland    • Hyperlipidemia    • Kidney stone    • Pulmonary embolism (HCC)        Past Surgical History  Past Surgical History:   Procedure Laterality Date   • COLONOSCOPY     • HERNIA REPAIR Right 2009    With mesh   • KNEE ARTHROSCOPY     • [place] ARTHROSCOPY KNEE DIAGNOSTIC W/WO SYNOVIAL BX SPX  Left 06/14/2019    Procedure: ARTHROSCOPY KNEE;  Surgeon: Ana María Tucker MD;  Location:  MAIN OR;  Service: Orthopedics   • AR SURGICAL ARTHROSCOPY SHOULDER W/ROTATOR CUFF RPR Right 03/30/2022    Procedure: SHOULDER ARTHROSCOPIC ROTATOR CUFF REPAIR; EXTENSIVE DEBRIDEMENT;  Surgeon: Alfonzo Mccracken MD;  Location: [facility] MAIN OR;  Service: Orthopedics   • AR SURGICAL ARTHROSCOPY SHOULDER W/ROTATOR CUFF RPR Left 12/13/2023    Procedure: SHOULDER ARTHROSCOPIC ROTATOR CUFF REPAIR, SAD;  Surgeon: Alfonzo Mccracken MD;  Location: [facility] MAIN OR;  Service: Orthopedics   • SHOULDER SURGERY     • TONSILLECTOMY     • WISDOM TOOTH EXTRACTION          Allergies:  Amoxicillin, Codeine, Percocet [oxycodone-acetaminophen], and Codeine    Medications:  Current Outpatient Medications   Medication Sig Dispense Refill   • albuterol (ProAir HFA) 90 mcg/act inhaler Inhale 2 puffs every 6 (six) hours as needed for shortness of breath 8.5 g 0   • apixaban (Eliquis) 5 mg Take 1 tablet (5 mg total) by mouth 2 (two) times a day 180 tablet 1   • atorvastatin (LIPITOR) 40 mg tablet Take 1 tablet (40 mg total) by mouth daily 90 tablet 1   • Cholecalciferol (VITAMIN D) 50 MCG (2000 UT) CAPS Take by mouth     • guaiFENesin (MUCINEX) 600 mg 12 hr tablet Take 2 tablets (1,200 mg total) by mouth every 12 (twelve) hours 30 tablet 0   • levothyroxine 25 mcg tablet Take 1 tablet (25 mcg total) by mouth daily in the early morning 90 tablet 1   • predniSONE 10 mg tablet Use 5 tablets for 2 days. Use 4 tablets for 2 days. Use 3 tablets for 2 days. Use 2 tablets for 2 days. Use 1 tablet for 2 days. 30 tablet 0     No current facility-administered medications for this visit.       Social History:  Social History     Socioeconomic History   • Marital status: /Civil Union     Spouse name: None   • Number of children: 1   • Years of education: None   • Highest education level: None   Occupational History     Comment: full-time  "employment   Tobacco Use   • Smoking status: Never   • Smokeless tobacco: Never   Vaping Use   • Vaping status: Never Used   Substance and Sexual Activity   • Alcohol use: Not Currently     Comment: beer, socially   • Drug use: Never   • Sexual activity: Not Currently     Partners: Female     Birth control/protection: None   Other Topics Concern   • None   Social History Narrative    ** Merged History Encounter **         Advance directive information unavailable  Always uses seat belt  Caffeine use  Denied:  Hx of exercises occasionally  Denied:  Hx of domestic violence       Social Determinants of Health     Financial Resource Strain: Not on file   Food Insecurity: No Food Insecurity (12/29/2021)    Hunger Vital Sign    • Worried About Running Out of Food in the Last Year: Never true    • Ran Out of Food in the Last Year: Never true   Transportation Needs: No Transportation Needs (12/29/2021)    PRAPARE - Transportation    • Lack of Transportation (Medical): No    • Lack of Transportation (Non-Medical): No   Physical Activity: Not on file   Stress: Not on file   Social Connections: Not on file   Intimate Partner Violence: Not on file   Housing Stability: Low Risk  (12/29/2021)    Housing Stability Vital Sign    • Unable to Pay for Housing in the Last Year: No    • Number of Places Lived in the Last Year: 1    • Unstable Housing in the Last Year: No          Review of Systems   Constitutional:  Negative for chills and fever.   Respiratory:  Negative for cough and shortness of breath.    Cardiovascular:  Negative for chest pain.   Gastrointestinal:  Negative for nausea and vomiting.   Musculoskeletal:  Negative for gait problem.   Neurological:  Negative for weakness and numbness.       Objective:      /76   Pulse 92   Ht 6' 2\" (1.88 m)   Wt 94.8 kg (209 lb)   BMI 26.83 kg/m²          Physical Exam  Vitals reviewed.   Constitutional:       General: He is not in acute distress.     Appearance: He is not " toxic-appearing or diaphoretic.   HENT:      Head: Normocephalic and atraumatic.   Eyes:      Extraocular Movements: Extraocular movements intact.   Cardiovascular:      Rate and Rhythm: Normal rate and regular rhythm.      Pulses: Normal pulses.           Dorsalis pedis pulses are 2+ on the right side and 2+ on the left side.        Posterior tibial pulses are 2+ on the right side and 2+ on the left side.   Pulmonary:      Effort: Pulmonary effort is normal. No respiratory distress.   Musculoskeletal:         General: Swelling, tenderness and signs of injury present.      Cervical back: Normal range of motion and neck supple.      Right lower leg: No edema.      Left lower leg: No edema.      Right foot: No foot drop.      Left foot: No foot drop.      Comments: Tenderness and swelling on right 5th toe.  Alignment is WNL.  Nail is intact.   Skin:     General: Skin is warm.      Capillary Refill: Capillary refill takes less than 2 seconds.      Coloration: Skin is not cyanotic or mottled.      Findings: No abscess or wound.      Nails: There is no clubbing.   Neurological:      General: No focal deficit present.      Mental Status: He is alert and oriented to person, place, and time.      Cranial Nerves: No cranial nerve deficit.      Sensory: No sensory deficit.      Motor: No weakness.      Coordination: Coordination normal.   Psychiatric:         Mood and Affect: Mood normal.         Behavior: Behavior normal.         Thought Content: Thought content normal.         Judgment: Judgment normal.

## 2024-05-29 NOTE — LETTER
May 30, 2024     NALLELY Calabrese  [address] [address]    Patient: Asher Martin   YOB: 1960   Date of Visit: 5/29/2024       Dear Dr. Rice:    Thank you for referring Asher Martin to me for evaluation. Below are my notes for this consultation.    If you have questions, please do not hesitate to call me. I look forward to following your patient along with you.         Sincerely,        Mac Franz DPM        CC: MD Mac Mullen DPM  5/30/2024  9:01 AM  Sign when Signing Visit                 PATIENT:  Asher Martin  1960       ASSESSMENT:     1. Closed nondisplaced fracture of distal phalanx of lesser toe of right foot, initial encounter        2. Localized swelling of right foot  Ambulatory Referral to Podiatry                PLAN:  1. Reviewed medical records.  Patient was counseled and educated on the condition and the diagnosis.    2. X-ray was personally reviewed.  The radiological findings were discussed with the patient.    3. The diagnosis, treatment options and prognosis were discussed with the patient.    4. He has stable fracture on right 5th toe.  Leo splint applied and instructed.    5. Resting, icing, and elevation.    6. Pt to call the office if pain does not resolve in the next 2-3 weeks.        Imaging: I have personally reviewed pertinent films in PACS  Labs, pathology, and Other Studies: I have personally reviewed pertinent reports.        Subjective:       HPI  The patient was referred to my office for fracture on right 5th toe.  [place] bottle fell on his toe last Saturday with pain and swelling.  He went urgent center on Sunday and X-ray showed fracture.  Pain has been better.  Swelling noted.  No associated numbness or paresthesia.  No significant weakness or dysfunction.         The following portions of the patient's history were reviewed and updated as appropriate: allergies, current medications, past family history, past medical  history, past social history, past surgical history and problem list.  All pertinent labs and images were reviewed.      Past Medical History  Past Medical History:   Diagnosis Date   • Arthritis    • Blood in stool    • Disease of thyroid gland    • Hyperlipidemia    • Kidney stone    • Pulmonary embolism (HCC)        Past Surgical History  Past Surgical History:   Procedure Laterality Date   • COLONOSCOPY     • HERNIA REPAIR Right 2009    With mesh   • KNEE ARTHROSCOPY     • [place] ARTHROSCOPY KNEE DIAGNOSTIC W/WO SYNOVIAL BX SPX Left 06/14/2019    Procedure: ARTHROSCOPY KNEE;  Surgeon: Ana María Tucker MD;  [facility];  Service: Orthopedics   • UT SURGICAL ARTHROSCOPY SHOULDER W/ROTATOR CUFF RPR Right 03/30/2022    Procedure: SHOULDER ARTHROSCOPIC ROTATOR CUFF REPAIR; EXTENSIVE DEBRIDEMENT;  Surgeon: Alfonzo Mccracken MD;  [facility];  Service: Orthopedics   • UT SURGICAL ARTHROSCOPY SHOULDER W/ROTATOR CUFF RPR Left 12/13/2023    Procedure: SHOULDER ARTHROSCOPIC ROTATOR CUFF REPAIR, SAD;  Surgeon: Alfonzo Mccracken MD;  [facility];  Service: Orthopedics   • SHOULDER SURGERY     • TONSILLECTOMY     • WISDOM TOOTH EXTRACTION          Allergies:  Amoxicillin, Codeine, Percocet [oxycodone-acetaminophen], and Codeine    Medications:  Current Outpatient Medications   Medication Sig Dispense Refill   • albuterol (ProAir HFA) 90 mcg/act inhaler Inhale 2 puffs every 6 (six) hours as needed for shortness of breath 8.5 g 0   • apixaban (Eliquis) 5 mg Take 1 tablet (5 mg total) by mouth 2 (two) times a day 180 tablet 1   • atorvastatin (LIPITOR) 40 mg tablet Take 1 tablet (40 mg total) by mouth daily 90 tablet 1   • Cholecalciferol (VITAMIN D) 50 MCG (2000 UT) CAPS Take by mouth     • guaiFENesin (MUCINEX) 600 mg 12 hr tablet Take 2 tablets (1,200 mg total) by mouth every 12 (twelve) hours 30 tablet 0   • levothyroxine 25 mcg tablet Take 1 tablet (25 mcg total) by mouth daily  in the early morning 90 tablet 1   • predniSONE 10 mg tablet Use 5 tablets for 2 days. Use 4 tablets for 2 days. Use 3 tablets for 2 days. Use 2 tablets for 2 days. Use 1 tablet for 2 days. 30 tablet 0     No current facility-administered medications for this visit.       Social History:  Social History     Socioeconomic History   • Marital status: /Civil Union     Spouse name: None   • Number of children: 1   • Years of education: None   • Highest education level: None   Occupational History     Comment: full-time employment   Tobacco Use   • Smoking status: Never   • Smokeless tobacco: Never   Vaping Use   • Vaping status: Never Used   Substance and Sexual Activity   • Alcohol use: Not Currently     Comment: beer, socially   • Drug use: Never   • Sexual activity: Not Currently     Partners: Female     Birth control/protection: None   Other Topics Concern   • None   Social History Narrative    ** Merged History Encounter **         Advance directive information unavailable  Always uses seat belt  Caffeine use  Denied:  Hx of exercises occasionally  Denied:  Hx of domestic violence       Social Determinants of Health     Financial Resource Strain: Not on file   Food Insecurity: No Food Insecurity (12/29/2021)    Hunger Vital Sign    • Worried About Running Out of Food in the Last Year: Never true    • Ran Out of Food in the Last Year: Never true   Transportation Needs: No Transportation Needs (12/29/2021)    PRAPARE - Transportation    • Lack of Transportation (Medical): No    • Lack of Transportation (Non-Medical): No   Physical Activity: Not on file   Stress: Not on file   Social Connections: Not on file   Intimate Partner Violence: Not on file   Housing Stability: Low Risk  (12/29/2021)    Housing Stability Vital Sign    • Unable to Pay for Housing in the Last Year: No    • Number of Places Lived in the Last Year: 1    • Unstable Housing in the Last Year: No          Review of Systems   Constitutional:   "Negative for chills and fever.   Respiratory:  Negative for cough and shortness of breath.    Cardiovascular:  Negative for chest pain.   Gastrointestinal:  Negative for nausea and vomiting.   Musculoskeletal:  Negative for gait problem.   Neurological:  Negative for weakness and numbness.       Objective:      /76   Pulse 92   Ht 6' 2\" (1.88 m)   Wt 94.8 kg (209 lb)   BMI 26.83 kg/m²          Physical Exam  Vitals reviewed.   Constitutional:       General: He is not in acute distress.     Appearance: He is not toxic-appearing or diaphoretic.   HENT:      Head: Normocephalic and atraumatic.   Eyes:      Extraocular Movements: Extraocular movements intact.   Cardiovascular:      Rate and Rhythm: Normal rate and regular rhythm.      Pulses: Normal pulses.           Dorsalis pedis pulses are 2+ on the right side and 2+ on the left side.        Posterior tibial pulses are 2+ on the right side and 2+ on the left side.   Pulmonary:      Effort: Pulmonary effort is normal. No respiratory distress.   Musculoskeletal:         General: Swelling, tenderness and signs of injury present.      Cervical back: Normal range of motion and neck supple.      Right lower leg: No edema.      Left lower leg: No edema.      Right foot: No foot drop.      Left foot: No foot drop.      Comments: Tenderness and swelling on right 5th toe.  Alignment is WNL.  Nail is intact.   Skin:     General: Skin is warm.      Capillary Refill: Capillary refill takes less than 2 seconds.      Coloration: Skin is not cyanotic or mottled.      Findings: No abscess or wound.      Nails: There is no clubbing.   Neurological:      General: No focal deficit present.      Mental Status: He is alert and oriented to person, place, and time.      Cranial Nerves: No cranial nerve deficit.      Sensory: No sensory deficit.      Motor: No weakness.      Coordination: Coordination normal.   Psychiatric:         Mood and Affect: Mood normal.         Behavior: " Behavior normal.         Thought Content: Thought content normal.         Judgment: Judgment normal.

## 2024-06-06 ENCOUNTER — APPOINTMENT (OUTPATIENT)
Dept: LAB | Age: 64
End: 2024-06-06

## 2024-06-06 DIAGNOSIS — Z00.8 HEALTH EXAMINATION IN POPULATION SURVEY: ICD-10-CM

## 2024-06-06 LAB
CHOLEST SERPL-MCNC: 153 MG/DL
HDLC SERPL-MCNC: 36 MG/DL
LDLC SERPL CALC-MCNC: 63 MG/DL (ref 0–100)
NONHDLC SERPL-MCNC: 117 MG/DL
TRIGL SERPL-MCNC: 272 MG/DL

## 2024-06-06 PROCEDURE — 80061 LIPID PANEL: CPT

## 2024-06-06 PROCEDURE — 83036 HEMOGLOBIN GLYCOSYLATED A1C: CPT

## 2024-06-06 PROCEDURE — 36415 COLL VENOUS BLD VENIPUNCTURE: CPT

## 2024-06-07 LAB
EST. AVERAGE GLUCOSE BLD GHB EST-MCNC: 137 MG/DL
HBA1C MFR BLD: 6.4 %

## 2024-06-26 ENCOUNTER — OFFICE VISIT (OUTPATIENT)
Dept: FAMILY MEDICINE CLINIC | Facility: CLINIC | Age: 64
End: 2024-06-26
Payer: COMMERCIAL

## 2024-06-26 VITALS
OXYGEN SATURATION: 97 % | BODY MASS INDEX: 27.22 KG/M2 | DIASTOLIC BLOOD PRESSURE: 84 MMHG | HEART RATE: 79 BPM | TEMPERATURE: 97.1 F | WEIGHT: 212 LBS | SYSTOLIC BLOOD PRESSURE: 122 MMHG

## 2024-06-26 DIAGNOSIS — Z12.5 SCREENING FOR PROSTATE CANCER: Primary | ICD-10-CM

## 2024-06-26 DIAGNOSIS — R73.02 IMPAIRED GLUCOSE TOLERANCE: ICD-10-CM

## 2024-06-26 DIAGNOSIS — E78.2 MIXED HYPERLIPIDEMIA: ICD-10-CM

## 2024-06-26 DIAGNOSIS — E03.9 HYPOTHYROIDISM, UNSPECIFIED TYPE: Primary | ICD-10-CM

## 2024-06-26 PROBLEM — J40 BRONCHITIS: Status: RESOLVED | Noted: 2024-03-25 | Resolved: 2024-06-26

## 2024-06-26 PROCEDURE — 99214 OFFICE O/P EST MOD 30 MIN: CPT | Performed by: FAMILY MEDICINE

## 2024-06-26 NOTE — PROGRESS NOTES
Ambulatory Visit  Name: Asher Martin      : 1960      MRN: [id number]  Encounter Provider: Nathaly Richards MD  Encounter Date: 2024   Encounter department: [facility]    Assessment & Plan   1. Hypothyroidism, unspecified type  Assessment & Plan:  Stable on levothyroxine 25  Orders:  -     TSH, 3rd generation; Future; Expected date: 10/21/2024  2. Impaired glucose tolerance  Assessment & Plan:  Watch carbs, increase exercise  Orders:  -     Comprehensive metabolic panel; Future; Expected date: 10/21/2024  -     Hemoglobin A1C; Future; Expected date: 10/21/2024  3. Mixed hyperlipidemia  Assessment & Plan:  Watch carbs, increase  exercise,tg up 272,ldl 63, goal less 70  Orders:  -     Lipid panel; Future; Expected date: 10/21/2024       History of Present Illness     F/u thyroid, lipids, borderline bs, feels  well , no cp, no sob, knee  bothering him , no headaches           Review of Systems   Constitutional:  Negative for activity change, appetite change and fatigue.   Respiratory:  Negative for shortness of breath.    Cardiovascular:  Negative for chest pain.   Musculoskeletal:  Positive for arthralgias.        Knee pain   Neurological:  Negative for dizziness, light-headedness and headaches.       Objective     /84 (BP Location: Right arm, Patient Position: Sitting, Cuff Size: Large)   Pulse 79   Temp (!) 97.1 °F (36.2 °C) (Temporal)   Wt 96.2 kg (212 lb)   SpO2 97%   BMI 27.22 kg/m²     Physical Exam  Vitals reviewed.   Constitutional:       Appearance: Normal appearance.   Neck:      Vascular: No carotid bruit.   Cardiovascular:      Rate and Rhythm: Normal rate and regular rhythm.      Pulses: Normal pulses.      Heart sounds: Normal heart sounds.   Pulmonary:      Effort: Pulmonary effort is normal.      Breath sounds: Normal breath sounds.   Musculoskeletal:      Right lower leg: No edema.   Lymphadenopathy:      Cervical: No cervical adenopathy.   Neurological:       Mental Status: He is alert.   Psychiatric:         Mood and Affect: Mood normal.       Administrative Statements

## 2024-09-27 DIAGNOSIS — I26.99 PULMONARY EMBOLISM (HCC): ICD-10-CM

## 2024-09-27 DIAGNOSIS — E03.9 HYPOTHYROIDISM, UNSPECIFIED TYPE: ICD-10-CM

## 2024-09-27 RX ORDER — APIXABAN 5 MG/1
5 TABLET, FILM COATED ORAL 2 TIMES DAILY
Qty: 60 TABLET | Refills: 0 | Status: SHIPPED | OUTPATIENT
Start: 2024-09-27

## 2024-09-27 RX ORDER — LEVOTHYROXINE SODIUM 25 UG/1
25 TABLET ORAL
Qty: 90 TABLET | Refills: 0 | Status: SHIPPED | OUTPATIENT
Start: 2024-09-27

## 2024-11-02 DIAGNOSIS — E78.2 MIXED HYPERLIPIDEMIA: ICD-10-CM

## 2024-11-04 RX ORDER — ATORVASTATIN CALCIUM 40 MG/1
40 TABLET, FILM COATED ORAL DAILY
Qty: 90 TABLET | Refills: 1 | Status: SHIPPED | OUTPATIENT
Start: 2024-11-04

## 2024-11-29 ENCOUNTER — OFFICE VISIT (OUTPATIENT)
Dept: FAMILY MEDICINE CLINIC | Facility: CLINIC | Age: 64
End: 2024-11-29
Payer: COMMERCIAL

## 2024-11-29 VITALS
OXYGEN SATURATION: 95 % | BODY MASS INDEX: 27.98 KG/M2 | DIASTOLIC BLOOD PRESSURE: 82 MMHG | HEIGHT: 74 IN | SYSTOLIC BLOOD PRESSURE: 130 MMHG | WEIGHT: 218 LBS | HEART RATE: 76 BPM

## 2024-11-29 DIAGNOSIS — E55.9 VITAMIN D DEFICIENCY: ICD-10-CM

## 2024-11-29 DIAGNOSIS — E78.2 MIXED HYPERLIPIDEMIA: ICD-10-CM

## 2024-11-29 DIAGNOSIS — R73.02 IMPAIRED GLUCOSE TOLERANCE: ICD-10-CM

## 2024-11-29 DIAGNOSIS — E03.9 HYPOTHYROIDISM, UNSPECIFIED TYPE: Primary | ICD-10-CM

## 2024-11-29 PROBLEM — S42.115A CLOSED NONDISPLACED FRACTURE OF BODY OF LEFT SCAPULA: Status: RESOLVED | Noted: 2023-09-14 | Resolved: 2024-11-29

## 2024-11-29 PROCEDURE — 99214 OFFICE O/P EST MOD 30 MIN: CPT | Performed by: FAMILY MEDICINE

## 2024-11-29 NOTE — PROGRESS NOTES
"Name: Asher Martin      : 1960      MRN: [id number]  Encounter Provider: Nathaly Richards MD  Encounter Date: 2024   Encounter department: [facility]  :  Assessment & Plan  Hypothyroidism, unspecified type  On levothyroxine  25 , await lab, doing better with energy level         Impaired glucose tolerance    Orders:    Hemoglobin A1C; Future    Mixed hyperlipidemia  Last tg up ,on atorvastatin , await lab         Vitamin D deficiency    Orders:    Vitamin D 25 hydroxy    Await lab       History of Present Illness     Patient presents with:  Follow-up: 5 month f/u   Usual aches  and pains. Energy level better , bms  moving normal      Review of Systems   Constitutional:  Negative for activity change, appetite change and fatigue.   Respiratory:  Negative for shortness of breath.    Cardiovascular:  Negative for chest pain.   Gastrointestinal:  Negative for abdominal pain, constipation and diarrhea.   Neurological:  Negative for dizziness, light-headedness and headaches.   Hematological:  Negative for adenopathy.   Psychiatric/Behavioral:  The patient is not nervous/anxious.           Objective   /82 (BP Location: Left arm, Patient Position: Sitting, Cuff Size: Adult)   Pulse 76   Ht 6' 2\" (1.88 m)   Wt 98.9 kg (218 lb)   SpO2 95%   BMI 27.99 kg/m²      Physical Exam  Vitals reviewed.   Constitutional:       Appearance: Normal appearance.   Neck:      Vascular: No carotid bruit.   Cardiovascular:      Rate and Rhythm: Normal rate and regular rhythm.      Pulses: Normal pulses.      Heart sounds: Normal heart sounds.   Pulmonary:      Effort: Pulmonary effort is normal.      Breath sounds: Normal breath sounds.   Musculoskeletal:      Right lower leg: No edema.      Left lower leg: No edema.   Lymphadenopathy:      Cervical: No cervical adenopathy.   Neurological:      Mental Status: He is alert.   Psychiatric:         Mood and Affect: Mood normal.         "

## 2024-12-02 ENCOUNTER — APPOINTMENT (OUTPATIENT)
Dept: LAB | Age: 64
End: 2024-12-02
Payer: COMMERCIAL

## 2024-12-02 ENCOUNTER — RESULTS FOLLOW-UP (OUTPATIENT)
Dept: FAMILY MEDICINE CLINIC | Facility: CLINIC | Age: 64
End: 2024-12-02

## 2024-12-02 DIAGNOSIS — E78.2 MIXED HYPERLIPIDEMIA: ICD-10-CM

## 2024-12-02 DIAGNOSIS — R73.02 IMPAIRED GLUCOSE TOLERANCE: ICD-10-CM

## 2024-12-02 DIAGNOSIS — E03.9 HYPOTHYROIDISM, UNSPECIFIED TYPE: ICD-10-CM

## 2024-12-02 DIAGNOSIS — E11.9 TYPE 2 DIABETES MELLITUS WITHOUT COMPLICATION, WITHOUT LONG-TERM CURRENT USE OF INSULIN (HCC): Primary | ICD-10-CM

## 2024-12-02 DIAGNOSIS — Z12.5 SCREENING FOR PROSTATE CANCER: ICD-10-CM

## 2024-12-02 LAB
25(OH)D3 SERPL-MCNC: 62.7 NG/ML (ref 30–100)
ALBUMIN SERPL BCG-MCNC: 4.5 G/DL (ref 3.5–5)
ALP SERPL-CCNC: 67 U/L (ref 34–104)
ALT SERPL W P-5'-P-CCNC: 25 U/L (ref 7–52)
ANION GAP SERPL CALCULATED.3IONS-SCNC: 10 MMOL/L (ref 4–13)
AST SERPL W P-5'-P-CCNC: 18 U/L (ref 13–39)
BILIRUB SERPL-MCNC: 0.86 MG/DL (ref 0.2–1)
BUN SERPL-MCNC: 26 MG/DL (ref 5–25)
CALCIUM SERPL-MCNC: 9.3 MG/DL (ref 8.4–10.2)
CHLORIDE SERPL-SCNC: 106 MMOL/L (ref 96–108)
CHOLEST SERPL-MCNC: 171 MG/DL (ref ?–200)
CO2 SERPL-SCNC: 24 MMOL/L (ref 21–32)
CREAT SERPL-MCNC: 1.37 MG/DL (ref 0.6–1.3)
EST. AVERAGE GLUCOSE BLD GHB EST-MCNC: 148 MG/DL
GFR SERPL CREATININE-BSD FRML MDRD: 54 ML/MIN/1.73SQ M
GLUCOSE P FAST SERPL-MCNC: 131 MG/DL (ref 65–99)
HBA1C MFR BLD: 6.8 %
HDLC SERPL-MCNC: 39 MG/DL
LDLC SERPL CALC-MCNC: 72 MG/DL (ref 0–100)
NONHDLC SERPL-MCNC: 132 MG/DL
POTASSIUM SERPL-SCNC: 3.9 MMOL/L (ref 3.5–5.3)
PROT SERPL-MCNC: 7.1 G/DL (ref 6.4–8.4)
PSA SERPL-MCNC: 2.2 NG/ML (ref 0–4)
SODIUM SERPL-SCNC: 140 MMOL/L (ref 135–147)
TRIGL SERPL-MCNC: 301 MG/DL (ref ?–150)
TSH SERPL DL<=0.05 MIU/L-ACNC: 5.45 UIU/ML (ref 0.45–4.5)

## 2024-12-02 PROCEDURE — 84443 ASSAY THYROID STIM HORMONE: CPT

## 2024-12-02 PROCEDURE — 80053 COMPREHEN METABOLIC PANEL: CPT

## 2024-12-02 PROCEDURE — 36415 COLL VENOUS BLD VENIPUNCTURE: CPT

## 2024-12-02 PROCEDURE — 83036 HEMOGLOBIN GLYCOSYLATED A1C: CPT

## 2024-12-02 PROCEDURE — G0103 PSA SCREENING: HCPCS

## 2024-12-02 PROCEDURE — 82306 VITAMIN D 25 HYDROXY: CPT | Performed by: FAMILY MEDICINE

## 2024-12-02 PROCEDURE — 80061 LIPID PANEL: CPT

## 2024-12-29 DIAGNOSIS — I26.99 PULMONARY EMBOLISM (HCC): ICD-10-CM

## 2024-12-29 DIAGNOSIS — E03.9 HYPOTHYROIDISM, UNSPECIFIED TYPE: ICD-10-CM

## 2024-12-31 RX ORDER — APIXABAN 5 MG/1
5 TABLET, FILM COATED ORAL 2 TIMES DAILY
Qty: 60 TABLET | Refills: 0 | Status: SHIPPED | OUTPATIENT
Start: 2024-12-31

## 2024-12-31 RX ORDER — LEVOTHYROXINE SODIUM 25 UG/1
25 TABLET ORAL
Qty: 90 TABLET | Refills: 1 | Status: SHIPPED | OUTPATIENT
Start: 2024-12-31

## 2024-12-31 NOTE — TELEPHONE ENCOUNTER
Patient needs updated blood work. Please place orders. A courtesy refill was provided for Eliquis.    CBC

## 2025-02-11 ENCOUNTER — OFFICE VISIT (OUTPATIENT)
Dept: FAMILY MEDICINE CLINIC | Facility: CLINIC | Age: 65
End: 2025-02-11
Payer: COMMERCIAL

## 2025-02-11 VITALS
SYSTOLIC BLOOD PRESSURE: 110 MMHG | TEMPERATURE: 98.7 F | HEIGHT: 74 IN | HEART RATE: 103 BPM | WEIGHT: 212 LBS | OXYGEN SATURATION: 96 % | DIASTOLIC BLOOD PRESSURE: 66 MMHG | BODY MASS INDEX: 27.21 KG/M2

## 2025-02-11 DIAGNOSIS — I26.99 PULMONARY EMBOLISM, OTHER, UNSPECIFIED CHRONICITY, UNSPECIFIED WHETHER ACUTE COR PULMONALE PRESENT (HCC): ICD-10-CM

## 2025-02-11 DIAGNOSIS — E78.2 MIXED HYPERLIPIDEMIA: ICD-10-CM

## 2025-02-11 DIAGNOSIS — E11.9 TYPE 2 DIABETES MELLITUS WITHOUT COMPLICATION, WITHOUT LONG-TERM CURRENT USE OF INSULIN (HCC): ICD-10-CM

## 2025-02-11 DIAGNOSIS — U07.1 COVID-19: Primary | ICD-10-CM

## 2025-02-11 LAB
SARS-COV-2 AG UPPER RESP QL IA: POSITIVE
SL AMB POCT RAPID FLU A: NEGATIVE
SL AMB POCT RAPID FLU B: NEGATIVE
VALID CONTROL: ABNORMAL

## 2025-02-11 PROCEDURE — 87804 INFLUENZA ASSAY W/OPTIC: CPT | Performed by: PHYSICIAN ASSISTANT

## 2025-02-11 PROCEDURE — 99214 OFFICE O/P EST MOD 30 MIN: CPT | Performed by: PHYSICIAN ASSISTANT

## 2025-02-11 PROCEDURE — 87811 SARS-COV-2 COVID19 W/OPTIC: CPT | Performed by: PHYSICIAN ASSISTANT

## 2025-02-11 NOTE — PROGRESS NOTES
Name: Asher Martin      : 1960      MRN: [id number]  Encounter Provider: Torres Houser PA-C  Encounter Date: 2025   Encounter department: [facility]  :  Assessment & Plan  COVID-19  Diagnosis was discussed with patient.  He currently appears stable.  He is currently on Eliquis and atorvastatin which would be contraindicated with Paxlovid therapy.  He does not have any high risk respiratory conditions.  He declines further antiviral therapies.  He also declines prednisone or prescription cough syrup.  Patient believes he will just use over-the-counter Robitussin at this point.  If symptoms are not improving in the next week recommend possible use of steroid inhaler if needed.  Patient verbalizes understanding and agreement with plan.  Quarantine guidelines were reviewed with patient.  Orders:  •  POCT Rapid Covid Ag  •  POCT rapid flu A and B    Type 2 diabetes mellitus without complication, without long-term current use of insulin (HCC)    Lab Results   Component Value Date    HGBA1C 6.8 (H) 2024   Patient with a recent hemoglobin A1c of 6.8, following with Dr. Richards on metformin therapy.       Mixed hyperlipidemia  Stable on atorvastatin 40 mg daily, no medication changes.       Pulmonary embolism, other, unspecified chronicity, unspecified whether acute cor pulmonale present (HCC)  Presently stable on Eliquis 5 mg twice daily.  Patient is not a candidate for Paxlovid antiviral.              History of Present Illness   HPI: This is a 64-year-old gentleman that presents to the office with significant cough that started on Friday of this past week, 4 days ago.  He has been feeling a bit fatigued and rundown.  He has not had any fevers that he is aware of.  Most of his coughing produces clear mucus.  He has been taking some Sudafed over-the-counter with little benefit so far.  Does have a history of hyperlipidemia and continues on atorvastatin.  He has had history of  "pulmonary embolism and continues Eliquis 5 mg twice daily as well.  He has had some history of hyperglycemia and continues to follow with PCP for metformin therapy.      Review of Systems    Objective   /66 (BP Location: Left arm, Patient Position: Sitting, Cuff Size: Standard)   Pulse 103   Temp 98.7 °F (37.1 °C) (Tympanic)   Ht 6' 2\" (1.88 m)   Wt 96.2 kg (212 lb)   SpO2 96%   BMI 27.22 kg/m²      Physical Exam    "

## 2025-02-11 NOTE — ASSESSMENT & PLAN NOTE
Lab Results   Component Value Date    HGBA1C 6.8 (H) 12/02/2024   Patient with a recent hemoglobin A1c of 6.8, following with Dr. Richards on metformin therapy.

## 2025-02-11 NOTE — ASSESSMENT & PLAN NOTE
Presently stable on Eliquis 5 mg twice daily.  Patient is not a candidate for Paxlovid antiviral.

## 2025-04-05 DIAGNOSIS — I26.99 PULMONARY EMBOLISM (HCC): ICD-10-CM

## 2025-04-07 RX ORDER — APIXABAN 5 MG/1
5 TABLET, FILM COATED ORAL 2 TIMES DAILY
Qty: 180 TABLET | Refills: 1 | Status: SHIPPED | OUTPATIENT
Start: 2025-04-07

## 2025-06-20 ENCOUNTER — RESULTS FOLLOW-UP (OUTPATIENT)
Dept: FAMILY MEDICINE CLINIC | Facility: CLINIC | Age: 65
End: 2025-06-20

## 2025-06-20 ENCOUNTER — OFFICE VISIT (OUTPATIENT)
Dept: FAMILY MEDICINE CLINIC | Facility: CLINIC | Age: 65
End: 2025-06-20
Payer: COMMERCIAL

## 2025-06-20 ENCOUNTER — APPOINTMENT (OUTPATIENT)
Dept: LAB | Age: 65
End: 2025-06-20
Payer: COMMERCIAL

## 2025-06-20 VITALS
OXYGEN SATURATION: 96 % | HEART RATE: 76 BPM | WEIGHT: 205 LBS | BODY MASS INDEX: 26.31 KG/M2 | HEIGHT: 74 IN | DIASTOLIC BLOOD PRESSURE: 72 MMHG | SYSTOLIC BLOOD PRESSURE: 124 MMHG

## 2025-06-20 DIAGNOSIS — E11.9 TYPE 2 DIABETES MELLITUS WITHOUT COMPLICATION, WITHOUT LONG-TERM CURRENT USE OF INSULIN (HCC): Primary | ICD-10-CM

## 2025-06-20 DIAGNOSIS — E78.2 MIXED HYPERLIPIDEMIA: ICD-10-CM

## 2025-06-20 DIAGNOSIS — E03.9 HYPOTHYROIDISM, UNSPECIFIED TYPE: ICD-10-CM

## 2025-06-20 LAB
CREAT UR-MCNC: 134.1 MG/DL
LEFT EYE DIABETIC RETINOPATHY: NORMAL
LEFT EYE IMAGE QUALITY: NORMAL
LEFT EYE MACULAR EDEMA: NORMAL
LEFT EYE OTHER RETINOPATHY: NORMAL
MICROALBUMIN UR-MCNC: <7 MG/L
RIGHT EYE DIABETIC RETINOPATHY: NORMAL
RIGHT EYE IMAGE QUALITY: NORMAL
RIGHT EYE MACULAR EDEMA: NORMAL
RIGHT EYE OTHER RETINOPATHY: NORMAL
SEVERITY (EYE EXAM): NORMAL
SL AMB POCT HEMOGLOBIN AIC: 5.8 (ref ?–6.5)

## 2025-06-20 PROCEDURE — 82570 ASSAY OF URINE CREATININE: CPT | Performed by: FAMILY MEDICINE

## 2025-06-20 PROCEDURE — 99214 OFFICE O/P EST MOD 30 MIN: CPT | Performed by: FAMILY MEDICINE

## 2025-06-20 PROCEDURE — 92250 FUNDUS PHOTOGRAPHY W/I&R: CPT | Performed by: FAMILY MEDICINE

## 2025-06-20 PROCEDURE — 83036 HEMOGLOBIN GLYCOSYLATED A1C: CPT | Performed by: FAMILY MEDICINE

## 2025-06-20 PROCEDURE — 82043 UR ALBUMIN QUANTITATIVE: CPT | Performed by: FAMILY MEDICINE

## 2025-06-20 RX ORDER — LEVOTHYROXINE SODIUM 25 UG/1
25 TABLET ORAL
Qty: 90 TABLET | Refills: 1 | Status: SHIPPED | OUTPATIENT
Start: 2025-06-20

## 2025-06-20 RX ORDER — ATORVASTATIN CALCIUM 40 MG/1
40 TABLET, FILM COATED ORAL DAILY
Qty: 90 TABLET | Refills: 1 | Status: SHIPPED | OUTPATIENT
Start: 2025-06-20

## 2025-06-20 NOTE — PROGRESS NOTES
"Name: Asher Martin      : 1960      MRN: [id number]  Encounter Provider: Nathaly Richards MD  Encounter Date: 2025   Encounter department: [facility]  :  Assessment & Plan  Type 2 diabetes mellitus without complication, without long-term current use of insulin (HCC)    Lab Results   Component Value Date    HGBA1C 5.8 2025   Stable on once daily Metformin    Orders:    IRIS Diabetic eye exam    POCT hemoglobin A1c    Albumin / creatinine urine ratio; Future    Comprehensive metabolic panel    Comprehensive metabolic panel; Future    Hemoglobin A1C; Future    Mixed hyperlipidemia  Await  lab, ldl  goal      Orders:    Lipid panel    Comprehensive metabolic panel    atorvastatin (LIPITOR) 40 mg tablet; Take 1 tablet (40 mg total) by mouth daily    Lipid panel; Future    Hypothyroidism, unspecified type    Orders:    TSH, 3rd generation    levothyroxine 25 mcg tablet; Take 1 tablet (25 mcg total) by mouth daily in the early morning           History of Present Illness   Patient presents with:  Follow-up: 6 months follow up   Feels  well, no cp, no sob, no headaches, trying to watch diet      Review of Systems   Constitutional:  Negative for activity change, appetite change and fatigue.   Respiratory:  Negative for shortness of breath.    Cardiovascular:  Negative for chest pain.   Neurological:  Negative for dizziness, light-headedness and headaches.   Hematological:  Does not bruise/bleed easily.       Objective   /72 (BP Location: Left arm, Patient Position: Sitting, Cuff Size: Standard)   Pulse 76   Ht 6' 2\" (1.88 m)   Wt 93 kg (205 lb)   SpO2 96%   BMI 26.32 kg/m²      Physical Exam  Vitals reviewed.   Constitutional:       Appearance: Normal appearance.   Neck:      Vascular: No carotid bruit.     Cardiovascular:      Rate and Rhythm: Normal rate and regular rhythm.      Pulses: Normal pulses. no weak pulses.           Dorsalis pedis pulses are 2+ on the right " side and 2+ on the left side.        Posterior tibial pulses are 2+ on the right side and 2+ on the left side.      Heart sounds: Normal heart sounds.   Pulmonary:      Effort: Pulmonary effort is normal.      Breath sounds: Normal breath sounds.     Musculoskeletal:      Right lower leg: No edema.      Left lower leg: No edema.   Feet:      Right foot:      Skin integrity: No ulcer, skin breakdown, erythema, warmth, callus or dry skin.      Left foot:      Skin integrity: No ulcer, skin breakdown, erythema, warmth, callus or dry skin.   Lymphadenopathy:      Cervical: No cervical adenopathy.     Neurological:      Mental Status: He is alert.     Psychiatric:         Mood and Affect: Mood normal.       Diabetic Foot Exam    Patient's shoes and socks removed.    Right Foot/Ankle   Right Foot Inspection  Skin Exam: skin normal and skin intact. No dry skin, no warmth, no callus, no erythema, no maceration, no abnormal color, no pre-ulcer, no ulcer and no callus.     Toe Exam: ROM and strength within normal limits.     Sensory   Monofilament testing: intact    Vascular  Capillary refills: < 3 seconds  The right DP pulse is 2+. The right PT pulse is 2+.     Left Foot/Ankle  Left Foot Inspection  Skin Exam: skin normal and skin intact. No dry skin, no warmth, no erythema, no maceration, normal color, no pre-ulcer, no ulcer and no callus.     Toe Exam: ROM and strength within normal limits.     Sensory   Monofilament testing: intact    Vascular  Capillary refills: < 3 seconds  The left DP pulse is 2+. The left PT pulse is 2+.     Assign Risk Category  No deformity present  No loss of protective sensation  No weak pulses  Risk: 0

## 2025-07-13 ENCOUNTER — OFFICE VISIT (OUTPATIENT)
Dept: URGENT CARE | Age: 65
End: 2025-07-13
Payer: COMMERCIAL

## 2025-07-13 VITALS
WEIGHT: 203.8 LBS | BODY MASS INDEX: 26.17 KG/M2 | RESPIRATION RATE: 18 BRPM | DIASTOLIC BLOOD PRESSURE: 88 MMHG | OXYGEN SATURATION: 96 % | TEMPERATURE: 99.7 F | HEART RATE: 90 BPM | SYSTOLIC BLOOD PRESSURE: 130 MMHG

## 2025-07-13 DIAGNOSIS — J02.9 SORE THROAT: ICD-10-CM

## 2025-07-13 DIAGNOSIS — J04.0 ACUTE LARYNGITIS: Primary | ICD-10-CM

## 2025-07-13 LAB — S PYO AG THROAT QL: NEGATIVE

## 2025-07-13 PROCEDURE — 99213 OFFICE O/P EST LOW 20 MIN: CPT

## 2025-07-13 PROCEDURE — 87880 STREP A ASSAY W/OPTIC: CPT

## 2025-07-13 RX ORDER — METHYLPREDNISOLONE 4 MG/1
TABLET ORAL
Qty: 21 TABLET | Refills: 0 | Status: SHIPPED | OUTPATIENT
Start: 2025-07-13 | End: 2025-07-19

## 2025-07-13 NOTE — PROGRESS NOTES
[facility]  Name: Asher Martin      : 1960      MRN: [id number]  Encounter Provider: NALLELY Olivares  Encounter Date: 2025   Encounter department: [facility] [place]  :  Rapid strep negative in office.   Suspect acute viral laryngitis-please begin Medrol dose avery as directed. Last Hba1c 5.8, patient counseled to monitor blood glucose while taking.   May take OTC Dayquil as needed for cough/congestion.   Rapid strep performed in office found to be negative, throat culture results pending and [facility] be available in [facility] within 48 hours.  May use Cepacol lozenges, Chloraseptic throat spray, warm salt water gargles and hot tea with honey as needed for sore throat.  Follow up with primary care provider if symptoms do not resolve within 1-2 weeks.    Assessment & Plan  Sore throat    Orders:    POCT rapid strepA    methylPREDNISolone 4 MG tablet therapy pack; Take 6 tablets (24 mg total) by mouth daily for 1 day, THEN 5 tablets (20 mg total) daily for 1 day, THEN 4 tablets (16 mg total) daily for 1 day, THEN 3 tablets (12 mg total) daily for 1 day, THEN 2 tablets (8 mg total) daily for 1 day, THEN 1 tablet (4 mg total) daily for 1 day. Use as directed on package.    Acute laryngitis    Orders:    methylPREDNISolone 4 MG tablet therapy pack; Take 6 tablets (24 mg total) by mouth daily for 1 day, THEN 5 tablets (20 mg total) daily for 1 day, THEN 4 tablets (16 mg total) daily for 1 day, THEN 3 tablets (12 mg total) daily for 1 day, THEN 2 tablets (8 mg total) daily for 1 day, THEN 1 tablet (4 mg total) daily for 1 day. Use as directed on package.        Patient Instructions  Patient Education     Laryngitis Discharge Instructions   About this topic   The larynx is also called the voice box. It is a tube that holds your vocal cords and they make sounds when we speak. Our vocal cords help us talk with each other. It also acts like a door that opens to let air in and closes to keep  food out of the lungs.  Sometimes, the voice box and vocal cords get swollen. This can happen for many reasons. It can be because you have a cold or the flu or you may have used your voice too much. At times, it may be from breathing in chemicals or smoke. Allergies can cause problems with this as well.  When this happens it is called laryngitis. Laryngitis causes your voice to become hoarse or weak. You may even lose your voice completely. This may be a problem that lasts for a short time or a long time. Laryngitis that lasts for a long time may be caused by some other health problem like acid reflux, polyps, or damage to nerves. Your care will be based on what is causing your laryngitis.  What care is needed at home?   Ask your doctor what you need to do when you go home. Make sure you ask questions if you do not understand what the doctor says. This way you will know what you need to do.  Try not to talk. Do not whisper. This can make your problem worse.  Put a cool mist humidifier in your room to keep your throat from being dry.  Drink lots of liquids.  Gargle with warm salt water. Mix 1/2 teaspoon (2.5 grams) salt with a cup (240 mL) of warm water.  Keep your mouth from being dry. Try chewing gum or sucking on lozenges.  What follow-up care is needed?   Your doctor may ask you to make visits to the office to check on your progress. Be sure to keep these visits.  You may need to see a special kind of doctor called an ENT or ear, nose, and throat doctor.  What drugs may be needed?   Your doctor may order drugs to:  Fight an infection  Help with pain and swelling  Help with acid reflux  Will physical activity be limited?   Physical activity may be limited based on what is causing this problem. Talk with your doctor about the right amount of activity for you.  What changes to diet are needed?   Eat soft foods like soup and pureed fruit and vegetables if it hurts to swallow.  Avoid drinking sports drinks, soft  drinks, or undiluted fruit juice. They have too much sugar and may cause fluid loss and throat pain. Instead try herbal teas, diluted fruit juice, and water.  Avoid caffeine, smoking, and beer, wine, and mixed drinks (alcohol). These can make your signs worse.  Honey will help your sore throat feel better. Do not give honey to children under 1 year old.  What problems could happen?   Harm to vocal cords  Breathing problems  What can be done to prevent this health problem?   Avoid being near people who have a cold or the flu.  Try not to strain your voice. Avoid clearing your throat.  Don't smoke. Avoid being with people who do smoke.  When do I need to call the doctor?   Signs of infection. These include a fever of 100.4°F (38°C) or higher, chills, very bad sore throat, ear or sinus pain, cough, more mucus or change in color of mucus.  Voice stays hoarse or voice does not come back after 2 weeks  Very bad throat pain  Trouble breathing or swallowing  You are not feeling better in 2 to 3 days or you are feeling worse  Teach Back: Helping You Understand   The Teach Back Method helps you understand the information we are giving you. After you talk with the staff, tell them in your own words what you learned. This helps to make sure the staff has described each thing clearly. It also helps to explain things that may have been confusing. Before going home, make sure you can do these:  I can tell you about my condition.  I can tell you what may help ease my sore throat.  I can tell you what I will do if I have trouble breathing, very bad throat pain, or my voice stays hoarse.  Last Reviewed Date   2020-07-02  Consumer Information Use and Disclaimer   This generalized information is a limited summary of diagnosis, treatment, and/or medication information. It is not meant to be comprehensive and should be used as a tool to help the user understand and/or assess potential diagnostic and treatment options. It does NOT include  all information about conditions, treatments, medications, side effects, or risks that may apply to a specific patient. It is not intended to be medical advice or a substitute for the medical advice, diagnosis, or treatment of a health care provider based on the health care provider's examination and assessment of a patient’s specific and unique circumstances. Patients must speak with a health care provider for complete information about their health, medical questions, and treatment options, including any risks or benefits regarding use of medications. This information does not endorse any treatments or medications as safe, effective, or approved for treating a specific patient. [employer] and its affiliates disclaim any warranty or liability relating to this information or the use thereof. The use of this information is governed by the Terms of Use, available at https://www.[employer]/en/know/clinical-effectiveness-terms   Copyright   Follow up with PCP in 3-5 days.  Proceed to  ER if symptoms worsen.    If tests are performed, our office will contact you with results only if changes need to made to the care plan discussed with you at the visit. You can review your full results on [facility].    Chief Complaint:   Chief Complaint   Patient presents with    Sore Throat     Sore throat X 3 days     History of Present Illness   Sore Throat   This is a new problem. The current episode started in the past 7 days (x 3 days). The problem has been unchanged. Neither side of throat is experiencing more pain than the other. There has been no fever. Associated symptoms include congestion, coughing and a hoarse voice. Pertinent negatives include no abdominal pain, diarrhea, drooling, ear discharge, ear pain, headaches, plugged ear sensation, neck pain, shortness of breath, stridor, swollen glands, trouble swallowing or vomiting. Associated symptoms comments: Mildly productive, occasional cough. He has had  no exposure to strep or mono. He has tried nothing for the symptoms. The treatment provided no relief.         Review of Systems   Constitutional:  Negative for fatigue and fever.   HENT:  Positive for congestion, hoarse voice and sore throat. Negative for drooling, ear discharge, ear pain, postnasal drip, rhinorrhea, sinus pressure, sinus pain, sneezing and trouble swallowing.    Eyes: Negative.  Negative for pain, discharge, redness and itching.   Respiratory:  Positive for cough. Negative for apnea, choking, chest tightness, shortness of breath, wheezing and stridor.    Cardiovascular: Negative.  Negative for chest pain and palpitations.   Gastrointestinal: Negative.  Negative for abdominal pain, diarrhea, nausea and vomiting.   Endocrine: Negative.  Negative for polydipsia, polyphagia and polyuria.   Genitourinary: Negative.  Negative for decreased urine volume and flank pain.   Musculoskeletal: Negative.  Negative for arthralgias, back pain, myalgias, neck pain and neck stiffness.   Skin: Negative.  Negative for color change and rash.   Allergic/Immunologic: Negative.  Negative for environmental allergies.   Neurological: Negative.  Negative for dizziness, facial asymmetry, light-headedness, numbness and headaches.   Hematological: Negative.  Negative for adenopathy.   Psychiatric/Behavioral: Negative.       Past Medical History   Past Medical History[1]  Past Surgical History[2]  Family History[3]  he reports that he has never smoked. He has never used smokeless tobacco. He reports that he does not currently use alcohol. He reports that he does not use drugs.  Current Outpatient Medications   Medication Instructions    atorvastatin (LIPITOR) 40 mg, Oral, Daily    Eliquis 5 mg, Oral, 2 times daily    levothyroxine 25 mcg, Oral, Daily (early morning)    metFORMIN (GLUCOPHAGE) 500 mg, Oral, Daily with breakfast    methylPREDNISolone 4 MG tablet therapy pack Take 6 tablets (24 mg total) by mouth daily for 1 day,  THEN 5 tablets (20 mg total) daily for 1 day, THEN 4 tablets (16 mg total) daily for 1 day, THEN 3 tablets (12 mg total) daily for 1 day, THEN 2 tablets (8 mg total) daily for 1 day, THEN 1 tablet (4 mg total) daily for 1 day. Use as directed on package.   Allergies[4]     Objective   /88 (BP Location: Left arm, Patient Position: Sitting, Cuff Size: Adult)   Pulse 90   Temp 99.7 °F (37.6 °C) (Tympanic)   Resp 18   Wt 92.4 kg (203 lb 12.8 oz)   SpO2 96%   BMI 26.17 kg/m²      Physical Exam  Vitals and nursing note reviewed.   Constitutional:       General: He is not in acute distress.     Appearance: He is well-developed. He is not ill-appearing, toxic-appearing or diaphoretic.      Interventions: He is not intubated.  HENT:      Head: Normocephalic and atraumatic.      Right Ear: Tympanic membrane and ear canal normal. Tympanic membrane is not erythematous.      Left Ear: Tympanic membrane and ear canal normal. Tympanic membrane is not erythematous.      Mouth/Throat:      Mouth: Mucous membranes are moist. No oral lesions.      Pharynx: Uvula midline. No pharyngeal swelling, oropharyngeal exudate, posterior oropharyngeal erythema or uvula swelling.      Tonsils: No tonsillar exudate or tonsillar abscesses. 1+ on the right. 1+ on the left.     Eyes:      Conjunctiva/sclera: Conjunctivae normal.     Neck:      Thyroid: No thyroid mass, thyromegaly or thyroid tenderness.     Cardiovascular:      Rate and Rhythm: Normal rate and regular rhythm.      Heart sounds: Normal heart sounds, S1 normal and S2 normal. Heart sounds not distant. No murmur heard.  Pulmonary:      Effort: Pulmonary effort is normal. No tachypnea, bradypnea, accessory muscle usage, prolonged expiration, respiratory distress or retractions. He is not intubated.      Breath sounds: Normal breath sounds. No stridor, decreased air movement or transmitted upper airway sounds. No decreased breath sounds, wheezing, rhonchi or rales.   Abdominal:  "     Palpations: Abdomen is soft.      Tenderness: There is no abdominal tenderness.     Musculoskeletal:         General: No swelling.      Cervical back: Full passive range of motion without pain, normal range of motion and neck supple. No spinous process tenderness or muscular tenderness. Normal range of motion.   Lymphadenopathy:      Cervical: Cervical adenopathy present.      Right cervical: Superficial cervical adenopathy present.      Left cervical: Superficial cervical adenopathy present.     Skin:     General: Skin is warm and dry.      Capillary Refill: Capillary refill takes less than 2 seconds.     Neurological:      Mental Status: He is alert.     Psychiatric:         Mood and Affect: Mood normal.         Portions of the record may have been created with voice recognition software.  Occasional wrong word or \"sound a like\" substitutions may have occurred due to the inherent limitations of voice recognition software.  Read the chart carefully and recognize, using context, where substitutions have occurred.       [1]   Past Medical History:  Diagnosis Date    Arthritis     Blood in stool     Disease of thyroid gland     Fractures     Hyperlipidemia     Irritable bowel syndrome     Kidney stone     Pulmonary embolism (HCC)     Type 2 diabetes mellitus (HCC) 08/21/2023   [2]   Past Surgical History:  Procedure Laterality Date    COLONOSCOPY      HERNIA REPAIR Right 2009    With mesh    KNEE ARTHROSCOPY      SD ARTHROSCOPY KNEE DIAGNOSTIC W/WO SYNOVIAL BX SPX Left 06/14/2019    Procedure: ARTHROSCOPY KNEE;  Surgeon: Ana María Tucker MD;  Location: [facility];  Service: Orthopedics    SD SURGICAL ARTHROSCOPY SHOULDER W/ROTATOR CUFF RPR Right 03/30/2022    Procedure: SHOULDER ARTHROSCOPIC ROTATOR CUFF REPAIR; EXTENSIVE DEBRIDEMENT;  Surgeon: Alfonzo Mccracken MD;  [facility];  Service: Orthopedics    SD SURGICAL ARTHROSCOPY SHOULDER W/ROTATOR CUFF RPR Left 12/13/2023    Procedure: " SHOULDER ARTHROSCOPIC ROTATOR CUFF REPAIR, SAD;  Surgeon: Alfonzo Mccracken MD;  Location: [facility] MA[facility] OR;  Service: Orthopedics    SHOULDER SURGERY      TONSILLECTOMY      WISDOM TOOTH EXTRACTION     [3]   Family History  Problem Relation Name Age of Onset    Stroke Mother Karina Martin         CVA    Breast cancer Mother Karina Martin     Arthritis Mother Karina Martin     Diabetes Father Jean Marie Martin         DM    Cancer Father Jean Marie Martin     Melanoma Brother     [4]   Allergies  Allergen Reactions    Amoxicillin Vomiting    Codeine Vomiting    Percocet [Oxycodone-Acetaminophen] GI Intolerance    Codeine GI Intolerance     vomitting

## 2025-07-13 NOTE — PATIENT INSTRUCTIONS
Rapid strep negative in office.   Suspect acute viral laryngitis-please begin Medrol dose avery as directed. Last Hba1c 5.8, patient counseled to monitor blood glucose while taking.   May take OTC Dayquil as needed for cough/congestion.   Rapid strep performed in office found to be negative, throat culture results pending and [facility].  May use Cepacol lozenges, Chloraseptic throat spray, warm salt water gargles and hot tea with honey as needed for sore throat.  Follow up with primary care provider if symptoms do not resolve within 1-2 weeks.

## (undated) DEVICE — GLOVE SRG BIOGEL ECLIPSE 7

## (undated) DEVICE — SHOULDER SUSPENSION KIT 6 PER BOX

## (undated) DEVICE — SUT ETHILON 3-0 FS-1 18 IN 663G

## (undated) DEVICE — NEEDLE 25G X 1 1/2

## (undated) DEVICE — GLOVE INDICATOR PI UNDERGLOVE SZ 7.5 BLUE

## (undated) DEVICE — GLOVE SRG BIOGEL 7.5

## (undated) DEVICE — EXPRESSEW III SUTURE NEEDLE FOR USE WITH EXPRESSEW II OR III SUTURE PASSER: Brand: EXPRESSEW

## (undated) DEVICE — ABDOMINAL PAD: Brand: DERMACEA

## (undated) DEVICE — BLADE SHAVER DISSECTOR 3.5MM 13CM COOLCUT

## (undated) DEVICE — TUBING SUCTION 5MM X 12 FT

## (undated) DEVICE — VAPR COOLPULSE 90 ELECTRODE 90 DEGREES SUCTION WITH INTEGRATED HANDPIECE: Brand: VAPR COOLPULSE

## (undated) DEVICE — OCCLUSIVE GAUZE STRIP,3% BISMUTH TRIBROMOPHENATE IN PETROLATUM BLEND: Brand: XEROFORM

## (undated) DEVICE — SPONGE PVP SCRUB WING STERILE

## (undated) DEVICE — TUBING ARTHROSCOPIC WAVE  MAIN PUMP

## (undated) DEVICE — ARTHROSCOPY FLOOR MAT

## (undated) DEVICE — INTENDED FOR TISSUE SEPARATION, AND OTHER PROCEDURES THAT REQUIRE A SHARP SURGICAL BLADE TO PUNCTURE OR CUT.: Brand: BARD-PARKER ® CARBON RIB-BACK BLADES

## (undated) DEVICE — SYRINGE 3ML LL

## (undated) DEVICE — GLOVE SRG BIOGEL 8

## (undated) DEVICE — IMPERVIOUS STOCKINETTE: Brand: DEROYAL

## (undated) DEVICE — SUT MONOCRYL 4-0 PS-2 18 IN Y496G

## (undated) DEVICE — BLADE SHAVER DISSECTOR 4MM 13CM COOLCUT

## (undated) DEVICE — 3M™ IOBAN™ 2 ANTIMICROBIAL INCISE DRAPE 6650EZ: Brand: IOBAN™ 2

## (undated) DEVICE — THREADED CLEAR CANNULA WITH OBTURATOR 7MM X 75MM

## (undated) DEVICE — SYRINGE 10ML LL

## (undated) DEVICE — DRESSING MEPILEX AG BORDER 4 X 4 IN

## (undated) DEVICE — DISPOSABLE EQUIPMENT COVER: Brand: SMALL TOWEL DRAPE

## (undated) DEVICE — CHLORAPREP HI-LITE 26ML ORANGE

## (undated) DEVICE — ACE WRAP 6 IN UNSTERILE

## (undated) DEVICE — U-DRAPE: Brand: CONVERTORS

## (undated) DEVICE — PACK PBDS SHOULDER ARTHROSCOPY RF

## (undated) DEVICE — LIGHT GLOVE GREEN

## (undated) DEVICE — 3M™ STERI-STRIP™ BLEND TONE SKIN CLOSURES, B1557, TAN, 1/2 IN X 4 IN (12MM X 100MM), 6 STRIPS/ENVELOPE: Brand: 3M™ STERI-STRIP™

## (undated) DEVICE — SKN PRP WNG SPNGE PVP SCRB STR: Brand: MEDLINE INDUSTRIES, INC.

## (undated) DEVICE — 3M™ STERI-STRIP™ REINFORCED ADHESIVE SKIN CLOSURES, R1547, 1/2 IN X 4 IN (12 MM X 100 MM), 6 STRIPS/ENVELOPE: Brand: 3M™ STERI-STRIP™

## (undated) DEVICE — NEEDLE 18 G X 1 1/2

## (undated) DEVICE — GAUZE SPONGES,16 PLY: Brand: CURITY

## (undated) DEVICE — SUT MONOCRYL 4-0 PS-2 27 IN Y426H

## (undated) DEVICE — ADHESIVE SKIN HIGH VISCOSITY EXOFIN 1ML

## (undated) DEVICE — THREADED CLEAR CANNULA WITH OBTURATOR 8.5MM X 75MM

## (undated) DEVICE — GLOVE INDICATOR PI UNDERGLOVE SZ 8.5 BLUE

## (undated) DEVICE — PADDING CAST 4 IN  COTTON STRL

## (undated) DEVICE — BETHLEHEM UNIVERSAL  ARTHRO PK: Brand: CARDINAL HEALTH